# Patient Record
Sex: FEMALE | Race: WHITE | NOT HISPANIC OR LATINO | Employment: OTHER | URBAN - NONMETROPOLITAN AREA
[De-identification: names, ages, dates, MRNs, and addresses within clinical notes are randomized per-mention and may not be internally consistent; named-entity substitution may affect disease eponyms.]

---

## 2017-04-18 ENCOUNTER — TRANSCRIBE ORDERS (OUTPATIENT)
Dept: INFUSION THERAPY | Facility: HOSPITAL | Age: 72
End: 2017-04-18

## 2017-04-18 DIAGNOSIS — M81.0 OSTEOPOROSIS, UNSPECIFIED: Primary | ICD-10-CM

## 2017-06-06 ENCOUNTER — HOSPITAL ENCOUNTER (INPATIENT)
Facility: HOSPITAL | Age: 72
LOS: 10 days | Discharge: HOME HEALTH | End: 2017-06-16
Attending: FAMILY MEDICINE | Admitting: FAMILY MEDICINE

## 2017-06-06 DIAGNOSIS — S72.001D CLOSED RIGHT HIP FRACTURE, WITH ROUTINE HEALING, SUBSEQUENT ENCOUNTER: Primary | ICD-10-CM

## 2017-06-06 PROBLEM — S72.001A CLOSED RIGHT HIP FRACTURE: Status: ACTIVE | Noted: 2017-06-06

## 2017-06-06 LAB
BILIRUB UR QL STRIP: NEGATIVE
CLARITY UR: CLEAR
COLOR UR: YELLOW
GLUCOSE UR STRIP-MCNC: NEGATIVE MG/DL
HGB UR QL STRIP.AUTO: NEGATIVE
KETONES UR QL STRIP: NEGATIVE
LEUKOCYTE ESTERASE UR QL STRIP.AUTO: NEGATIVE
NITRITE UR QL STRIP: NEGATIVE
PH UR STRIP.AUTO: 6.5 [PH] (ref 5–8)
PROT UR QL STRIP: NEGATIVE
SP GR UR STRIP: <=1.005 (ref 1–1.03)
UROBILINOGEN UR QL STRIP: NORMAL

## 2017-06-06 PROCEDURE — 81003 URINALYSIS AUTO W/O SCOPE: CPT | Performed by: FAMILY MEDICINE

## 2017-06-06 PROCEDURE — 93010 ELECTROCARDIOGRAM REPORT: CPT | Performed by: INTERNAL MEDICINE

## 2017-06-06 PROCEDURE — 93005 ELECTROCARDIOGRAM TRACING: CPT | Performed by: FAMILY MEDICINE

## 2017-06-06 PROCEDURE — 94799 UNLISTED PULMONARY SVC/PX: CPT

## 2017-06-06 RX ORDER — SIMVASTATIN 40 MG
40 TABLET ORAL NIGHTLY
COMMUNITY
End: 2017-06-16 | Stop reason: HOSPADM

## 2017-06-06 RX ORDER — LEVETIRACETAM 500 MG/1
500 TABLET ORAL DAILY
Status: DISCONTINUED | OUTPATIENT
Start: 2017-06-07 | End: 2017-06-16 | Stop reason: HOSPADM

## 2017-06-06 RX ORDER — VENLAFAXINE HYDROCHLORIDE 150 MG/1
150 CAPSULE, EXTENDED RELEASE ORAL DAILY
Status: ON HOLD | COMMUNITY
End: 2019-02-20

## 2017-06-06 RX ORDER — PHENYTOIN SODIUM 100 MG/1
100 CAPSULE, EXTENDED RELEASE ORAL EVERY MORNING
COMMUNITY
End: 2017-06-16 | Stop reason: HOSPADM

## 2017-06-06 RX ORDER — ALUMINA, MAGNESIA, AND SIMETHICONE 2400; 2400; 240 MG/30ML; MG/30ML; MG/30ML
15 SUSPENSION ORAL EVERY 6 HOURS PRN
Status: DISCONTINUED | OUTPATIENT
Start: 2017-06-06 | End: 2017-06-16 | Stop reason: HOSPADM

## 2017-06-06 RX ORDER — MONTELUKAST SODIUM 10 MG/1
10 TABLET ORAL NIGHTLY
COMMUNITY
End: 2018-08-17

## 2017-06-06 RX ORDER — BACLOFEN 10 MG/1
10 TABLET ORAL 2 TIMES DAILY
Status: ON HOLD | COMMUNITY
End: 2018-06-04

## 2017-06-06 RX ORDER — CLONAZEPAM 0.5 MG/1
0.5 TABLET ORAL 2 TIMES DAILY PRN
Status: ON HOLD | COMMUNITY
End: 2018-06-04

## 2017-06-06 RX ORDER — SENNA PLUS 8.6 MG/1
1 TABLET ORAL 2 TIMES DAILY
Status: DISCONTINUED | OUTPATIENT
Start: 2017-06-06 | End: 2017-06-16 | Stop reason: HOSPADM

## 2017-06-06 RX ORDER — PHENOBARBITAL 97.2 MG/1
97.2 TABLET ORAL 2 TIMES DAILY
Status: ON HOLD | COMMUNITY
End: 2018-06-12

## 2017-06-06 RX ORDER — LEVETIRACETAM 500 MG/1
750 TABLET ORAL EVERY 12 HOURS SCHEDULED
Status: ON HOLD | COMMUNITY
End: 2019-02-20

## 2017-06-06 RX ORDER — LEVETIRACETAM 250 MG/1
250 TABLET ORAL
Status: ON HOLD | COMMUNITY
End: 2018-06-04

## 2017-06-06 RX ORDER — SENNA PLUS 8.6 MG/1
1 TABLET ORAL 2 TIMES DAILY
Status: ON HOLD | COMMUNITY
End: 2018-06-04

## 2017-06-06 RX ORDER — BISACODYL 10 MG
10 SUPPOSITORY, RECTAL RECTAL DAILY PRN
Status: DISCONTINUED | OUTPATIENT
Start: 2017-06-06 | End: 2017-06-16 | Stop reason: HOSPADM

## 2017-06-06 RX ORDER — ATORVASTATIN CALCIUM 40 MG/1
40 TABLET, FILM COATED ORAL NIGHTLY
Status: DISCONTINUED | OUTPATIENT
Start: 2017-06-06 | End: 2017-06-16 | Stop reason: HOSPADM

## 2017-06-06 RX ORDER — MONTELUKAST SODIUM 10 MG/1
10 TABLET ORAL NIGHTLY
Status: DISCONTINUED | OUTPATIENT
Start: 2017-06-06 | End: 2017-06-16 | Stop reason: HOSPADM

## 2017-06-06 RX ORDER — ONDANSETRON 4 MG/1
4 TABLET, ORALLY DISINTEGRATING ORAL EVERY 6 HOURS PRN
Status: DISCONTINUED | OUTPATIENT
Start: 2017-06-06 | End: 2017-06-16 | Stop reason: HOSPADM

## 2017-06-06 RX ORDER — PHENYTOIN SODIUM 100 MG/1
100 CAPSULE, EXTENDED RELEASE ORAL DAILY
Status: DISCONTINUED | OUTPATIENT
Start: 2017-06-07 | End: 2017-06-10

## 2017-06-06 RX ORDER — HYDROCODONE BITARTRATE AND ACETAMINOPHEN 10; 325 MG/1; MG/1
1 TABLET ORAL EVERY 6 HOURS PRN
Status: DISCONTINUED | OUTPATIENT
Start: 2017-06-06 | End: 2017-06-07

## 2017-06-06 RX ORDER — PANTOPRAZOLE SODIUM 40 MG/1
40 TABLET, DELAYED RELEASE ORAL 2 TIMES DAILY
COMMUNITY

## 2017-06-06 RX ORDER — LEVETIRACETAM 250 MG/1
250 TABLET ORAL NIGHTLY
Status: DISCONTINUED | OUTPATIENT
Start: 2017-06-06 | End: 2017-06-16 | Stop reason: HOSPADM

## 2017-06-06 RX ORDER — HYDROCODONE BITARTRATE AND ACETAMINOPHEN 10; 325 MG/1; MG/1
1 TABLET ORAL 2 TIMES DAILY PRN
COMMUNITY
End: 2017-06-16 | Stop reason: HOSPADM

## 2017-06-06 RX ORDER — BACLOFEN 10 MG/1
20 TABLET ORAL EVERY 12 HOURS SCHEDULED
Status: DISCONTINUED | OUTPATIENT
Start: 2017-06-06 | End: 2017-06-16 | Stop reason: HOSPADM

## 2017-06-06 RX ORDER — PHENYTOIN SODIUM 100 MG/1
200 CAPSULE, EXTENDED RELEASE ORAL NIGHTLY
Status: DISCONTINUED | OUTPATIENT
Start: 2017-06-06 | End: 2017-06-10

## 2017-06-06 RX ORDER — PANTOPRAZOLE SODIUM 40 MG/1
40 TABLET, DELAYED RELEASE ORAL
Status: DISCONTINUED | OUTPATIENT
Start: 2017-06-07 | End: 2017-06-16 | Stop reason: HOSPADM

## 2017-06-06 RX ORDER — ONDANSETRON 4 MG/1
4 TABLET, FILM COATED ORAL EVERY 6 HOURS PRN
Status: DISCONTINUED | OUTPATIENT
Start: 2017-06-06 | End: 2017-06-16 | Stop reason: HOSPADM

## 2017-06-06 RX ORDER — PHENOBARBITAL 32.4 MG/1
97.2 TABLET ORAL EVERY 12 HOURS SCHEDULED
Status: COMPLETED | OUTPATIENT
Start: 2017-06-06 | End: 2017-06-16

## 2017-06-06 RX ORDER — ONDANSETRON 2 MG/ML
4 INJECTION INTRAMUSCULAR; INTRAVENOUS EVERY 6 HOURS PRN
Status: DISCONTINUED | OUTPATIENT
Start: 2017-06-06 | End: 2017-06-16 | Stop reason: HOSPADM

## 2017-06-06 RX ORDER — PHENYTOIN SODIUM 100 MG/1
200 CAPSULE, EXTENDED RELEASE ORAL
COMMUNITY
End: 2017-06-16 | Stop reason: HOSPADM

## 2017-06-06 RX ORDER — ACETAMINOPHEN 325 MG/1
650 TABLET ORAL EVERY 4 HOURS PRN
Status: DISCONTINUED | OUTPATIENT
Start: 2017-06-06 | End: 2017-06-16 | Stop reason: HOSPADM

## 2017-06-06 RX ORDER — CLONAZEPAM 0.5 MG/1
0.5 TABLET ORAL 2 TIMES DAILY PRN
Status: DISCONTINUED | OUTPATIENT
Start: 2017-06-06 | End: 2017-06-16 | Stop reason: HOSPADM

## 2017-06-06 RX ORDER — VENLAFAXINE HYDROCHLORIDE 150 MG/1
150 CAPSULE, EXTENDED RELEASE ORAL
Status: DISCONTINUED | OUTPATIENT
Start: 2017-06-07 | End: 2017-06-16 | Stop reason: HOSPADM

## 2017-06-06 RX ADMIN — SENNOSIDES 1 TABLET: 8.6 TABLET ORAL at 21:16

## 2017-06-06 RX ADMIN — LEVETIRACETAM 250 MG: 250 TABLET, FILM COATED ORAL at 21:16

## 2017-06-06 RX ADMIN — PHENOBARBITAL 97.2 MG: 32.4 TABLET ORAL at 21:16

## 2017-06-06 RX ADMIN — CLONAZEPAM 0.5 MG: 0.5 TABLET ORAL at 21:17

## 2017-06-06 RX ADMIN — PHENYTOIN SODIUM 200 MG: 100 CAPSULE, EXTENDED RELEASE ORAL at 21:16

## 2017-06-06 RX ADMIN — ATORVASTATIN CALCIUM 40 MG: 40 TABLET, FILM COATED ORAL at 21:16

## 2017-06-06 RX ADMIN — MONTELUKAST SODIUM 10 MG: 10 TABLET ORAL at 21:16

## 2017-06-06 RX ADMIN — BACLOFEN 20 MG: 10 TABLET ORAL at 21:16

## 2017-06-07 LAB
ALBUMIN SERPL-MCNC: 3.3 G/DL (ref 3.4–4.8)
ALBUMIN/GLOB SERPL: 1 G/DL (ref 1.5–2.5)
ALP SERPL-CCNC: 141 U/L (ref 35–104)
ALT SERPL W P-5'-P-CCNC: 12 U/L (ref 10–36)
ANION GAP SERPL CALCULATED.3IONS-SCNC: 6.7 MMOL/L (ref 3.6–11.2)
AST SERPL-CCNC: 18 U/L (ref 10–30)
BASOPHILS # BLD AUTO: 0.06 10*3/MM3 (ref 0–0.3)
BASOPHILS NFR BLD AUTO: 1.5 % (ref 0–2)
BILIRUB SERPL-MCNC: 0.4 MG/DL (ref 0.2–1.8)
BUN BLD-MCNC: 6 MG/DL (ref 7–21)
BUN/CREAT SERPL: 15.4 (ref 7–25)
CALCIUM SPEC-SCNC: 8.8 MG/DL (ref 7.7–10)
CHLORIDE SERPL-SCNC: 106 MMOL/L (ref 99–112)
CO2 SERPL-SCNC: 25.3 MMOL/L (ref 24.3–31.9)
CREAT BLD-MCNC: 0.39 MG/DL (ref 0.43–1.29)
DEPRECATED RDW RBC AUTO: 48.8 FL (ref 37–54)
EOSINOPHIL # BLD AUTO: 0.44 10*3/MM3 (ref 0–0.7)
EOSINOPHIL NFR BLD AUTO: 10.8 % (ref 0–7)
ERYTHROCYTE [DISTWIDTH] IN BLOOD BY AUTOMATED COUNT: 14.5 % (ref 11.5–14.5)
GFR SERPL CREATININE-BSD FRML MDRD: >150 ML/MIN/1.73
GLOBULIN UR ELPH-MCNC: 3.3 GM/DL
GLUCOSE BLD-MCNC: 98 MG/DL (ref 70–110)
HCT VFR BLD AUTO: 34.2 % (ref 37–47)
HGB BLD-MCNC: 11.1 G/DL (ref 12–16)
IMM GRANULOCYTES # BLD: 0.02 10*3/MM3 (ref 0–0.03)
IMM GRANULOCYTES NFR BLD: 0.5 % (ref 0–0.5)
LYMPHOCYTES # BLD AUTO: 1.09 10*3/MM3 (ref 1–3)
LYMPHOCYTES NFR BLD AUTO: 26.7 % (ref 16–46)
MCH RBC QN AUTO: 32.1 PG (ref 27–33)
MCHC RBC AUTO-ENTMCNC: 32.5 G/DL (ref 33–37)
MCV RBC AUTO: 98.8 FL (ref 80–94)
MONOCYTES # BLD AUTO: 0.32 10*3/MM3 (ref 0.1–0.9)
MONOCYTES NFR BLD AUTO: 7.8 % (ref 0–12)
NEUTROPHILS # BLD AUTO: 2.16 10*3/MM3 (ref 1.4–6.5)
NEUTROPHILS NFR BLD AUTO: 52.7 % (ref 40–75)
OSMOLALITY SERPL CALC.SUM OF ELEC: 273.3 MOSM/KG (ref 273–305)
PLATELET # BLD AUTO: 383 10*3/MM3 (ref 130–400)
PMV BLD AUTO: 8.4 FL (ref 6–10)
POTASSIUM BLD-SCNC: 4.1 MMOL/L (ref 3.5–5.3)
PROT SERPL-MCNC: 6.6 G/DL (ref 6–8)
RBC # BLD AUTO: 3.46 10*6/MM3 (ref 4.2–5.4)
SODIUM BLD-SCNC: 138 MMOL/L (ref 135–153)
WBC NRBC COR # BLD: 4.09 10*3/MM3 (ref 4.5–12.5)

## 2017-06-07 PROCEDURE — 97530 THERAPEUTIC ACTIVITIES: CPT

## 2017-06-07 PROCEDURE — 97116 GAIT TRAINING THERAPY: CPT

## 2017-06-07 PROCEDURE — G8979 MOBILITY GOAL STATUS: HCPCS

## 2017-06-07 PROCEDURE — 25010000002 ENOXAPARIN PER 10 MG: Performed by: FAMILY MEDICINE

## 2017-06-07 PROCEDURE — 80053 COMPREHEN METABOLIC PANEL: CPT | Performed by: FAMILY MEDICINE

## 2017-06-07 PROCEDURE — G8985 CARRY GOAL STATUS: HCPCS

## 2017-06-07 PROCEDURE — 97163 PT EVAL HIGH COMPLEX 45 MIN: CPT

## 2017-06-07 PROCEDURE — 85025 COMPLETE CBC W/AUTO DIFF WBC: CPT | Performed by: FAMILY MEDICINE

## 2017-06-07 PROCEDURE — 97110 THERAPEUTIC EXERCISES: CPT

## 2017-06-07 PROCEDURE — G8978 MOBILITY CURRENT STATUS: HCPCS

## 2017-06-07 PROCEDURE — 97167 OT EVAL HIGH COMPLEX 60 MIN: CPT

## 2017-06-07 PROCEDURE — G8984 CARRY CURRENT STATUS: HCPCS

## 2017-06-07 RX ORDER — HYDROCODONE BITARTRATE AND ACETAMINOPHEN 10; 325 MG/1; MG/1
1 TABLET ORAL EVERY 4 HOURS PRN
Status: DISCONTINUED | OUTPATIENT
Start: 2017-06-07 | End: 2017-06-16 | Stop reason: HOSPADM

## 2017-06-07 RX ADMIN — PHENOBARBITAL 97.2 MG: 32.4 TABLET ORAL at 08:12

## 2017-06-07 RX ADMIN — VENLAFAXINE HYDROCHLORIDE 150 MG: 150 CAPSULE, EXTENDED RELEASE ORAL at 08:11

## 2017-06-07 RX ADMIN — SENNOSIDES 1 TABLET: 8.6 TABLET ORAL at 08:11

## 2017-06-07 RX ADMIN — ENOXAPARIN SODIUM 40 MG: 40 INJECTION SUBCUTANEOUS at 08:12

## 2017-06-07 RX ADMIN — HYDROCODONE BITARTRATE AND ACETAMINOPHEN 1 TABLET: 10; 325 TABLET ORAL at 20:54

## 2017-06-07 RX ADMIN — LEVETIRACETAM 250 MG: 250 TABLET, FILM COATED ORAL at 20:55

## 2017-06-07 RX ADMIN — LEVETIRACETAM 500 MG: 500 TABLET, FILM COATED ORAL at 08:11

## 2017-06-07 RX ADMIN — PHENYTOIN SODIUM 200 MG: 100 CAPSULE, EXTENDED RELEASE ORAL at 20:55

## 2017-06-07 RX ADMIN — SENNOSIDES 1 TABLET: 8.6 TABLET ORAL at 17:33

## 2017-06-07 RX ADMIN — PHENYTOIN SODIUM 100 MG: 100 CAPSULE, EXTENDED RELEASE ORAL at 08:11

## 2017-06-07 RX ADMIN — BACLOFEN 20 MG: 10 TABLET ORAL at 08:11

## 2017-06-07 RX ADMIN — BACLOFEN 20 MG: 10 TABLET ORAL at 20:54

## 2017-06-07 RX ADMIN — MONTELUKAST SODIUM 10 MG: 10 TABLET ORAL at 20:55

## 2017-06-07 RX ADMIN — PHENOBARBITAL 97.2 MG: 32.4 TABLET ORAL at 20:54

## 2017-06-07 RX ADMIN — CLONAZEPAM 0.5 MG: 0.5 TABLET ORAL at 20:54

## 2017-06-07 RX ADMIN — HYDROCODONE BITARTRATE AND ACETAMINOPHEN 1 TABLET: 10; 325 TABLET ORAL at 05:39

## 2017-06-07 RX ADMIN — PANTOPRAZOLE SODIUM 40 MG: 40 TABLET, DELAYED RELEASE ORAL at 05:39

## 2017-06-07 RX ADMIN — HYDROCODONE BITARTRATE AND ACETAMINOPHEN 1 TABLET: 10; 325 TABLET ORAL at 14:40

## 2017-06-07 RX ADMIN — ATORVASTATIN CALCIUM 40 MG: 40 TABLET, FILM COATED ORAL at 20:55

## 2017-06-07 NOTE — PROGRESS NOTES
Rehabilitation Nursing  Inpatient Rehabilitation Plan of Care Note    Plan of Care  Copy from POCBody Function Structure    Skin Integrity (Active)  Current Status (6/6/2017 9:00:00 PM): impaired skin integrity  Weekly Goal: prevent further skin integrity impairment  Discharge Goal: signs of regaining skin integrity    Safety    Potential for Injury (Active)  Current Status (6/6/2017 9:00:00 PM): risk for falls  Weekly Goal: remain free of falls  Discharge Goal: identify measures to prevent falls    Signed by: Yazmin Munoz Nurse

## 2017-06-07 NOTE — PROGRESS NOTES
Inpatient Rehabilitation Functional Measures Assessment    Functional Measures  RAS Eating:  RAS Grooming:  RAS Bathing:  RAS Upper Body Dressing:  RAS Lower Body Dressing:  RAS Toileting:    RAS Bladder Management  Level of Assistance:  Frequency/Number of Accidents this Shift:    RAS Bowel Management  Level of Assistance:  Frequency/Number of Accidents this Shift:    RAS Bed/Chair/Wheelchair Transfer:  RAS Toilet Transfer:  RAS Tub/Shower Transfer:    Previously Documented Mode of Locomotion at Discharge:  RAS Expected Mode of Locomotion at Discharge:  RAS Walk/Wheelchair:  RAS Stairs:    RAS Comprehension:  Both ( auditory and visual) modes of comprehension are used  equally. Comprehension Score = 7, Independent.  Patient comprehends  complex/abstract information in their primary language.  Patient is completely  independent for auditory and visual comprehension.  There are no activity  limitations.  RAS Expression:  Both ( vocal and non-vocal) modes of expression are used  equally. Expression Score = 7, Independent. Patient expresses complex/abstract  information in their primary language.  Patient is completely independent for  vocal and non-vocal expression.  There are no activity limitations.  RAS Social Interaction:  Social Interaction Score = 6, Modified Independent.  Patient is modified independent for social interaction, requiring: Requires  additional time.  RAS Problem Solving:  Patient does not make appropriate decisions in order to  solve complex problems without assistance from a helper. Problem Solving Score =  3, Moderate Direction. Patient makes appropriate decisions in order to solve  routine problems 50-74% of the time. Patient requires moderate/some direction  for the following behavior(s): Poor judgment. Difficulty with self-monitoring.  Difficulty weighing alternatives/making choices. Denies presence or extent of  disability. Impulsivity. patient is non weight bearing to right lower  extremity  and she neds constant reminding .  RAS Memory:  Memory Score = 3, Moderate Prompting. Patient recognizes and  remembers 50-74% of the time. Patient requires moderate/some prompting  for  memory for the following: Unaware of daily routine. Difficulty recognizing  family and/or friends. Inability to recognize people or remember  instructions/directions requiring short-term recall (minutes, hours, days).  Limited recall of daily routine.    Therapy Mode Minutes  Occupational Therapy:  Physical Therapy:  Speech Language Pathology:    Discharge Functional Goals:    Signed by: Nurse Mohinder

## 2017-06-07 NOTE — PROGRESS NOTES
Rehabilitation Nursing  Inpatient Rehabilitation Functional Measures Assessment and Plan of Care    Plan of Care/Interventions  Body Function Structure    Skin Integrity (Active)  Current Status (6/6/2017 9:00:00 PM): impaired skin integrity  Weekly Goal: prevent further skin integrity impairment  Discharge Goal: signs of regaining skin integrity    Safety    Potential for Injury (Active)  Current Status (6/6/2017 9:00:00 PM): risk for falls  Weekly Goal: remain free of falls  Discharge Goal: identify measures to prevent falls    Functional Measures  RAS Eating:  RAS Grooming:  RAS Bathing:  RAS Upper Body Dressing:  RAS Lower Body Dressing:  RAS Toileting:    RAS Bladder Management  Level of Assistance:  Frequency/Number of Accidents this Shift:    RAS Bowel Management  Level of Assistance:  Frequency/Number of Accidents this Shift:    RAS Bed/Chair/Wheelchair Transfer:  RAS Toilet Transfer:  RAS Tub/Shower Transfer:    Previously Documented Mode of Locomotion at Discharge:  RAS Expected Mode of Locomotion at Discharge:  RAS Walk/Wheelchair:  RAS Stairs:    RAS Comprehension:  Both ( auditory and visual) modes of comprehension are used  equally. Comprehension Score = 6, Modified [place].  Patient comprehends  complex/abstract information in their primary language, requiring: Additional  time.  RAS Expression:  Both ( vocal and non-vocal) modes of expression are used  equally. Expression Score = 6,  Modified Independent. Patient expresses  complex/abstract information in their primary language, requiring: Additional  time.  RAS Social Interaction:  Social Interaction Score = 6, Modified Independent.  Patient is modified independent for social interaction, requiring: Requires  additional time.  RAS Problem Solving:  Problem Solving Score = 6, Modified [place].  Patient  makes appropriate decisions in order to solve complex problems, but requires  extra time.  RAS Memory:  Memory Score = 6, Modified  [place].  Patient is modified  independent for memory, requiring: Requires additional time.    Signed by: Sasha Martin Nurse

## 2017-06-07 NOTE — THERAPY EVALUATION
Inpatient Rehabilitation - Occupational Therapy Initial Evaluation   Ed     Patient Name: Maribel Staton  : 1945  MRN: [id number]  Today's Date: 2017  Onset of Illness/Injury or Date of Surgery Date: 17  Date of Referral to OT: 17  Referring Physician: Dr. Velasquez    Admit Date: 2017     No diagnosis found.  Patient Active Problem List   Diagnosis   • Closed right hip fracture     Past Medical History:   Diagnosis Date   • Anxiety    • Arthritis    • Asthma    • Chronic low back pain    • COPD (chronic obstructive pulmonary disease)    • Depression    • GERD (gastroesophageal reflux disease)    • History of transfusion    • Hypertension    • Seizures    • Tobacco abuse      Past Surgical History:   Procedure Laterality Date   • APPENDECTOMY      about 15 years ago   • CARDIAC CATHETERIZATION     • CATARACT EXTRACTION Bilateral    • CHOLECYSTECTOMY  about 4 years ago   • CHOLECYSTECTOMY     • HIP ARTHROPLASTY Bilateral    • HYSTERECTOMY     • ORIF HIP FRACTURE Right 2017   • SKIN GRAFT                OT ASSESSMENT FLOWSHEET (last 72 hours)      OT Evaluation       17 1238 17 0751 17 2042 17 2018       Rehab Evaluation    Document Type evaluation;therapy note (daily note)  -AB        Subjective Information no complaints;agree to therapy  -AB        Patient Effort, Rehab Treatment good  -AB        General Information    Patient Profile Review yes  -AB        Onset of Illness/Injury or Date of Surgery Date 17  -AB        Referring Physician Dr. Velasquez  -AB        Precautions/Limitations fall precautions;non-weight bearing status;seizure precautions;other (see comments)   <skin integrity; NWB RLE; intermittent confusion  -AB        Prior Level of Function independent:;all household mobility;ADL's;home management   utilized RW at times for assistance w/ ambulation  -AB        Equipment Currently Used at Home shower  chair;commode;wheelchair;wheelchair, motorized;walker, rolling;cane, quad;cane, straight  -AB walker, rolling;wheelchair;shower chair;nebulizer;oxygen;cane, straight;wheelchair, motorized  -LUCIO  crutches;power chair, (recliner lift);walker, rolling;wheelchair  -     Plans/Goals Discussed With patient;agreed upon  -AB        Risks Reviewed patient:;LOB;increased discomfort;change in vital signs;increased drainage;lines disloged  -AB        Benefits Reviewed patient:;improve function;increase independence;increase strength;increase balance;decrease pain;decrease risk of DVT;improve skin integrity;increase knowledge  -AB        Barriers to Rehab cognitive status;physical barrier  -AB        Living Environment    Lives With spouse  -AB spouse  -LUCIO spouse  -      Living Arrangements house;mobile home  -AB house  -LUCIO house  -      Home Accessibility tub/shower is not walk in;ramps present at home  -AB ramps present at home;bed and bath on same level  -LUCIO bed and bath on same level  -      Stair Railings at Home   inside, present on left side  -      Type of Financial/Environmental Concern   eviction pending  -      Transportation Available  car  -LUCIO car  -      Clinical Impression    Date of Referral to OT 06/06/17  -AB        OT Diagnosis s/p R Hip ORIF  -AB        Impairments Found (describe specific impairments) aerobic capacity/endurance;arousal, attention, and cognition;gait, locomotion, and balance;muscle performance;other (see comments)   impaired ADL's  -AB        Patient/Family Goals Statement Return home  -AB        Criteria for Skilled Therapeutic Interventions Met yes;treatment indicated  -AB        Rehab Potential good, to achieve stated therapy goals  -AB        Therapy Frequency 3-5 times/wk  -AB        Predicted Duration of Therapy Intervention (days/wks) 10-14 days  -AB        Anticipated Equipment Needs At Discharge --   TBD  -AB        Anticipated Discharge Disposition --   TBD  -AB         Functional Level Prior    Ambulation    0-->independent  -FH     Transferring    0-->independent  -FH     Toileting    0-->independent  -FH     Bathing    0-->independent  -     Dressing    0-->independent  -     Eating    0-->independent  -FH     Communication    0-->understands/communicates without difficulty  -FH     Swallowing    0-->swallows foods/liquids without difficulty  -FH     Vision Assessment/Intervention    Visual Impairment WFL with corrective lenses  -AB        Cognitive Assessment/Intervention    Current Cognitive/Communication Assessment impaired  -AB        Orientation Status oriented to;person;place;situation;disoriented to;time  -AB        Follows Commands/Answers Questions able to follow single-step instructions;75% of the time;100% of the time;needs cueing;needs increased time;needs repetition  -AB        Personal Safety decreased awareness, need for assist;decreased awareness, need for safety  -AB        Personal Safety Interventions gait belt;nonskid shoes/slippers when out of bed;supervised activity  -AB        ROM (Range of Motion)    General ROM Detail BUE AROM WFL  -AB        MMT (Manual Muscle Testing)    General MMT Assessment Detail Bilateral Shoulders: 4/5; Bilateral elbows, hands: 4+/5  -AB        Mobility Assessment/Training    Extremity Weight-Bearing Status right lower extremity  -AB        Right Lower Extremity Weight-Bearing non weight-bearing  -AB        Transfer Assessment/Treatment    Transfer, Comment ModA/Ivory  -AB        ADL Assessment/Intervention    Additional Documentation Self-Feeding Assessment/Training (Group)  -AB        Lower Body Bathing Assessment/Training    LB Bathing Assess/Train, Comment TB Bathing: ModA  -AB        Upper Body Dressing Assessment/Training    UB Dressing Assess/Train, Comment Supervision/Set-Up  -AB        Lower Body Dressing Assessment/Training    LB Dressing Assess/Train, Comment MaxA  -AB        Toileting Assessment/Training     Toileting Assess/Train, Comment TotalA  -AB        Grooming Assessment/Training    Grooming Assess/Train, Comment Supervision/Set-Up  -AB        Self-Feeding Assessment/Training    Self-Feeding Assess/Train, Comment Set-Up  -AB        Motor Skills/Interventions    Additional Documentation Fine Motor Coordination Training (Group);Gross Motor Coordination Training (Group)  -AB        Therapy Exercises    Bilateral Upper Extremity AROM:;sitting   BUE CoordEx; G/FMC TherEx/Act; Strengthening  -AB        BUE Resistance other (comment)   BUE Wrist Rolls X2  -AB        Gross Motor Coordination Training    Gross Motor Skill, Impairments Detail BUE Rapid Alternating Intact  -AB        Fine Motor Coordination Training    Detail (Fine Motor Coordination Training) BUE FMC/In-Hand Manipulation slightly decreased  -AB        Opposition Right:;Left:;intact  -AB        Sensory Assessment/Intervention    Sensory Impairment --   BUE intact  -AB        General Therapy Interventions    Planned Therapy Interventions activity intolerance;adaptive equipment training;ADL retraining;energy conservation;fine motor coordination training;home exercise program;motor coordination training;ROM (Range of Motion);strengthening;transfer training;other (see comments)   Therapeutic groups as beneficial to patient  -AB        Positioning and Restraints    Post Treatment Position wheelchair  -AB        In Bed supine;call light within reach;encouraged to call for assist;exit alarm on;side rails up x2;RLE elevated   in AM  -AB        In Wheelchair sitting;with other staff;legs elevated   in PM w/   -AB          User Key  (r) = Recorded By, (t) = Taken By, (c) = Cosigned By    Initials Name Effective Dates    AB Missy Pelayo OT 02/04/16 -     LUCIO Joe RN 06/16/16 -      Yazmin Munoz RN 06/16/16 -            Occupational Therapy Education     Title: PT OT SLP Therapies (Active)     Topic: Occupational  Therapy (Active)     Point: ADL training (Active)    Description: Instruct learner(s) on proper safety adaptation and remediation techniques during self care or transfers.   Instruct in proper use of assistive devices.    Learning Progress Summary    Learner Readiness Method Response Comment Documented by Status   Patient Acceptance E,D NR  AB 06/07/17 1234 Active               Point: Precautions (Active)    Description: Instruct learner(s) on prescribed precautions during self-care and functional transfers.    Learning Progress Summary    Learner Readiness Method Response Comment Documented by Status   Patient Acceptance E,D NR  AB 06/07/17 1234 Active               Point: Body mechanics (Active)    Description: Instruct learner(s) on proper positioning and spine alignment during self-care, functional mobility activities and/or exercises.    Learning Progress Summary    Learner Readiness Method Response Comment Documented by Status   Patient Acceptance E,D NR  AB 06/07/17 1234 Active                      User Key     Initials Effective Dates Name Provider Type Discipline    AB 02/04/16 -  Missy Pelayo, OT Occupational Therapist OT                  OT Recommendation and Plan  Anticipated Equipment Needs At Discharge:  (TBD)  Anticipated Discharge Disposition:  (TBD)  Planned Therapy Interventions: activity intolerance, adaptive equipment training, ADL retraining, energy conservation, fine motor coordination training, home exercise program, motor coordination training, ROM (Range of Motion), strengthening, transfer training, other (see comments) (Therapeutic groups as beneficial to patient)  Therapy Frequency: 3-5 times/wk             OT Goals       06/07/17 1231          Transfer Training OT LTG    Transfer Training OT LTG, Date Established 06/07/17  -AB      Transfer Training OT LTG, Time to Achieve by discharge  -AB      Transfer Training OT LTG, Activity Type all transfers  -AB      Transfer Training OT  LTG, [place] Level contact guard assist  -AB      Patient Education OT LTG    Patient Education OT LTG, Date Established 06/07/17  -AB      Patient Education OT LTG, Time to Achieve by discharge  -AB      Patient Education OT LTG, Education Type HEP;adaptive equipment mgmt;home safety;work simplification  -AB      Bathing OT STG    Bathing Goal OT STG, Date Established 06/07/17  -AB      Bathing Goal OT STG, Time to Achieve 5 - 7 days  -AB      Bathing Goal OT STG, [place] Level minimum assist (75% patient effort)  -AB      Bathing OT LTG    Bathing Goal OT LTG, Date Established 06/07/17  -AB      Bathing Goal OT LTG, Time to Achieve by discharge  -AB      Bathing Goal OT LTG, [place] Level contact guard assist  -AB      Eating Self-Feeding OT LTG    Eat Self Feeding Goal OT LTG, Date Established 06/07/17  -AB      Eat Self Feeding Goal OT LTG, Time to Achieve by discharge  -AB      Eat Self Feed Goal OT LTG, [place] Level conditional independence  -AB      Toileting OT LTG    Toileting Goal OT LTG, Date Established 06/07/17  -AB      Toileting Goal OT LTG, Time to Achieve by discharge  -AB      Toileting Goal OT LTG, [place] Level contact guard assist  -AB      LB Dressing OT STG    LB Dressing Goal OT STG, Date Established 06/07/17  -AB      LB Dressing Goal OT STG, Time to Achieve 5 - 7 days  -AB      LB Dressing Goal OT STG, [place] Level moderate assist (50% patient effort)  -AB      LB Dressing OT LTG    LB Dressing Goal OT LTG, Date Established 06/07/17  -AB      LB Dressing Goal OT LTG, Time to Achieve by discharge  -AB      LB Dressing Goal OT LTG, [place] Level minimum assist (75% patient effort)  -AB        User Key  (r) = Recorded By, (t) = Taken By, (c) = Cosigned By    Initials Name Provider Type    AB Missy Pelayo, OT Occupational Therapist              Time Calculation:   OT Start Time: 1245  OT Stop Time: 1325  OT Time Calculation (min): 40 min  OT  Non-Billable Time (min): 60 min    Therapy Charges for Today     Code Description Service Date Service Provider Modifiers Qty    66513599862 HC OT EVAL HIGH COMPLEXITY 3 2017 Missy Pelayo, OT GO 1    27332476019 HC OT THER SUPP EA 15 MIN 2017 Missy Pelayo OT GO 2    14906281013 HC OT THERAPEUTIC ACT EA 15 MIN 2017 Missy Dariana Tanges OT GO 3               Missy Tanges OT  2017 and Inpatient Rehabilitation - Occupational Therapy Treatment Note  [facility]     Patient Name: Maribel Staton  : 1945  MRN: [id number]  Today's Date: 2017  Onset of Illness/Injury or Date of Surgery Date: 17  Date of Referral to OT: 17  Referring Physician: Dr. Velasquez      Admit Date: 2017    Visit Dx:   No diagnosis found.  Patient Active Problem List   Diagnosis   • Closed right hip fracture                 OT Goals       17 1231          Transfer Training OT LTG    Transfer Training OT LTG, Date Established 17  -AB      Transfer Training OT LTG, Time to Achieve by discharge  -AB      Transfer Training OT LTG, Activity Type all transfers  -AB      Transfer Training OT LTG, [place] Level contact guard assist  -AB      Patient Education OT LTG    Patient Education OT LTG, Date Established 17  -AB      Patient Education OT LTG, Time to Achieve by discharge  -AB      Patient Education OT LTG, Education Type HEP;adaptive equipment mgmt;home safety;work simplification  -AB      Bathing OT STG    Bathing Goal OT STG, Date Established 17  -AB      Bathing Goal OT STG, Time to Achieve 5 - 7 days  -AB      Bathing Goal OT STG, [place] Level minimum assist (75% patient effort)  -AB      Bathing OT LTG    Bathing Goal OT LTG, Date Established 17  -AB      Bathing Goal OT LTG, Time to Achieve by discharge  -AB      Bathing Goal OT LTG, [place] Level contact guard assist  -AB      Eating Self-Feeding OT LTG    Eat Self Feeding Goal  OT LTG, Date Established 06/07/17  -AB      Eat Self Feeding Goal OT LTG, Time to Achieve by discharge  -AB      Eat Self Feed Goal OT LTG, [place] Level conditional independence  -AB      Toileting OT LTG    Toileting Goal OT LTG, Date Established 06/07/17  -AB      Toileting Goal OT LTG, Time to Achieve by discharge  -AB      Toileting Goal OT LTG, [place] Level contact guard assist  -AB      LB Dressing OT STG    LB Dressing Goal OT STG, Date Established 06/07/17  -AB      LB Dressing Goal OT STG, Time to Achieve 5 - 7 days  -AB      LB Dressing Goal OT STG, [place] Level moderate assist (50% patient effort)  -AB      LB Dressing OT LTG    LB Dressing Goal OT LTG, Date Established 06/07/17  -AB      LB Dressing Goal OT LTG, Time to Achieve by discharge  -AB      LB Dressing Goal OT LTG, [place] Level minimum assist (75% patient effort)  -AB        User Key  (r) = Recorded By, (t) = Taken By, (c) = Cosigned By    Initials Name Provider Type    AB Missy Pelayo OT Occupational Therapist          Occupational Therapy Education     Title: PT OT SLP Therapies (Active)     Topic: Occupational Therapy (Active)     Point: ADL training (Active)    Description: Instruct learner(s) on proper safety adaptation and remediation techniques during self care or transfers.   Instruct in proper use of assistive devices.    Learning Progress Summary    Learner Readiness Method Response Comment Documented by Status   Patient Acceptance E,D NR  AB 06/07/17 1234 Active               Point: Precautions (Active)    Description: Instruct learner(s) on prescribed precautions during self-care and functional transfers.    Learning Progress Summary    Learner Readiness Method Response Comment Documented by Status   Patient Acceptance E,D NR  AB 06/07/17 1234 Active               Point: Body mechanics (Active)    Description: Instruct learner(s) on proper positioning and spine alignment during self-care,  functional mobility activities and/or exercises.    Learning Progress Summary    Learner Readiness Method Response Comment Documented by Status   Patient Acceptance E,D NR  AB 06/07/17 1234 Active                      User Key     Initials Effective Dates Name Provider Type [facility] 02/04/16 -  Missy Pelayo OT Occupational Therapist OT                  OT Recommendation and Plan  Anticipated Equipment Needs At Discharge:  (TBD)  Anticipated Discharge Disposition:  (TBD)  Planned Therapy Interventions: activity intolerance, adaptive equipment training, ADL retraining, energy conservation, fine motor coordination training, home exercise program, motor coordination training, ROM (Range of Motion), strengthening, transfer training, other (see comments) (Therapeutic groups as beneficial to patient)  Therapy Frequency: 3-5 times/wk          Time Calculation:         Time Calculation- OT       06/07/17 1552 06/07/17 0955       Time Calculation- OT    OT Start Time 1245  -AB 0955  -AB     OT Stop Time 1325  -AB 1045  -AB     OT Time Calculation (min) 40 min  -AB 50 min  -AB     Total Timed Code Minutes- OT 40 minute(s)  -AB 50 minute(s)  -AB     OT Non-Billable Time (min)  60 min  -AB       User Key  (r) = Recorded By, (t) = Taken By, (c) = Cosigned By    Initials Name Provider Type    AB Missy Pelayo OT Occupational Therapist           Therapy Charges for Today     Code Description Service Date Service Provider Modifiers Qty    05616839715 HC OT EVAL HIGH COMPLEXITY 3 6/7/2017 Missy Pelayo OT GO 1    43946048197 HC OT THER SUPP EA 15 MIN 6/7/2017 Missy Pelayo OT GO 2    [id number] HC OT THERAPEUTIC ACT EA 15 MIN 6/7/2017 iMssy Pelayo OT GO 3               Missy Pelayo OT  6/7/2017

## 2017-06-07 NOTE — PROGRESS NOTES
Rehabilitation Nursing  Inpatient Rehabilitation Admission Assessment of Functional Measures    Functional Measures  RAS Eating:  RAS Grooming:  RAS Bathing:  RAS Upper Body Dressing:  RAS Lower Body Dressing:  RAS Toileting:    RAS Bladder Management  Level of Assistance:  Bladder Score = 3. Patient performs 50-74% of tasks and  requires moderate assistance for bladder management. [place] provides some assist  to roll or bridge to place OR remove bedpan. bedpan  Frequency/Number of Accidents (4 days prior to admission):  Bladder accidents  prior to admission:  0 Patient has not had an accident, but used a  device/medication 4 days prior to admission requiring: bedpan .  Frequency/Number of Accidents this Shift: Bladder accidents this shift:  0 .  Patient has not had an accident, but used a device/medication this shift  requiring: bedpan .    RAS Bowel Management  Level of Assistance: Bowel Score = 3. Patient performs 50-74% of tasks and  requires moderate assistance for bowel management. [place] provides some assist  to roll or bridge to place OR remove bedpan. bedpan, medication  Frequency/Number of Accidents (4 days prior to admission): Bowel accidents prior  to admission:  0 Patient has not had an accident, but used a device/medication 4  days prior to admission requiring: bedpan, medication .  Frequency/Number of Accidents this Shift: Bowel accidents this shift: 0 .  Patient has not had an accident, but used a device/medication this shift  requiring: bedpan, medication .    RAS Bed/Chair/Wheelchair Transfer:  [facility] Toilet Transfer:  [facility] Tub/Shower Transfer:    Previously Documented Mode of Locomotion at Discharge:  [facility] Expected Mode of Locomotion at Discharge:  [facility] Walk/Wheelchair:  [facility] Stairs:    [facility] Comprehension:  Both ( auditory and visual) modes of comprehension are used  equally. Comprehension Score = 6, Modified [place].  Patient comprehends  complex/abstract information in their primary language,  requiring: Additional  time.  RAS Expression:  Both ( vocal and non-vocal) modes of expression are used  equally. Expression Score = 6,  Modified Independent. Patient expresses  complex/abstract information in their primary language, requiring: Additional  time.  RAS Social Interaction:  Social Interaction Score = 6, Modified Independent.  Patient is modified independent for social interaction, requiring: Requires  additional time. Medications.  RAS Problem Solving:  Problem Solving Score = 6, Modified [place].  Patient  makes appropriate decisions in order to solve complex problems, but requires  extra time.  RAS Memory:  Memory Score = 6, Modified [place].  Patient is modified  independent for memory, requiring: Requires additional time.    Discharge Functional Goals:  Bladder: 6  Bowel: 6  Mode of Comprehension: B  Comprehension: 6  Mode of Expression: B  Expression: 6  Problem Solvin  Social Interaction: 6  Memory: 6    Signed by: Arina Joe, Supervisor

## 2017-06-07 NOTE — PROGRESS NOTES
Patient Assessment Instrument  Quality Indicators - Admission    Section B. Hearing, Speech Vision  Expression of Ideas and Wants: Expresses complex messages without difficulty and  with speech that is clear and easy to understand.  Understanding Verbal Content: Understands: Clear comprehension without cues or  repetitions.    Section C. Cognitive Patterns      Section [id number]. Prior Functioning      Section GD8576. Prior Device Use      Section [id number]. Self Care Performance      Section [id number]. Self Care Discharge Goals      Section PW5006. Mobility Performance      Section [id number]. Mobility Discharge Goals      Section H. Bladder and Bowel  Bladder Continence: Incontinent less than daily (e.g., once or twice during the  3-day assessment period).  Bowel Continence: Occasionally incontinent (one episode of bowel incontinence).    Section I. Active Diagnosis      Section J. Health Conditions      Section K. Swallowing/Nutritional Status      Section M. Skin Conditions      Section O. Special Treatments, Procedures, and Programs      OPTIONAL BRANCH FOR TRACKING FALLS  Fall(s) During Shift:    Signed by: Sasha Martin Nurse

## 2017-06-07 NOTE — PROGRESS NOTES
Inpatient Rehabilitation Functional Measures Assessment    Functional Measures  RAS Eating:  Eating Score = 5. Patient is supervision/set-up for eating,  requiring: Opening containers. Cutting meat. Pouring liquids. No assistive  devices were required.  RAS Grooming: Patient requires minimal assistance for washing, rinsing and  drying the face. Patient requires minimal assistance for washing, rinsing and  drying the hands. Patient requires minimal assistance for brushing teeth.  Patient requires minimal assistance for brushing/combing hair. Shaving or  applying makeup was not observed for this patient. Patient performs 0 -  24% of  grooming tasks.  Grooming Score = 1, Total Assistance. No assistive devices were  required.  RAS Bathing:  Patient bathed in bed. Patient requires minimal assistance for  washing, rinsing, or drying the right arm. Patient requires minimal assistance  for washing, rinsing, or drying the left arm. Patient requires minimal  assistance for washing, rinsing, or drying the chest. Patient requires minimal  assistance for washing, rinsing, or drying the abdomen. Patient requires total  assistance for washing, rinsing, or drying the perineal area. Patient requires  total assistance for washing, rinsing, or drying the buttocks. Patient requires  total assistance for washing, rinsing, or drying the right upper leg. Patient  requires total assistance for washing, rinsing, or drying the left upper leg.  Patient requires total assistance for washing, rinsing, or drying the right  lower leg, including the foot. Patient requires total assistance for washing,  rinsing, or drying the left lower leg, including the foot. Patient performs 0 -  24% of bathing tasks.  Bathing Score = 1, Total Assistance. No assistive devices  were required.  RAS Upper Body Dressing:  Upper Body Dressing was not observed this shift  because patient dressed/undressed in pajamas/gown only. pt doesn't have any  clothes .  RAS Lower  Body Dressing:  Lower Body Dressing was not observed this shift  because patient dressed/undressed in pajamas/gown only.  RAS Toileting:  Patient requires moderate assistance for adjusting clothing  before using a toilet, commode, bedpan, or urinal. Patient requires moderate  assistance for hygiene. Patient requires moderate assistance for adjusting  clothing after using a toilet, commode, bedpan, or urinal. Patient performs 0 -  24% of toileting tasks.  Toileting Score = 1, Total Assistance. Patient requires  the following assistive device(s): Grab bar. Arm rest of a specialized seat.    RAS Bladder Management  Level of Assistance:  Bladder Score = 5.  Patient is supervision/set-up for  bladder management, requiring: Emptying equipment. Setting out equipment. Stand  by assistance. Patient requires the following assistive device(s):  Bedpan.  Frequency/Number of Accidents this Shift:  Bladder accidents this shift:  0 .  Patient has not had an accident this shift.    RAS Bowel Management  Level of Assistance: Bowel Score = 7.  Patient is completely independent for  bowel management.  Patient did not have bowel movement.  No  medication/intervention was provided.  Frequency/Number of Accidents this Shift: Bowel accidents this shift: 0 .  Patient has not had an accident this shift.    RAS Bed/Chair/Wheelchair Transfer:  Activity was not observed.  RAS Toilet Transfer:  Toilet Transfer was not observed this shift because  patient used bedpan/urinal only.  RAS Tub/Shower Transfer:    Previously Documented Mode of Locomotion at Discharge:  [facility] Expected Mode of Locomotion at Discharge:  [facility] Walk/Wheelchair:  [facility] Stairs:    [facility] Comprehension:  [facility] Expression:  [facility] Social Interaction:  [facility] Problem Solving:  [facility] Memory:    Therapy Mode Minutes  Occupational Therapy:  Physical Therapy:  Speech Language Pathology:    Discharge Functional Goals:    Signed by: RENNY Carr

## 2017-06-07 NOTE — PROGRESS NOTES
Inpatient Rehabilitation Functional Measures Assessment and Plan of Care    Plan of Care  Self Care    [OT] Bathing(Active)  Current Status(06/07/2017): ModA  Weekly Goal(06/13/2017): [place]  Discharge Goal: CGA    [OT] Dressing (Lower)(Active)  Current Status(06/07/2017): MaxA  Weekly Goal(06/13/2017): ModA  Discharge Goal: [place]    Performed Intervention(s)  ADL Retraining; Pt./Family Education; TherEx; Fxl Transfers; AE Training    Functional Measures  RAS Eating:  Eating Score = 5. Patient is supervision/set-up for eating,  requiring: Opening containers. No assistive devices were required.  RAS Grooming: Grooming Score = 5. Patient is supervision/set-up for grooming,  requiring: Setting out grooming equipment. Initial preparation. Stand by  assistance. Verbal cuing, prompting, or instructing. No assistive devices were  required.  RAS Bathing:  Bathing was not observed this shift.  Simulated bathing activities  only.  RAS Upper Body Dressing:  Upper Body Dressing Score = 5. Patient is supervision  for upper body dressing, requiring: Gathering/setting out clothes. Stand by  assistance. Verbal cuing, prompting, or instructing. No assistive devices were  required.  RAS Lower Body Dressing:  Gathering clothes was not observed for this patient.  Wearing underwear or an undergarment was not observed for this patient. Patient  requires total assistance for holding clothing and/or threading the right leg  through the pants/skirt. Patient requires no physical assistance for donning  and/or doffing pants/skirt threading left leg. Patient requires  minimal/incidental physical assistance for pulling pants/skirt over hips and  adjusting fasteners. Patient requires total assistance for holding clothing  and/or donning and/or doffing right sock. Patient requires no physical  assistance for donning and/or doffing left sock. Patient requires total  assistance for holding clothing and/or donning and/or doffing right shoe.  Patient  requires total assistance for holding clothing and/or donning and/or  doffing left shoe. Patient performs 39.29 % of lower body dressing tasks. Lower  Body Dressing Score = 2, Maximal Assistance. No assistive devices were required.    RAS Toileting:  Patient requires minimal assistance for adjusting clothing  before using a toilet, commode, bedpan, or urinal. Patient requires total  assistance for hygiene. Patient requires minimal assistance for adjusting  clothing after using a toilet, commode, bedpan, or urinal. Patient performs 0 -  24% of toileting tasks.  Toileting Score = 1, Total Assistance. Patient requires  the following assistive device(s): Grab bar.    RAS Bladder Management  Level of Assistance:  Frequency/Number of Accidents this Shift:    RAS Bowel Management  Level of Assistance:  Frequency/Number of Accidents this Shift:    RAS Bed/Chair/Wheelchair Transfer:  RAS Toilet Transfer:  Toilet Transfer Score = 3.  Patient performs 50-74% of  effort and requires moderate assistance (some lifting) for transferring to and  from the toilet/commode. Patient requires the following assistive device(s):  Safety frame/over the toilet. Grab bars.  RAS Tub/Shower Transfer:  Activity was not observed.    Previously Documented Mode of Locomotion at Discharge:  RAS Expected Mode of Locomotion at Discharge:  RAS Walk/Wheelchair:  RAS Stairs:    [facility] Comprehension:  RAS Expression:  RAS Social Interaction:  RAS Problem Solving:  RAS Memory:    Therapy Mode Minutes  Occupational Therapy: Individual: 90 minutes.  Physical Therapy:  Speech Language Pathology:    Discharge Functional Goals:  Eatin  Groomin  Bathing; 4  Lower Body Dressin  Upper Body Dressin  Toiletin  Tub/Shower: 4  Toilet Transfers: 4    Signed by: Missy Pelayo, Occupational Therapist

## 2017-06-07 NOTE — PROGRESS NOTES
Inpatient Rehabilitation Functional Measures Assessment    Functional Measures  RAS Eating:  Eating Score = 5. Patient is supervision/set-up for eating,  requiring: Opening containers. No assistive devices were required.  RAS Grooming:  RAS Bathing:  RAS Upper Body Dressing:  RAS Lower Body Dressing:  RAS Toileting:  Activity was not observed.    RAS Bladder Management  Level of Assistance:  Bladder Score = 2. Patient performs 25-49% of tasks and  requires maximal assistance for bladder management. [place] provides most of the  assist to roll or bridge to place and remove bedpan.  Frequency/Number of Accidents this Shift:  Bladder accidents this shift:  0 .  Patient has not had an accident, but used a device/medication this shift  requiring: bedpan .    RAS Bowel Management  Level of Assistance: Bowel Score = 7.  Patient is completely independent for  bowel management.  Patient did not have bowel movement.  No  medication/intervention was provided.  Frequency/Number of Accidents this Shift: Bowel accidents this shift: 0 .  Patient has not had an accident, but used a device/medication this shift  requiring: bedpan .    RAS Bed/Chair/Wheelchair Transfer:  Activity was not observed.  RAS Toilet Transfer:  Activity was not observed.  RAS Tub/Shower Transfer:  Activity was not observed.    Previously Documented Mode of Locomotion at Discharge:  RAS Expected Mode of Locomotion at Discharge:  RAS Walk/Wheelchair:  RAS Stairs:    RAS Comprehension:  RAS Expression:  RAS Social Interaction:  RAS Problem Solving:  RAS Memory:    Therapy Mode Minutes  Occupational Therapy:  Physical Therapy:  Speech Language Pathology:    Discharge Functional Goals:    Signed by: RENNY Ball

## 2017-06-07 NOTE — PLAN OF CARE
Problem: Inpatient Physical Therapy  Goal: Bed Mobility Goal STG- PT    06/07/17 1703   Bed Mobility PT STG   Bed Mobility PT STG, Date Established 06/07/17   Bed Mobility PT STG, Time to Achieve 5 - 7 days   Bed Mobility PT STG, Activity Type all bed mobility   Bed Mobility PT STG, [place] Level contact guard assist   Transfer Training Goal, Assist Device bed rails       Goal: Bed Mobility Goal LTG- PT    06/07/17 1703   Bed Mobility PT LTG   Bed Mobility PT LTG, Date Established 06/07/17   Bed Mobility PT LTG, Time to Achieve by discharge   Bed Mobility PT LTG, Activity Type all bed mobility   Bed Mobility PT LTG, [place] Level conditional independence;supervision required       Goal: Transfer Training Goal 1 STG- PT    06/07/17 1703   Transfer Training PT STG   Transfer Training PT STG, Date Established 06/07/17   Transfer Training PT STG, Time to Achieve 5 - 7 days   Transfer Training PT STG, Activity Type all transfers   Transfer Training PT STG, [place] Level contact guard assist   Transfer Training PT STG, Assist Device other (see comments)  (with appropriate AD)       Goal: Transfer Training Goal 1 LTG- PT    06/07/17 1703   Transfer Training PT LTG   Transfer Training PT LTG, Date Established 06/07/17   Transfer Training PT LTG, Time to Achieve by discharge   Transfer Training PT LTG, Activity Type all transfers   Transfer Training PT LTG, [place] Level conditional independence;supervision required   Transfer Training PT LTG, Assist Device other (see comments)  (with appropriate AD)       Goal: Gait Training Goal STG- PT    06/07/17 1703   Gait Training PT STG   Gait Training Goal PT STG, Date Established 06/07/17   Gait Training Goal PT STG, Time to Achieve 5 - 7 days   Gait Training Goal PT STG, [place] Level contact guard assist   Gait Training Goal PT STG, Assist Device walker, rolling   Gait Training Goal PT STG, Distance to Achieve 50       Goal: Gait Training Goal LTG- PT     06/07/17 1703   Gait Training PT LTG   Gait Training Goal PT LTG, Date Established 06/07/17   Gait Training Goal PT LTG, Time to Achieve by discharge   Gait Training Goal PT LTG, [place] Level conditional independence;supervision required   Gait Training Goal PT LTG, Assist Device walker, rolling   Gait Training Goal PT LTG, Distance to Achieve 75       Goal: Patient Education Goal LTG- PT    06/07/17 1703   Patient Education PT LTG   Patient Education PT LTG, Date Established 06/07/17   Patient Education PT LTG, Time to Achieve by discharge   Patient Education PT LTG, Education Type written program;HEP;precaution per surgeon;positioning;posture/body mechanics;gait;transfers;bed mobility;pain management;progression of POC;benefits of activity;home safety;equipment management;skin care/inspection;energy conservation   Patient Education PT LTG, Education Understanding demonstrate adequately;verbalize understanding

## 2017-06-07 NOTE — THERAPY EVALUATION
Inpatient Rehabilitation - Physical Therapy Initial Evaluation/Treatment Note  DEBORAH Ward     Patient Name: Maribel Staton  : 1945  MRN: [id number]  Today's Date: 2017   Onset of Illness/Injury or Date of Surgery Date: 17  Date of Referral to PT: 17  Referring Physician: Eva      Admit Date: 2017     Visit Dx:  No diagnosis found.  Patient Active Problem List   Diagnosis   • Closed right hip fracture     Past Medical History:   Diagnosis Date   • Anxiety    • Arthritis    • Asthma    • Chronic low back pain    • COPD (chronic obstructive pulmonary disease)    • Depression    • GERD (gastroesophageal reflux disease)    • History of transfusion    • Hypertension    • Seizures    • Tobacco abuse      Past Surgical History:   Procedure Laterality Date   • APPENDECTOMY      about 15 years ago   • CARDIAC CATHETERIZATION     • CATARACT EXTRACTION Bilateral    • CHOLECYSTECTOMY  about 4 years ago   • CHOLECYSTECTOMY     • HIP ARTHROPLASTY Bilateral    • HYSTERECTOMY     • ORIF HIP FRACTURE Right 2017   • SKIN GRAFT      's          PT ASSESSMENT (last 72 hours)      PT Evaluation       17 1623 17 1238    Rehab Evaluation    Document Type evaluation;therapy note (daily note)  -CT evaluation;therapy note (daily note)  -AB    Subjective Information agree to therapy;complains of;pain  -CT no complaints;agree to therapy  -AB    Patient Effort, Rehab Treatment good  -CT good  -AB    Symptoms Noted During/After Treatment increased pain  -CT     General Information    Patient Profile Review yes  -CT yes  -AB    Onset of Illness/Injury or Date of Surgery Date 17  -CT 17  -AB    Referring Physician Eva  -ALYCE Velasquez  -AB    Precautions/Limitations fall precautions;non-weight bearing status;seizure precautions   NWB RLE; intermittent confusion  -CT fall precautions;non-weight bearing status;seizure precautions;other (see comments)   <skin integrity; NWB RLE;  intermittent confusion  -AB    Prior Level of Function independent:;all household mobility;community mobility  -CT independent:;all household mobility;ADL's;home management   utilized RW at times for assistance w/ ambulation  -AB    Equipment Currently Used at Home shower chair;commode;wheelchair;wheelchair, motorized;walker, rolling;cane, quad;cane, straight  -CT shower chair;commode;wheelchair;wheelchair, motorized;walker, rolling;cane, quad;cane, straight  -AB    Plans/Goals Discussed With patient;agreed upon  -CT patient;agreed upon  -AB    Risks Reviewed patient:;LOB;nausea/vomiting;dizziness;increased discomfort;change in vital signs;increased drainage;lines disloged  -CT patient:;LOB;increased discomfort;change in vital signs;increased drainage;lines disloged  -AB    Benefits Reviewed patient:;improve function;increase independence;increase strength;increase balance;decrease pain;decrease risk of DVT;improve skin integrity;increase knowledge  -CT patient:;improve function;increase independence;increase strength;increase balance;decrease pain;decrease risk of DVT;improve skin integrity;increase knowledge  -AB    Barriers to Rehab cognitive status;physical barrier  -CT cognitive status;physical barrier  -AB    Living Environment    Lives With spouse  -CT spouse  -AB    Living Arrangements house;mobile home  -CT house;mobile home  -AB    Home Accessibility ramps present at home  -CT tub/shower is not walk in;ramps present at home  -AB    Clinical Impression    Date of Referral to PT 06/06/17  -CT     PT Diagnosis R hip ORIF  -CT     Functional Level At Time Of Evaluation Min A   -CT     Patient/Family Goals Statement pt goals are to return to PLOF  -CT     Criteria for Skilled Therapeutic Interventions Met yes;treatment indicated  -CT     Pathology/Pathophysiology Noted (Describe Specifically for Each System) musculoskeletal;neuromuscular  -CT     Impairments Found (describe specific impairments) aerobic  capacity/endurance;gait, locomotion, and balance;joint integrity and mobility  -CT     Functional Limitations in Following Categories (Describe Specific Limitations) self-care;home management  -CT     Rehab Potential good, to achieve stated therapy goals  -CT     Predicted Duration of Therapy Intervention (days/wks) 10-14 days  -CT     Pain Assessment    Pain Assessment 0-10  -CT     Pain Score 10  -CT     Pain Type Surgical pain  -CT     Pain Location Hip  -CT     Pain Orientation Right  -CT     Pain Intervention(s) Medication (See MAR);Repositioned;Rest   see RN notes for medication details  -CT     Vision Assessment/Intervention    Visual Impairment WFL with corrective lenses  -CT WFL with corrective lenses  -AB    Cognitive Assessment/Intervention    Current Cognitive/Communication Assessment impaired  -CT impaired  -AB    Orientation Status oriented to;person;place;situation;disoriented to;time  -CT oriented to;person;place;situation;disoriented to;time  -AB    Follows Commands/Answers Questions able to follow single-step instructions;75% of the time;100% of the time;needs cueing;needs increased time;needs repetition  -CT able to follow single-step instructions;75% of the time;100% of the time;needs cueing;needs increased time;needs repetition  -AB    Personal Safety decreased awareness, need for assist;decreased awareness, need for safety  -CT decreased awareness, need for assist;decreased awareness, need for safety  -AB    Personal Safety Interventions gait belt;muscle strengthening facilitated;nonskid shoes/slippers when out of bed  -CT gait belt;nonskid shoes/slippers when out of bed;supervised activity  -AB    ROM (Range of Motion)    General ROM Detail BLE functional within hip precaution   -CT BUE AROM WFL  -AB    MMT (Manual Muscle Testing)    General MMT Assessment Detail LLE 4/5; RLE deferred d/t reported pain  -CT Bilateral Shoulders: 4/5; Bilateral elbows, hands: 4+/5  -AB    Mobility  Assessment/Training    Extremity Weight-Bearing Status right lower extremity  -CT right lower extremity  -AB    Right Lower Extremity Weight-Bearing non weight-bearing  -CT non weight-bearing  -AB    Bed Mobility, Assessment/Treatment    Bed Mobility, Assistive Device bed rails  -CT     Bed Mobility, Roll Left, [place] contact guard assist  -CT     Bed Mobility, Roll Right, [place] contact guard assist  -CT     Bed Mobility, Scoot/Bridge, [place] minimum assist (75% patient effort)  -CT     Bed Mob, Supine to Sit, [place] minimum assist (75% patient effort);moderate assist (50% patient effort)  -CT     Bed Mob, Sit to Supine, [place] minimum assist (75% patient effort);moderate assist (50% patient effort)  -CT     Bed Mobility, Safety Issues decreased use of legs for bridging/pushing  -CT     Bed Mobility, Impairments ROM decreased;strength decreased;impaired balance;coordination impaired;pain  -CT     Transfer Assessment/Treatment    Transfers, Bed-Chair [place] minimum assist (75% patient effort);verbal cues required;nonverbal cues required (demo/gesture)  -CT     Transfers, Chair-Bed [place] minimum assist (75% patient effort);verbal cues required;nonverbal cues required (demo/gesture)  -CT     Transfers, Bed-Chair-Bed, Assist Device rolling walker  -CT     Transfers, Sit-Stand [place] minimum assist (75% patient effort);verbal cues required;nonverbal cues required (demo/gesture)  -CT     Transfers, Stand-Sit [place] minimum assist (75% patient effort);verbal cues required;nonverbal cues required (demo/gesture)  -CT     Transfers, Sit-Stand-Sit, Assist Device bed rails;rolling walker  -CT     Transfer, Maintain Weight Bearing Status able to maintain weight bearing status  -CT     Transfer, Safety Issues balance decreased during turns;step length decreased  -CT     Transfer, Impairments strength decreased;impaired balance;coordination impaired  -CT     Transfer, Comment   ModA/Ivory  -AB    Gait Assessment/Treatment    Gait, [place] Level minimum assist (75% patient effort);2 person assist required;verbal cues required;nonverbal cues required (demo/gesture)  -CT     Gait, Assistive Device rolling walker  -CT     Gait, Distance (Feet) 15  -CT     Gait, Gait Pattern Analysis swing-to gait  -CT     Gait, Gait Deviations rossy decreased;decreased heel strike;limb motion velocity decreased;step length decreased;weight-shifting ability decreased  -CT     Gait, Maintain Weight Bearing Status able to maintain weight bearing status  -CT     Gait, Safety Issues balance decreased during turns;step length decreased;weight-shifting ability decreased  -CT     Gait, Impairments strength decreased;impaired balance;coordination impaired  -CT     Stairs Assessment/Treatment    Stairs, [place] Level not tested  -CT     Motor Skills/Interventions    Additional Documentation  Fine Motor Coordination Training (Group);Gross Motor Coordination Training (Group)  -AB    Therapy Exercises    Bilateral Lower Extremities AROM:;15 reps;sitting  -CT     Bilateral Upper Extremity  AROM:;sitting   BUE CoordEx; G/FMC TherEx/Act; Strengthening  -AB    BUE Resistance  other (comment)   BUE Wrist Rolls X2  -AB    Fine Motor Coordination Training    Detail (Fine Motor Coordination Training)  BUE FMC/In-Hand Manipulation slightly decreased  -AB    Opposition  Right:;Left:;intact  -AB    Gross Motor Coordination Training    Gross Motor Skill, Impairments Detail  BUE Rapid Alternating Intact  -AB    Sensory Assessment/Intervention    Sensory Impairment  --   BUE intact  -AB    Edema Management    Edema Amount right:;minimal  -CT     Positioning and Restraints    Pre-Treatment Position in bed  -CT     Post Treatment Position wheelchair  -CT wheelchair  -AB    In Bed  supine;call light within reach;encouraged to call for assist;exit alarm on;side rails up x2;RLE elevated   in AM  -AB    In Wheelchair sitting;call  light within reach;encouraged to call for assist   in room  -CT sitting;with other staff;legs elevated   in PM w/   -AB      06/07/17 0751 06/06/17 2042    General Information    Equipment Currently Used at Home walker, rolling;wheelchair;shower chair;nebulizer;oxygen;cane, straight;wheelchair, motorized  -     Living Environment    Lives With spouse  - spouse  -    Living Arrangements house  - house  -    Home Accessibility ramps present at home;bed and bath on same level  - bed and bath on same level  -    Stair Railings at Home  inside, present on left side  -    Type of Financial/Environmental Concern  eviction pending  -    Transportation Available car  - car  -      06/06/17 2018       General Information    Equipment Currently Used at Home crutches;power chair, (recliner lift);walker, rolling;wheelchair  -       User Key  (r) = Recorded By, (t) = Taken By, (c) = Cosigned By    Initials Name Provider Type    AB Missy Pelayo, OT Occupational Therapist     Arina Joe, RN Registered Nurse     Yazmin Munoz, RN Registered Nurse    CT Yuli Moreno, PT Physical Therapist          Physical Therapy Education     Title: PT OT SLP Therapies (Active)     Topic: Physical Therapy (Active)     Point: Mobility training (Active)    Learning Progress Summary    Learner Readiness Method Response Comment Documented by Status   Patient Acceptance E NR  CT 06/07/17 1707 Active               Point: Body mechanics (Active)    Learning Progress Summary    Learner Readiness Method Response Comment Documented by Status   Patient Acceptance E NR  CT 06/07/17 1707 Active               Point: Precautions (Active)    Learning Progress Summary    Learner Readiness Method Response Comment Documented by Status   Patient Acceptance E NR  CT 06/07/17 1707 Active                      User Key     Initials Effective Dates Name Provider Type [facility] 03/14/16 -  Yuli  Josh, PT Physical Therapist PT                PT Recommendation and Plan  Anticipated Equipment Needs At Discharge:  (to be determined)  Anticipated Discharge Disposition: home with assist, home with home health  Planned Therapy Interventions: balance training, bed mobility training, gait training, home exercise program, manual therapy techniques, neuromuscular re-education, motor coordination training, patient/family education, postural re-education, ROM (Range of Motion), stair training, strengthening, transfer training  PT Frequency: 2 times/day, 5 times/wk, per priority policy             IP PT Goals       06/07/17 1703          Bed Mobility PT STG    Bed Mobility PT STG, Date Established 06/07/17  -CT      Bed Mobility PT STG, Time to Achieve 5 - 7 days  -CT      Bed Mobility PT STG, Activity Type all bed mobility  -CT      Bed Mobility PT STG, [place] Level contact guard assist  -CT      Transfer Training Goal, Assist Device bed rails  -CT      Bed Mobility PT LTG    Bed Mobility PT LTG, Date Established 06/07/17  -CT      Bed Mobility PT LTG, Time to Achieve by discharge  -CT      Bed Mobility PT LTG, Activity Type all bed mobility  -CT      Bed Mobility PT LTG, [place] Level conditional independence;supervision required  -CT      Transfer Training PT STG    Transfer Training PT STG, Date Established 06/07/17  -CT      Transfer Training PT STG, Time to Achieve 5 - 7 days  -CT      Transfer Training PT STG, Activity Type all transfers  -CT      Transfer Training PT STG, [place] Level contact guard assist  -CT      Transfer Training PT STG, Assist Device other (see comments)   with appropriate AD  -CT      Transfer Training PT LTG    Transfer Training PT LTG, Date Established 06/07/17  -CT      Transfer Training PT LTG, Time to Achieve by discharge  -CT      Transfer Training PT LTG, Activity Type all transfers  -CT      Transfer Training PT LTG, [place] Level conditional  independence;supervision required  -CT      Transfer Training PT LTG, Assist Device other (see comments)   with appropriate AD  -CT      Gait Training PT STG    Gait Training Goal PT STG, Date Established 06/07/17  -CT      Gait Training Goal PT STG, Time to Achieve 5 - 7 days  -CT      Gait Training Goal PT STG, [place] Level contact guard assist  -CT      Gait Training Goal PT STG, Assist Device walker, rolling  -CT      Gait Training Goal PT STG, Distance to Achieve 50  -CT      Gait Training PT LTG    Gait Training Goal PT LTG, Date Established 06/07/17  -CT      Gait Training Goal PT LTG, Time to Achieve by discharge  -CT      Gait Training Goal PT LTG, [place] Level conditional independence;supervision required  -CT      Gait Training Goal PT LTG, Assist Device walker, rolling  -CT      Gait Training Goal PT LTG, Distance to Achieve 75  -CT      Patient Education PT LTG    Patient Education PT LTG, Date Established 06/07/17  -CT      Patient Education PT LTG, Time to Achieve by discharge  -CT      Patient Education PT LTG, Education Type written program;HEP;precaution per surgeon;positioning;posture/body mechanics;gait;transfers;bed mobility;pain management;progression of POC;benefits of activity;home safety;equipment management;skin care/inspection;energy conservation  -CT      Patient Education PT LTG, Education Understanding demonstrate adequately;verbalize understanding  -CT        User Key  (r) = Recorded By, (t) = Taken By, (c) = Cosigned By    Initials Name Provider Type    CT Yuli Moreno PT Physical Therapist               Time Calculation:         PT Charges       06/07/17 1707          Time Calculation    Start Time 0745  -CT      Stop Time 0915  -CT      Time Calculation (min) 90 min  -CT      PT Non-Billable Time (min) 30 min  -CT      PT Received On 06/07/17  -CT      PT - Next Appointment 06/08/17  -CT      PT Goal Re-Cert Due Date 06/14/17  -CT        User Key  (r) = Recorded By,  (t) = Taken By, (c) = Cosigned By    Initials Name Provider Type    CT Yuli Moreno, PT Physical Therapist          Therapy Charges for Today     Code Description Service Date Service Provider Modifiers Qty    01028776487 HC PT EVAL HIGH COMPLEXITY 2 6/7/2017 Yuli Moreno, PT GP 1    65767023591 HC GAIT TRAINING EA 15 MIN 6/7/2017 Yuli Moreno, PT GP 1    25812576483 HC PT THER PROC EA 15 MIN 6/7/2017 Yuli Moreno, PT GP 1    47030260301 HC PT THERAPEUTIC ACT EA 15 MIN 6/7/2017 Yuli Moreno, PT GP 2    06591302001 HC PT THER SUPP EA 15 MIN 6/7/2017 Yuli Moreno, PT GP 2                 Yuli Moreno, PT  6/7/2017

## 2017-06-07 NOTE — PROGRESS NOTES
Patient Assessment Instrument  Quality Indicators - Admission    Section B. Hearing, Speech Vision      Section C. Cognitive Patterns      Section TV0032. Prior Functioning      Section AP0380. Prior Device Use  Manual wheelchair, Motorized wheelchair or scooter, Walker    Section GG2345. Self Care Performance      Section TR9693. Self Care Discharge Goals      Section MG9557. Mobility Performance      Section [id number]. Mobility Discharge Goals      Section H. Bladder and Bowel      Section I. Active Diagnosis      Section J. Health Conditions      Section K. Swallowing/Nutritional Status      Section M. Skin Conditions      Section O. Special Treatments, Procedures, and Programs      OPTIONAL BRANCH FOR TRACKING FALLS  Fall(s) During Shift:    Signed by: Arina Joe, Supervisor

## 2017-06-07 NOTE — SIGNIFICANT NOTE
06/07/17 0757   Case Management/Social Work Plan   Plan Spoke to pt who states being  to spouse Steven Staton.  She has 2 sons and 1 daughter.  Son Cyrus Gorman lives nearby and will help at home if needed.  Pt has had  services in the past but don't remember if it was [facility] or [facility].  Pt has a RW, w/c, shower chair, nebulizer, oxygen at night, cane, and electric wheelchair at home.  Pt lives in a house/trailer combination with ramp at front door.  Pt receives Social Security and $70 food stamps per month.  Pt has Medicare B only and [employer] insurance.  Pt has no advanced directive/living will/POA.  Spouse Steven will transport home at discharge.  Pt plans to return home with spouse at discharge with spouse and son providing assistance.

## 2017-06-07 NOTE — H&P
Chief complaint: Right Periprosthetic Femoral Neck Fracture S/P ORIF    This patient is seen this morning by me for post admission physician evaluation to the inpatient rehabilitation facility    Subjective     Patient is a 71 y.o. female presented to [facility] emergency room secondary to a fall that occurred at home.  Patient states that she has had a long history of dizziness upon standing as well as history of multiple falls in the past however states on date of admission of 5/23/2017 became profoundly dizzy with rising from a seated to standing position posture balance falling onto a hardwood floor.  Patient states she had immediate pain in the right hip was unable to move and unable to ambulate.  Patient states she laid on the floor for roughly 2 hours prior to anyone arriving home and able to contact EMS for transport.  Upon evaluation in emergency room was found to have a right periprosthetic femoral neck fracture.  Secondary to this, patient was taken to the operating room on May 25, for right hip open reduction internal fixation.  She encountered no interoperative complications.  Postoperatively patient did have acute blood loss anemia requiring 2 units pack blood cells.  The remainder of her hospital course has been lengthy yet uncomplicated and rehabilitation transfer has been delayed secondary to holiday weekend as well as insurance approval.  Patient continues to participate with physical and occupational therapy and has been recommended for inpatient rehabilitation.    Prior to hospitalization, patient was living at home with her .  She states that she was utilizing oxygen at night  however not continuously.  She does have history of COPD and continues to smoke.  Patient does facilitate the use of a cane, walker, and wheelchair assistance and has done so for the last 2 year secondary to instability on her feet as well as history of frequent falls and chronic pain in the lower back.   She states that she her  completes all of the household activities as well as assists her with transportation and community activities.  She states she is independent with transfers and ambulation within the home as well as personal care needs.    Mini FIM preadmission screening assessment based on PT and OT evaluations at acute care reveal the following preadmission FIM scores: Eating-6, Grooming-4, Bathing-2, Dressing upper body-2, Dressing lower body-2, Toileting-1, Transfers to bed/chair/wheelchair-1 with 2 person assist. Transfers to toilet-1 with 2 person assist.Locomotion-1 with w/c x 2 person assist, able to ambulate-10 ft, gati -poor. Bladder management-3, Bowel management-3, Comprehension-6,Expression-7, Social Interaction-7, Problem Solving-6, Memory-6.  I have reviewed the preadmission screening evaluation form and the document is current and up-to-date with no change required    Currently patient is resting comfortably.  She has no acute complaints this morning however continues to have some stiffness and soreness however the right lower extremity with decreased active range of motion.. She states the pain medication simply isn't working very long to help with her pain    Review of Systems   On review of systems the patient denies the following unless noted above:     Constitutional:  Fevers, chills, weight change, fatigue     Eyes: Vision changes, headache, double vision, loss of vision     ENT: Runny nose, nose bleeds, ringing in ears, pain with swallowing, sore throat     Cardiovascular: Chest pains, palpitations, PND, orthopnea     Respiratory: Cough, wheezing, SOA, hemoptysis     GI:  Abdominal pain, diarrhea, constipation, change in stool caliber,    Rectal bleeding, vomiting or nausea     : Difficulty voiding, dysuria, hematuria     Musculoskeletal: Changes of any chronic joint pain, swelling     Skin: Rash, itching, easy bruisability     Neurological: Unilateral weakness, new onset  numbness, speech difficulties     Psychiatric: Sadness, tearfulness, feelings of hopelessness, racing thoughts     Endocrine:  Heat or cold intolerance, mood swings, polydipsia, polyphagia,    recent hypoglycemia      History  Past Medical History:   Diagnosis Date   • Anxiety    • Arthritis    • Asthma    • Chronic low back pain    • COPD (chronic obstructive pulmonary disease)    • Depression    • GERD (gastroesophageal reflux disease)    • History of transfusion    • Hypertension    • Seizures    • Tobacco abuse      Past Surgical History:   Procedure Laterality Date   • APPENDECTOMY      about 15 years ago   • CARDIAC CATHETERIZATION  2012   • CATARACT EXTRACTION Bilateral    • CHOLECYSTECTOMY  about 4 years ago   • CHOLECYSTECTOMY     • HIP ARTHROPLASTY Bilateral    • HYSTERECTOMY     • ORIF HIP FRACTURE Right 05/2017   • SKIN GRAFT      1970's     Family History   Problem Relation Age of Onset   • Arthritis Mother    • Arthritis Father    • Cancer Father    • Alcohol abuse Sister    • Arthritis Sister    • Cancer Sister    • Alcohol abuse Brother    • Arthritis Brother    • Cancer Brother    • Early death Brother    • Breast cancer Neg Hx      Social History   Substance Use Topics   • Smoking status: Current Every Day Smoker     Packs/day: 1.50     Years: 53.00     Types: Cigarettes     Start date: 6/6/1965   • Smokeless tobacco: None   • Alcohol use No     Prescriptions Prior to Admission   Medication Sig Dispense Refill Last Dose   • baclofen (LIORESAL) 10 MG tablet Take 10 mg by mouth 2 (Two) Times a Day.   Unknown at Unknown time   • clonazePAM (KlonoPIN) 0.5 MG tablet Take 0.5 mg by mouth 2 (Two) Times a Day As Needed for Seizures.   Unknown at Unknown time   • HYDROcodone-acetaminophen (NORCO)  MG per tablet Take 1 tablet by mouth 2 (Two) Times a Day As Needed for Moderate Pain (4-6).   Unknown at Unknown time   • levETIRAcetam (KEPPRA) 250 MG tablet Take 250 mg by mouth every night at bedtime.    "Unknown at Unknown time   • levETIRAcetam (KEPPRA) 500 MG tablet Take 500 mg by mouth Every Morning.   Unknown at Unknown time   • montelukast (SINGULAIR) 10 MG tablet Take 10 mg by mouth Every Night.   Unknown at Unknown time   • pantoprazole (PROTONIX) 40 MG EC tablet Take 40 mg by mouth Daily.   Unknown at Unknown time   • PHENobarbital (LUMINAL) 97.2 MG tablet Take 97.2 mg by mouth 2 (Two) Times a Day.   Unknown at Unknown time   • phenytoin (DILANTIN) 100 MG ER capsule Take 100 mg by mouth Every Morning.   Unknown at Unknown time   • phenytoin (DILANTIN) 100 MG ER capsule Take 200 mg by mouth every night at bedtime.   Unknown at Unknown time   • senna (SENNA LAX) 8.6 MG tablet Take 1 tablet by mouth 2 (Two) Times a Day.   Unknown at Unknown time   • simvastatin (ZOCOR) 40 MG tablet Take 40 mg by mouth Every Night.   Unknown at Unknown time   • venlafaxine XR (EFFEXOR-XR) 150 MG 24 hr capsule Take 150 mg by mouth Daily.   Unknown at Unknown time     Allergies:  Morphine and related    Scheduled Meds:    atorvastatin 40 mg Oral Nightly   baclofen 20 mg Oral Q12H   enoxaparin 40 mg Subcutaneous Daily   levETIRAcetam 250 mg Oral Nightly   levETIRAcetam 500 mg Oral Daily   montelukast 10 mg Oral Nightly   pantoprazole 40 mg Oral Q AM   PHENobarbital 97.2 mg Oral Q12H   phenytoin 100 mg Oral Daily   phenytoin 200 mg Oral Nightly   senna 1 tablet Oral BID   venlafaxine  mg Oral Daily With Breakfast     Continuous Infusions:   PRN Meds:.•  acetaminophen  •  aluminum-magnesium hydroxide-simethicone  •  bisacodyl  •  clonazePAM  •  HYDROcodone-acetaminophen  •  magnesium hydroxide  •  ondansetron **OR** ondansetron ODT **OR** ondansetron          Objective     Vital Signs    /67 (BP Location: Right arm, Patient Position: Lying)  Pulse 68  Temp 97.9 °F (36.6 °C) (Oral)   Resp 18  Ht 59\" (149.9 cm)  Wt 119 lb (54 kg)  SpO2 97%  BMI 24.04 kg/m2        Physical Exam:   General Appearance: alert, pleasant, " appears older than stated age, interactive and   cooperative   Head: normocephalic, without obvious abnormality and atraumatic   Eyes: lids and lashes normal, conjunctivae and sclerae normal, no icterus, no   pallor, corneas clear and PERRLA   Ears: ears appear intact with no abnormalities noted   Nose: nares normal, septum midline, mucosa normal and no drainage   Throat: no oral lesions, no thrush, oral mucosa moist and oopharynx normal   Neck: no adenopathy, supple, trachea midline, no thyromegaly, no carotid bruit   and no JVD   Back: no kyphosis present, no scoliosis present, no skin lesions, erythema, or   scars, no tenderness to percussion or palpation and range of motion normal   Lungs: clear to auscultation, respirations regular, respirations even and    respirations unlabored. No accessory muscle use.    Heart:: regular rhythm & normal rate, normal S1, S2, no murmur, no gallop, no   rub and no click.  Chest wall with no abnormalities observed. PMI nondisplaced   Abdomen: normal bowel sounds in all quadrants, no masses, no hepatomegaly,   no splenomegaly, soft non-tender, no guarding and no rebound tenderness   Extremities: no edema, no cyanosis, no redness, no tenderness, no clubbing   Musculoskeletal: Right hip active flexion- ~10 degrees, passive flexion- ~30 degrees with pain, all other                      joints with full range of motion, no effusion.  Pedal pulses palpable and equal bilaterally   Skin: no bleeding, bruising or rash and no lesions noted   Lymph Nodes: no palpable adenopathy   Neurologic: Mental Status orientated to person, place, time and situation.    Speech is intelligible, Nonlabored.  Alertness alert and awake and mood/affect   normal, Cranial Nerves 2 - 12 grossly intact as examined   Sensory intact in BLE and BUE.   Motor strength  LUE is 4/5 proximally, 4/5 distally      RUE is 4/5 proximally, 4/5 distally      LLE is 5/5 @ hip flexors, quads as well as       dorsiflexion /  plantar flexion      RLE is 2/5 @ hip flexors, quads as well as        3/5 dosriflexion / plantar flexion   Reflexes: Right:  2+ biceps, 2+ brachioradialis      2+ patella, 2+ achilles     Left: 2+ biceps, 2+ brachioradialis      2+ patella, 2+ achilles    Results Review:   Lab Results (last 24 hours)     Procedure Component Value Units Date/Time    Urinalysis With / Culture If Indicated [[id number]]  (Normal) Collected:  06/06/17 2101    Specimen:  Urine from Urine, Clean Catch Updated:  06/06/17 2236     Color, UA Yellow     Appearance, UA Clear     pH, UA 6.5     Specific Gravity, UA <=1.005     Glucose, UA Negative     Ketones, UA Negative     Bilirubin, UA Negative     Blood, UA Negative     Protein, UA Negative     Leuk Esterase, UA Negative     Nitrite, UA Negative     Urobilinogen, UA 0.2 E.U./dL    Narrative:       Urine microscopic not indicated.    CBC Auto Differential [[id number]]  (Abnormal) Collected:  06/07/17 0417    Specimen:  Blood Updated:  06/07/17 0547     WBC 4.09 (L) 10*3/mm3      RBC 3.46 (L) 10*6/mm3      Hemoglobin 11.1 (L) g/dL      Hematocrit 34.2 (L) %      MCV 98.8 (H) fL      MCH 32.1 pg      MCHC 32.5 (L) g/dL      RDW 14.5 %      RDW-SD 48.8 fl      MPV 8.4 fL      Platelets 383 10*3/mm3      Neutrophil % 52.7 %      Lymphocyte % 26.7 %      Monocyte % 7.8 %      Eosinophil % 10.8 (H) %      Basophil % 1.5 %      Immature Grans % 0.5 %      Neutrophils, Absolute 2.16 10*3/mm3      Lymphocytes, Absolute 1.09 10*3/mm3      Monocytes, Absolute 0.32 10*3/mm3      Eosinophils, Absolute 0.44 10*3/mm3      Basophils, Absolute 0.06 10*3/mm3      Immature Grans, Absolute 0.02 10*3/mm3     Comprehensive Metabolic Panel [[id number]]  (Abnormal) Collected:  06/07/17 0417    Specimen:  Blood Updated:  06/07/17 0610     Glucose 98 mg/dL      BUN 6 (L) mg/dL      Creatinine 0.39 (L) mg/dL      Sodium 138 mmol/L      Potassium 4.1 mmol/L      Chloride 106 mmol/L      CO2 25.3 mmol/L      Calcium 8.8  mg/dL      Total Protein 6.6 g/dL      Albumin 3.30 (L) g/dL      ALT (SGPT) 12 U/L      AST (SGOT) 18 U/L      Alkaline Phosphatase 141 (H) U/L       Note New Reference Ranges        Total Bilirubin 0.4 mg/dL      eGFR Non African Amer >150 mL/min/1.73      Globulin 3.3 gm/dL      A/G Ratio 1.0 (L) g/dL      BUN/Creatinine Ratio 15.4     Anion Gap 6.7 mmol/L     Osmolality, Calculated [[id number]]  (Normal) Collected:  06/07/17 0417    Specimen:  Blood Updated:  06/07/17 0610     Osmolality Calc 273.3 mOsm/kg         Imaging Results (last 24 hours)     ** No results found for the last 24 hours. **          Assessment/Plan   Right Periprosthetic Hip Fracture S/P ORIF  Chronic Low Back Pain  OA  Seizure Disorder  COPD with Ongoing Tobacco Abuse  Debility    This patient will be admitted to acute inpatient rehabilitation.  The patient is going to require intensive treatment with physical therapy and occupational therapy.  The patient will require PT and OT treatments at least 90 minutes daily per modality 5 days per week.  Patient will require physical therapy for lower extremity strengthening, balance, endurance, progressing gait, functional mobility and improving safety.  The patient will require occupational therapy treatment for ADL retraining, functional transfers, functional mobility and self-care training.Continue with weightbearing restriction with nonweightbearing to the right lower extremity    Increase hydrocodone to every 4 hours when necessary for pain    Lovenox for DVT prophylaxis    Baclofen for spasm    She has history of seizures.  Continue with Keppra and phenytoin and phenobarbital      At this point his plans disposition is to home.  She has a  to assist her within approximately 10-14 days at a contact guard assist level of functioning          Samuel Duane Kreis, MD  06/07/17  8:59 AM     This patient is seen this morning by me.  I agree with the above note.  I have admitted the above  history and physical to reflect work performed by me.  TI have produced the above plan in its entirety    Samuel Duane Kreis, MD  06/07/17  8:59 AM

## 2017-06-07 NOTE — PROGRESS NOTES
Patient Assessment Instrument  Quality Indicators - Admission    Section B. Hearing, Speech Vision      Section C. Cognitive Patterns  Brief Interview for Mental Status (BIMS) was conducted.  Repetition of Three Words: Three words  Temporal Orientation Year: Missed by more than 5 years or no answer  Temporal Orientation Month: Missed by more than 1 month or no answer  Temporal Orientation Day of Week: Correct  Recall Socks: Yes, no cue required  Recall Blue: Yes, no cue required  Recall Bed: Yes, after cuing  BIMS SUMMARY SCORE: 9 Moderately impaired Patient was able to complete the Brief  Interview for Mental Status    Section [id number]. Prior Functioning    Self Care: Patient completed the activities by him/herself, with or without an  assistive device, with no assistance from a helper.  Indoor Mobility: Patient completed the activities by him/herself, with or  without an assistive device, with no assistance from a helper.  Stairs: A helper completed the activities for the patient.  Functional Cognition: Patient needed partial assistance from another person to  complete activities.    Section [id number]. Prior Device Use      Section IN8677. Self Care Performance      Section GE0769. Self Care Discharge Goals      Section OC7707. Mobility Performance      Section BJ9002. Mobility Discharge Goals      Section H. Bladder and Bowel      Section I. Active Diagnosis  Comorbidities and Co-existing Conditions:   Patient does not have PAD, PVD, or  Diabetes Mellitus    Section J. Health Conditions  Patient has had two or more falls, or a fall with injury, in the past year.  Patient has had major surgery during the 100 days prior to admission.    Section K. Swallowing/Nutritional Status  Regular food (solids and liquids swallowed safely without supervision or  modified food or liquid consistency).    Section M. Skin Conditions  Unhealed Pressure Ulcer(s) at Stage 1 or Higher on Admission:  Yes.  Number of Unhealed Stage 1:  1  Number of Unhealed Stage 2: 0  Number of Unhealed Stage 3: 0  Number of Unhealed Stage 4: 0  Number of Unhealed Unstageable Due to Non-removable Dressin  Number of Unhealed Unstageable Due to Slough/Eschar: 0  Number of Unhealed Unstageable Due to Suspected Deep Tissue Injury: 0    Section O. Special Treatments, Procedures, and Programs  Patient did not receive total parenteral nutrition treatment at the time of  admission.    OPTIONAL BRANCH FOR TRACKING FALLS  Fall(s) During Shift: No falls.    Signed by: Arina Joe, Supervisor

## 2017-06-07 NOTE — SIGNIFICANT NOTE
06/07/17 1406   Prior Routine   Prior Activity Arts/Crafts;Current events;Baking club;MT;1:1;P[employer]alism;Television;Discussion/reminisce;Beauty shop  (usha and quilting likesto cook and watch news , gospel  music , likes to go out to eat ans watching westerns )   Typical Evening Retire Time 2200   Preferred Interaction In Room   Physical/Cognitive Deficits   Vision Assessment Glasses used during interview   Hearing Assessment No difficulty in normal conversation, interaction, listening to TV   Adaptive Equipment/Other Walker;Wheelchair   Cognitive/Memory- Patient Recalls: Room Location;Staff names/faces;That he/she is in facility;Most Recent Meal;Birthplace   Communication barriers to participation Makes self understood;Understands   Speech assessment Clear speech   Other Treatments Receiving Therapy   Activity Plan   Activity Preferences During Stay One-to-one;Beauty shop;Television;Discussion/reminisce   Activity Plan Supportive   Information Obtained From Patient   Attendance   Activity 1:1  (nails done today)   Participation Active participation   Therapeutic Recreation   Community Reintegration Demonstrates ability to complete social goals   Stress Management/Relaxation Training Able to identify stress of pain levels   Social Skills Demonstrates ability to listen to others   Leisure Education Demonstrates knowledge of leisure barriers   Leisure Resources Identifies awareness of available leisure resources   Leisure Attitude/Participation Participates in 1:1 structured activity   Patient's goals: Improve Self-esteem   Time Spent With Patient   Minutes spent with patient 60

## 2017-06-07 NOTE — PROGRESS NOTES
Case Management  Inpatient Rehabilitation Plan of Care and Discharge Plan Note    Rehabilitation Diagnosis:  S/P Righ hip ORIF  Date of Onset:  05/23/17    Medical Summary:  Hx of present illness:  Right femur periprosthetic fracture  PMH:  GERD, anxiety, HLD, seizures, heart cath, jaylene, appy, hysterectomy,  cataracts, B hip replacements    Plan of Care      Expected Intensity:  Average of 3 hours of therapy 5 days/week.  Interdisciplinary Team:  Interdisciplinary Team: Medical Supervision and 24 Hour Rehabilitation Nursing.,  Physical Therapy:, Occupational Therapy:, Social Work, Therapeutic Recreation.  Physical Therapy Intensity/Duration: 1.5 hrs/day, 5-6 days per week  Occupational Therapy Intensity/Duration: 1.5 hrs/day, 5-6 days per week  Estimated Length of Stay/Anticipated Discharge Date: 7-10 days  Anticipated Discharge Destination:  Anticipated discharge destination from inpatient rehabilitation is community  discharge with assistance. Pt plans to return home with spouse at discharge.      Based on the patient's medical and functional status, their prognosis and  expected level of functional improvement is:  good    Signed by: Arina Joe, Supervisor

## 2017-06-07 NOTE — PROGRESS NOTES
Patient Assessment Instrument  Quality Indicators - Admission    Section B. Hearing, Speech Vision      Section C. Cognitive Patterns      Section IT4158. Prior Functioning      Section IT7305. Prior Device Use      Section [id number]. Self Care Performance     Eating: [place] sets up or cleans up; patient completes activity. [place] assists  only prior to or following the activity.   Oral Hygiene: [place] provides verbal cues or touching/steadying assistance as  patient completes activity.   Toileting Hygiene: [place] does all of the effort. Patient does none of the  effort to complete the activity. Or, the assistance of 2 or more helpers is  required for the patient to complete the activity.   Shower/Bathe Self: [place] does less than half the effort. [place] lifts, holds  or supports trunk or limbs but provides less than half the effort.   Upper Body Dressing: [place] provides verbal cues or touching/steadying  assistance as patient completes activity.   Lower Body Dressing: [place] does more than half the effort. [place] lifts or  holds trunk or limbs and provides more than half the effort.   Putting On/Taking Off Footwear: [place] does more than half the effort. [place]  lifts or holds trunk or limbs and provides more than half the effort.    Section [id number]. Self Care Discharge Goals     Eating: Patient completed the activities by him/herself with no assistance from  a helper.   Oral Hygiene: [place] sets up or cleans up; patient completes activity. [place]  assists only prior to or following the activity.   Toileting Hygiene: [place] provides verbal cues or touching/steadying assistance  as patient completes activity.   Shower/Bathe Self: [place] provides verbal cues or touching/steadying assistance  as patient completes activity.   Upper Body Dressing: [place] sets up or cleans up; patient completes activity.  [place] assists only prior to or following the activity.   Lower Body Dressing: [place] provides verbal cues or  touching/steadying  assistance as patient completes activity.   Putting On/Taking Off Footwear: [place] does less than half the effort. [place]  lifts, holds or supports trunk or limbs but provides less than half the effort.    Section [id number]. Mobility Performance      Section [id number]. Mobility Discharge Goals      Section H. Bladder and Bowel      Section I. Active Diagnosis      Section J. Health Conditions      Section K. Swallowing/Nutritional Status      Section M. Skin Conditions      Section O. Special Treatments, Procedures, and Programs      OPTIONAL BRANCH FOR TRACKING FALLS  Fall(s) During Shift:    Signed by: Missy Pelayo, Occupational Therapist

## 2017-06-07 NOTE — PLAN OF CARE
Problem: Inpatient Occupational Therapy  Goal: Transfer Training Goal 1 LTG- OT    06/07/17 1231   Transfer Training OT LTG   Transfer Training OT LTG, Date Established 06/07/17   Transfer Training OT LTG, Time to Achieve by discharge   Transfer Training OT LTG, Activity Type all transfers   Transfer Training OT LTG, [place] Level contact guard assist       Goal: Patient Education Goal LTG- OT    06/07/17 1231   Patient Education OT LTG   Patient Education OT LTG, Date Established 06/07/17   Patient Education OT LTG, Time to Achieve by discharge   Patient Education OT LTG, Education Type HEP;adaptive equipment mgmt;home safety;work simplification       Goal: Bathing Goal STG- OT    06/07/17 1231   Bathing OT STG   Bathing Goal OT STG, Date Established 06/07/17   Bathing Goal OT STG, Time to Achieve 5 - 7 days   Bathing Goal OT STG, [place] Level minimum assist (75% patient effort)       Goal: Bathing Goal LTG- OT    06/07/17 1231   Bathing OT LTG   Bathing Goal OT LTG, Date Established 06/07/17   Bathing Goal OT LTG, Time to Achieve by discharge   Bathing Goal OT LTG, [place] Level contact guard assist       Goal: Eating Self-Feeding Goal LTG- OT    06/07/17 1231   Eating Self-Feeding OT LTG   Eat Self Feeding Goal OT LTG, Date Established 06/07/17   Eat Self Feeding Goal OT LTG, Time to Achieve by discharge   Eat Self Feed Goal OT LTG, [place] Level conditional independence       Goal: Toileting Goal LTG- OT    06/07/17 1231   Toileting OT LTG   Toileting Goal OT LTG, Date Established 06/07/17   Toileting Goal OT LTG, Time to Achieve by discharge   Toileting Goal OT LTG, [place] Level contact guard assist       Goal: LB Dressing Goal STG- OT    06/07/17 1231   LB Dressing OT STG   LB Dressing Goal OT STG, Date Established 06/07/17   LB Dressing Goal OT STG, Time to Achieve 5 - 7 days   LB Dressing Goal OT STG, [place] Level moderate assist (50% patient effort)       Goal: LB Dressing  Goal LTG- OT    06/07/17 1231   LB Dressing OT LTG   LB Dressing Goal OT LTG, Date Established 06/07/17   LB Dressing Goal OT LTG, Time to Achieve by discharge   LB Dressing Goal OT LTG, [place] Level minimum assist (75% patient effort)

## 2017-06-07 NOTE — PROGRESS NOTES
Patient Assessment Instrument  Quality Indicators - Admission    Section B. Hearing, Speech Vision      Section C. Cognitive Patterns      Section [id number]. Prior Functioning      Section FG1625. Prior Device Use      Section UM0622. Self Care Performance      Section [id number]. Self Care Discharge Goals      Section [id number]. Mobility Performance     Roll Left and Right: [place] does less than half the effort. [place] lifts, holds  or supports trunk or limbs but provides less than half the effort.   Sit to Lying: [place] does less than half the effort. [place] lifts, holds or  supports trunk or limbs but provides less than half the effort.   Lying to Sitting on Side of Bed: [place] does less than half the effort. [place]  lifts, holds or supports trunk or limbs but provides less than half the effort.   Sit to Stand: [place] does less than half the effort. [place] lifts, holds or  supports trunk or limbs but provides less than half the effort.   Chair/Bed to Chair Transfer: [place] does less than half the effort. [place]  lifts, holds or supports trunk or limbs but provides less than half the effort.   Toilet Transfer [place] does less than half the effort. [place] lifts, holds or  supports trunk or limbs but provides less than half the effort.   Car Transfer: Not attempted due to medical or safety concerns.    Patient Walks: Yes   Walk 10 Feet: [place] does more than half the effort. [place] lifts or holds  trunk or limbs and provides more than half the effort.   Walk 50 Feet With 2 Turns: Not attempted due to medical or safety concerns.   Walk 150 Feet: Not attempted due to medical or safety concerns.   Walking 10 Feet on Uneven Surfaces: Not attempted due to medical or safety  concerns.   1 Step Over Curb or Up/Down Stair: Not attempted due to medical or safety  concerns.   4 Steps Up and Down, With/Without Rail: Not attempted due to medical or safety  concerns.   12 Steps Up and Down, With/Without Rail: Not attempted due to medical  or safety  concerns.   Picking up an Object: Not attempted due to medical or safety concerns.    Section [id number]. Mobility Discharge Goals     Roll Left and Right: Patient completed the activities by him/herself with no  assistance from a helper.   Sit to Lying: Patient completed the activities by him/herself with no  assistance from a helper.   Lying to Sitting on Side of Bed: Patient completed the activities by  him/herself with no assistance from a helper.   Sit to Stand: Patient completed the activities by him/herself with no  assistance from a helper.   Chair/Bed to Chair Transfer: Patient completed the activities by him/herself  with no assistance from a helper.   Toilet Transfer: Patient completed the activities by him/herself with no  assistance from a helper.   Car Transfer: Patient completed the activities by him/herself with no  assistance from a helper.   Walk Discharge Goals:   Walk 50 Feet With 2 Turns: [place] sets up or cleans up; patient completes  activity. [place] assists only prior to or following the activity.     Wheelchair Discharge Goals:  Wheel 50 Feet with Two Turns Discharge Goal: [place] does all of the effort.  Patient does none of the effort to complete the activity. Or, the assistance of  2 or more helpers is required for the patient to complete the activity.    Section H. Bladder and Bowel      Section I. Active Diagnosis      Section J. Health Conditions      Section K. Swallowing/Nutritional Status      Section M. Skin Conditions      Section O. Special Treatments, Procedures, and Programs      OPTIONAL BRANCH FOR TRACKING FALLS  Fall(s) During Shift:    Signed by: Yuli Moreno PT

## 2017-06-07 NOTE — PLAN OF CARE
Problem: Patient Care Overview (Adult)  Goal: Plan of Care Review  Outcome: Ongoing (interventions implemented as appropriate)  Goal: Adult Individualization and Mutuality  Outcome: Ongoing (interventions implemented as appropriate)  Goal: Discharge Needs Assessment  Outcome: Ongoing (interventions implemented as appropriate)    Problem: Skin Integrity Impairment, Risk/Actual (Adult)  Goal: Identify Related Risk Factors and Signs and Symptoms  Outcome: Ongoing (interventions implemented as appropriate)  Goal: Skin Integrity/Wound Healing  Outcome: Ongoing (interventions implemented as appropriate)    Problem: Pressure Ulcer (Adult)  Goal: Signs and Symptoms of Listed Potential Problems Will be Absent or Manageable (Pressure Ulcer)  Outcome: Ongoing (interventions implemented as appropriate)    Problem: Fall Risk (Adult)  Goal: Identify Related Risk Factors and Signs and Symptoms  Outcome: Ongoing (interventions implemented as appropriate)  Goal: Absence of Falls  Outcome: Ongoing (interventions implemented as appropriate)    Problem: Fractured Hip (Adult)  Goal: Signs and Symptoms of Listed Potential Problems Will be Absent or Manageable (Fractured Hip)  Outcome: Ongoing (interventions implemented as appropriate)    Problem: Infection, Risk/Actual (Adult)  Goal: Identify Related Risk Factors and Signs and Symptoms  Outcome: Ongoing (interventions implemented as appropriate)  Goal: Infection Prevention/Resolution  Outcome: Ongoing (interventions implemented as appropriate)

## 2017-06-07 NOTE — PROGRESS NOTES
Inpatient Rehabilitation Functional Measures Assessment and Plan of Care    Plan of Care  Mobility    [PT] Bed/Chair/Wheelchair(Active)  Current Status(01/01/1753):  Weekly Goal(01/01/1753):  Discharge Goal:    [PT] Walk(Active)  Current Status(06/07/2017): Min A w/ RW 15 ft  Weekly Goal(06/15/2017): CGA w/ RW 50 ft  Discharge Goal: MI/SUP w/ RW 75 ft    Functional Measures  RAS Eating:  RAS Grooming:  RAS Bathing:  RAS Upper Body Dressing:  RAS Lower Body Dressing:  RAS Toileting:    RAS Bladder Management  Level of Assistance:  Frequency/Number of Accidents this Shift:    RAS Bowel Management  Level of Assistance:  Frequency/Number of Accidents this Shift:    RAS Bed/Chair/Wheelchair Transfer:  Bed/chair/wheelchair Transfer Score = 4.  Patient performs 75% or more of effort and minimal assistance (little/incidental  help/lifting of one limb/steadying) for transferring to and from the  bed/chair/wheelchair, requiring: Contact guard. Steadying. Patient requires the  following assistive device(s): Walker.  RAS Toilet Transfer:  RAS Tub/Shower Transfer:    Previously Documented Mode of Locomotion at Discharge:  RAS Expected Mode of Locomotion at Discharge:  RAS Walk/Wheelchair:  WHEELCHAIR OBSERVATION   Activity was not observed.    WALK OBSERVATION   Walk Distance Scale = 1.  Distance walked is less than 50 feet. Walk Score = 1.   Incidental assistance with lifting, contact guard or steadying was provided.  Patient performs 75% or more of effort and requires minimal contact assistance  of two or more people for walking. . Patient walked a distance of 15 feet.  Patient requires the following assistive device(s): Rolling walker.  RAS Stairs:  Activity was not observed.    RAS Comprehension:  RAS Expression:  RAS Social Interaction:  RAS Problem Solving:  RAS Memory:    Therapy Mode Minutes  Occupational Therapy:  Physical Therapy: Individual: 90 minutes.  Speech Language Pathology:    Discharge Functional Goals:  Bed,  Chair, Wheelchair Transfers: 6  Mode of Locomotion: W  Walk: 2  Stairs: 1    Signed by: Yuli Moreno PT

## 2017-06-07 NOTE — PLAN OF CARE
Problem: Patient Care Overview (Adult)  Goal: Plan of Care Review  Outcome: Ongoing (interventions implemented as appropriate)    06/07/17 0747   Coping/Psychosocial Response Interventions   Plan Of Care Reviewed With patient   Patient Care Overview   Progress progress toward functional goals as expected         Problem: Skin Integrity Impairment, Risk/Actual (Adult)  Goal: Identify Related Risk Factors and Signs and Symptoms  Outcome: Ongoing (interventions implemented as appropriate)  Goal: Skin Integrity/Wound Healing  Outcome: Ongoing (interventions implemented as appropriate)    Problem: Pressure Ulcer (Adult)  Goal: Signs and Symptoms of Listed Potential Problems Will be Absent or Manageable (Pressure Ulcer)  Outcome: Ongoing (interventions implemented as appropriate)    Problem: Fall Risk (Adult)  Goal: Identify Related Risk Factors and Signs and Symptoms  Outcome: Outcome(s) achieved Date Met:  06/07/17  Goal: Absence of Falls  Outcome: Ongoing (interventions implemented as appropriate)    Problem: Fractured Hip (Adult)  Goal: Signs and Symptoms of Listed Potential Problems Will be Absent or Manageable (Fractured Hip)  Outcome: Ongoing (interventions implemented as appropriate)    Problem: Infection, Risk/Actual (Adult)  Goal: Identify Related Risk Factors and Signs and Symptoms  Outcome: Outcome(s) achieved Date Met:  06/07/17  Goal: Infection Prevention/Resolution  Outcome: Ongoing (interventions implemented as appropriate)

## 2017-06-08 PROCEDURE — 97116 GAIT TRAINING THERAPY: CPT

## 2017-06-08 PROCEDURE — 97535 SELF CARE MNGMENT TRAINING: CPT

## 2017-06-08 PROCEDURE — 97110 THERAPEUTIC EXERCISES: CPT

## 2017-06-08 PROCEDURE — 94799 UNLISTED PULMONARY SVC/PX: CPT

## 2017-06-08 PROCEDURE — 97530 THERAPEUTIC ACTIVITIES: CPT

## 2017-06-08 PROCEDURE — 25010000002 ENOXAPARIN PER 10 MG: Performed by: FAMILY MEDICINE

## 2017-06-08 PROCEDURE — 97150 GROUP THERAPEUTIC PROCEDURES: CPT

## 2017-06-08 RX ADMIN — LEVETIRACETAM 500 MG: 500 TABLET, FILM COATED ORAL at 08:48

## 2017-06-08 RX ADMIN — BACLOFEN 20 MG: 10 TABLET ORAL at 21:33

## 2017-06-08 RX ADMIN — SENNOSIDES 1 TABLET: 8.6 TABLET ORAL at 08:48

## 2017-06-08 RX ADMIN — PHENYTOIN SODIUM 200 MG: 100 CAPSULE, EXTENDED RELEASE ORAL at 21:34

## 2017-06-08 RX ADMIN — HYDROCODONE BITARTRATE AND ACETAMINOPHEN 1 TABLET: 10; 325 TABLET ORAL at 05:59

## 2017-06-08 RX ADMIN — ENOXAPARIN SODIUM 40 MG: 40 INJECTION SUBCUTANEOUS at 08:52

## 2017-06-08 RX ADMIN — HYDROCODONE BITARTRATE AND ACETAMINOPHEN 1 TABLET: 10; 325 TABLET ORAL at 15:03

## 2017-06-08 RX ADMIN — PHENOBARBITAL 97.2 MG: 32.4 TABLET ORAL at 08:50

## 2017-06-08 RX ADMIN — HYDROCODONE BITARTRATE AND ACETAMINOPHEN 1 TABLET: 10; 325 TABLET ORAL at 21:35

## 2017-06-08 RX ADMIN — VENLAFAXINE HYDROCHLORIDE 150 MG: 150 CAPSULE, EXTENDED RELEASE ORAL at 08:48

## 2017-06-08 RX ADMIN — PHENYTOIN SODIUM 100 MG: 100 CAPSULE, EXTENDED RELEASE ORAL at 08:48

## 2017-06-08 RX ADMIN — BACLOFEN 20 MG: 10 TABLET ORAL at 08:48

## 2017-06-08 RX ADMIN — SENNOSIDES 1 TABLET: 8.6 TABLET ORAL at 17:18

## 2017-06-08 RX ADMIN — ATORVASTATIN CALCIUM 40 MG: 40 TABLET, FILM COATED ORAL at 21:33

## 2017-06-08 RX ADMIN — CLONAZEPAM 0.5 MG: 0.5 TABLET ORAL at 21:34

## 2017-06-08 RX ADMIN — MONTELUKAST SODIUM 10 MG: 10 TABLET ORAL at 21:34

## 2017-06-08 RX ADMIN — PANTOPRAZOLE SODIUM 40 MG: 40 TABLET, DELAYED RELEASE ORAL at 05:59

## 2017-06-08 RX ADMIN — PHENOBARBITAL 64.8 MG: 32.4 TABLET ORAL at 21:34

## 2017-06-08 RX ADMIN — LEVETIRACETAM 250 MG: 250 TABLET, FILM COATED ORAL at 21:34

## 2017-06-08 NOTE — SIGNIFICANT NOTE
06/08/17 0800   Case Management/Social Work Plan   Plan Team conference was held this morning and discharge date is planned for 6/21/17.  Spoke to pt about discharge date of 6/21 and she is agreeable.  Attempted to contact spouse Steven [phone] without success.  Attempted to contact son Cyrus [phone] but message says that phone line has been disconnected

## 2017-06-08 NOTE — PROGRESS NOTES
"     LOS: 2 days     Chief Complaint:  Hip fracture    Subjective     Interval History:     She's frustrated this morning because her bed alarm went off all night.  She states she rested very little.  She did work with PT and OT yesterday.  She states that this morning she is tired because the bed alarm Going off on night.  Bed mobility requires minimal assist.  Transfers required min assist.  Ambulating up to 15 feet with min assist ×2.  No cough or fevers or chills.  She had a bowel movement this morning.  She has COPD and states her breathing is doing pretty good      Objective     Vital Signs  /68 (BP Location: Left arm, Patient Position: Lying)  Pulse 72  Temp 98.7 °F (37.1 °C) (Oral)   Resp 18  Ht 59\" (149.9 cm)  Wt 119 lb (54 kg)  SpO2 95%  BMI 24.04 kg/m2  Intake & Output (last day)       06/07 0701 - 06/08 0700 06/08 0701 - 06/09 0700    P.O. 1680     Total Intake(mL/kg) 1680 (31.1)     Net +1680            Unmeasured Urine Occurrence 9 x     Unmeasured Stool Occurrence 1 x             Physical Exam:     General Appearance:    Alert, cooperative, in no acute distress   Head:    Normocephalic, without obvious abnormality, atraumatic   Eyes:            Lids and lashes normal, conjunctivae and sclerae normal, no   icterus, no pallor, corneas clear, PERRLA   Ears:    Ears appear intact with no abnormalities noted       Neck:   No adenopathy, supple, trachea midline, no thyromegaly, no   carotid bruit, no JVD   Lungs:     Clear to auscultation,respirations regular, even and                  unlabored    Heart:    Regular rhythm and normal rate, normal S1 and S2, no            murmur, no gallop, no rub, no click   Chest Wall:    No abnormalities observed   Abdomen:     Normal bowel sounds, no masses, no organomegaly, soft        non-tender, non-distended, no guarding, no rebound                tenderness   Extremities:   no edema, no cyanosis, no redness   Pulses:   Pulses palpable and equal " bilaterally   Skin:   No bleeding, bruising or rash   Lymph nodes:   No palpable adenopathy   Neurologic:   Cranial nerves 2 - 12 grossly intact, sensation intact, DTR       present and equal bilaterally        Results Review:    Lab Results   Component Value Date    WBC 4.09 (L) 06/07/2017    HGB 11.1 (L) 06/07/2017    HCT 34.2 (L) 06/07/2017    MCV 98.8 (H) 06/07/2017     06/07/2017       Lab Results   Component Value Date    GLUCOSE 98 06/07/2017    BUN 6 (L) 06/07/2017    CREATININE 0.39 (L) 06/07/2017    EGFRIFNONA >150 06/07/2017    BCR 15.4 06/07/2017     06/07/2017    K 4.1 06/07/2017     06/07/2017    CO2 25.3 06/07/2017    CALCIUM 8.8 06/07/2017    ALBUMIN 3.30 (L) 06/07/2017    LABIL2 1.0 (L) 06/07/2017    AST 18 06/07/2017    ALT 12 06/07/2017     No results found for: INR    No results found for: POCGLU       Medication Review:     Current Facility-Administered Medications:   •  acetaminophen (TYLENOL) tablet 650 mg, 650 mg, Oral, Q4H PRN, Samuel Duane Kreis, MD  •  aluminum-magnesium hydroxide-simethicone (MAALOX MAX) 400-400-40 MG/5ML suspension 15 mL, 15 mL, Oral, Q6H PRN, Samuel Duane Kreis, MD  •  atorvastatin (LIPITOR) tablet 40 mg, 40 mg, Oral, Nightly, Samuel Duane Kreis, MD, 40 mg at 06/07/17 2055  •  baclofen (LIORESAL) tablet 20 mg, 20 mg, Oral, Q12H, Samuel Duane Kreis, MD, 20 mg at 06/08/17 0848  •  bisacodyl (DULCOLAX) suppository 10 mg, 10 mg, Rectal, Daily PRN, Samuel Duane Kreis, MD  •  clonazePAM (KlonoPIN) tablet 0.5 mg, 0.5 mg, Oral, BID PRN, Samuel Duane Kreis, MD, 0.5 mg at 06/07/17 2054  •  enoxaparin (LOVENOX) syringe 40 mg, 40 mg, Subcutaneous, Daily, Samuel Duane Kreis, MD, 40 mg at 06/08/17 0852  •  HYDROcodone-acetaminophen (NORCO)  MG per tablet 1 tablet, 1 tablet, Oral, Q4H PRN, Samuel Duane Kreis, MD, 1 tablet at 06/08/17 0519  •  levETIRAcetam (KEPPRA) tablet 250 mg, 250 mg, Oral, Nightly, Samuel Duane Kreis, MD, 250 mg at 06/07/17 2050  •   levETIRAcetam (KEPPRA) tablet 500 mg, 500 mg, Oral, Daily, Samuel Duane Kreis, MD, 500 mg at 06/08/17 0848  •  magnesium hydroxide (MILK OF MAGNESIA) suspension 2400 mg/10mL 10 mL, 10 mL, Oral, Daily PRN, Samuel Duane Kreis, MD  •  montelukast (SINGULAIR) tablet 10 mg, 10 mg, Oral, Nightly, Samuel Duane Kreis, MD, 10 mg at 06/07/17 2055  •  ondansetron (ZOFRAN) tablet 4 mg, 4 mg, Oral, Q6H PRN **OR** ondansetron ODT (ZOFRAN-ODT) disintegrating tablet 4 mg, 4 mg, Oral, Q6H PRN **OR** ondansetron (ZOFRAN) injection 4 mg, 4 mg, Intravenous, Q6H PRN, Samuel Duane Kreis, MD  •  pantoprazole (PROTONIX) EC tablet 40 mg, 40 mg, Oral, Q AM, Samuel Duane Kreis, MD, 40 mg at 06/08/17 0559  •  PHENobarbital (LUMINAL) tablet 97.2 mg, 97.2 mg, Oral, Q12H, Samuel Duane Kreis, MD, 97.2 mg at 06/08/17 0850  •  phenytoin (DILANTIN) ER capsule 100 mg, 100 mg, Oral, Daily, Samuel Duane Kreis, MD, 100 mg at 06/08/17 0848  •  phenytoin (DILANTIN) ER capsule 200 mg, 200 mg, Oral, Nightly, Samuel Duane Kreis, MD, 200 mg at 06/07/17 2055  •  senna (SENOKOT) tablet 1 tablet, 1 tablet, Oral, BID, Samuel Duane Kreis, MD, 1 tablet at 06/08/17 0848  •  venlafaxine XR (EFFEXOR-XR) 24 hr capsule 150 mg, 150 mg, Oral, Daily With Breakfast, Samuel Duane Kreis, MD, 150 mg at 06/08/17 0848      Assessment/Plan     Periprosthetic hip fracture status post ORIF  Osteoarthritis  Chronic obstructive pulmonary disease  Seizure disorder      Continue hydrocodone when necessary for pain    PT and OT ongoing.  Refer to team conference note.    Continue baclofen    Continue Keppra, Dilantin and phenobarbital        Samuel Duane Kreis, MD  06/08/17  8:56 AM

## 2017-06-08 NOTE — PROGRESS NOTES
Inpatient Rehabilitation Functional Measures Assessment    Functional Measures  RAS Eating:  RAS Grooming: Grooming Score = 5. Patient is supervision/set-up for grooming,  requiring: Setting out grooming equipment. Initial preparation. Stand by  assistance. Verbal cuing, prompting, or instructing. No assistive devices were  required.  RAS Bathing:  RAS Upper Body Dressing:  RAS Lower Body Dressing:  RAS Toileting:    RAS Bladder Management  Level of Assistance:  Frequency/Number of Accidents this Shift:    RAS Bowel Management  Level of Assistance:  Frequency/Number of Accidents this Shift:    RAS Bed/Chair/Wheelchair Transfer:  [facility] Toilet Transfer:  RAS Tub/Shower Transfer:    Previously Documented Mode of Locomotion at Discharge:  [facility] Expected Mode of Locomotion at Discharge:  RAS Walk/Wheelchair:  [facility] Stairs:    [facility] Comprehension:  RAS Expression:  [facility] Social Interaction:  [facility] Problem Solving:  [facility] Memory:    Therapy Mode Minutes  Occupational Therapy: Individual: 45 minutes.   Group: 45 minutes.  Physical Therapy:  Speech Language Pathology:    Discharge Functional Goals:    Signed by: Missy Pelayo, Occupational Therapist

## 2017-06-08 NOTE — PROGRESS NOTES
PPS CMG Coordinator  Inpatient Rehabilitation Admission    Ethnic Group: White.  Marital Status:  Marital Status: .    IRF Admission Date:  06/06/17  Admission Class: Initial Rehab.  Admit From:  [facility]    Pre-Hospital Living: Home. Pre-Hospital Living  With: (2) Family/Relatives.    Payment Sources: Primary: Medicare Fee for Service  Secondary: Not Listed.  Impairment Group: 08.11 Status Post Unilateral Hip Fracture  Date of Onset of Impairment: 05/23/17    Etiologic Diagnosis Code(s):  Rank Code      Description  1    S72.001A  Fracture of unspecified part of neck of right                 femur, initial encounter for closed fracture    Comorbidities:      Are there any arthritis conditions recorded for Impairment Group, Etiologic  Diagnosis, or Comorbid Conditions that meet all of the regulatory requirements  for IRF classification (in 42 .29(b)(2)(x), (xi), and xii))?    RAS Bladder Accidents:  0 - Accidents.  Patient used medications/device 4 days  prior to admission.  Patient used medications/device this shift.  6/6/2017  9:00:00 PM  Bladder Score = 6. Patient has not had an accident, but uses a  device/medication. bedpan  RAS Bowel Accident: 0 -Accidents.  Patient used medications/device 4 days prior  to admission. Patient used medications/device this shift.  6/6/2017 9:00:00 PM  Bowel Score = 6. Patient has no accidents, but uses a device/medications.  bedpan, medication    Signed by: Arina Joe, Supervisor

## 2017-06-08 NOTE — PROGRESS NOTES
Inpatient Rehabilitation Functional Measures Assessment and Plan of Care    Plan of Care      Functional Measures  RAS Eating:  RAS Grooming:  RAS Bathing:  RAS Upper Body Dressing:  RAS Lower Body Dressing:  RAS Toileting:    RAS Bladder Management  Level of Assistance:  Frequency/Number of Accidents this Shift:    RAS Bowel Management  Level of Assistance:  Frequency/Number of Accidents this Shift:    RAS Bed/Chair/Wheelchair Transfer:  RAS Toilet Transfer:  RAS Tub/Shower Transfer:    Previously Documented Mode of Locomotion at Discharge:  RAS Expected Mode of Locomotion at Discharge:  RAS Walk/Wheelchair:  RAS Stairs:    RAS Comprehension:  Both ( auditory and visual) modes of comprehension are used  equally. Comprehension Score = 6, Modified [place].  Patient comprehends  complex/abstract information in their primary language, requiring: Additional  time.  RAS Expression:  Both ( vocal and non-vocal) modes of expression are used  equally. Expression Score = 6,  Modified Independent. Patient expresses  complex/abstract information in their primary language, requiring: Additional  time.  RAS Social Interaction:  Social Interaction Score = 6, Modified [place].  Patient is modified independent for social interaction, requiring: Requires  additional time.  RAS Problem Solving:  Problem Solving Score = 6, Modified [place].  Patient  makes appropriate decisions in order to solve complex problems, but requires  extra time.  RAS Memory:  Memory Score = 6, Modified [place].  Patient is modified  independent for memory, requiring: Requires additional time.    Therapy Mode Minutes  Occupational Therapy:  Physical Therapy:  Speech Language Pathology:    Discharge Functional Goals:    Signed by: Dewey Sanchez RN

## 2017-06-08 NOTE — PROGRESS NOTES
Inpatient Rehabilitation Functional Measures Assessment    Functional Measures  RAS Eating:  RAS Grooming: Grooming Score = 5. Patient is supervision/set-up for grooming,  requiring: Setting out grooming equipment. Initial preparation. Opening  containers. No assistive devices were required.  RAS Bathing:  Patient bathed in bed. Patient requires moderate assistance for  washing, rinsing, or drying the right arm. Patient requires moderate assistance  for washing, rinsing, or drying the left arm. Patient requires moderate  assistance for washing, rinsing, or drying the chest. Patient requires moderate  assistance for washing, rinsing, or drying the abdomen. Patient requires  moderate assistance for washing, rinsing, or drying the perineal area. Patient  requires total assistance for washing, rinsing, or drying the buttocks. Patient  requires total assistance for washing, rinsing, or drying the right upper leg.  Patient requires total assistance for washing, rinsing, or drying the left upper  leg. Patient requires total assistance for washing, rinsing, or drying the right  lower leg, including the foot. Patient requires total assistance for washing,  rinsing, or drying the left lower leg, including the foot. Patient performs 0 -  24% of bathing tasks.  Bathing Score = 1, Total Assistance. No assistive devices  were required.  RAS Upper Body Dressing:  Patient requires total assistance for gathering  clothes. Wearing a bra or undershirt was not applicable for this patient.  Patient requires moderate/considerable physical assistance for threading the  right arm through the garment (shirt/sweater). Patient requires  moderate/considerable physical assistance for threading the left arm through the  garment (shirt/sweater). Patient requires moderate/considerable physical  assistance for pulling an over-head-garment over head or pulling  front-fastening-garment around back. Patient requires moderate/considerable  physical  assistance for pulling an over-head-garment down the trunk or  adjusting/fastening together a front-fastening-garment. Patient performs 60 % of  upper body dressing tasks. Upper Body Dressing Score = 3, Moderate Assistance.  No assistive devices were required.  RAS Lower Body Dressing:  Patient requires total assistance for gathering  clothes. Patient requires total assistance for holding clothing and/or threading  the right leg through the undergarment. Patient requires total assistance for  holding clothing and/or threading the left leg through the undergarment. Patient  requires total assistance for holding clothing and/or pulling undergarment over  hips and adjusting fasteners. Patient requires total assistance for holding  clothing and/or threading the right leg through the pants/skirt. Patient  requires total assistance for holding clothing and/or threading the left leg  through the pants/skirt. Patient requires total assistance for holding clothing  and/or pulling pants/skirt over hips and adjusting fasteners. Patient requires  total assistance for holding clothing and/or donning and/or doffing right sock.  Patient requires total assistance for holding clothing and/or donning and/or  doffing left sock. Donning and/or doffing right shoe was not observed for this  patient. Donning and/or doffing left shoe was not observed for this patient.  Patient performs 0 -  24% of lower body dressing tasks. Lower Body Dressing  Score = 1, Total Assistance. No assistive devices were required.  RAS Toileting:    RAS Bladder Management  Level of Assistance:  Bladder Score = 1. Patient performs less than 25% of tasks  and requires total assist or assist of two for use of bedpan in bladder  management.  Frequency/Number of Accidents this Shift:  Bladder accidents this shift:  0 .  Patient has not had an accident, but used a device/medication this shift  requiring: bedpan, brief .    RAS Bowel Management  Level of Assistance:  Bowel Score = 1. Patient performs less than 25% of tasks  and requires total assist or assist of two for use of bedpan in bowel  management.  Frequency/Number of Accidents this Shift: Bowel accidents this shift: 0 .  Patient has not had an accident, but used a device/medication this shift  requiring: bedpan .    RAS Bed/Chair/Wheelchair Transfer:  Psychiatric Toilet Transfer:  Toilet Transfer was not observed this shift because  patient used bedpan/urinal only.  RAS Tub/Shower Transfer:    Previously Documented Mode of Locomotion at Discharge:  Psychiatric Expected Mode of Locomotion at Discharge:  Psychiatric Walk/Wheelchair:  Psychiatric Stairs:    Psychiatric Comprehension:  RAS Expression:  Psychiatric Social Interaction:  Psychiatric Problem Solving:  Psychiatric Memory:    Therapy Mode Minutes  Occupational Therapy:  Physical Therapy:  Speech Language Pathology:    Discharge Functional Goals:    Signed by: RENNY Gonzalez

## 2017-06-08 NOTE — PROGRESS NOTES
Inpatient Rehabilitation Functional Measures Assessment    Functional Measures  RAS Eating:  RAS Grooming:  RAS Bathing:  RAS Upper Body Dressing:  RAS Lower Body Dressing:  RAS Toileting:    RAS Bladder Management  Level of Assistance:  Frequency/Number of Accidents this Shift:    RAS Bowel Management  Level of Assistance:  Frequency/Number of Accidents this Shift:    RAS Bed/Chair/Wheelchair Transfer:  RAS Toilet Transfer:  RAS Tub/Shower Transfer:    Previously Documented Mode of Locomotion at Discharge:  RAS Expected Mode of Locomotion at Discharge:  RAS Walk/Wheelchair:  RAS Stairs:    RAS Comprehension:  Both ( auditory and visual) modes of comprehension are used  equally. Comprehension Score = 6, Modified [place].  Patient comprehends  complex/abstract information in their primary language, requiring: Glasses.  RAS Expression:  Both ( vocal and non-vocal) modes of expression are used  equally. Expression Score = 6,  Modified Independent. Patient expresses  complex/abstract information in their primary language, requiring: Additional  time.  RAS Social Interaction:  Social Interaction Score = 6, Modified Independent.  Patient is modified independent for social interaction, requiring: Requires  additional time.  RAS Problem Solving:  Problem Solving Score = 6, Modified [place].  Patient  makes appropriate decisions in order to solve complex problems, but requires  extra time.  RAS Memory:  Memory Score = 6, Modified [place].  Patient is modified  independent for memory, requiring: Requires additional time.    Therapy Mode Minutes  Occupational Therapy:  Physical Therapy:  Speech Language Pathology:    Discharge Functional Goals:    Signed by: Nurse Nathen

## 2017-06-08 NOTE — PROGRESS NOTES
Inpatient Rehabilitation Functional Measures Assessment    Functional Measures  RAS Eating:  RAS Grooming:  RAS Bathing:  RAS Upper Body Dressing:  RAS Lower Body Dressing:  RAS Toileting:    RAS Bladder Management  Level of Assistance:  Frequency/Number of Accidents this Shift:    RAS Bowel Management  Level of Assistance:  Frequency/Number of Accidents this Shift:    RAS Bed/Chair/Wheelchair Transfer:  Bed/chair/wheelchair Transfer Score = 4.  Patient performs 75% or more of effort and minimal assistance (little/incidental  help/lifting of one limb/steadying) for transferring to and from the  bed/chair/wheelchair, requiring: Hand placement. Contact guard. Steadying.  Lifting one limb only. Patient requires the following assistive device(s): Arm  rest. Bed rails. Walker.  RAS Toilet Transfer:  Toilet Transfer Score = 4.  Patient performs 75% or more  of effort and minimal assistance (little/incidental help/steadying) for  transferring to and from the toilet/commode, requiring: Hand placement. Contact  guard. Steadying. Patient requires the following assistive device(s): Safety  frame/over the toilet. Grab bars. Walker.  RAS Tub/Shower Transfer:    Previously Documented Mode of Locomotion at Discharge:  RAS Expected Mode of Locomotion at Discharge:  RAS Walk/Wheelchair:  WHEELCHAIR OBSERVATION   Activity was not observed.    WALK OBSERVATION   Walk Distance Scale = 1.  Distance walked is less than 50 feet. Walk Score = 1.   Incidental assistance with lifting, contact guard or steadying was provided.  Patient performs 75% or more of effort and requires minimal contact assistance  for walking. Patient walked a distance of 20 feet. Patient requires the  following assistive device(s): Rolling walker.  RAS Stairs:  Activity was not observed.    RAS Comprehension:  RAS Expression:  RAS Social Interaction:  RAS Problem Solving:  RAS Memory:    Therapy Mode Minutes  Occupational Therapy:  Physical Therapy: Individual: 90  minutes.  Speech Language Pathology:    Discharge Functional Goals:    Signed by: Roberta Friedman PTA

## 2017-06-08 NOTE — PLAN OF CARE
Problem: Patient Care Overview (Adult)  Goal: Plan of Care Review  Outcome: Ongoing (interventions implemented as appropriate)    06/07/17 0747 06/08/17 0726   Coping/Psychosocial Response Interventions   Plan Of Care Reviewed With --  patient   Patient Care Overview   Progress progress toward functional goals as expected --          Problem: Skin Integrity Impairment, Risk/Actual (Adult)  Goal: Skin Integrity/Wound Healing  Outcome: Ongoing (interventions implemented as appropriate)    Problem: Pressure Ulcer (Adult)  Goal: Signs and Symptoms of Listed Potential Problems Will be Absent or Manageable (Pressure Ulcer)  Outcome: Ongoing (interventions implemented as appropriate)    Problem: Fall Risk (Adult)  Goal: Absence of Falls  Outcome: Ongoing (interventions implemented as appropriate)    Problem: Fractured Hip (Adult)  Goal: Signs and Symptoms of Listed Potential Problems Will be Absent or Manageable (Fractured Hip)  Outcome: Ongoing (interventions implemented as appropriate)    Problem: Infection, Risk/Actual (Adult)  Goal: Infection Prevention/Resolution  Outcome: Ongoing (interventions implemented as appropriate)

## 2017-06-08 NOTE — THERAPY TREATMENT NOTE
Inpatient Rehabilitation - Occupational Therapy Treatment Note  [facility]     Patient Name: Maribel Staton  : 1945  MRN: [id number]  Today's Date: 2017  Onset of Illness/Injury or Date of Surgery Date: 17  Date of Referral to OT: 17  Referring Physician: Eva      Admit Date: 2017    Visit Dx:   No diagnosis found.  Patient Active Problem List   Diagnosis   • Closed right hip fracture             Adult Rehabilitation Note       17 1629          Rehab Assessment/Intervention    Discipline occupational therapist  -AB      Document Type therapy note (daily note)  -AB      Subjective Information no complaints;agree to therapy  -AB      Patient Effort, Rehab Treatment good  -AB      Precautions/Limitations fall precautions;non-weight bearing status;seizure precautions;other (see comments)   <skin integrity; NWB RLE; intermittent confusion  -AB      Patient Response to Treatment Pt. w/ good tolerance and rest breaks provided.   -AB      Recorded by [AB] Missy Pelayo OT      Cognitive Assessment/Intervention    Follows Commands/Answers Questions able to follow single-step instructions;100% of the time;needs cueing  -AB      Personal Safety decreased awareness, need for assist;decreased awareness, need for safety  -AB      Personal Safety Interventions gait belt;nonskid shoes/slippers when out of bed;supervised activity  -AB      Recorded by [AB] Missy Pelayo OT      Grooming Assessment/Training    Grooming Assess/Train, Position sitting  -AB      Grooming Assess/Train, Indepen Level supervision required;verbal cues required  -AB      Recorded by [AB] Missy Pelayo OT      Therapy Exercises    Bilateral Upper Extremity AROM:;sitting   BUE CoordEx; G/FMC TherEx/Act; Strengthening  -AB      Recorded by [AB] Missy Pelayo OT      Positioning and Restraints    In Wheelchair sitting;call light within reach;encouraged to call for assist;legs  elevated;with PT   in room in AM; w/ PT in PM  -AB      Recorded by [AB] Missy Pelayo, OT        User Key  (r) = Recorded By, (t) = Taken By, (c) = Cosigned By    Initials Name Effective Dates    AB Missy Pelayo, OT 02/04/16 -                 OT Goals       06/07/17 1231          Transfer Training OT LTG    Transfer Training OT LTG, Date Established 06/07/17  -AB      Transfer Training OT LTG, Time to Achieve by discharge  -AB      Transfer Training OT LTG, Activity Type all transfers  -AB      Transfer Training OT LTG, [place] Level contact guard assist  -AB      Patient Education OT LTG    Patient Education OT LTG, Date Established 06/07/17  -AB      Patient Education OT LTG, Time to Achieve by discharge  -AB      Patient Education OT LTG, Education Type HEP;adaptive equipment mgmt;home safety;work simplification  -AB      Bathing OT STG    Bathing Goal OT STG, Date Established 06/07/17  -AB      Bathing Goal OT STG, Time to Achieve 5 - 7 days  -AB      Bathing Goal OT STG, [place] Level minimum assist (75% patient effort)  -AB      Bathing OT LTG    Bathing Goal OT LTG, Date Established 06/07/17  -AB      Bathing Goal OT LTG, Time to Achieve by discharge  -AB      Bathing Goal OT LTG, [place] Level contact guard assist  -AB      Eating Self-Feeding OT LTG    Eat Self Feeding Goal OT LTG, Date Established 06/07/17  -AB      Eat Self Feeding Goal OT LTG, Time to Achieve by discharge  -AB      Eat Self Feed Goal OT LTG, [place] Level conditional independence  -AB      Toileting OT LTG    Toileting Goal OT LTG, Date Established 06/07/17  -AB      Toileting Goal OT LTG, Time to Achieve by discharge  -AB      Toileting Goal OT LTG, [place] Level contact guard assist  -AB      LB Dressing OT STG    LB Dressing Goal OT STG, Date Established 06/07/17  -AB      LB Dressing Goal OT STG, Time to Achieve 5 - 7 days  -AB      LB Dressing Goal OT STG, [place] Level moderate  assist (50% patient effort)  -AB      LB Dressing OT LTG    LB Dressing Goal OT LTG, Date Established 06/07/17  -AB      LB Dressing Goal OT LTG, Time to Achieve by discharge  -AB      LB Dressing Goal OT LTG, [place] Level minimum assist (75% patient effort)  -AB        User Key  (r) = Recorded By, (t) = Taken By, (c) = Cosigned By    Initials Name Provider Type    AB Missy Pelayo OT Occupational Therapist          Occupational Therapy Education     Title: PT OT SLP Therapies (Active)     Topic: Occupational Therapy (Active)     Point: ADL training (Active)    Description: Instruct learner(s) on proper safety adaptation and remediation techniques during self care or transfers.   Instruct in proper use of assistive devices.    Learning Progress Summary    Learner Readiness Method Response Comment Documented by Status   Patient Acceptance E,D NR  AB 06/08/17 1632 Active    Acceptance E,D NR  AB 06/07/17 1234 Active               Point: Precautions (Active)    Description: Instruct learner(s) on prescribed precautions during self-care and functional transfers.    Learning Progress Summary    Learner Readiness Method Response Comment Documented by Status   Patient Acceptance E,D NR  AB 06/08/17 1632 Active    Acceptance E,D NR  AB 06/07/17 1234 Active               Point: Body mechanics (Active)    Description: Instruct learner(s) on proper positioning and spine alignment during self-care, functional mobility activities and/or exercises.    Learning Progress Summary    Learner Readiness Method Response Comment Documented by Status   Patient Acceptance E,D NR  AB 06/08/17 1632 Active    Acceptance E,D NR  AB 06/07/17 1234 Active                      User Key     Initials Effective Dates Name Provider Type [facility] 02/04/16 -  Missy Pelayo OT Occupational Therapist OT                  OT Recommendation and Plan  Anticipated Equipment Needs At Discharge:  (TBD)  Anticipated Discharge  Disposition:  (TBD)  Planned Therapy Interventions: activity intolerance, adaptive equipment training, ADL retraining, energy conservation, fine motor coordination training, home exercise program, motor coordination training, ROM (Range of Motion), strengthening, transfer training, other (see comments) (Therapeutic groups as beneficial to patient)  Therapy Frequency: 3-5 times/wk          Time Calculation:         Time Calculation- OT       06/08/17 1634 06/08/17 1000       Time Calculation- OT    OT Start Time 1245  -AB 1000  -AB     OT Stop Time 1330  -AB 1045  -AB     OT Time Calculation (min) 45 min  -AB 45 min  -AB     Total Timed Code Minutes- OT 45 minute(s)  -AB      OT Non-Billable Time (min)  15 min  -AB       User Key  (r) = Recorded By, (t) = Taken By, (c) = Cosigned By    Initials Name Provider Type    AB Missy Pelayo OT Occupational Therapist           Therapy Charges for Today     Code Description Service Date Service Provider Modifiers Qty    17830193289 HC OT EVAL HIGH COMPLEXITY 3 6/7/2017 Missy Pelayo OT GO 1    79672026614 HC OT THER SUPP EA 15 MIN 6/7/2017 Missy Pelayo OT GO 2    76543167628 HC OT THERAPEUTIC ACT EA 15 MIN 6/7/2017 Missy Pelayo OT GO 3    86757554334 HC OT THER PROC GROUP 6/8/2017 Missy Pelayo OT GO 1    50337088018 HC OT SELF CARE/MGMT/TRAIN EA 15 MIN 6/8/2017 Missy Pelayo OT GO 1    67663892330 HC OT THERAPEUTIC ACT EA 15 MIN 6/8/2017 Missy Pelayo OT GO 2    56550018344 HC OT THER SUPP EA 15 MIN 6/8/2017 Missy Pelayo OT GO 1               Missy Pelayo OT  6/8/2017

## 2017-06-08 NOTE — PROGRESS NOTES
Inpatient Rehabilitation Functional Measures Assessment    Functional Measures  RAS Eating:  Eating Score = 5. Patient is supervision/set-up for eating,  requiring: Opening containers. [place] bread. Cutting meat. Pouring liquids.  No assistive devices were required.  RAS Grooming: Activity was not observed.  RAS Bathing:  Activity was not observed.  RAS Upper Body Dressing:  Activity was not observed.  RAS Lower Body Dressing:  Activity was not observed.  RAS Toileting:  Patient requires moderate assistance for adjusting clothing  before using a toilet, commode, bedpan, or urinal. Patient requires minimal  assistance for hygiene. Patient requires moderate assistance for adjusting  clothing after using a toilet, commode, bedpan, or urinal. Patient performs 0 -  24% of toileting tasks.  Toileting Score = 1, Total Assistance. Patient requires  the following assistive device(s): Grab bar. Arm rest of a specialized seat.    RAS Bladder Management  Level of Assistance:  Bladder Score = 5.  Patient is supervision/set-up for  bladder management, requiring: Stand by assistance. No assistive devices were  required.  Frequency/Number of Accidents this Shift:  Bladder accidents this shift:  0 .  Patient has not had an accident this shift.    RAS Bowel Management  Level of Assistance: Bowel Score = 5.  Patient is supervision/set-up for bowel  management, requiring: Stand by assistance. No assistive devices were required.    Frequency/Number of Accidents this Shift: Bowel accidents this shift: 0 .  Patient has not had an accident this shift.    RAS Bed/Chair/Wheelchair Transfer:  Bed/chair/wheelchair Transfer Score = 1.  Patient performs 50-74% of effort and requires moderate assistance (some  lifting) of two or more people for transferring to and from the  bed/chair/wheelchair. safety . Patient requires the following assistive  device(s): Grab bars. Seating system of wheelchair.  RSA Toilet Transfer:  Toilet Transfer Score =  1.  Patient performs 50-74% of  effort and requires moderate assistance (some lifting) of two or more people for  transferring to and from the toilet/commode. safety . Patient requires the  following assistive device(s): Grab bars. Specialized seat.  RAS Tub/Shower Transfer:  Activity was not observed.    Previously Documented Mode of Locomotion at Discharge:  [facility] Expected Mode of Locomotion at Discharge:  RAS Walk/Wheelchair:  [facility] Stairs:    RAS Comprehension:  [facility] Expression:  [facility] Social Interaction:  [facility] Problem Solving:  [facility] Memory:    Therapy Mode Minutes  Occupational Therapy:  Physical Therapy:  Speech Language Pathology:    Discharge Functional Goals:    Signed by: RENNY Coppola

## 2017-06-08 NOTE — PROGRESS NOTES
Inpatient Rehabilitation Plan of Care Note    Plan of Care   Body Function Structure    Skin Integrity (Active)  Current Status (6/6/2017 9:00:00 PM): impaired skin integrity  Weekly Goal: prevent further skin integrity impairment  Discharge Goal: signs of regaining skin integrity    Safety    Potential for Injury (Active)  Current Status (6/6/2017 9:00:00 PM): risk for falls  Weekly Goal: remain free of falls  Discharge Goal: identify measures to prevent falls    Signed by: Dewey Sanchez RN

## 2017-06-08 NOTE — THERAPY TREATMENT NOTE
Inpatient Rehabilitation - Physical Therapy Treatment Note  [facility]     Patient Name: Maribel Staton  : 1945  MRN: [id number]  Today's Date: 2017  Onset of Illness/Injury or Date of Surgery Date: 17  Date of Referral to PT: 17  Referring Physician: Eva    Admit Date: 2017    Visit Dx:  No diagnosis found.  Patient Active Problem List   Diagnosis   • Closed right hip fracture               Adult Rehabilitation Note       17 1814 17 1629       Rehab Assessment/Intervention    Discipline physical therapy assistant  -LL occupational therapist  -AB     Document Type therapy note (daily note)   BID treatment session  -LL therapy note (daily note)  -AB     Subjective Information no complaints;agree to therapy  -LL no complaints;agree to therapy  -AB     Patient Effort, Rehab Treatment good  -LL good  -AB     Precautions/Limitations fall precautions;non-weight bearing status;seizure precautions;other (see comments)   skin integrity, NWB RLE, intermittent confusion  -LL fall precautions;non-weight bearing status;seizure precautions;other (see comments)   <skin integrity; NWB RLE; intermittent confusion  -AB     Patient Response to Treatment Patient did well with BID treatment session with adequate rest breaks but was c/o increased pain in pm.  RN notified & medication administered.  -LL Pt. w/ good tolerance and rest breaks provided.   -AB     Recorded by [LL] Roberta Friedman PTA [AB] Missy Pelayo OT     Pain Assessment    Pain Assessment Levin-Orozco FACES  -LL      Levin-Baker FACES Pain Rating 8  -LL      Pain Type Surgical pain  -LL      Pain Location Hip  -LL      Pain Orientation Right  -LL      Pain Intervention(s) Medication (See MAR);Repositioned;Rest   See RN notes for medication details  -LL      Recorded by [LL] Roberta Friedman PTA      Vision Assessment/Intervention    Visual Impairment WFL with corrective lenses  -LL      Recorded by [LL] Roberta Friedman PTA       Cognitive Assessment/Intervention    Current Cognitive/Communication Assessment impaired  -LL      Orientation Status oriented to;person;place;situation;disoriented to;time  -LL      Follows Commands/Answers Questions able to follow single-step instructions;100% of the time;needs cueing  -LL able to follow single-step instructions;100% of the time;needs cueing  -AB     Personal Safety decreased awareness, need for assist;decreased awareness, need for safety  -LL decreased awareness, need for assist;decreased awareness, need for safety  -AB     Personal Safety Interventions gait belt;nonskid shoes/slippers when out of bed  -LL gait belt;nonskid shoes/slippers when out of bed;supervised activity  -AB     Recorded by [LL] Roberta Friedman PTA [AB] Missy Pelayo OT     Mobility Assessment/Training    Extremity Weight-Bearing Status right lower extremity  -LL      Right Lower Extremity Weight-Bearing non weight-bearing  -LL      Recorded by [LL] Roberta Friedman PTA      Bed Mobility, Assessment/Treatment    Bed Mobility, Assistive Device bed rails  -LL      Bed Mobility, Roll Left, [place] contact guard assist  -LL      Bed Mobility, Roll Right, [place] contact guard assist  -LL      Bed Mobility, Scoot/Bridge, [place] minimum assist (75% patient effort)  -LL      Bed Mob, Supine to Sit, [place] minimum assist (75% patient effort);moderate assist (50% patient effort)  -LL      Bed Mob, Sit to Supine, [place] minimum assist (75% patient effort);moderate assist (50% patient effort)  -LL      Bed Mobility, Safety Issues decreased use of legs for bridging/pushing  -LL      Bed Mobility, Impairments ROM decreased;strength decreased;impaired balance;coordination impaired;pain  -LL      Recorded by [LL] Roberta Friedman PTA      Transfer Assessment/Treatment    Transfers, Bed-Chair [place] minimum assist (75% patient effort);verbal cues required;nonverbal cues required (demo/gesture)   -LL      Transfers, Chair-Bed [place] minimum assist (75% patient effort);verbal cues required;nonverbal cues required (demo/gesture)  -LL      Transfers, Bed-Chair-Bed, Assist Device rolling walker  -LL      Transfers, Sit-Stand [place] minimum assist (75% patient effort);verbal cues required;nonverbal cues required (demo/gesture)  -LL      Transfers, Stand-Sit [place] minimum assist (75% patient effort);verbal cues required;nonverbal cues required (demo/gesture)  -LL      Transfers, Sit-Stand-Sit, Assist Device bed rails;rolling walker  -LL      Toilet Transfer, [place] minimum assist (75% patient effort);nonverbal cues required (demo/gesture);verbal cues required  -LL      Toilet Transfer, Assistive Device rolling walker;elevated toilet seat   Grab bars  -LL      Transfer, Safety Issues balance decreased during turns;step length decreased  -LL      Transfer, Impairments strength decreased;impaired balance;coordination impaired  -LL      Recorded by [LL] Roberta Friedman PTA      Gait Assessment/Treatment    Gait, [place] Level minimum assist (75% patient effort);2 person assist required;verbal cues required;nonverbal cues required (demo/gesture)  -LL      Gait, Assistive Device rolling walker  -LL      Gait, Distance (Feet) 10   in am; 20 x 2 in pm  -LL      Gait, Gait Pattern Analysis swing-to gait  -LL      Gait, Gait Deviations rossy decreased;decreased heel strike;limb motion velocity decreased;step length decreased;weight-shifting ability decreased  -LL      Gait, Maintain Weight Bearing Status able to maintain weight bearing status  -LL      Gait, Safety Issues balance decreased during turns;step length decreased;weight-shifting ability decreased;steps too close front assistive device  -LL      Gait, Impairments strength decreased;impaired balance;coordination impaired  -LL      Recorded by [LL] Roberta Friedman PTA      Stairs Assessment/Treatment    Stairs, [place] Level not  tested  -LL      Recorded by [LL] Roberta Friedman PTA      Grooming Assessment/Training    Grooming Assess/Train, Position  sitting  -AB     Grooming Assess/Train, Indepen Level  supervision required;verbal cues required  -AB     Recorded by  [AB] Missy Pelayo OT     Therapy Exercises    Bilateral Lower Extremities AROM:;15 reps;sitting;supine  -LL      Bilateral Upper Extremity  AROM:;sitting   BUE CoordEx; G/FMC TherEx/Act; Strengthening  -AB     Recorded by [LL] Roberta Friedman PTA [AB] Missy Pelayo OT     Positioning and Restraints    Pre-Treatment Position in bed   in am; WC in common area in pm  -LL      Post Treatment Position wheelchair   in am; bed in pm  -LL      In Bed supine;call light within reach;encouraged to call for assist;exit alarm on;RLE elevated   in pm  -LL      In Wheelchair sitting;legs elevated   In common area  -LL sitting;call light within reach;encouraged to call for assist;legs elevated;with PT   in room in AM; w/ PT in PM  -AB     Recorded by [LL] Roberta rFiedman PTA [AB] Missy Pelayo OT       User Key  (r) = Recorded By, (t) = Taken By, (c) = Cosigned By    Initials Name Effective Dates    AB Missy Pelayo OT 02/04/16 -     LL Roberta Friedman PTA 05/02/16 -                 IP PT Goals       06/07/17 1703          Bed Mobility PT STG    Bed Mobility PT STG, Date Established 06/07/17  -CT      Bed Mobility PT STG, Time to Achieve 5 - 7 days  -CT      Bed Mobility PT STG, Activity Type all bed mobility  -CT      Bed Mobility PT STG, [place] Level contact guard assist  -CT      Transfer Training Goal, Assist Device bed rails  -CT      Bed Mobility PT LTG    Bed Mobility PT LTG, Date Established 06/07/17  -CT      Bed Mobility PT LTG, Time to Achieve by discharge  -CT      Bed Mobility PT LTG, Activity Type all bed mobility  -CT      Bed Mobility PT LTG, [place] Level conditional independence;supervision required  -CT      Transfer  Training PT STG    Transfer Training PT STG, Date Established 06/07/17  -CT      Transfer Training PT STG, Time to Achieve 5 - 7 days  -CT      Transfer Training PT STG, Activity Type all transfers  -CT      Transfer Training PT STG, [place] Level contact guard assist  -CT      Transfer Training PT STG, Assist Device other (see comments)   with appropriate AD  -CT      Transfer Training PT LTG    Transfer Training PT LTG, Date Established 06/07/17  -CT      Transfer Training PT LTG, Time to Achieve by discharge  -CT      Transfer Training PT LTG, Activity Type all transfers  -CT      Transfer Training PT LTG, [place] Level conditional independence;supervision required  -CT      Transfer Training PT LTG, Assist Device other (see comments)   with appropriate AD  -CT      Gait Training PT STG    Gait Training Goal PT STG, Date Established 06/07/17  -CT      Gait Training Goal PT STG, Time to Achieve 5 - 7 days  -CT      Gait Training Goal PT STG, [place] Level contact guard assist  -CT      Gait Training Goal PT STG, Assist Device walker, rolling  -CT      Gait Training Goal PT STG, Distance to Achieve 50  -CT      Gait Training PT LTG    Gait Training Goal PT LTG, Date Established 06/07/17  -CT      Gait Training Goal PT LTG, Time to Achieve by discharge  -CT      Gait Training Goal PT LTG, [place] Level conditional independence;supervision required  -CT      Gait Training Goal PT LTG, Assist Device walker, rolling  -CT      Gait Training Goal PT LTG, Distance to Achieve 75  -CT      Patient Education PT LTG    Patient Education PT LTG, Date Established 06/07/17  -CT      Patient Education PT LTG, Time to Achieve by discharge  -CT      Patient Education PT LTG, Education Type written program;HEP;precaution per surgeon;positioning;posture/body mechanics;gait;transfers;bed mobility;pain management;progression of POC;benefits of activity;home safety;equipment management;skin care/inspection;energy  conservation  -CT      Patient Education PT LTG, Education Understanding demonstrate adequately;verbalize understanding  -CT        User Key  (r) = Recorded By, (t) = Taken By, (c) = Cosigned By    Initials Name Provider Type    CT Yuli Moreno, LEODAN Physical Therapist          Physical Therapy Education     Title: PT OT SLP Therapies (Active)     Topic: Physical Therapy (Active)     Point: Mobility training (Active)    Learning Progress Summary    Learner Readiness Method Response Comment Documented by Status   Patient Acceptance E,D NR  LL 06/08/17 1820 Active    Acceptance E NR  CT 06/07/17 1707 Active               Point: Body mechanics (Active)    Learning Progress Summary    Learner Readiness Method Response Comment Documented by Status   Patient Acceptance E,D NR  LL 06/08/17 1820 Active    Acceptance E NR  CT 06/07/17 1707 Active               Point: Precautions (Active)    Learning Progress Summary    Learner Readiness Method Response Comment Documented by Status   Patient Acceptance E,D NR  LL 06/08/17 1820 Active    Acceptance E NR  CT 06/07/17 1707 Active                      User Key     Initials Effective Dates Name Provider Type Discipline    CT 03/14/16 -  Yuli Moreno PT Physical Therapist PT     05/02/16 -  Roberta Friedman PTA Physical Therapy Assistant PT                    PT Recommendation and Plan  Anticipated Equipment Needs At Discharge:  (to be determined)  Anticipated Discharge Disposition: home with assist, home with home health  Planned Therapy Interventions: balance training, bed mobility training, gait training, home exercise program, manual therapy techniques, neuromuscular re-education, motor coordination training, patient/family education, postural re-education, ROM (Range of Motion), stair training, strengthening, transfer training  PT Frequency: 2 times/day, 5 times/wk, per priority policy            Time Calculation:         PT Charges       06/08/17 1821 06/08/17 1820        Time Calculation    Start Time 1500  -LL 0745  -LL     Stop Time 1545  -LL 0830  -LL     Time Calculation (min) 45 min  -LL 45 min  -LL     PT Non-Billable Time (min)  15 min  -LL     PT Received On  06/08/17  -LL     PT Goal Re-Cert Due Date  06/14/17  -LL     Time Calculation- PT    Total Timed Code Minutes- PT 45 minute(s)  -LL 45 minute(s)  -LL       User Key  (r) = Recorded By, (t) = Taken By, (c) = Cosigned By    Initials Name Provider Type     Roberta Friedman PTA Physical Therapy Assistant          Therapy Charges for Today     Code Description Service Date Service Provider Modifiers Qty    70245079974 HC GAIT TRAINING EA 15 MIN 6/8/2017 Roberta Friedman PTA GP 2    18735161213 HC PT THER PROC EA 15 MIN 6/8/2017 Roberta Friedman PTA GP 3    64818262769 HC PT THERAPEUTIC ACT EA 15 MIN 6/8/2017 Roberta Friedman PTA GP 1    40129734003 HC PT CARE PLAN EACH 15 MIN 6/8/2017 Roberta Friedman PTA GP 1    84775525121 HC PT THER SUPP EA 15 MIN 6/8/2017 Roberta Friedman PTA GP 2               Di Friedman PTA  6/8/2017

## 2017-06-09 PROCEDURE — 97530 THERAPEUTIC ACTIVITIES: CPT

## 2017-06-09 PROCEDURE — 97535 SELF CARE MNGMENT TRAINING: CPT

## 2017-06-09 PROCEDURE — 97110 THERAPEUTIC EXERCISES: CPT

## 2017-06-09 PROCEDURE — 94799 UNLISTED PULMONARY SVC/PX: CPT

## 2017-06-09 PROCEDURE — 97116 GAIT TRAINING THERAPY: CPT

## 2017-06-09 PROCEDURE — 25010000002 ENOXAPARIN PER 10 MG: Performed by: FAMILY MEDICINE

## 2017-06-09 RX ORDER — LORAZEPAM 2 MG/ML
1 INJECTION INTRAMUSCULAR ONCE AS NEEDED
Status: DISCONTINUED | OUTPATIENT
Start: 2017-06-09 | End: 2017-06-16 | Stop reason: HOSPADM

## 2017-06-09 RX ADMIN — VENLAFAXINE HYDROCHLORIDE 150 MG: 150 CAPSULE, EXTENDED RELEASE ORAL at 08:37

## 2017-06-09 RX ADMIN — BACLOFEN 20 MG: 10 TABLET ORAL at 20:59

## 2017-06-09 RX ADMIN — SENNOSIDES 1 TABLET: 8.6 TABLET ORAL at 08:37

## 2017-06-09 RX ADMIN — ATORVASTATIN CALCIUM 40 MG: 40 TABLET, FILM COATED ORAL at 20:59

## 2017-06-09 RX ADMIN — MONTELUKAST SODIUM 10 MG: 10 TABLET ORAL at 21:00

## 2017-06-09 RX ADMIN — CLONAZEPAM 0.5 MG: 0.5 TABLET ORAL at 20:58

## 2017-06-09 RX ADMIN — PHENYTOIN SODIUM 200 MG: 100 CAPSULE, EXTENDED RELEASE ORAL at 21:00

## 2017-06-09 RX ADMIN — HYDROCODONE BITARTRATE AND ACETAMINOPHEN 1 TABLET: 10; 325 TABLET ORAL at 06:20

## 2017-06-09 RX ADMIN — PHENOBARBITAL 97.2 MG: 32.4 TABLET ORAL at 08:37

## 2017-06-09 RX ADMIN — SENNOSIDES 1 TABLET: 8.6 TABLET ORAL at 17:38

## 2017-06-09 RX ADMIN — LEVETIRACETAM 250 MG: 250 TABLET, FILM COATED ORAL at 20:59

## 2017-06-09 RX ADMIN — PANTOPRAZOLE SODIUM 40 MG: 40 TABLET, DELAYED RELEASE ORAL at 06:20

## 2017-06-09 RX ADMIN — LEVETIRACETAM 500 MG: 500 TABLET, FILM COATED ORAL at 08:37

## 2017-06-09 RX ADMIN — HYDROCODONE BITARTRATE AND ACETAMINOPHEN 1 TABLET: 10; 325 TABLET ORAL at 14:16

## 2017-06-09 RX ADMIN — HYDROCODONE BITARTRATE AND ACETAMINOPHEN 1 TABLET: 10; 325 TABLET ORAL at 21:34

## 2017-06-09 RX ADMIN — PHENYTOIN SODIUM 100 MG: 100 CAPSULE, EXTENDED RELEASE ORAL at 08:37

## 2017-06-09 RX ADMIN — PHENOBARBITAL 97.2 MG: 32.4 TABLET ORAL at 21:34

## 2017-06-09 RX ADMIN — ENOXAPARIN SODIUM 40 MG: 40 INJECTION SUBCUTANEOUS at 08:37

## 2017-06-09 RX ADMIN — BACLOFEN 20 MG: 10 TABLET ORAL at 08:36

## 2017-06-09 NOTE — PROGRESS NOTES
Inpatient Rehabilitation Functional Measures Assessment    Functional Measures  RAS Eating:  RAS Grooming:  RAS Bathing:  ARS Upper Body Dressing:  RAS Lower Body Dressing:  RAS Toileting:    RAS Bladder Management  Level of Assistance:  Bladder Score = 5.  Patient is supervision/set-up for  bladder management, requiring: No assistive devices were required.  Frequency/Number of Accidents this Shift:  Bladder accidents this shift:  0 .  Patient has not had an accident this shift.    RAS Bowel Management  Level of Assistance: Bowel Score = 7.  Patient is completely independent for  bowel management.  Patient did not have bowel movement.  No  medication/intervention was provided.  Frequency/Number of Accidents this Shift: Bowel accidents this shift: 0 .  Patient has not had an accident this shift.    RAS Bed/Chair/Wheelchair Transfer:  Bed/chair/wheelchair Transfer Score = 4.  Patient performs 75% or more of effort and minimal assistance (little/incidental  help/lifting of one limb/steadying) for transferring to and from the  bed/chair/wheelchair, requiring: Steadying. Patient requires the following  assistive device(s): Seating system of wheelchair. Grab bars.  RAS Toilet Transfer:  Toilet Transfer Score = 4.  Patient performs 75% or more  of effort and minimal assistance (little/incidental help/steadying) for  transferring to and from the toilet/commode, requiring: Steadying. Patient  requires the following assistive device(s): Safety frame/over the toilet. Grab  bars.  RAS Tub/Shower Transfer:    Previously Documented Mode of Locomotion at Discharge:  [facility] Expected Mode of Locomotion at Discharge:  RAS Walk/Wheelchair:  RAS Stairs:    RAS Comprehension:  RAS Expression:  [facility] Social Interaction:  [facility] Problem Solving:  [facility] Memory:    Therapy Mode Minutes  Occupational Therapy:  Physical Therapy:  Speech Language Pathology:    Discharge Functional Goals:    Signed by: RENNY Gonzalez

## 2017-06-09 NOTE — SIGNIFICANT NOTE
06/09/17 1442   Activity Plan   Activity Preferences During [employer];One-to-one   Activity Plan Supportive   Information Obtained From Patient   Attendance   Activity [employer] shop;1:1   Participation Active participation   Time Spent With Patient   Minutes spent with patient 30

## 2017-06-09 NOTE — PROGRESS NOTES
Inpatient Rehabilitation Functional Measures Assessment    Functional Measures  RAS Eating:  Eating Score = 5. Patient is supervision/set-up for eating,  requiring: Opening containers. [place] bread. Cutting meat. Pouring liquids.  No assistive devices were required.  RAS Grooming: Activity was not observed.  RAS Bathing:  Activity was not observed.  RAS Upper Body Dressing:  Activity was not observed.  RAS Lower Body Dressing:  Activity was not observed.  RAS Toileting:  Patient requires moderate assistance for adjusting clothing  before using a toilet, commode, bedpan, or urinal. Patient requires moderate  assistance for hygiene. Patient requires moderate assistance for adjusting  clothing after using a toilet, commode, bedpan, or urinal. Patient performs 0 -  24% of toileting tasks.  Toileting Score = 1, Total Assistance. Patient requires  the following assistive device(s): Grab bar. Arm rest of a specialized seat.    RAS Bladder Management  Level of Assistance:  Bladder Score = 5.  Patient is supervision/set-up for  bladder management, requiring: Stand by assistance. No assistive devices were  required.  Frequency/Number of Accidents this Shift:  Bladder accidents this shift:  0 .  Patient has not had an accident this shift.    RAS Bowel Management  Level of Assistance: Bowel Score = 5.  Patient is supervision/set-up for bowel  management, requiring: Stand by assistance. No assistive devices were required.    Frequency/Number of Accidents this Shift: Bowel accidents this shift: 0 .  Patient has not had an accident this shift.    RAS Bed/Chair/Wheelchair Transfer:  Bed/chair/wheelchair Transfer Score = 3.  Patient performs 50-74% of effort and requires moderate assistance (some  lifting) for transferring to and from the bed/chair/wheelchair. Patient requires  the following assistive device(s): Grab bars. Seating system of wheelchair.  RAS Toilet Transfer:  Toilet Transfer Score = 3.  Patient performs 50-74%  of  effort and requires moderate assistance (some lifting) for transferring to and  from the toilet/commode. Patient requires the following assistive device(s):  Grab bars. Specialized seat.  RAS Tub/Shower Transfer:  Activity was not observed.    Previously Documented Mode of Locomotion at Discharge:  RAS Expected Mode of Locomotion at Discharge:  RAS Walk/Wheelchair:  RAS Stairs:    RAS Comprehension:  RAS Expression:  RAS Social Interaction:  [facility] Problem Solving:  [facility] Memory:    Therapy Mode Minutes  Occupational Therapy:  Physical Therapy:  Speech Language Pathology:    Discharge Functional Goals:    Signed by: RENNY Coppola

## 2017-06-09 NOTE — PROGRESS NOTES
Inpatient Rehabilitation Functional Measures Assessment    Functional Measures  RAS Eating:  RAS Grooming: Grooming Score = 5. Patient is supervision/set-up for grooming,  requiring: Setting out grooming equipment. Initial preparation. Stand by  assistance. Verbal cuing, prompting, or instructing. No assistive devices were  required.  RAS Bathing:  Patient bathed in bed. Patient requires no physical assistance for  washing, rinsing, and drying the right arm. Patient requires no physical  assistance for washing, rinsing, and drying the left arm. Patient requires no  physical assistance for washing, rinsing, and drying the chest. Patient requires  no physical assistance for washing, rinsing, and drying the abdomen. Patient  requires no physical assistance for washing, rinsing, and drying the perineal  area. Patient requires no physical assistance for washing, rinsing, and drying  the buttocks. Patient requires no physical assistance for washing, rinsing, and  drying the right upper leg. Patient requires no physical assistance for washing,  rinsing, and drying the left upper leg. Patient requires minimal assistance for  washing, rinsing, or drying the right lower leg, including the foot. Patient  requires minimal assistance for washing, rinsing, or drying the left lower leg,  including the foot. Patient performs 80 % of bathing tasks. Bathing Score = 4,  Minimal Assistance. Patient requires the following assistive device(s): Grab  bar/arm rest to maintain balance. Long handled sponge.  RAS Upper Body Dressing:  Upper Body Dressing Score = 5. Patient is supervision  for upper body dressing, requiring: Gathering/setting out clothes. Stand by  assistance. Verbal cuing, prompting, or instructing. No assistive devices were  required.  RAS Lower Body Dressing:  Gathering clothes was not observed for this patient.  Wearing underwear or an undergarment was not observed for this patient. Patient  requires no physical assistance  for donning and/or doffing pants/skirt threading  right leg. Patient requires no physical assistance for donning and/or doffing  pants/skirt threading left leg. Patient requires no physical assistance for  donning and/or doffing pants/skirt over hips and adjusting fastener. Patient  requires minimal/incidental physical assistance for donning and/or doffing right  sock. Patient requires no physical assistance for donning and/or doffing left  sock. Patient requires total assistance for holding clothing and/or donning  and/or doffing right shoe. Patient requires total assistance for holding  clothing and/or donning and/or doffing left shoe. Patient performs 75 % of lower  body dressing tasks. Lower Body Dressing Score = 4, Minimal Assistance. No  assistive devices were required.  RAS Toileting:  Patient requires no physical assistance for adjusting clothing  before using a toilet, commode, bedpan, or urinal. Patient requires minimal  assistance for hygiene. Patient requires no physical assistance for adjusting  clothing after using a toilet, commode, bedpan, or urinal. Patient performs 75 %  of toileting tasks. Toileting Score = 4, Minimal Assistance. Patient requires  the following assistive device(s): Grab bar.    RAS Bladder Management  Level of Assistance:  Frequency/Number of Accidents this Shift:    RAS Bowel Management  Level of Assistance:  Frequency/Number of Accidents this Shift:    RAS Bed/Chair/Wheelchair Transfer:  RAS Toilet Transfer:  Toilet Transfer Score = 4.  Patient performs 75% or more  of effort and minimal assistance (little/incidental help/steadying) for  transferring to and from the toilet/commode, requiring: Steadying. Hand  placement. Patient requires the following assistive device(s): Safety frame/over  the toilet. Grab bars.  RAS Tub/Shower Transfer:    Previously Documented Mode of Locomotion at Discharge:  [facility] Expected Mode of Locomotion at Discharge:  [facility] Walk/Wheelchair:  [facility]  Stairs:    RAS Comprehension:  RAS Expression:  RAS Social Interaction:  RAS Problem Solving:  RAS Memory:    Therapy Mode Minutes  Occupational Therapy: Individual: 90 minutes.  Physical Therapy:  Speech Language Pathology:    Discharge Functional Goals:    Signed by: Missy Pelayo Occupational Therapist

## 2017-06-09 NOTE — PLAN OF CARE
Problem: Patient Care Overview (Adult)  Goal: Plan of Care Review  Outcome: Ongoing (interventions implemented as appropriate)    06/07/17 0747 06/09/17 1117   Coping/Psychosocial Response Interventions   Plan Of Care Reviewed With --  patient   Patient Care Overview   Progress progress toward functional goals as expected --          Problem: Skin Integrity Impairment, Risk/Actual (Adult)  Goal: Skin Integrity/Wound Healing  Outcome: Ongoing (interventions implemented as appropriate)    Problem: Pressure Ulcer (Adult)  Goal: Signs and Symptoms of Listed Potential Problems Will be Absent or Manageable (Pressure Ulcer)  Outcome: Ongoing (interventions implemented as appropriate)    Problem: Fall Risk (Adult)  Goal: Absence of Falls  Outcome: Ongoing (interventions implemented as appropriate)    Problem: Fractured Hip (Adult)  Goal: Signs and Symptoms of Listed Potential Problems Will be Absent or Manageable (Fractured Hip)  Outcome: Ongoing (interventions implemented as appropriate)    Problem: Infection, Risk/Actual (Adult)  Goal: Infection Prevention/Resolution  Outcome: Ongoing (interventions implemented as appropriate)

## 2017-06-09 NOTE — PROGRESS NOTES
Case Management  Inpatient Rehabilitation Team Conference    Conference Date/Time: 6/8/2017 5:01:51 AM    Team Conference Attendees:  MD Becka Vásquez, PT  Missy Pealyo, OT   Arina Joe RN/    Demographics            Age: 71Y            Gender: Female    Admission Date: 6/6/2017 7:41:15 PM  Rehabilitation Diagnosis:  S/P Righ hip ORIF  Comorbidities:      Plan of Care  Anticipated Discharge Date/Estimated Length of Stay: 7-10 days  Anticipated Discharge Destination: Community discharge with assistance  Discharge Plan : Pt plans to return home with spouse at discharge.  Medical Necessity Expected Level Rationale: good  Intensity and Duration: an average of 3 hours/5 days per week  Medical Supervision and 24 Hour Rehab Nursing: x  Physical Therapy: x  PT Intensity/Duration: 1.5 hrs/day, 5-6 days per week  Occupational Therapy: x  OT Intensity/Duration: 1.5 hrs/day, 5-6 days per week  Social Work: x  Therapeutic Recreation: x  Updated (if changes indicated)    Anticipated Discharge Date/Estimated Length of Stay:   6-21-17      Discharge Plan of Care:    Based on the patient's medical and functional status, their prognosis and  expected level of functional improvement is: good      Interdisciplinary Problem/Goals/Status  Body Function Structure    [RN] Skin Integrity(Active)  Current Status(06/06/2017): impaired skin integrity  Weekly Goal(06/17/2017): prevent further skin integrity impairment  Discharge Goal: signs of regaining skin integrity        Mobility    [PT] Bed/Chair/Wheelchair(Active)  Current Status(01/01/1753):  Weekly Goal(01/01/1753):  Discharge Goal:    [PT] Walk(Active)  Current Status(06/07/2017): Min A w/ RW 15 ft  Weekly Goal(06/15/2017): CGA w/ RW 50 ft  Discharge Goal: MI/SUP w/ RW 75 ft        Safety    [RN] Potential for Injury(Active)  Current Status(06/06/2017): risk for falls  Weekly Goal(06/17/2017): remain free of falls  Discharge Goal: identify measures to  prevent falls        Self Care    [OT] Bathing(Active)  Current Status(06/07/2017): ModA  Weekly Goal(06/13/2017): [place]  Discharge Goal: CGA    [OT] Dressing (Lower)(Active)  Current Status(06/07/2017): MaxA  Weekly Goal(06/13/2017): ModA  Discharge Goal: [place]    Comments: Pt will return home with spouse at discharge.    Signed by: Arina Joe, Supervisor    Physician CoSigned By: Abel Velasquez 06/09/2017 09:18:14

## 2017-06-09 NOTE — THERAPY TREATMENT NOTE
Inpatient Rehabilitation - Occupational Therapy Treatment Note   Ed     Patient Name: Maribel Staton  : 1945  MRN: [id number]  Today's Date: 2017  Onset of Illness/Injury or Date of Surgery Date: 17  Date of Referral to OT: 17  Referring Physician: Eva      Admit Date: 2017    Visit Dx:   No diagnosis found.  Patient Active Problem List   Diagnosis   • Closed right hip fracture             Adult Rehabilitation Note       17 1453 17 1814 17 1629    Rehab Assessment/Intervention    Discipline occupational therapist  -AB physical therapy assistant  -LL occupational therapist  -AB    Document Type therapy note (daily note)  -AB therapy note (daily note)   BID treatment session  -LL therapy note (daily note)  -AB    Subjective Information no complaints;agree to therapy  -AB no complaints;agree to therapy  -LL no complaints;agree to therapy  -AB    Patient Effort, Rehab Treatment good  -AB good  -LL good  -AB    Precautions/Limitations fall precautions;non-weight bearing status;seizure precautions;other (see comments)   NWB RLE; <skin integrity; intermittent confusion  -AB fall precautions;non-weight bearing status;seizure precautions;other (see comments)   skin integrity, NWB RLE, intermittent confusion  -LL fall precautions;non-weight bearing status;seizure precautions;other (see comments)   <skin integrity; NWB RLE; intermittent confusion  -AB    Patient Response to Treatment Pt. w/ good tolerance and rest breaks provided. Improvements noted w/ self care tasks. See ADL Assess for updated levels.   -AB Patient did well with BID treatment session with adequate rest breaks but was c/o increased pain in pm.  RN notified & medication administered.  -LL Pt. w/ good tolerance and rest breaks provided.   -AB    Recorded by [AB] Missy Pelayo OT [LL] Roberta Friedman PTA [AB] Missy Pelayo OT    Pain Assessment    Pain Assessment  Levin-Orozco FACES  -      Levin-Baker FACES Pain Rating  8  -LL     Pain Type  Surgical pain  -LL     Pain Location  Hip  -LL     Pain Orientation  Right  -LL     Pain Intervention(s)  Medication (See MAR);Repositioned;Rest   See RN notes for medication details  -LL     Recorded by  [LL] Roberta Friedman PTA     Vision Assessment/Intervention    Visual Impairment  WFL with corrective lenses  -LL     Recorded by  [LL] Roberta Friedman PTA     Cognitive Assessment/Intervention    Current Cognitive/Communication Assessment  impaired  -LL     Orientation Status  oriented to;person;place;situation;disoriented to;time  -LL     Follows Commands/Answers Questions able to follow single-step instructions;100% of the time;needs cueing  -AB able to follow single-step instructions;100% of the time;needs cueing  -LL able to follow single-step instructions;100% of the time;needs cueing  -AB    Personal Safety decreased awareness, need for assist;decreased awareness, need for safety  -AB decreased awareness, need for assist;decreased awareness, need for safety  -LL decreased awareness, need for assist;decreased awareness, need for safety  -AB    Personal Safety Interventions gait belt;nonskid shoes/slippers when out of bed;supervised activity  -AB gait belt;nonskid shoes/slippers when out of bed  -LL gait belt;nonskid shoes/slippers when out of bed;supervised activity  -AB    Recorded by [AB] Missy Pelayo OT [LL] Roberta Friedman PTA [AB] Missy Pelayo OT    Mobility Assessment/Training    Extremity Weight-Bearing Status right lower extremity  -AB right lower extremity  -LL     Right Lower Extremity Weight-Bearing non weight-bearing  -AB non weight-bearing  -LL     Recorded by [AB] Missy Pelayo OT [LL] Roberta Friedman PTA     Bed Mobility, Assessment/Treatment    Bed Mobility, Assistive Device  bed rails  -LL     Bed Mobility, Roll Left, [place]  contact guard assist  -LL     Bed Mobility, Roll Right, [place]   contact guard assist  -LL     Bed Mobility, Scoot/Bridge, [place]  minimum assist (75% patient effort)  -LL     Bed Mob, Supine to Sit, [place]  minimum assist (75% patient effort);moderate assist (50% patient effort)  -LL     Bed Mob, Sit to Supine, [place]  minimum assist (75% patient effort);moderate assist (50% patient effort)  -LL     Bed Mobility, Safety Issues  decreased use of legs for bridging/pushing  -LL     Bed Mobility, Impairments  ROM decreased;strength decreased;impaired balance;coordination impaired;pain  -LL     Recorded by  [LL] Roberta Friedman PTA     Transfer Assessment/Treatment    Transfers, Bed-Chair [place]  minimum assist (75% patient effort);verbal cues required;nonverbal cues required (demo/gesture)  -LL     Transfers, Chair-Bed [place]  minimum assist (75% patient effort);verbal cues required;nonverbal cues required (demo/gesture)  -LL     Transfers, Bed-Chair-Bed, Assist Device  rolling walker  -LL     Transfers, Sit-Stand [place]  minimum assist (75% patient effort);verbal cues required;nonverbal cues required (demo/gesture)  -LL     Transfers, Stand-Sit [place]  minimum assist (75% patient effort);verbal cues required;nonverbal cues required (demo/gesture)  -LL     Transfers, Sit-Stand-Sit, Assist Device  bed rails;rolling walker  -LL     Toilet Transfer, [place] minimum assist (75% patient effort);verbal cues required;nonverbal cues required (demo/gesture)  -AB minimum assist (75% patient effort);nonverbal cues required (demo/gesture);verbal cues required  -LL     Toilet Transfer, Assistive Device elevated toilet seat;wheelchair;other (see comments)   grab bar  -AB rolling walker;elevated toilet seat   Grab bars  -LL     Transfer, Safety Issues  balance decreased during turns;step length decreased  -LL     Transfer, Impairments  strength decreased;impaired balance;coordination impaired  -LL     Recorded by [AB] Missy Pelayo OT [LL]  Roberta Friedman PTA     Gait Assessment/Treatment    Gait, [place] Level  minimum assist (75% patient effort);2 person assist required;verbal cues required;nonverbal cues required (demo/gesture)  -LL     Gait, Assistive Device  rolling walker  -LL     Gait, Distance (Feet)  10   in am; 20 x 2 in pm  -LL     Gait, Gait Pattern Analysis  swing-to gait  -LL     Gait, Gait Deviations  rossy decreased;decreased heel strike;limb motion velocity decreased;step length decreased;weight-shifting ability decreased  -LL     Gait, Maintain Weight Bearing Status  able to maintain weight bearing status  -LL     Gait, Safety Issues  balance decreased during turns;step length decreased;weight-shifting ability decreased;steps too close front assistive device  -LL     Gait, Impairments  strength decreased;impaired balance;coordination impaired  -LL     Recorded by  [LL] Roberta Friedman PTA     Stairs Assessment/Treatment    Stairs, [place] Level  not tested  -LL     Recorded by  [LL] Roberta Friedman PTA     Upper Body Bathing Assessment/Training    UB Bathing Assess/Train, Position sitting  -AB      UB Bathing Assess/Train, [place] Level supervision required;set up required  -AB      UB Bathing Assess/Train, Comment TB Bathing: [place]  -AB      Recorded by [AB] Missy Pelayo, [place]      Lower Body Bathing Assessment/Training    LB Bathing Assess/Train Assistive Device long-handled sponge;grab bars  -AB      LB Bathing Assess/Train, Position sitting;standing  -AB      LB Bathing Assess/Train, [place] Level minimum assist (75% patient effort);verbal cues required;nonverbal cues required (demo/gesture)  -AB      LB Bathing Assess/Train, Comment TB Bathing: [place]  -AB      Recorded by [AB] Missy Pelayo OT      Upper Body Dressing Assessment/Training    UB Dressing Assess/Train, Position sitting  -AB      UB Dressing Assess/Train, [place] supervision required;set up required  -AB      Recorded by  [AB] Missy Pelayo, OT      Lower Body Dressing Assessment/Training    LB Dressing Assess/Train, Clothing Type donning:;doffing:;slipper socks;pants  -AB      LB Dressing Assess/Train, Assist Device reacher;sock-aid  -AB      LB Dressing Assess/Train, Position sitting  -AB      LB Dressing Assess/Train, [place] minimum assist (75% patient effort);verbal cues required;nonverbal cues required (demo/gesture)  -AB      Recorded by [AB] Missy Pelayo OT      Toileting Assessment/Training    Toileting Assess/Train, Assistive Device grab bars;raised toilet seat  -AB      Toileting Assess/Train, Position sitting  -AB      Toileting Assess/Train, [place] minimum assist (75% patient effort);nonverbal cues required (demo/gesture);verbal cues required  -AB      Recorded by [AB] Missy Pelayo OT      Grooming Assessment/Training    Grooming Assess/Train, Position sitting  -AB  sitting  -AB    Grooming Assess/Train, [place] Level supervision required;set up required  -AB  supervision required;verbal cues required  -AB    Recorded by [AB] Missy Pelayo OT  [AB] Missy Pelayo, [place]    Therapy Exercises    Bilateral Lower Extremities  AROM:;15 reps;sitting;supine  -LL     Bilateral Upper Extremity AROM:;sitting   BUE CoordEx; G/FMC TherEx/Act; Strengthening  -AB  AROM:;sitting   BUE CoordEx; G/[facility]MC TherEx/Act; Strengthening  -AB    Recorded by [AB] Missy Pelayo OT [LL] Roberta Friedman PTA [AB] Missy Pelayo, [place]    Positioning and Restraints    Pre-Treatment Position  in bed   in am; WC in common area in pm  -LL     Post Treatment Position  wheelchair   in am; bed in pm  -LL     In Bed  supine;call light within reach;encouraged to call for assist;exit alarm on;RLE elevated   in pm  -LL     In Wheelchair sitting;with PT;legs elevated  -AB sitting;legs elevated   In common area  -LL sitting;call light within reach;encouraged to call for assist;legs  elevated;with PT   in room in AM; w/ PT in PM  -AB    Recorded by [AB] Missy Pelayo OT [LL] Roberta Friedman, PTA [AB] Missy Pelayo, OT      User Key  (r) = Recorded By, (t) = Taken By, (c) = Cosigned By    Initials Name Effective Dates    AB Missy Pelayo, OT 02/04/16 -     LL Roberta Friedman, PTA 05/02/16 -                 OT Goals       06/07/17 1231          Transfer Training OT LTG    Transfer Training OT LTG, Date Established 06/07/17  -AB      Transfer Training OT LTG, Time to Achieve by discharge  -AB      Transfer Training OT LTG, Activity Type all transfers  -AB      Transfer Training OT LTG, [place] Level contact guard assist  -AB      Patient Education OT LTG    Patient Education OT LTG, Date Established 06/07/17  -AB      Patient Education OT LTG, Time to Achieve by discharge  -AB      Patient Education OT LTG, Education Type HEP;adaptive equipment mgmt;home safety;work simplification  -AB      Bathing OT STG    Bathing Goal OT STG, Date Established 06/07/17  -AB      Bathing Goal OT STG, Time to Achieve 5 - 7 days  -AB      Bathing Goal OT STG, [place] Level minimum assist (75% patient effort)  -AB      Bathing OT LTG    Bathing Goal OT LTG, Date Established 06/07/17  -AB      Bathing Goal OT LTG, Time to Achieve by discharge  -AB      Bathing Goal OT LTG, [place] Level contact guard assist  -AB      Eating Self-Feeding OT LTG    Eat Self Feeding Goal OT LTG, Date Established 06/07/17  -AB      Eat Self Feeding Goal OT LTG, Time to Achieve by discharge  -AB      Eat Self Feed Goal OT LTG, [place] Level conditional independence  -AB      Toileting OT LTG    Toileting Goal OT LTG, Date Established 06/07/17  -AB      Toileting Goal OT LTG, Time to Achieve by discharge  -AB      Toileting Goal OT LTG, [place] Level contact guard assist  -AB      LB Dressing OT STG    LB Dressing Goal OT STG, Date Established 06/07/17  -AB      LB Dressing Goal OT STG,  Time to Achieve 5 - 7 days  -AB      LB Dressing Goal OT STG, [place] Level moderate assist (50% patient effort)  -AB      LB Dressing OT LTG    LB Dressing Goal OT LTG, Date Established 06/07/17  -AB      LB Dressing Goal OT LTG, Time to Achieve by discharge  -AB      LB Dressing Goal OT LTG, [place] Level minimum assist (75% patient effort)  -AB        User Key  (r) = Recorded By, (t) = Taken By, (c) = Cosigned By    Initials Name Provider Type    AB Missy Pelayo OT Occupational Therapist          Occupational Therapy Education     Title: PT OT SLP Therapies (Active)     Topic: Occupational Therapy (Active)     Point: ADL training (Active)    Description: Instruct learner(s) on proper safety adaptation and remediation techniques during self care or transfers.   Instruct in proper use of assistive devices.    Learning Progress Summary    Learner Readiness Method Response Comment Documented by Status   Patient Acceptance E,D NR  AB 06/09/17 1457 Active    Acceptance E,D NR  AB 06/08/17 1632 Active    Acceptance E,D NR  AB 06/07/17 1234 Active               Point: Precautions (Active)    Description: Instruct learner(s) on prescribed precautions during self-care and functional transfers.    Learning Progress Summary    Learner Readiness Method Response Comment Documented by Status   Patient Acceptance E,D NR  AB 06/09/17 1457 Active    Acceptance E,D NR  AB 06/08/17 1632 Active    Acceptance E,D NR  AB 06/07/17 1234 Active               Point: Body mechanics (Active)    Description: Instruct learner(s) on proper positioning and spine alignment during self-care, functional mobility activities and/or exercises.    Learning Progress Summary    Learner Readiness Method Response Comment Documented by Status   Patient Acceptance E,D NR  AB 06/09/17 1457 Active    Acceptance E,D NR  AB 06/08/17 1632 Active    Acceptance E,D NR  AB 06/07/17 1234 Active                      User Key     Initials Effective  Dates Name Provider Type Discipline    AB 02/04/16 -  Missy Pelayo OT Occupational Therapist OT                  OT Recommendation and Plan  Anticipated Equipment Needs At Discharge:  (TBD)  Anticipated Discharge Disposition:  (TBD)  Planned Therapy Interventions: activity intolerance, adaptive equipment training, ADL retraining, energy conservation, fine motor coordination training, home exercise program, motor coordination training, ROM (Range of Motion), strengthening, transfer training, other (see comments) (Therapeutic groups as beneficial to patient)  Therapy Frequency: 3-5 times/wk          Time Calculation:         Time Calculation- OT       06/09/17 1458          Time Calculation- OT    OT Start Time 0745  -AB      OT Stop Time 0915  -AB      OT Time Calculation (min) 90 min  -AB      Total Timed Code Minutes- OT 90 minute(s)  -AB      OT Non-Billable Time (min) 15 min  -AB        User Key  (r) = Recorded By, (t) = Taken By, (c) = Cosigned By    Initials Name Provider Type    AB Missy Pelayo OT Occupational Therapist           Therapy Charges for Today     Code Description Service Date Service Provider Modifiers Qty    91051448116 HC OT THER PROC GROUP 6/8/2017 Missy Pelayo OT GO 1    11176432333 HC OT SELF CARE/MGMT/TRAIN EA 15 MIN 6/8/2017 Missy Pelayo OT GO 1    52367622576 HC OT THERAPEUTIC ACT EA 15 MIN 6/8/2017 Missy Pelayo OT GO 2    52984183171 HC OT THER SUPP EA 15 MIN 6/8/2017 Missy Pelayo OT GO 1    38176338105 HC OT SELF CARE/MGMT/TRAIN EA 15 MIN 6/9/2017 Missy Pelayo OT GO 3    96774144323 HC OT THER SUPP EA 15 MIN 6/9/2017 Missy Pelayo OT GO 3    21427583670 HC OT THERAPEUTIC ACT EA 15 MIN 6/9/2017 Missy Pelayo OT GO 3               Missy Pelayo OT  6/9/2017

## 2017-06-09 NOTE — PROGRESS NOTES
Inpatient Rehabilitation Functional Measures Assessment    Functional Measures  RAS Eating:  RAS Grooming:  RAS Bathing:  RAS Upper Body Dressing:  RAS Lower Body Dressing:  RAS Toileting:    RAS Bladder Management  Level of Assistance:  Frequency/Number of Accidents this Shift:    RAS Bowel Management  Level of Assistance:  Frequency/Number of Accidents this Shift:    RAS Bed/Chair/Wheelchair Transfer:  RAS Toilet Transfer:  RAS Tub/Shower Transfer:    Previously Documented Mode of Locomotion at Discharge:  RAS Expected Mode of Locomotion at Discharge:  RAS Walk/Wheelchair:  RAS Stairs:    RAS Comprehension:  Both ( auditory and visual) modes of comprehension are used  equally. Comprehension Score = 6, Modified [place].  Patient comprehends  complex/abstract information in their primary language, requiring:  RAS Expression:  Both ( vocal and non-vocal) modes of expression are used  equally. Expression Score = 6,  Modified Independent. Patient expresses  complex/abstract information in their primary language, requiring:  RAS Social Interaction:  Social Interaction Score = 6, Modified Independent.  Patient is modified independent for social interaction, requiring:  RAS Problem Solving:  Problem Solving Score = 6, Modified [place].  Patient  makes appropriate decisions in order to solve complex problems, but requires  extra time.  RAS Memory:  Memory Score = 6, Modified [place].  Patient is modified  independent for memory, requiring:    Therapy Mode Minutes  Occupational Therapy:  Physical Therapy:  Speech Language Pathology:    Discharge Functional Goals:    Signed by: Nurse Nathen

## 2017-06-09 NOTE — PROGRESS NOTES
"     LOS: 3 days     Chief Complaint:  Hip fracture    Subjective     Interval History:     She states she's feeling fairly well today.  She has no cough or fevers or chills.  No black or bloody bowel movements.  Bed mobility requires min to mod assist.  Transfers required min assist.  Ambulating 10 feet with rolling walker with min assist ×2.  Pain control seems pretty good for the most part.  Systolic blood pressure less than 140.  Rested better last night.      Objective     Vital Signs  /67 (BP Location: Right arm, Patient Position: Lying)  Pulse 71  Temp 98.1 °F (36.7 °C) (Oral)   Resp 18  Ht 59\" (149.9 cm)  Wt 119 lb (54 kg)  SpO2 95%  BMI 24.04 kg/m2  Intake & Output (last day)       06/08 0701 - 06/09 0700 06/09 0701 - 06/10 0700    P.O. 840 120    Total Intake(mL/kg) 840 (15.6) 120 (2.2)    Net +840 +120          Unmeasured Urine Occurrence 5 x             Physical Exam:     General Appearance:    Alert, cooperative, in no acute distress   Head:    Normocephalic, without obvious abnormality, atraumatic   Eyes:            Lids and lashes normal, conjunctivae and sclerae normal, no   icterus, no pallor, corneas clear, PERRLA   Ears:    Ears appear intact with no abnormalities noted       Neck:   No adenopathy, supple, trachea midline, no thyromegaly, no   carotid bruit, no JVD   Lungs:     Clear to auscultation,respirations regular, even and                  unlabored    Heart:    Regular rhythm and normal rate, normal S1 and S2, no            murmur, no gallop, no rub, no click   Chest Wall:    No abnormalities observed   Abdomen:     Normal bowel sounds, no masses, no organomegaly, soft        non-tender, non-distended, no guarding, no rebound                tenderness   Extremities:   no edema, no cyanosis, no redness   Pulses:   Pulses palpable and equal bilaterally   Skin:   No bleeding, bruising or rash                Results Review:    Lab Results   Component Value Date    WBC 4.09 (L) " 06/07/2017    HGB 11.1 (L) 06/07/2017    HCT 34.2 (L) 06/07/2017    MCV 98.8 (H) 06/07/2017     06/07/2017       Lab Results   Component Value Date    GLUCOSE 98 06/07/2017    BUN 6 (L) 06/07/2017    CREATININE 0.39 (L) 06/07/2017    EGFRIFNONA >150 06/07/2017    BCR 15.4 06/07/2017     06/07/2017    K 4.1 06/07/2017     06/07/2017    CO2 25.3 06/07/2017    CALCIUM 8.8 06/07/2017    ALBUMIN 3.30 (L) 06/07/2017    LABIL2 1.0 (L) 06/07/2017    AST 18 06/07/2017    ALT 12 06/07/2017     No results found for: INR    No results found for: POCGLU       Medication Review:     Current Facility-Administered Medications:   •  acetaminophen (TYLENOL) tablet 650 mg, 650 mg, Oral, Q4H PRN, Samuel Duane Kreis, MD  •  aluminum-magnesium hydroxide-simethicone (MAALOX MAX) 400-400-40 MG/5ML suspension 15 mL, 15 mL, Oral, Q6H PRN, Samuel Duane Kreis, MD  •  atorvastatin (LIPITOR) tablet 40 mg, 40 mg, Oral, Nightly, Samuel Duane Kreis, MD, 40 mg at 06/08/17 2133  •  baclofen (LIORESAL) tablet 20 mg, 20 mg, Oral, Q12H, Samuel Duane Kreis, MD, 20 mg at 06/09/17 0836  •  bisacodyl (DULCOLAX) suppository 10 mg, 10 mg, Rectal, Daily PRN, Samuel Duane Kreis, MD  •  clonazePAM (KlonoPIN) tablet 0.5 mg, 0.5 mg, Oral, BID PRN, Samuel Duane Kreis, MD, 0.5 mg at 06/08/17 2134  •  enoxaparin (LOVENOX) syringe 40 mg, 40 mg, Subcutaneous, Daily, Samuel Duane Kreis, MD, 40 mg at 06/09/17 0837  •  HYDROcodone-acetaminophen (NORCO)  MG per tablet 1 tablet, 1 tablet, Oral, Q4H PRN, Samuel Duane Kreis, MD, 1 tablet at 06/09/17 0620  •  levETIRAcetam (KEPPRA) tablet 250 mg, 250 mg, Oral, Nightly, Samuel Duane Kreis, MD, 250 mg at 06/08/17 2134  •  levETIRAcetam (KEPPRA) tablet 500 mg, 500 mg, Oral, Daily, Samuel Duane Kreis, MD, 500 mg at 06/09/17 0837  •  magnesium hydroxide (MILK OF MAGNESIA) suspension 2400 mg/10mL 10 mL, 10 mL, Oral, Daily PRN, Samuel Duane Kreis, MD  •  montelukast (SINGULAIR) tablet 10 mg, 10 mg, Oral,  Nightly, Samuel Duane Kreis, MD, 10 mg at 06/08/17 2134  •  ondansetron (ZOFRAN) tablet 4 mg, 4 mg, Oral, Q6H PRN **OR** ondansetron ODT (ZOFRAN-ODT) disintegrating tablet 4 mg, 4 mg, Oral, Q6H PRN **OR** ondansetron (ZOFRAN) injection 4 mg, 4 mg, Intravenous, Q6H PRN, Samuel Duane Kreis, MD  •  pantoprazole (PROTONIX) EC tablet 40 mg, 40 mg, Oral, Q AM, Samuel Duane Kreis, MD, 40 mg at 06/09/17 0620  •  PHENobarbital (LUMINAL) tablet 97.2 mg, 97.2 mg, Oral, Q12H, Samuel Duane Kreis, MD, 97.2 mg at 06/09/17 0837  •  phenytoin (DILANTIN) ER capsule 100 mg, 100 mg, Oral, Daily, Samuel Duane Kreis, MD, 100 mg at 06/09/17 0837  •  phenytoin (DILANTIN) ER capsule 200 mg, 200 mg, Oral, Nightly, Samuel Duane Kreis, MD, 200 mg at 06/08/17 2134  •  senna (SENOKOT) tablet 1 tablet, 1 tablet, Oral, BID, Samuel Duane Kreis, MD, 1 tablet at 06/09/17 0837  •  venlafaxine XR (EFFEXOR-XR) 24 hr capsule 150 mg, 150 mg, Oral, Daily With Breakfast, Samuel Duane Kreis, MD, 150 mg at 06/09/17 0837      Assessment/Plan     Periprosthetic hip fracture status post ORIF  Osteoarthritis  Chronic obstructive pulmonary disease  Seizure disorder      Continue hydrocodone when necessary for pain    PT and OT ongoing.     Pulmonary status is stable.    Continue baclofen    Continue Keppra, Dilantin and phenobarbital        Samuel Duane Kreis, MD  06/09/17  9:04 AM

## 2017-06-09 NOTE — PLAN OF CARE
Problem: Patient Care Overview (Adult)  Goal: Adult Individualization and Mutuality  Outcome: Ongoing (interventions implemented as appropriate)  Goal: Discharge Needs Assessment  Outcome: Ongoing (interventions implemented as appropriate)    Problem: Skin Integrity Impairment, Risk/Actual (Adult)  Goal: Skin Integrity/Wound Healing  Outcome: Ongoing (interventions implemented as appropriate)    Problem: Pressure Ulcer (Adult)  Goal: Signs and Symptoms of Listed Potential Problems Will be Absent or Manageable (Pressure Ulcer)  Outcome: Ongoing (interventions implemented as appropriate)    Problem: Fall Risk (Adult)  Goal: Absence of Falls  Outcome: Ongoing (interventions implemented as appropriate)    Problem: Fractured Hip (Adult)  Goal: Signs and Symptoms of Listed Potential Problems Will be Absent or Manageable (Fractured Hip)  Outcome: Ongoing (interventions implemented as appropriate)

## 2017-06-09 NOTE — PROGRESS NOTES
Inpatient Rehabilitation Functional Measures Assessment    Functional Measures  RAS Eating:  RAS Grooming:  RAS Bathing:  RAS Upper Body Dressing:  RAS Lower Body Dressing:  RAS Toileting:    RAS Bladder Management  Level of Assistance:  Frequency/Number of Accidents this Shift:    RAS Bowel Management  Level of Assistance:  Frequency/Number of Accidents this Shift:    RAS Bed/Chair/Wheelchair Transfer:  RAS Toilet Transfer:  RAS Tub/Shower Transfer:    Previously Documented Mode of Locomotion at Discharge:  RAS Expected Mode of Locomotion at Discharge:  RAS Walk/Wheelchair:  RAS Stairs:    RAS Comprehension:  Both ( auditory and visual) modes of comprehension are used  equally. Comprehension Score = 6, Modified Scott.  Patient comprehends  complex/abstract information in their primary language, requiring: Additional  time.  RAS Expression:  Both ( vocal and non-vocal) modes of expression are used  equally. Expression Score = 6,  Modified Independent. Patient expresses  complex/abstract information in their primary language, requiring: Additional  time.  RAS Social Interaction:  Social Interaction Score = 6, Modified Independent.  Patient is modified independent for social interaction, requiring: Requires  additional time.  RAS Problem Solving:  Problem Solving Score = 6, Modified Scott.  Patient  makes appropriate decisions in order to solve complex problems, but requires  extra time.  RAS Memory:  Memory Score = 6, Modified Scott.  Patient is modified  independent for memory, requiring: Requires additional time.    Therapy Mode Minutes  Occupational Therapy:  Physical Therapy:  Speech Language Pathology:    Discharge Functional Goals:    Signed by: Dewey Sanchez RN

## 2017-06-09 NOTE — PROGRESS NOTES
Inpatient Rehabilitation Functional Measures Assessment    Functional Measures  RAS Eating:  RAS Grooming:  RAS Bathing:  RAS Upper Body Dressing:  RAS Lower Body Dressing:  RAS Toileting:    RAS Bladder Management  Level of Assistance:  Frequency/Number of Accidents this Shift:    RAS Bowel Management  Level of Assistance:  Frequency/Number of Accidents this Shift:    RAS Bed/Chair/Wheelchair Transfer:  Bed/chair/wheelchair Transfer Score = 4.  Patient performs 75% or more of effort and minimal assistance (little/incidental  help/lifting of one limb/steadying) for transferring to and from the  bed/chair/wheelchair, requiring: Contact guard. Steadying. Hand placement.  Patient requires the following assistive device(s): Arm rest. Bed rails. Walker.    RAS Toilet Transfer:  Toilet Transfer Score = 4.  Patient performs 75% or more  of effort and minimal assistance (little/incidental help/steadying) for  transferring to and from the toilet/commode, requiring: Contact guard.  Steadying. Hand placement. Patient requires the following assistive device(s):  Safety frame/over the toilet. Grab bars. Walker.  RAS Tub/Shower Transfer:    Previously Documented Mode of Locomotion at Discharge:  RAS Expected Mode of Locomotion at Discharge:  RAS Walk/Wheelchair:  WHEELCHAIR OBSERVATION   Activity was not observed.    WALK OBSERVATION   Walk Distance Scale = 1.  Distance walked is less than 50 feet. Walk Score = 1.   Incidental assistance with lifting, contact guard or steadying was provided.  Patient performs 75% or more of effort and requires minimal contact assistance  for walking. Patient walked a distance of 20 feet. Patient requires the  following assistive device(s): Rolling walker.  RAS Stairs:  Activity was not observed.    RAS Comprehension:  RAS Expression:  RAS Social Interaction:  RAS Problem Solving:  RAS Memory:    Therapy Mode Minutes  Occupational Therapy:  Physical Therapy: Individual: 90 minutes.  Speech  Language Pathology:    Discharge Functional Goals:    Signed by: Roberta Friedman PTA

## 2017-06-09 NOTE — THERAPY TREATMENT NOTE
Inpatient Rehabilitation - Physical Therapy Treatment Note   [place]     Patient Name: Maribel Staton  : 1945  MRN: [id number]  Today's Date: 2017  Onset of Illness/Injury or Date of Surgery Date: 17  Date of Referral to PT: 17  Referring Physician: Eva    Admit Date: 2017    Visit Dx:  No diagnosis found.  Patient Active Problem List   Diagnosis   • Closed right hip fracture               Adult Rehabilitation Note       17 1646 17 1453 17 1814    Rehab Assessment/Intervention    Discipline physical therapy assistant  -LL occupational therapist  -AB physical therapy assistant  -LL    Document Type therapy note (daily note)   BID treatment session  -LL therapy note (daily note)  -AB therapy note (daily note)   BID treatment session  -LL    Subjective Information no complaints;agree to therapy  -LL no complaints;agree to therapy  -AB no complaints;agree to therapy  -LL    Patient Effort, Rehab Treatment good  -LL good  -AB good  -LL    Precautions/Limitations fall precautions;non-weight bearing status;seizure precautions   skin integrity, intermittent confusion  -LL fall precautions;non-weight bearing status;seizure precautions;other (see comments)   NWB RLE; <skin integrity; intermittent confusion  -AB fall precautions;non-weight bearing status;seizure precautions;other (see comments)   skin integrity, NWB RLE, intermittent confusion  -LL    Patient Response to Treatment Patient did well with BID treatment sessino with adequate rest breaks.  -LL Pt. w/ good tolerance and rest breaks provided. Improvements noted w/ self care tasks. See ADL Assess for updated levels.   -AB Patient did well with BID treatment session with adequate rest breaks but was c/o increased pain in pm.  RN notified & medication administered.  -LL    Recorded by [LL] Roberta Friedman PTA [AB] Missy Pelayo OT [LL] Roberta Friedman PTA    Pain Assessment    Pain Assessment Levin-Orozco FACES   -LL  Levin-Orozco FACES  -LL    Levin-Baker FACES Pain Rating 6  -LL  8  -LL    Pain Type Surgical pain  -LL  Surgical pain  -LL    Pain Location Hip  -LL  Hip  -LL    Pain Orientation Right  -LL  Right  -LL    Pain Intervention(s) Repositioned;Rest  -LL  Medication (See MAR);Repositioned;Rest   See RN notes for medication details  -LL    Recorded by [LL] Roberta Friedman PTA  [LL] Roberta Friedman PTA    Vision Assessment/Intervention    Visual Impairment WFL with corrective lenses  -LL  WFL with corrective lenses  -LL    Recorded by [LL] Roberta Friedman PTA  [LL] Roberta Friedman PTA    Cognitive Assessment/Intervention    Current Cognitive/Communication Assessment impaired  -LL  impaired  -LL    Orientation Status oriented to;person;place;situation;disoriented to;time  -LL  oriented to;person;place;situation;disoriented to;time  -LL    Follows Commands/Answers Questions able to follow single-step instructions;100% of the time;needs cueing  -LL able to follow single-step instructions;100% of the time;needs cueing  -AB able to follow single-step instructions;100% of the time;needs cueing  -LL    Personal Safety decreased awareness, need for assist;decreased awareness, need for safety  -LL decreased awareness, need for assist;decreased awareness, need for safety  -AB decreased awareness, need for assist;decreased awareness, need for safety  -LL    Personal Safety Interventions gait belt;nonskid shoes/slippers when out of bed  -LL gait belt;nonskid shoes/slippers when out of bed;supervised activity  -AB gait belt;nonskid shoes/slippers when out of bed  -LL    Recorded by [LL] Roberta Friedman PTA [AB] Missy Pelayo, [place] [LL] Roberta Friedman PTA    Mobility Assessment/Training    Extremity Weight-Bearing Status right lower extremity  -LL right lower extremity  -AB right lower extremity  -LL    Right Lower Extremity Weight-Bearing non weight-bearing  -LL non weight-bearing  -AB non weight-bearing  -LL    Recorded by  [LL] Roberta Friedman PTA [AB] Missy Pelayo, OT [LL] Roberta Friedman PTA    Bed Mobility, Assessment/Treatment    Bed Mobility, Assistive Device bed rails  -LL  bed rails  -LL    Bed Mobility, Roll Left, [place] contact guard assist  -LL  contact guard assist  -LL    Bed Mobility, Roll Right, [place] contact guard assist  -LL  contact guard assist  -LL    Bed Mobility, Scoot/Bridge, [place] minimum assist (75% patient effort)  -LL  minimum assist (75% patient effort)  -LL    Bed Mob, Supine to Sit, [place] minimum assist (75% patient effort)  -LL  minimum assist (75% patient effort);moderate assist (50% patient effort)  -LL    Bed Mob, Sit to Supine, [place] minimum assist (75% patient effort)  -LL  minimum assist (75% patient effort);moderate assist (50% patient effort)  -LL    Bed Mobility, Safety Issues decreased use of legs for bridging/pushing  -LL  decreased use of legs for bridging/pushing  -LL    Bed Mobility, Impairments ROM decreased;strength decreased;impaired balance;coordination impaired;pain  -LL  ROM decreased;strength decreased;impaired balance;coordination impaired;pain  -LL    Recorded by [LL] Roberta Friedman PTA  [LL] Roberta Friedman PTA    Transfer Assessment/Treatment    Transfers, Bed-Chair [place] minimum assist (75% patient effort);verbal cues required;nonverbal cues required (demo/gesture)  -LL  minimum assist (75% patient effort);verbal cues required;nonverbal cues required (demo/gesture)  -LL    Transfers, Chair-Bed [place] minimum assist (75% patient effort);verbal cues required;nonverbal cues required (demo/gesture)  -LL  minimum assist (75% patient effort);verbal cues required;nonverbal cues required (demo/gesture)  -LL    Transfers, Bed-Chair-Bed, Assist Device rolling walker  -LL  rolling walker  -LL    Transfers, Sit-Stand [place] minimum assist (75% patient effort);verbal cues required;nonverbal cues required (demo/gesture)  -LL   minimum assist (75% patient effort);verbal cues required;nonverbal cues required (demo/gesture)  -LL    Transfers, Stand-Sit [place] minimum assist (75% patient effort);verbal cues required;nonverbal cues required (demo/gesture)  -LL  minimum assist (75% patient effort);verbal cues required;nonverbal cues required (demo/gesture)  -LL    Transfers, Sit-Stand-Sit, Assist Device bed rails;rolling walker  -LL  bed rails;rolling walker  -LL    Toilet Transfer, [place] minimum assist (75% patient effort);nonverbal cues required (demo/gesture);verbal cues required  -LL minimum assist (75% patient effort);verbal cues required;nonverbal cues required (demo/gesture)  -AB minimum assist (75% patient effort);nonverbal cues required (demo/gesture);verbal cues required  -LL    Toilet Transfer, Assistive Device rolling walker;elevated toilet seat   Grab bars  -LL elevated toilet seat;wheelchair;other (see comments)   grab bar  -AB rolling walker;elevated toilet seat   Grab bars  -LL    Transfer, Safety Issues balance decreased during turns;step length decreased  -LL  balance decreased during turns;step length decreased  -LL    Transfer, Impairments strength decreased;impaired balance;coordination impaired  -LL  strength decreased;impaired balance;coordination impaired  -LL    Recorded by [LL] Roberta Friedman PTA [AB] Missy Pelayo OT [LL] Roberta Friedman PTA    Gait Assessment/Treatment    Gait, [place] Level minimum assist (75% patient effort);2 person assist required;verbal cues required;nonverbal cues required (demo/gesture)  -LL  minimum assist (75% patient effort);2 person assist required;verbal cues required;nonverbal cues required (demo/gesture)  -LL    Gait, Assistive Device rolling walker  -LL  rolling walker  -LL    Gait, Distance (Feet) 20   then 40 in am; 40 x 2 in pm  -LL  10   in am; 20 x 2 in pm  -LL    Gait, Gait Pattern Analysis swing-to gait  -LL  swing-to gait  -LL    Gait, Gait  Deviations rossy decreased;decreased heel strike;limb motion velocity decreased;step length decreased;weight-shifting ability decreased  -LL  rossy decreased;decreased heel strike;limb motion velocity decreased;step length decreased;weight-shifting ability decreased  -LL    Gait, Maintain Weight Bearing Status cues to maintain weight bearing status  -LL  able to maintain weight bearing status  -LL    Gait, Safety Issues balance decreased during turns;step length decreased;weight-shifting ability decreased  -LL  balance decreased during turns;step length decreased;weight-shifting ability decreased;steps too close front assistive device  -LL    Gait, Impairments strength decreased;impaired balance;coordination impaired  -LL  strength decreased;impaired balance;coordination impaired  -LL    Recorded by [LL] Roberta Friedman PTA  [LL] Roberta Friedman PTA    Stairs Assessment/Treatment    Stairs, [place] Level not tested  -LL  not tested  -LL    Recorded by [LL] Roberta Friedman PTA  [LL] Roberta Friedman PTA    Upper Body Bathing Assessment/Training    UB Bathing Assess/Train, Position  sitting  -AB     UB Bathing Assess/Train, [place] Level  supervision required;set up required  -AB     UB Bathing Assess/Train, Comment  TB Bathing: [place]  -AB     Recorded by  [AB] Missy Pelayo, [place]     Lower Body Bathing Assessment/Training    LB Bathing Assess/Train Assistive Device  long-handled sponge;grab bars  -AB     LB Bathing Assess/Train, Position  sitting;standing  -AB     LB Bathing Assess/Train, [place] Level  minimum assist (75% patient effort);verbal cues required;nonverbal cues required (demo/gesture)  -AB     LB Bathing Assess/Train, Comment  TB Bathing: [place]  -AB     Recorded by  [AB] Missy Pelayo, [place]     Upper Body Dressing Assessment/Training    UB Dressing Assess/Train, Position  sitting  -AB     UB Dressing Assess/Train, [place]  supervision required;set up required  -AB      Recorded by  [AB] Missy Pelayo OT     Lower Body Dressing Assessment/Training    LB Dressing Assess/Train, Clothing Type  donning:;doffing:;slipper socks;pants  -AB     LB Dressing Assess/Train, Assist Device  reacher;sock-aid  -AB     LB Dressing Assess/Train, Position  sitting  -AB     LB Dressing Assess/Train, [place]  minimum assist (75% patient effort);verbal cues required;nonverbal cues required (demo/gesture)  -AB     Recorded by  [AB] Missy Pelayo OT     Toileting Assessment/Training    Toileting Assess/Train, Assistive Device  grab bars;raised toilet seat  -AB     Toileting Assess/Train, Position  sitting  -AB     Toileting Assess/Train, [place] Level  minimum assist (75% patient effort);nonverbal cues required (demo/gesture);verbal cues required  -AB     Recorded by  [AB] Missy Pelayo OT     Grooming Assessment/Training    Grooming Assess/Train, Position  sitting  -AB     Grooming Assess/Train, [place] Level  supervision required;set up required  -AB     Recorded by  [AB] Missy Pelayo OT     Therapy Exercises    Bilateral Lower Extremities AROM:;15 reps;sitting;supine  -LL  AROM:;15 reps;sitting;supine  -LL    BLE Resistance theraband  -LL      Bilateral Upper Extremity  AROM:;sitting   BUE CoordEx; G/FMC TherEx/Act; Strengthening  -AB     Recorded by [LL] Roberta Friedman PTA [AB] Missy Pelayo OT [LL] Roberta Friedman PTA    Positioning and Restraints    Pre-Treatment Position --   WC with OT in am; WC with activities in pm  -LL  in bed   in am; WC in common area in pm  -LL    Post Treatment Position bed   in am & pm  -LL  wheelchair   in am; bed in pm  -LL    In Bed supine;call light within reach;encouraged to call for assist;exit alarm on;side rails up x3;RLE elevated  -LL  supine;call light within reach;encouraged to call for assist;exit alarm on;RLE elevated   in pm  -LL    In Wheelchair  sitting;with PT;legs elevated  -AB sitting;legs elevated    In common area  -LL    Recorded by [LL] Roberta Friedman PTA [AB] Missy Pelayo OT [LL] Roberta Friedman PTA      06/08/17 1629          Rehab Assessment/Intervention    Discipline occupational therapist  -AB      Document Type therapy note (daily note)  -AB      Subjective Information no complaints;agree to therapy  -AB      Patient Effort, Rehab Treatment good  -AB      Precautions/Limitations fall precautions;non-weight bearing status;seizure precautions;other (see comments)   <skin integrity; NWB RLE; intermittent confusion  -AB      Patient Response to Treatment Pt. w/ good tolerance and rest breaks provided.   -AB      Recorded by [AB] Missy Pelayo OT      Cognitive Assessment/Intervention    Follows Commands/Answers Questions able to follow single-step instructions;100% of the time;needs cueing  -AB      Personal Safety decreased awareness, need for assist;decreased awareness, need for safety  -AB      Personal Safety Interventions gait belt;nonskid shoes/slippers when out of bed;supervised activity  -AB      Recorded by [AB] Missy Pelayo OT      Grooming Assessment/Training    Grooming Assess/Train, Position sitting  -AB      Grooming Assess/Train, [place] Level supervision required;verbal cues required  -AB      Recorded by [AB] Missy Pelayo OT      Therapy Exercises    Bilateral Upper Extremity AROM:;sitting   BUE CoordEx; G/FMC TherEx/Act; Strengthening  -AB      Recorded by [AB] Missy Pelayo OT      Positioning and Restraints    In Wheelchair sitting;call light within reach;encouraged to call for assist;legs elevated;with PT   in room in AM; w/ PT in PM  -AB      Recorded by [AB] Missy Pelayo OT        User Key  (r) = Recorded By, (t) = Taken By, (c) = Cosigned By    Initials Name Effective Dates    AB Missy Pelayo OT 02/04/16 -     LL Roberta Friedman PTA 05/02/16 -                 IP PT Goals       06/07/17 1703          Bed  Mobility PT STG    Bed Mobility PT STG, Date Established 06/07/17  -CT      Bed Mobility PT STG, Time to Achieve 5 - 7 days  -CT      Bed Mobility PT STG, Activity Type all bed mobility  -CT      Bed Mobility PT STG, [place] Level contact guard assist  -CT      Transfer Training Goal, Assist Device bed rails  -CT      Bed Mobility PT LTG    Bed Mobility PT LTG, Date Established 06/07/17  -CT      Bed Mobility PT LTG, Time to Achieve by discharge  -CT      Bed Mobility PT LTG, Activity Type all bed mobility  -CT      Bed Mobility PT LTG, [place] Level conditional independence;supervision required  -CT      Transfer Training PT STG    Transfer Training PT STG, Date Established 06/07/17  -CT      Transfer Training PT STG, Time to Achieve 5 - 7 days  -CT      Transfer Training PT STG, Activity Type all transfers  -CT      Transfer Training PT STG, [place] Level contact guard assist  -CT      Transfer Training PT STG, Assist Device other (see comments)   with appropriate AD  -CT      Transfer Training PT LTG    Transfer Training PT LTG, Date Established 06/07/17  -CT      Transfer Training PT LTG, Time to Achieve by discharge  -CT      Transfer Training PT LTG, Activity Type all transfers  -CT      Transfer Training PT LTG, [place] Level conditional independence;supervision required  -CT      Transfer Training PT LTG, Assist Device other (see comments)   with appropriate AD  -CT      Gait Training PT STG    Gait Training Goal PT STG, Date Established 06/07/17  -CT      Gait Training Goal PT STG, Time to Achieve 5 - 7 days  -CT      Gait Training Goal PT STG, [place] Level contact guard assist  -CT      Gait Training Goal PT STG, Assist Device walker, rolling  -CT      Gait Training Goal PT STG, Distance to Achieve 50  -CT      Gait Training PT LTG    Gait Training Goal PT LTG, Date Established 06/07/17  -CT      Gait Training Goal PT LTG, Time to Achieve by discharge  -CT      Gait Training Goal  PT LTG, [place] Level conditional independence;supervision required  -CT      Gait Training Goal PT LTG, Assist Device walker, rolling  -CT      Gait Training Goal PT LTG, Distance to Achieve 75  -CT      Patient Education PT LTG    Patient Education PT LTG, Date Established 06/07/17  -CT      Patient Education PT LTG, Time to Achieve by discharge  -CT      Patient Education PT LTG, Education Type written program;HEP;precaution per surgeon;positioning;posture/body mechanics;gait;transfers;bed mobility;pain management;progression of POC;benefits of activity;home safety;equipment management;skin care/inspection;energy conservation  -CT      Patient Education PT LTG, Education Understanding demonstrate adequately;verbalize understanding  -CT        User Key  (r) = Recorded By, (t) = Taken By, (c) = Cosigned By    Initials Name Provider Type    CT Yuli Moreno PT Physical Therapist          Physical Therapy Education     Title: PT OT SLP Therapies (Active)     Topic: Physical Therapy (Active)     Point: Mobility training (Done)    Learning Progress Summary    Learner Readiness Method Response Comment Documented by Status   Patient Acceptance EELINR  LL 06/09/17 1700 Done    Acceptance ED NR  LL 06/08/17 1820 Active    Acceptance E NR  CT 06/07/17 1707 Active               Point: Body mechanics (Done)    Learning Progress Summary    Learner Readiness Method Response Comment Documented by Status   Patient Acceptance ELI BEGUMNR  LL 06/09/17 1700 Done    Acceptance EELI NR  LL 06/08/17 1820 Active    Acceptance E NR  CT 06/07/17 1707 Active               Point: Precautions (Done)    Learning Progress Summary    Learner Readiness Method Response Comment Documented by Status   Patient Acceptance ELI BEGUMNR  LL 06/09/17 1700 Done    Acceptance EELI NR  LL 06/08/17 1820 Active    Acceptance E NR  CT 06/07/17 1707 Active                      User Key     Initials Effective Dates Name Provider Type Discipline    CT  03/14/16 -  Yuli Moreno, PT Physical Therapist PT     05/02/16 -  Roberta Friedman PTA Physical Therapy Assistant PT                    PT Recommendation and Plan  Anticipated Equipment Needs At Discharge:  (to be determined)  Anticipated Discharge Disposition: home with assist, home with home health  Planned Therapy Interventions: balance training, bed mobility training, gait training, home exercise program, manual therapy techniques, neuromuscular re-education, motor coordination training, patient/family education, postural re-education, ROM (Range of Motion), stair training, strengthening, transfer training  PT Frequency: 2 times/day, 5 times/wk, per priority policy            Time Calculation:         PT Charges       06/09/17 1702 06/09/17 1700       Time Calculation    Start Time 1245  -LL 0920  -LL     Stop Time 1330  -LL 1005  -LL     Time Calculation (min) 45 min  -LL 45 min  -LL     PT Non-Billable Time (min)  15 min  -LL     PT Received On  06/09/17  -LL     PT Goal Re-Cert Due Date  06/14/17  -LL     Time Calculation- PT    Total Timed Code Minutes- PT 45 minute(s)  -LL 45 minute(s)  -LL       User Key  (r) = Recorded By, (t) = Taken By, (c) = Cosigned By    Initials Name Provider Type    LL Roberta Friedman PTA Physical Therapy Assistant          Therapy Charges for Today     Code Description Service Date Service Provider Modifiers Qty    02113205252 HC GAIT TRAINING EA 15 MIN 6/8/2017 Roberta Friedman PTA GP 2    54700680087 HC PT THER PROC EA 15 MIN 6/8/2017 Roberta Friedman PTA GP 3    52686042206 HC PT THERAPEUTIC ACT EA 15 MIN 6/8/2017 Roberta Friedman PTA GP 1    65319443403 HC PT CARE PLAN EACH 15 MIN 6/8/2017 Roberta Friedman PTA GP 1    75031492011 HC PT THER SUPP EA 15 MIN 6/8/2017 Roberta Friedman PTA GP 2    82245497583 HC GAIT TRAINING EA 15 MIN 6/9/2017 Roberta Friedman PTA GP 2    97745894508 HC PT THERAPEUTIC ACT EA 15 MIN 6/9/2017 Roberta Friedman PTA GP 1    26799412874 HC PT THER PROC EA  15 MIN 6/9/2017 Roberta Friedman PTA GP 3    43119586244 HC PT THER SUPP EA 15 MIN 6/9/2017 Roberta Friedman PTA GP 2               Di Friedman PTA  6/9/2017

## 2017-06-10 LAB — PHENYTOIN SERPL-MCNC: 3 MCG/ML (ref 10–20)

## 2017-06-10 PROCEDURE — 97110 THERAPEUTIC EXERCISES: CPT

## 2017-06-10 PROCEDURE — 80185 ASSAY OF PHENYTOIN TOTAL: CPT | Performed by: FAMILY MEDICINE

## 2017-06-10 PROCEDURE — 25010000002 ENOXAPARIN PER 10 MG: Performed by: FAMILY MEDICINE

## 2017-06-10 PROCEDURE — 97530 THERAPEUTIC ACTIVITIES: CPT

## 2017-06-10 PROCEDURE — 97116 GAIT TRAINING THERAPY: CPT

## 2017-06-10 PROCEDURE — 94799 UNLISTED PULMONARY SVC/PX: CPT

## 2017-06-10 PROCEDURE — 97535 SELF CARE MNGMENT TRAINING: CPT

## 2017-06-10 RX ORDER — PHENYTOIN SODIUM 100 MG/1
200 CAPSULE, EXTENDED RELEASE ORAL EVERY 12 HOURS SCHEDULED
Status: DISCONTINUED | OUTPATIENT
Start: 2017-06-10 | End: 2017-06-16 | Stop reason: HOSPADM

## 2017-06-10 RX ADMIN — BACLOFEN 20 MG: 10 TABLET ORAL at 20:53

## 2017-06-10 RX ADMIN — SENNOSIDES 1 TABLET: 8.6 TABLET ORAL at 08:44

## 2017-06-10 RX ADMIN — MONTELUKAST SODIUM 10 MG: 10 TABLET ORAL at 20:53

## 2017-06-10 RX ADMIN — ALUMINUM HYDROXIDE, MAGNESIUM HYDROXIDE, AND DIMETHICONE 15 ML: 400; 400; 40 SUSPENSION ORAL at 20:52

## 2017-06-10 RX ADMIN — PHENYTOIN SODIUM 200 MG: 100 CAPSULE, EXTENDED RELEASE ORAL at 20:53

## 2017-06-10 RX ADMIN — BACLOFEN 20 MG: 10 TABLET ORAL at 08:44

## 2017-06-10 RX ADMIN — PHENYTOIN SODIUM 100 MG: 100 CAPSULE, EXTENDED RELEASE ORAL at 08:44

## 2017-06-10 RX ADMIN — NYSTATIN 500000 UNITS: 100000 SUSPENSION ORAL at 20:56

## 2017-06-10 RX ADMIN — VENLAFAXINE HYDROCHLORIDE 150 MG: 150 CAPSULE, EXTENDED RELEASE ORAL at 08:44

## 2017-06-10 RX ADMIN — CLONAZEPAM 0.5 MG: 0.5 TABLET ORAL at 20:51

## 2017-06-10 RX ADMIN — LEVETIRACETAM 500 MG: 500 TABLET, FILM COATED ORAL at 08:44

## 2017-06-10 RX ADMIN — ATORVASTATIN CALCIUM 40 MG: 40 TABLET, FILM COATED ORAL at 20:53

## 2017-06-10 RX ADMIN — ACETAMINOPHEN 650 MG: 325 TABLET ORAL at 20:51

## 2017-06-10 RX ADMIN — PHENOBARBITAL 97.2 MG: 32.4 TABLET ORAL at 08:47

## 2017-06-10 RX ADMIN — PANTOPRAZOLE SODIUM 40 MG: 40 TABLET, DELAYED RELEASE ORAL at 05:20

## 2017-06-10 RX ADMIN — SENNOSIDES 1 TABLET: 8.6 TABLET ORAL at 17:53

## 2017-06-10 RX ADMIN — LEVETIRACETAM 250 MG: 250 TABLET, FILM COATED ORAL at 20:53

## 2017-06-10 RX ADMIN — PHENOBARBITAL 97.2 MG: 32.4 TABLET ORAL at 20:55

## 2017-06-10 RX ADMIN — ENOXAPARIN SODIUM 40 MG: 40 INJECTION SUBCUTANEOUS at 08:44

## 2017-06-10 RX ADMIN — NYSTATIN 500000 UNITS: 100000 SUSPENSION ORAL at 17:53

## 2017-06-10 RX ADMIN — ACETAMINOPHEN 650 MG: 325 TABLET ORAL at 06:29

## 2017-06-10 NOTE — PLAN OF CARE
Problem: Patient Care Overview (Adult)  Goal: Plan of Care Review  Outcome: Ongoing (interventions implemented as appropriate)    06/10/17 0733   Coping/Psychosocial Response Interventions   Plan Of Care Reviewed With patient   Patient Care Overview   Progress progress toward functional goals as expected         Problem: Skin Integrity Impairment, Risk/Actual (Adult)  Goal: Skin Integrity/Wound Healing  Outcome: Ongoing (interventions implemented as appropriate)    Problem: Pressure Ulcer (Adult)  Goal: Signs and Symptoms of Listed Potential Problems Will be Absent or Manageable (Pressure Ulcer)  Outcome: Ongoing (interventions implemented as appropriate)    Problem: Fall Risk (Adult)  Goal: Absence of Falls  Outcome: Ongoing (interventions implemented as appropriate)    Problem: Fractured Hip (Adult)  Goal: Signs and Symptoms of Listed Potential Problems Will be Absent or Manageable (Fractured Hip)  Outcome: Ongoing (interventions implemented as appropriate)    Problem: Infection, Risk/Actual (Adult)  Goal: Infection Prevention/Resolution  Outcome: Ongoing (interventions implemented as appropriate)    Problem: Mobility, Physical Impaired (Adult)  Goal: Enhanced Mobility Skills  Outcome: Ongoing (interventions implemented as appropriate)

## 2017-06-10 NOTE — PROGRESS NOTES
"     LOS: 4 days     Chief Complaint:  Hip fracture    Subjective     Interval History:     She had a grand mal seizure last night.  Dilantin level this morning is 3.  She had had a seizure in a long time prior to that.  No cough or fevers or chills or shortness of breath.  No recurrent seizures since last night.      Objective     Vital Signs  /78  Pulse 82  Temp 97.9 °F (36.6 °C)  Resp 16  Ht 59\" (149.9 cm)  Wt 119 lb (54 kg)  SpO2 98%  BMI 24.04 kg/m2  Intake & Output (last day)       06/09 0701 - 06/10 0700 06/10 0701 - 06/11 0700    P.O. 600 120    Total Intake(mL/kg) 600 (11.1) 120 (2.2)    Net +600 +120          Unmeasured Urine Occurrence 5 x             Physical Exam:     General Appearance:    Alert, cooperative, in no acute distress   Head:    Normocephalic, without obvious abnormality, atraumatic   Eyes:            Lids and lashes normal, conjunctivae and sclerae normal, no   icterus, no pallor, corneas clear, PERRLA   Ears:    Ears appear intact with no abnormalities noted       Neck:   No adenopathy, supple, trachea midline, no thyromegaly, no   carotid bruit, no JVD   Lungs:     Clear to auscultation,respirations regular, even and                  unlabored    Heart:    Regular rhythm and normal rate, normal S1 and S2, no            murmur, no gallop, no rub, no click   Chest Wall:    No abnormalities observed   Abdomen:     Normal bowel sounds, no masses, no organomegaly, soft        non-tender, non-distended, no guarding, no rebound                tenderness   Extremities:   no edema, no cyanosis, no redness   Pulses:   Pulses palpable and equal bilaterally   Skin:   No bleeding, bruising or rash                Results Review:    Lab Results   Component Value Date    WBC 4.09 (L) 06/07/2017    HGB 11.1 (L) 06/07/2017    HCT 34.2 (L) 06/07/2017    MCV 98.8 (H) 06/07/2017     06/07/2017       Lab Results   Component Value Date    GLUCOSE 98 06/07/2017    BUN 6 (L) 06/07/2017    " CREATININE 0.39 (L) 06/07/2017    EGFRIFNONA >150 06/07/2017    BCR 15.4 06/07/2017     06/07/2017    K 4.1 06/07/2017     06/07/2017    CO2 25.3 06/07/2017    CALCIUM 8.8 06/07/2017    ALBUMIN 3.30 (L) 06/07/2017    LABIL2 1.0 (L) 06/07/2017    AST 18 06/07/2017    ALT 12 06/07/2017     No results found for: INR    No results found for: POCGLU       Medication Review:     Current Facility-Administered Medications:   •  acetaminophen (TYLENOL) tablet 650 mg, 650 mg, Oral, Q4H PRN, Samuel Duane Kreis, MD, 650 mg at 06/10/17 0629  •  aluminum-magnesium hydroxide-simethicone (MAALOX MAX) 400-400-40 MG/5ML suspension 15 mL, 15 mL, Oral, Q6H PRN, Samuel Duane Kreis, MD  •  atorvastatin (LIPITOR) tablet 40 mg, 40 mg, Oral, Nightly, Samuel Duane Kreis, MD, 40 mg at 06/09/17 2059  •  baclofen (LIORESAL) tablet 20 mg, 20 mg, Oral, Q12H, Samuel Duane Kreis, MD, 20 mg at 06/10/17 0844  •  bisacodyl (DULCOLAX) suppository 10 mg, 10 mg, Rectal, Daily PRN, Samuel Duane Kreis, MD  •  clonazePAM (KlonoPIN) tablet 0.5 mg, 0.5 mg, Oral, BID PRN, Samuel Duane Kreis, MD, 0.5 mg at 06/09/17 2058  •  enoxaparin (LOVENOX) syringe 40 mg, 40 mg, Subcutaneous, Daily, Samuel Duane Kreis, MD, 40 mg at 06/10/17 0844  •  HYDROcodone-acetaminophen (NORCO)  MG per tablet 1 tablet, 1 tablet, Oral, Q4H PRN, Samuel Duane Kreis, MD, 1 tablet at 06/09/17 2134  •  levETIRAcetam (KEPPRA) tablet 250 mg, 250 mg, Oral, Nightly, Samuel Duane Kreis, MD, 250 mg at 06/09/17 2059  •  levETIRAcetam (KEPPRA) tablet 500 mg, 500 mg, Oral, Daily, Samuel Duane Kreis, MD, 500 mg at 06/10/17 0844  •  LORazepam (ATIVAN) injection 1 mg, 1 mg, Intramuscular, Once PRN, Samuel Duane Kreis, MD  •  magnesium hydroxide (MILK OF MAGNESIA) suspension 2400 mg/10mL 10 mL, 10 mL, Oral, Daily PRN, Samuel Duane Kreis, MD  •  montelukast (SINGULAIR) tablet 10 mg, 10 mg, Oral, Nightly, Samuel Duane Kreis, MD, 10 mg at 06/09/17 2100  •  ondansetron (ZOFRAN) tablet 4 mg,  4 mg, Oral, Q6H PRN **OR** ondansetron ODT (ZOFRAN-ODT) disintegrating tablet 4 mg, 4 mg, Oral, Q6H PRN **OR** ondansetron (ZOFRAN) injection 4 mg, 4 mg, Intravenous, Q6H PRN, Samuel Duane Kreis, MD  •  pantoprazole (PROTONIX) EC tablet 40 mg, 40 mg, Oral, Q AM, Samuel Duane Kreis, MD, 40 mg at 06/10/17 0520  •  PHENobarbital (LUMINAL) tablet 97.2 mg, 97.2 mg, Oral, Q12H, Samuel Duane Kreis, MD, 97.2 mg at 06/10/17 0847  •  phenytoin (DILANTIN) ER capsule 100 mg, 100 mg, Oral, Daily, Samuel Duane Kreis, MD, 100 mg at 06/10/17 0844  •  phenytoin (DILANTIN) ER capsule 200 mg, 200 mg, Oral, Nightly, Samuel Duane Kreis, MD, 200 mg at 06/09/17 2100  •  senna (SENOKOT) tablet 1 tablet, 1 tablet, Oral, BID, Samuel Duane Kreis, MD, 1 tablet at 06/10/17 0844  •  venlafaxine XR (EFFEXOR-XR) 24 hr capsule 150 mg, 150 mg, Oral, Daily With Breakfast, Samuel Duane Kreis, MD, 150 mg at 06/10/17 0844      Assessment/Plan     Periprosthetic hip fracture status post ORIF  Osteoarthritis  Chronic obstructive pulmonary disease  Seizure disorder      Continue hydrocodone when necessary for pain    PT and OT ongoing.     Pulmonary status is stable.    Continue baclofen    Continue Keppra, Dilantin and phenobarbital.  She is subtherapeutic on Dilantin and had active seizure last night.  Increase Dilantin to 200 mg twice a day        Samuel Duane Kreis, MD  06/10/17  2:12 PM

## 2017-06-10 NOTE — THERAPY TREATMENT NOTE
Inpatient Rehabilitation - Occupational Therapy Treatment Note  [facility]     Patient Name: Maribel Staton  : 1945  MRN: [id number]  Today's Date: 6/10/2017  Onset of Illness/Injury or Date of Surgery Date: 17  Date of Referral to OT: 17  Referring Physician: Eva      Admit Date: 2017    Visit Dx:   No diagnosis found.  Patient Active Problem List   Diagnosis   • Closed right hip fracture             Adult Rehabilitation Note       06/10/17 1500 06/10/17 1339 17 1646    Rehab Assessment/Intervention    Discipline occupational therapist  -BC physical therapist  - physical therapy assistant  -    Document Type therapy note (daily note)  -BC therapy note (daily note)  -AG therapy note (daily note)   BID treatment session  -LL    Subjective Information agree to therapy;no complaints  -BC agree to therapy;complains of;pain  -AG no complaints;agree to therapy  -LL    Patient Effort, Rehab Treatment good  -BC good  -AG good  -LL    Precautions/Limitations fall precautions;non-weight bearing status;seizure precautions  -BC  fall precautions;non-weight bearing status;seizure precautions   skin integrity, intermittent confusion  -LL    Patient Response to Treatment Pt. tolerated tx. well with frequent rest breaks at table top tasks  -BC tolerated well with rest breaks; incr c/o pain in PM.  -AG Patient did well with BID treatment sessino with adequate rest breaks.  -LL    Recorded by [BC] Ursula Concepcion, OT [AG] Becka No, PT [LL] Roberta Friedman PTA    Pain Assessment    Pain Assessment  Levin-Orozco FACES  -AG Levin-Orozco FACES  -LL    Levin-Baker FACES Pain Rating  6  -AG 6  -LL    Pain Type  Acute pain;Surgical pain  -AG Surgical pain  -LL    Pain Location  Hip  -AG Hip  -LL    Pain Orientation  Right  -AG Right  -LL    Pain Intervention(s)  Repositioned;Rest  -AG Repositioned;Rest  -LL    Recorded by  [AG] Becka No, PT [LL] Roberta Friedman PTA    Vision Assessment/Intervention     Visual Impairment   WFL with corrective lenses  -LL    Recorded by   [LL] Roberta Friedman PTA    Cognitive Assessment/Intervention    Current Cognitive/Communication Assessment  functional  -AG impaired  -LL    Orientation Status   oriented to;person;place;situation;disoriented to;time  -LL    Follows Commands/Answers Questions  100% of the time;able to follow single-step instructions;needs cueing  -AG able to follow single-step instructions;100% of the time;needs cueing  -LL    Personal Safety  decreased awareness, need for assist;decreased awareness, need for safety  -AG decreased awareness, need for assist;decreased awareness, need for safety  -LL    Personal Safety Interventions  gait belt;nonskid shoes/slippers when out of bed;supervised activity  -AG gait belt;nonskid shoes/slippers when out of bed  -LL    Recorded by  [AG] Becka No, PT [LL] Roberta Friedman PTA    Mobility Assessment/Training    Extremity Weight-Bearing Status  right lower extremity  -AG right lower extremity  -LL    Right Lower Extremity Weight-Bearing  non weight-bearing  -AG non weight-bearing  -LL    Recorded by  [AG] Becka No, PT [LL] Roberta Friedman PTA    Bed Mobility, Assessment/Treatment    Bed Mobility, Assistive Device   bed rails  -LL    Bed Mobility, Roll Left, [place]   contact guard assist  -LL    Bed Mobility, Roll Right, [place]   contact guard assist  -LL    Bed Mobility, Scoot/Bridge, [place]  minimum assist (75% patient effort)  -AG minimum assist (75% patient effort)  -LL    Bed Mob, Supine to Sit, [place]  contact guard assist;minimum assist (75% patient effort)  -AG minimum assist (75% patient effort)  -LL    Bed Mob, Sit to Supine, [place]  minimum assist (75% patient effort)  -AG minimum assist (75% patient effort)  -LL    Bed Mobility, Safety Issues  decreased use of legs for bridging/pushing  -AG decreased use of legs for bridging/pushing  -LL    Bed Mobility, Impairments  ROM  decreased;strength decreased;impaired balance;coordination impaired;pain  -AG ROM decreased;strength decreased;impaired balance;coordination impaired;pain  -LL    Recorded by  [AG] Becka No PT [LL] Roberta Friedman PTA    Transfer Assessment/Treatment    Transfers, Bed-Chair [place]  verbal cues required;nonverbal cues required (demo/gesture);contact guard assist;minimum assist (75% patient effort)  -AG minimum assist (75% patient effort);verbal cues required;nonverbal cues required (demo/gesture)  -LL    Transfers, Chair-Bed [place]  contact guard assist;minimum assist (75% patient effort)  -AG minimum assist (75% patient effort);verbal cues required;nonverbal cues required (demo/gesture)  -LL    Transfers, Bed-Chair-Bed, Assist Device  rolling walker  -AG rolling walker  -LL    Transfers, Sit-Stand [place]  verbal cues required;nonverbal cues required (demo/gesture);contact guard assist;minimum assist (75% patient effort)  -AG minimum assist (75% patient effort);verbal cues required;nonverbal cues required (demo/gesture)  -LL    Transfers, Stand-Sit [place]  verbal cues required;nonverbal cues required (demo/gesture);contact guard assist;minimum assist (75% patient effort)  -AG minimum assist (75% patient effort);verbal cues required;nonverbal cues required (demo/gesture)  -LL    Transfers, Sit-Stand-Sit, Assist Device  rolling walker  -AG bed rails;rolling walker  -LL    Toilet Transfer, [place]   minimum assist (75% patient effort);nonverbal cues required (demo/gesture);verbal cues required  -LL    Toilet Transfer, Assistive Device   rolling walker;elevated toilet seat   Grab bars  -LL    Transfer, Maintain Weight Bearing Status  cues to maintain weight bearing status  -AG     Transfer, Safety Issues  balance decreased during turns  -AG balance decreased during turns;step length decreased  -LL    Transfer, Impairments  ROM decreased;strength decreased;impaired balance;coordination  impaired;pain  -AG strength decreased;impaired balance;coordination impaired  -LL    Recorded by  [AG] Becka No, PT [LL] Roberta Friedman PTA    Gait Assessment/Treatment    Gait, [place] Level  contact guard assist;minimum assist (75% patient effort)  -AG minimum assist (75% patient effort);2 person assist required;verbal cues required;nonverbal cues required (demo/gesture)  -LL    Gait, Assistive Device  rolling walker  -AG rolling walker  -LL    Gait, Distance (Feet)  60   plus 40 additional ft following rest break.  -AG 20   then 40 in am; 40 x 2 in pm  -LL    Gait, Gait Pattern Analysis  swing-to gait  -AG swing-to gait  -LL    Gait, Gait Deviations  antalgic;rossy decreased;forward flexed posture;step length decreased  -AG rossy decreased;decreased heel strike;limb motion velocity decreased;step length decreased;weight-shifting ability decreased  -LL    Gait, Maintain Weight Bearing Status  cues to maintain weight bearing status  -AG cues to maintain weight bearing status  -LL    Gait, Safety Issues  balance decreased during turns  -AG balance decreased during turns;step length decreased;weight-shifting ability decreased  -LL    Gait, Impairments  ROM decreased;strength decreased;impaired balance;coordination impaired;pain  -AG strength decreased;impaired balance;coordination impaired  -LL    Recorded by  [AG] Becka No, PT [LL] Roberta Friedman PTA    Stairs Assessment/Treatment    Stairs, [place] Level   not tested  -LL    Recorded by   [LL] Roberta Friedman PTA    Toileting Assessment/Training    Toileting Assess/Train, Assistive Device grab bars;raised toilet seat  -BC      Toileting Assess/Train, Position sitting  -BC      Toileting Assess/Train, [place]ep[place] Level minimum assist (75% patient effort)  -BC      Recorded by [BC] Ursula Concepcion OT      Grooming Assessment/Training    Grooming Assess/Train, Position sitting;sink side  -BC      Grooming Assess/Train, Indepen Level supervision  required;nonverbal cues required (demo/gesture)  -BC      Recorded by [BC] Ursula Concepcion OT      Motor Skills/Interventions    Additional Documentation  Balance Skills Training (Group)  -AG     Recorded by  [AG] Becka No PT     Balance Skills Training    Sitting-Level of Assistance  Distant supervision  -AG     Sitting-Balance Support  Feet supported  -AG     Standing-Level of Assistance  Contact guard  -AG     Static Standing Balance Support  assistive device  -AG     Gait Balance-Level of Assistance  Contact guard;Minimum assistance  -AG     Gait Balance Support  assistive device  -AG     Recorded by  [AG] Becka No PT     Therapy Exercises    Bilateral Lower Extremities  AROM:;25 reps;supine;sitting  -AG AROM:;15 reps;sitting;supine  -LL    BLE Resistance  theraband  -AG theraband  -LL    Bilateral Upper Extremity AROM:   BUE, G/FM coordination ther ex. act., strengtherning   -BC      BUE Resistance --   wrist rolls x 1  -BC      Recorded by [BC] Ursula Concepcion, OT [AG] Bekca No, PT [LL] Roberta Friedman PTA    Fine Motor Coordination Training    Detail (Fine Motor Coordination Training) Peg board, hand , bean bag toss,   -BC      Recorded by [BC] Ursula Concepcion OT      Positioning and Restraints    Pre-Treatment Position sitting in chair/recliner  -BC sitting in chair/recliner  -AG --   WC with OT in am; WC with activities in pm  -LL    Post Treatment Position bed  -BC wheelchair  -AG bed   in am & pm  -LL    In Bed supine;call light within reach  -BC  supine;call light within reach;encouraged to call for assist;exit alarm on;side rails up x3;RLE elevated  -LL    In Wheelchair  sitting;with OT;RLE elevated  -AG     Recorded by [BC] Ursula Concepcion, OT [AG] Becka No, PT [LL] Roberta Friedman, [place]      06/09/17 1453 06/08/17 1814 06/08/17 1629    Rehab Assessment/Intervention    Discipline occupational therapist  -AB physical therapy assistant  -LL occupational therapist  -AB    Document  Type therapy note (daily note)  -AB therapy note (daily note)   BID treatment session  -LL therapy note (daily note)  -AB    Subjective Information no complaints;agree to therapy  -AB no complaints;agree to therapy  -LL no complaints;agree to therapy  -AB    Patient Effort, Rehab Treatment good  -AB good  -LL good  -AB    Precautions/Limitations fall precautions;non-weight bearing status;seizure precautions;other (see comments)   NWB RLE; <skin integrity; intermittent confusion  -AB fall precautions;non-weight bearing status;seizure precautions;other (see comments)   skin integrity, NWB RLE, intermittent confusion  -LL fall precautions;non-weight bearing status;seizure precautions;other (see comments)   <skin integrity; NWB RLE; intermittent confusion  -AB    Patient Response to Treatment Pt. w/ good tolerance and rest breaks provided. Improvements noted w/ self care tasks. See ADL Assess for updated levels.   -AB Patient did well with BID treatment session with adequate rest breaks but was c/o increased pain in pm.  RN notified & medication administered.  -LL Pt. w/ good tolerance and rest breaks provided.   -AB    Recorded by [AB] Missy Pelayo OT [LL] Roberta Friedman PTA [AB] Missy Pelayo OT    Pain Assessment    Pain Assessment  Levin-Baker FACES  -LL     Levin-Baker FACES Pain Rating  8  -LL     Pain Type  Surgical pain  -LL     Pain Location  Hip  -LL     Pain Orientation  Right  -LL     Pain Intervention(s)  Medication (See MAR);Repositioned;Rest   See RN notes for medication details  -LL     Recorded by  [LL] Roberta Friedman PTA     Vision Assessment/Intervention    Visual Impairment  WFL with corrective lenses  -LL     Recorded by  [LL] Roberta Friedman PTA     Cognitive Assessment/Intervention    Current Cognitive/Communication Assessment  impaired  -LL     Orientation Status  oriented to;person;place;situation;disoriented to;time  -LL     Follows Commands/Answers Questions able to follow  single-step instructions;100% of the time;needs cueing  -AB able to follow single-step instructions;100% of the time;needs cueing  -LL able to follow single-step instructions;100% of the time;needs cueing  -AB    Personal Safety decreased awareness, need for assist;decreased awareness, need for safety  -AB decreased awareness, need for assist;decreased awareness, need for safety  -LL decreased awareness, need for assist;decreased awareness, need for safety  -AB    Personal Safety Interventions gait belt;nonskid shoes/slippers when out of bed;supervised activity  -AB gait belt;nonskid shoes/slippers when out of bed  -LL gait belt;nonskid shoes/slippers when out of bed;supervised activity  -AB    Recorded by [AB] Missy Pelayo OT [LL] Roberta Friedman PTA [AB] Missy Pelayo OT    Mobility Assessment/Training    Extremity Weight-Bearing Status right lower extremity  -AB right lower extremity  -LL     Right Lower Extremity Weight-Bearing non weight-bearing  -AB non weight-bearing  -LL     Recorded by [AB] Missy Pelayo OT [LL] Roberta Friedman PTA     Bed Mobility, Assessment/Treatment    Bed Mobility, Assistive Device  bed rails  -LL     Bed Mobility, Roll Left, [place]  contact guard assist  -LL     Bed Mobility, Roll Right, [place]  contact guard assist  -LL     Bed Mobility, Scoot/Bridge, [place]  minimum assist (75% patient effort)  -LL     Bed Mob, Supine to Sit, [place]  minimum assist (75% patient effort);moderate assist (50% patient effort)  -LL     Bed Mob, Sit to Supine, [place]  minimum assist (75% patient effort);moderate assist (50% patient effort)  -LL     Bed Mobility, Safety Issues  decreased use of legs for bridging/pushing  -LL     Bed Mobility, Impairments  ROM decreased;strength decreased;impaired balance;coordination impaired;pain  -LL     Recorded by  [LL] Roberta Friedman PTA     Transfer Assessment/Treatment    Transfers, Bed-Chair [place]   minimum assist (75% patient effort);verbal cues required;nonverbal cues required (demo/gesture)  -LL     Transfers, Chair-Bed [place]  minimum assist (75% patient effort);verbal cues required;nonverbal cues required (demo/gesture)  -LL     Transfers, Bed-Chair-Bed, Assist Device  rolling walker  -LL     Transfers, Sit-Stand [place]  minimum assist (75% patient effort);verbal cues required;nonverbal cues required (demo/gesture)  -LL     Transfers, Stand-Sit [place]  minimum assist (75% patient effort);verbal cues required;nonverbal cues required (demo/gesture)  -LL     Transfers, Sit-Stand-Sit, Assist Device  bed rails;rolling walker  -LL     Toilet Transfer, [place] minimum assist (75% patient effort);verbal cues required;nonverbal cues required (demo/gesture)  -AB minimum assist (75% patient effort);nonverbal cues required (demo/gesture);verbal cues required  -LL     Toilet Transfer, Assistive Device elevated toilet seat;wheelchair;other (see comments)   grab bar  -AB rolling walker;elevated toilet seat   Grab bars  -LL     Transfer, Safety Issues  balance decreased during turns;step length decreased  -LL     Transfer, Impairments  strength decreased;impaired balance;coordination impaired  -LL     Recorded by [AB] Missy Pelayo OT [LL] Roberta Friedman PTA     Gait Assessment/Treatment    Gait, [place] Level  minimum assist (75% patient effort);2 person assist required;verbal cues required;nonverbal cues required (demo/gesture)  -LL     Gait, Assistive Device  rolling walker  -LL     Gait, Distance (Feet)  10   in am; 20 x 2 in pm  -LL     Gait, Gait Pattern Analysis  swing-to gait  -LL     Gait, Gait Deviations  rossy decreased;decreased heel strike;limb motion velocity decreased;step length decreased;weight-shifting ability decreased  -LL     Gait, Maintain Weight Bearing Status  able to maintain weight bearing status  -LL     Gait, Safety Issues  balance decreased during  turns;step length decreased;weight-shifting ability decreased;steps too close front assistive device  -LL     Gait, Impairments  strength decreased;impaired balance;coordination impaired  -LL     Recorded by  [LL] Roberta Friedman PTA     Stairs Assessment/Treatment    Stairs, [place] Level  not tested  -LL     Recorded by  [LL] Roberta Friedman PTA     Upper Body Bathing Assessment/Training    UB Bathing Assess/Train, Position sitting  -AB      UB Bathing Assess/Train, [place] Level supervision required;set up required  -AB      UB Bathing Assess/Train, Comment TB Bathing: [place]  -AB      Recorded by [AB] Missy Pelayo, OT      Lower Body Bathing Assessment/Training    LB Bathing Assess/Train Assistive Device long-handled sponge;grab bars  -AB      LB Bathing Assess/Train, Position sitting;standing  -AB      LB Bathing Assess/Train, [place] Level minimum assist (75% patient effort);verbal cues required;nonverbal cues required (demo/gesture)  -AB      LB Bathing Assess/Train, Comment TB Bathing: [place]  -AB      Recorded by [AB] Missy Pelayo OT      Upper Body Dressing Assessment/Training    UB Dressing Assess/Train, Position sitting  -AB      UB Dressing Assess/Train, [place] supervision required;set up required  -AB      Recorded by [AB] Missy Pelayo, [place]      Lower Body Dressing Assessment/Training    LB Dressing Assess/Train, Clothing Type donning:;doffing:;slipper socks;pants  -AB      LB Dressing Assess/Train, Assist Device reacher;sock-aid  -AB      LB Dressing Assess/Train, Position sitting  -AB      LB Dressing Assess/Train, [place] minimum assist (75% patient effort);verbal cues required;nonverbal cues required (demo/gesture)  -AB      Recorded by [AB] Missy Pelayo, [place]      Toileting Assessment/Training    Toileting Assess/Train, Assistive Device grab bars;raised toilet seat  -AB      Toileting Assess/Train, Position sitting  -AB      Toileting  Assess/Train, [place] Level minimum assist (75% patient effort);nonverbal cues required (demo/gesture);verbal cues required  -AB      Recorded by [AB] Missy Pelayo OT      Grooming Assessment/Training    Grooming Assess/Train, Position sitting  -AB  sitting  -AB    Grooming Assess/Train, [place]ep[place] Level supervision required;set up required  -AB  supervision required;verbal cues required  -AB    Recorded by [AB] Missy Pelayo OT  [AB] Missy Pelayo OT    Therapy Exercises    Bilateral Lower Extremities  AROM:;15 reps;sitting;supine  -LL     Bilateral Upper Extremity AROM:;sitting   BUE CoordEx; G/[facility] TherEx/Act; Strengthening  -AB  AROM:;sitting   BUE CoordEx; G/[facility] TherEx/Act; Strengthening  -AB    Recorded by [AB] Missy Pelayo OT [LL] Roberta Friedman PTA [AB] Missy Pelayo OT    Positioning and Restraints    Pre-Treatment Position  in bed   in am; WC in common area in pm  -LL     Post Treatment Position  wheelchair   in am; bed in pm  -LL     In Bed  supine;call light within reach;encouraged to call for assist;exit alarm on;RLE elevated   in pm  -LL     In Wheelchair sitting;with PT;legs elevated  -AB sitting;legs elevated   In common area  -LL sitting;call light within reach;encouraged to call for assist;legs elevated;with PT   in room in AM; w/ PT in PM  -AB    Recorded by [AB] Missy Pelayo OT [LL] Roberta Friedman PTA [AB] Missy Pelayo OT      User Key  (r) = Recorded By, (t) = Taken By, (c) = Cosigned By    Initials Name Effective Dates    AB Missy Pelayo, OT 02/04/16 -     AG Becka No, PT 03/14/16 -     LL Roberta Friedman PTA 05/02/16 -     [facility], OT 01/21/17 -                 OT Goals       06/07/17 1231          Transfer Training OT LTG    Transfer Training OT LTG, Date Established 06/07/17  -AB      Transfer Training OT LTG, Time to Achieve by discharge  -AB      Transfer Training OT LTG, Activity Type all  transfers  -AB      Transfer Training OT LTG, [place] Level contact guard assist  -AB      Patient Education OT LTG    Patient Education OT LTG, Date Established 06/07/17  -AB      Patient Education OT LTG, Time to Achieve by discharge  -AB      Patient Education OT LTG, Education Type HEP;adaptive equipment mgmt;home safety;work simplification  -AB      Bathing OT STG    Bathing Goal OT STG, Date Established 06/07/17  -AB      Bathing Goal OT STG, Time to Achieve 5 - 7 days  -AB      Bathing Goal OT STG, [place] Level minimum assist (75% patient effort)  -AB      Bathing OT LTG    Bathing Goal OT LTG, Date Established 06/07/17  -AB      Bathing Goal OT LTG, Time to Achieve by discharge  -AB      Bathing Goal OT LTG, [place] Level contact guard assist  -AB      Eating Self-Feeding OT LTG    Eat Self Feeding Goal OT LTG, Date Established 06/07/17  -AB      Eat Self Feeding Goal OT LTG, Time to Achieve by discharge  -AB      Eat Self Feed Goal OT LTG, [place] Level conditional independence  -AB      Toileting OT LTG    Toileting Goal OT LTG, Date Established 06/07/17  -AB      Toileting Goal OT LTG, Time to Achieve by discharge  -AB      Toileting Goal OT LTG, [place] Level contact guard assist  -AB      LB Dressing OT STG    LB Dressing Goal OT STG, Date Established 06/07/17  -AB      LB Dressing Goal OT STG, Time to Achieve 5 - 7 days  -AB      LB Dressing Goal OT STG, [place] Level moderate assist (50% patient effort)  -AB      LB Dressing OT LTG    LB Dressing Goal OT LTG, Date Established 06/07/17  -AB      LB Dressing Goal OT LTG, Time to Achieve by discharge  -AB      LB Dressing Goal OT LTG, [place] Level minimum assist (75% patient effort)  -AB        User Key  (r) = Recorded By, (t) = Taken By, (c) = Cosigned By    Initials Name Provider Type    AB Missy ePlayo OT Occupational Therapist          Occupational Therapy Education     Title: PT OT SLP Therapies  (Active)     Topic: Occupational Therapy (Active)     Point: ADL training (Active)    Description: Instruct learner(s) on proper safety adaptation and remediation techniques during self care or transfers.   Instruct in proper use of assistive devices.    Learning Progress Summary    Learner Readiness Method Response Comment Documented by Status   Patient Acceptance E NR  BC 06/10/17 1542 Active    Acceptance E,D NR  AB 06/09/17 1457 Active    Acceptance E,D NR  AB 06/08/17 1632 Active    Acceptance E,D NR  AB 06/07/17 1234 Active               Point: Precautions (Active)    Description: Instruct learner(s) on prescribed precautions during self-care and functional transfers.    Learning Progress Summary    Learner Readiness Method Response Comment Documented by Status   Patient Acceptance E,D NR  AB 06/09/17 1457 Active    Acceptance E,D NR  AB 06/08/17 1632 Active    Acceptance E,D NR  AB 06/07/17 1234 Active               Point: Body mechanics (Active)    Description: Instruct learner(s) on proper positioning and spine alignment during self-care, functional mobility activities and/or exercises.    Learning Progress Summary    Learner Readiness Method Response Comment Documented by Status   Patient Acceptance E,D NR  AB 06/09/17 1457 Active    Acceptance E,D NR  AB 06/08/17 1632 Active    Acceptance E,D NR  AB 06/07/17 1234 Active                      User Key     Initials Effective Dates Name Provider Type Discipline    AB 02/04/16 -  Missy Pelayo, OT Occupational Therapist OT    BC 01/21/17 -  Ursula Concepcion, OT Occupational Therapist OT                  OT Recommendation and Plan  Anticipated Equipment Needs At Discharge:  (TBD)  Anticipated Discharge Disposition:  (TBD)  Planned Therapy Interventions: activity intolerance, adaptive equipment training, ADL retraining, energy conservation, fine motor coordination training, home exercise program, motor coordination training, ROM (Range of Motion),  strengthening, transfer training, other (see comments) (Therapeutic groups as beneficial to patient)  Therapy Frequency: 3-5 times/wk          Time Calculation:         Time Calculation- OT       06/10/17 1542          Time Calculation-     OT Start Time 1330  -BC      OT Stop Time 1500  -BC      OT Time Calculation (min) 90 min  -BC      OT Non-Billable Time (min) 20 min  -BC        User Key  (r) = Recorded By, (t) = Taken By, (c) = Cosigned By    Initials Name Provider Type    [facility], OT Occupational Therapist           Therapy Charges for Today     Code Description Service Date Service Provider Modifiers Qty    [id number]  OT SELF CARE/MGMT/TRAIN EA 15 MIN 6/10/2017 [facility], OT GO 2    [id number]  OT THERAPEUTIC ACT EA 15 MIN 6/10/2017 [facility], OT GO 4               [facility], OT  6/10/2017

## 2017-06-10 NOTE — PROGRESS NOTES
Inpatient Rehabilitation Functional Measures Assessment    Functional Measures  RAS Eating:  RAS Grooming:  RAS Bathing:  RAS Upper Body Dressing:  RAS Lower Body Dressing:  RAS Toileting:    RAS Bladder Management  Level of Assistance:  Frequency/Number of Accidents this Shift:    RAS Bowel Management  Level of Assistance:  Frequency/Number of Accidents this Shift:    RAS Bed/Chair/Wheelchair Transfer:  RAS Toilet Transfer:  RAS Tub/Shower Transfer:    Previously Documented Mode of Locomotion at Discharge:  RAS Expected Mode of Locomotion at Discharge:  RAS Walk/Wheelchair:  RAS Stairs:    RAS Comprehension:  Both ( auditory and visual) modes of comprehension are used  equally. Comprehension Score = 6, Modified [place].  Patient comprehends  complex/abstract information in their primary language, requiring: Glasses.  RAS Expression:  Vocal expression is the usual mode. Expression Score = 7,  Independent.  Patient expresses complex/abstract information in their primary  language.  Patient is completely independent for vocal expression.  There are no  activity limitations.  RAS Social Interaction:  Social Interaction Score = 7, Independent. Patient is  completely independent for social interaction.  There are no activity  limitations.  RAS Problem Solving:  Problem Solving Score = 7, Independent.  Patient makes  appropriate decisions in order to solve complex problems.  Patient is completely  independent for problem solving.  There are no activity limitations.  RAS Memory:  Memory Score = 7, Independent.  Patient is completely independent  for memory.  There are no activity limitations.    Therapy Mode Minutes  Occupational Therapy:  Physical Therapy:  Speech Language Pathology:    Discharge Functional Goals:    Signed by: Nurse Radha

## 2017-06-10 NOTE — PLAN OF CARE
Problem: Patient Care Overview (Adult)  Goal: Plan of Care Review  Outcome: Ongoing (interventions implemented as appropriate)    Problem: Fall Risk (Adult)  Goal: Absence of Falls  Outcome: Ongoing (interventions implemented as appropriate)    Problem: Mobility, Physical Impaired (Adult)  Goal: Identify Related Risk Factors and Signs and Symptoms  Outcome: Outcome(s) achieved Date Met:  06/09/17  Goal: Enhanced Mobility Skills  Outcome: Ongoing (interventions implemented as appropriate)  Goal: Enhanced Functionality Ability  Outcome: Ongoing (interventions implemented as appropriate)    Problem: Seizure Disorder/Epilepsy (Adult)  Goal: Signs and Symptoms of Listed Potential Problems Will be Absent or Manageable (Seizure Disorder/Epilepsy)  Outcome: Ongoing (interventions implemented as appropriate)

## 2017-06-10 NOTE — PROGRESS NOTES
Inpatient Rehabilitation Functional Measures Assessment and Plan of Care    Plan of Care      Functional Measures  RAS Eating:  RAS Grooming:  RAS Bathing:  RAS Upper Body Dressing:  RAS Lower Body Dressing:  RAS Toileting:    RAS Bladder Management  Level of Assistance:  Frequency/Number of Accidents this Shift:    ARS Bowel Management  Level of Assistance:  Frequency/Number of Accidents this Shift:    RAS Bed/Chair/Wheelchair Transfer:  RAS Toilet Transfer:  RSA Tub/Shower Transfer:    Previously Documented Mode of Locomotion at Discharge:  RAS Expected Mode of Locomotion at Discharge:  RAS Walk/Wheelchair:  RAS Stairs:    RAS Comprehension:  Both ( auditory and visual) modes of comprehension are used  equally. Comprehension Score = 6, Modified [place].  Patient comprehends  complex/abstract information in their primary language, requiring: Additional  time.  RAS Expression:  Both ( vocal and non-vocal) modes of expression are used  equally. Expression Score = 6,  Modified Independent. Patient expresses  complex/abstract information in their primary language, requiring: Additional  time.  RAS Social Interaction:  Social Interaction Score = 6, Modified Independent.  Patient is modified independent for social interaction, requiring: Requires  additional time.  RAS Problem Solving:  Problem Solving Score = 6, Modified [place].  Patient  makes appropriate decisions in order to solve complex problems, but requires  extra time.  RAS Memory:  Memory Score = 6, Modified [place].  Patient is modified  independent for memory, requiring: Requires additional time.    Therapy Mode Minutes  Occupational Therapy:  Physical Therapy:  Speech Language Pathology:    Discharge Functional Goals:    Signed by: Dewey Sanchez RN

## 2017-06-10 NOTE — PROGRESS NOTES
Inpatient Rehabilitation Functional Measures Assessment    Functional Measures  RAS Eating:  RAS Grooming:  RAS Bathing:  RAS Upper Body Dressing:  RAS Lower Body Dressing:  RAS Toileting:    RAS Bladder Management  Level of Assistance:  Frequency/Number of Accidents this Shift:    RAS Bowel Management  Level of Assistance:  Frequency/Number of Accidents this Shift:    RAS Bed/Chair/Wheelchair Transfer:  Bed/chair/wheelchair Transfer Score = 4.  Patient performs 75% or more of effort and minimal assistance (little/incidental  help/lifting of one limb/steadying) for transferring to and from the  bed/chair/wheelchair, requiring: Patient requires the following assistive  device(s): Walker.  RAS Toilet Transfer:  Activity was not observed.  RAS Tub/Shower Transfer:  Activity was not observed.    Previously Documented Mode of Locomotion at Discharge:  RAS Expected Mode of Locomotion at Discharge: The expected mode of most  frequently used locomotion, at discharge, is expected to be both walking and  wheelchair.  RAS Walk/Wheelchair:  WHEELCHAIR OBSERVATION   Activity was not observed.    WALK OBSERVATION   Walk Distance Scale = 2.  Distance walked is 50 -149 feet. Walk Score = 2.  Patient performs 75% or more of effort and requires minimal assistance.  Incidental assistance, contact guard or steadying was provided. Patient walked a  distance of 60 feet. Patient requires the following assistive device(s): Rolling  walker.  RAS Stairs:  Activity was not observed.    RAS Comprehension:  RAS Expression:  RAS Social Interaction:  RAS Problem Solving:  RAS Memory:    Therapy Mode Minutes  Occupational Therapy:  Physical Therapy: Individual: 90 minutes.  Speech Language Pathology:    Discharge Functional Goals:    Signed by: Becka No, Physical Therapist

## 2017-06-10 NOTE — PROGRESS NOTES
Physical Medicine and Rehabilitation  Inpatient Rehabilitation Interdisciplinary Plan of Care    Demographics            Age: 71Y            Gender: Female    Admission Date: 6/6/2017 7:41:15 PM  Rehabilitation Diagnosis:  S/P Righ hip ORIF    Plan of Care  Anticipated Discharge Date/Estimated Length of Stay: 6-21-17  Anticipated Discharge Destination: Community discharge with assistance  Discharge Plan : Pt plans to return home with spouse at discharge.  Medical Necessity Expected Level Rationale: good  Intensity and Duration: an average of 3 hours/5 days per week  Medical Supervision and 24 Hour Rehab Nursing: x  Physical Therapy: x  PT Intensity/Duration: 1.5 hrs/day, 5-6 days per week  Occupational Therapy: x  OT Intensity/Duration: 1.5 hrs/day, 5-6 days per week  Social Work: x  Therapeutic Recreation: x  Updated (if changes indicated)  No changes to plan.    Based on the patient's medical and functional status, their prognosis and  expected level of functional improvement is: good    Interdisciplinary Problem/Goals/Status  Mobility    [PT] Bed/Chair/Wheelchair (Active)  Current Status:  Weekly Goal:  Discharge Goal:    [PT] Walk (Active)  Current Status (6/7/2017 12:00:00 AM): Min A w/ RW 15 ft  Weekly Goal: CGA w/ RW 50 ft  Discharge Goal: MI/SUP w/ RW 75 ft    Self Care    [OT] Bathing (Active)  Current Status (6/7/2017 10:00:00 AM): ModA  Weekly Goal: [place]  Discharge Goal: CGA    [OT] Dressing (Lower) (Active)  Current Status (6/7/2017 10:00:00 AM): MaxA  Weekly Goal: ModA  Discharge Goal: [place]    Body Function Structure    [RN] Skin Integrity (Active)  Current Status (6/6/2017 9:00:00 PM): impaired skin integrity  Weekly Goal: prevent further skin integrity impairment  Discharge Goal: signs of regaining skin integrity    Safety    [RN] Potential for Injury (Active)  Current Status (6/6/2017 9:00:00 PM): risk for falls  Weekly Goal: remain free of falls  Discharge Goal: identify measures to prevent  falls    Medical Problems  she has history of seizures.  Continue with anti-epileptic medications and  adjust as required    She has hip fracture.  Continue wound care.  Monitor for any worsening to her  overall status    Plan will be seizure-free and home on routine medications    Comments: Pt will return home with spouse at discharge.    Signed by: Abel Velasquez, Physician

## 2017-06-10 NOTE — PROGRESS NOTES
Inpatient Rehabilitation Functional Measures Assessment    Functional Measures  RAS Eating:  RAS Grooming: Grooming Score = 5. Patient is supervision/set-up for grooming,  requiring: No assistive devices were required.  RAS Bathing:  RAS Upper Body Dressing:  RAS Lower Body Dressing:  RAS Toileting:  Patient requires minimal assistance for adjusting clothing  before using a toilet, commode, bedpan, or urinal. Patient requires minimal  assistance for hygiene. Patient requires moderate assistance for adjusting  clothing after using a toilet, commode, bedpan, or urinal. Patient performs 75 %  of toileting tasks. Toileting Score = 4, Minimal Assistance. Patient requires  the following assistive device(s): Grab bar.    RAS Bladder Management  Level of Assistance:  Frequency/Number of Accidents this Shift:    RAS Bowel Management  Level of Assistance:  Frequency/Number of Accidents this Shift:    RAS Bed/Chair/Wheelchair Transfer:  RAS Toilet Transfer:  RAS Tub/Shower Transfer:    Previously Documented Mode of Locomotion at Discharge:  [facility] Expected Mode of Locomotion at Discharge:  [facility] Walk/Wheelchair:  [facility] Stairs:    [facility] Comprehension:  [facility] Expression:  [facility] Social Interaction:  [facility] Problem Solving:  ARS Memory:    Therapy Mode Minutes  Occupational Therapy: Individual: 90 minutes.  Physical Therapy:  Speech Language Pathology:    Discharge Functional Goals:    Signed by: Ursula Concepcion OT

## 2017-06-10 NOTE — PLAN OF CARE
Problem: Patient Care Overview (Adult)  Goal: Plan of Care Review  Outcome: Ongoing (interventions implemented as appropriate)    Problem: Fall Risk (Adult)  Goal: Absence of Falls  Outcome: Ongoing (interventions implemented as appropriate)    Problem: Mobility, Physical Impaired (Adult)  Goal: Enhanced Mobility Skills  Outcome: Ongoing (interventions implemented as appropriate)  Goal: Enhanced Functionality Ability  Outcome: Ongoing (interventions implemented as appropriate)    Problem: Seizure Disorder/Epilepsy (Adult)  Goal: Signs and Symptoms of Listed Potential Problems Will be Absent or Manageable (Seizure Disorder/Epilepsy)  Outcome: Ongoing (interventions implemented as appropriate)

## 2017-06-10 NOTE — PROGRESS NOTES
Inpatient Rehabilitation Functional Measures Assessment    Functional Measures  RAS Eating:  Eating Score = 5. Patient is supervision/set-up for eating,  requiring: Opening containers. [place] bread. Cutting meat. Pouring liquids.  No assistive devices were required.  RAS Grooming: Activity was not observed.  RAS Bathing:  Activity was not observed.  RAS Upper Body Dressing:  Activity was not observed.  RAS Lower Body Dressing:  Activity was not observed.  RAS Toileting:  Patient requires moderate assistance for adjusting clothing  before using a toilet, commode, bedpan, or urinal. Patient requires moderate  assistance for hygiene. Patient requires moderate assistance for adjusting  clothing after using a toilet, commode, bedpan, or urinal. Patient performs 0 -  24% of toileting tasks.  Toileting Score = 1, Total Assistance. Patient requires  the following assistive device(s): Grab bar. Arm rest of a specialized seat.    RAS Bladder Management  Level of Assistance:  Bladder Score = 5.  Patient is supervision/set-up for  bladder management, requiring: Stand by assistance. Patient requires the  following assistive device(s):  Adult brief. Patient is incontinent of bladder  at times.  Frequency/Number of Accidents this Shift:  Bladder accidents this shift:  1 .    RAS Bowel Management  Level of Assistance: Bowel Score = 5.  Patient is supervision/set-up for bowel  management, requiring: Stand by assistance. No assistive devices were required.    Frequency/Number of Accidents this Shift: Bowel accidents this shift: 0 .  Patient has not had an accident this shift.    RAS Bed/Chair/Wheelchair Transfer:  Bed/chair/wheelchair Transfer Score = 1.  Patient performs 50-74% of effort and requires moderate assistance (some  lifting) of two or more people for transferring to and from the  bed/chair/wheelchair. safety . Patient requires the following assistive  device(s): Grab bars. Seating system of wheelchair.  RAS Toilet  Transfer:  Toilet Transfer Score = 1.  Patient performs 50-74% of  effort and requires moderate assistance (some lifting) of two or more people for  transferring to and from the toilet/commode. safety . Patient requires the  following assistive device(s): Grab bars. Specialized seat.  RAS Tub/Shower Transfer:  Activity was not observed.    Previously Documented Mode of Locomotion at Discharge:  [facility] Expected Mode of Locomotion at Discharge:  [facility] Walk/Wheelchair:  [facility] Stairs:    [facility] Comprehension:  [facility] Expression:  [facility] Social Interaction:  [facility] Problem Solving:  [facility] Memory:    Therapy Mode Minutes  Occupational Therapy:  Physical Therapy:  Speech Language Pathology:    Discharge Functional Goals:    Signed by: RENNY Coppola

## 2017-06-10 NOTE — THERAPY TREATMENT NOTE
Inpatient Rehabilitation - Physical Therapy Treatment Note   [facility]     Patient Name: Maribel Staton  : 1945  MRN: [id number]  Today's Date: 6/10/2017  Onset of Illness/Injury or Date of Surgery Date: 17  Date of Referral to PT: 17  Referring Physician: Eva    Admit Date: 2017    Visit Dx:  No diagnosis found.  Patient Active Problem List   Diagnosis   • Closed right hip fracture               Adult Rehabilitation Note       06/10/17 1339 17 1646 17 1453    Rehab Assessment/Intervention    Discipline physical therapist  -AG physical therapy assistant  -LL occupational therapist  -AB    Document Type therapy note (daily note)  -AG therapy note (daily note)   BID treatment session  -LL therapy note (daily note)  -AB    Subjective Information agree to therapy;complains of;pain  -AG no complaints;agree to therapy  -LL no complaints;agree to therapy  -AB    Patient Effort, Rehab Treatment good  -AG good  -LL good  -AB    Precautions/Limitations  fall precautions;non-weight bearing status;seizure precautions   skin integrity, intermittent confusion  -LL fall precautions;non-weight bearing status;seizure precautions;other (see comments)   NWB RLE; <skin integrity; intermittent confusion  -AB    Patient Response to Treatment tolerated well with rest breaks; incr c/o pain in PM.  -AG Patient did well with BID treatment sessino with adequate rest breaks.  -LL Pt. w/ good tolerance and rest breaks provided. Improvements noted w/ self care tasks. See ADL Assess for updated levels.   -AB    Recorded by [AG] Becka No, PT [LL] Roberta Friedman PTA [AB] Missy Pelayo OT    Pain Assessment    Pain Assessment Levin-Orozco FACES  -AG Levin-Orozco FACES  -LL     Levin-Baker FACES Pain Rating 6  -AG 6  -LL     Pain Type Acute pain;Surgical pain  -AG Surgical pain  -LL     Pain Location Hip  -AG Hip  -LL     Pain Orientation Right  -AG Right  -LL     Pain Intervention(s) Repositioned;Rest   -AG Repositioned;Rest  -LL     Recorded by [AG] Becka No, PT [LL] Roberta Friedman PTA     Vision Assessment/Intervention    Visual Impairment  WFL with corrective lenses  -LL     Recorded by  [LL] Roberta Friedman PTA     Cognitive Assessment/Intervention    Current Cognitive/Communication Assessment functional  -AG impaired  -LL     Orientation Status  oriented to;person;place;situation;disoriented to;time  -LL     Follows Commands/Answers Questions 100% of the time;able to follow single-step instructions;needs cueing  -AG able to follow single-step instructions;100% of the time;needs cueing  -LL able to follow single-step instructions;100% of the time;needs cueing  -AB    Personal Safety decreased awareness, need for assist;decreased awareness, need for safety  -AG decreased awareness, need for assist;decreased awareness, need for safety  -LL decreased awareness, need for assist;decreased awareness, need for safety  -AB    Personal Safety Interventions gait belt;nonskid shoes/slippers when out of bed;supervised activity  -AG gait belt;nonskid shoes/slippers when out of bed  -LL gait belt;nonskid shoes/slippers when out of bed;supervised activity  -AB    Recorded by [AG] Becka No, PT [LL] Roberta Friedman, PTA [AB] Missy Pelayo, [place]    Mobility Assessment/Training    Extremity Weight-Bearing Status right lower extremity  -AG right lower extremity  -LL right lower extremity  -AB    Right Lower Extremity Weight-Bearing non weight-bearing  -AG non weight-bearing  -LL non weight-bearing  -AB    Recorded by [AG] Becka No, PT [LL] Roberta Friedman, [place] [AB] Missy Pelayo, OT    Bed Mobility, Assessment/Treatment    Bed Mobility, Assistive Device  bed rails  -LL     Bed Mobility, Roll Left, [place]  contact guard assist  -LL     Bed Mobility, Roll Right, [place]  contact guard assist  -LL     Bed Mobility, Scoot/Bridge, [place] minimum assist (75% patient effort)  -AG minimum  assist (75% patient effort)  -LL     Bed Mob, Supine to Sit, [place] contact guard assist;minimum assist (75% patient effort)  -AG minimum assist (75% patient effort)  -LL     Bed Mob, Sit to Supine, [place] minimum assist (75% patient effort)  -AG minimum assist (75% patient effort)  -LL     Bed Mobility, Safety Issues decreased use of legs for bridging/pushing  -AG decreased use of legs for bridging/pushing  -LL     Bed Mobility, Impairments ROM decreased;strength decreased;impaired balance;coordination impaired;pain  -AG ROM decreased;strength decreased;impaired balance;coordination impaired;pain  -LL     Recorded by [AG] Becka No, PT [LL] Roberta Friedman PTA     Transfer Assessment/Treatment    Transfers, Bed-Chair [place] verbal cues required;nonverbal cues required (demo/gesture);contact guard assist;minimum assist (75% patient effort)  -AG minimum assist (75% patient effort);verbal cues required;nonverbal cues required (demo/gesture)  -LL     Transfers, Chair-Bed [place] contact guard assist;minimum assist (75% patient effort)  -AG minimum assist (75% patient effort);verbal cues required;nonverbal cues required (demo/gesture)  -LL     Transfers, Bed-Chair-Bed, Assist Device rolling walker  -AG rolling walker  -LL     Transfers, Sit-Stand [place] verbal cues required;nonverbal cues required (demo/gesture);contact guard assist;minimum assist (75% patient effort)  -AG minimum assist (75% patient effort);verbal cues required;nonverbal cues required (demo/gesture)  -LL     Transfers, Stand-Sit [place] verbal cues required;nonverbal cues required (demo/gesture);contact guard assist;minimum assist (75% patient effort)  -AG minimum assist (75% patient effort);verbal cues required;nonverbal cues required (demo/gesture)  -LL     Transfers, Sit-Stand-Sit, Assist Device rolling walker  -AG bed rails;rolling walker  -LL     Toilet Transfer, [place]  minimum assist (75% patient  effort);nonverbal cues required (demo/gesture);verbal cues required  -LL minimum assist (75% patient effort);verbal cues required;nonverbal cues required (demo/gesture)  -AB    Toilet Transfer, Assistive Device  rolling walker;elevated toilet seat   Grab bars  -LL elevated toilet seat;wheelchair;other (see comments)   grab bar  -AB    Transfer, Maintain Weight Bearing Status cues to maintain weight bearing status  -AG      Transfer, Safety Issues balance decreased during turns  -AG balance decreased during turns;step length decreased  -LL     Transfer, Impairments ROM decreased;strength decreased;impaired balance;coordination impaired;pain  -AG strength decreased;impaired balance;coordination impaired  -LL     Recorded by [AG] Becka No, PT [LL] Roberta Friedman PTA [AB] Missy Pelayo OT    Gait Assessment/Treatment    Gait, [place] Level contact guard assist;minimum assist (75% patient effort)  -AG minimum assist (75% patient effort);2 person assist required;verbal cues required;nonverbal cues required (demo/gesture)  -LL     Gait, Assistive Device rolling walker  -AG rolling walker  -LL     Gait, Distance (Feet) 60   plus 40 additional ft following rest break.  -AG 20   then 40 in am; 40 x 2 in pm  -LL     Gait, Gait Pattern Analysis swing-to gait  -AG swing-to gait  -LL     Gait, Gait Deviations antalgic;rossy decreased;forward flexed posture;step length decreased  -AG rossy decreased;decreased heel strike;limb motion velocity decreased;step length decreased;weight-shifting ability decreased  -LL     Gait, Maintain Weight Bearing Status cues to maintain weight bearing status  -AG cues to maintain weight bearing status  -LL     Gait, Safety Issues balance decreased during turns  -AG balance decreased during turns;step length decreased;weight-shifting ability decreased  -LL     Gait, Impairments ROM decreased;strength decreased;impaired balance;coordination impaired;pain  -AG strength  decreased;impaired balance;coordination impaired  -LL     Recorded by [AG] Becka No, PT [LL] Roberta Friedman PTA     Stairs Assessment/Treatment    Stairs, [place] Level  not tested  -LL     Recorded by  [LL] Roberta Friedman PTA     Upper Body Bathing Assessment/Training    UB Bathing Assess/Train, Position   sitting  -AB    UB Bathing Assess/Train, [place] Level   supervision required;set up required  -AB    UB Bathing Assess/Train, Comment   TB Bathing: [place]  -AB    Recorded by   [AB] Misys Pelayo, OT    Lower Body Bathing Assessment/Training    LB Bathing Assess/Train Assistive Device   long-handled sponge;grab bars  -AB    LB Bathing Assess/Train, Position   sitting;standing  -AB    LB Bathing Assess/Train, [place] Level   minimum assist (75% patient effort);verbal cues required;nonverbal cues required (demo/gesture)  -AB    LB Bathing Assess/Train, Comment   TB Bathing: [place]  -AB    Recorded by   [AB] Missy Pelayo, OT    Upper Body Dressing Assessment/Training    UB Dressing Assess/Train, Position   sitting  -AB    UB Dressing Assess/Train, [place]   supervision required;set up required  -AB    Recorded by   [AB] Missy Pelayo, OT    Lower Body Dressing Assessment/Training    LB Dressing Assess/Train, Clothing Type   donning:;doffing:;slipper socks;pants  -AB    LB Dressing Assess/Train, Assist Device   reacher;sock-aid  -AB    LB Dressing Assess/Train, Position   sitting  -AB    LB Dressing Assess/Train, [place]   minimum assist (75% patient effort);verbal cues required;nonverbal cues required (demo/gesture)  -AB    Recorded by   [AB] Missy Pelayo, OT    Toileting Assessment/Training    Toileting Assess/Train, Assistive Device   grab bars;raised toilet seat  -AB    Toileting Assess/Train, Position   sitting  -AB    Toileting Assess/Train, [place] Level   minimum assist (75% patient effort);nonverbal cues required (demo/gesture);verbal cues  required  -AB    Recorded by   [AB] Missy Pelayo OT    Grooming Assessment/Training    Grooming Assess/Train, Position   sitting  -AB    Grooming Assess/Train, [place] Level   supervision required;set up required  -AB    Recorded by   [AB] Missy Pelayo OT    Motor Skills/Interventions    Additional Documentation Balance Skills Training (Group)  -AG      Recorded by [AG] Becka No PT      Balance Skills Training    Sitting-Level of Assistance Distant supervision  -AG      Sitting-Balance Support Feet supported  -AG      Standing-Level of Assistance Contact guard  -AG      Static Standing Balance Support assistive device  -AG      Gait Balance-Level of Assistance Contact guard;Minimum assistance  -AG      Gait Balance Support assistive device  -AG      Recorded by [AG] Becka No PT      Therapy Exercises    Bilateral Lower Extremities AROM:;25 reps;supine;sitting  -AG AROM:;15 reps;sitting;supine  -LL     BLE Resistance theraband  -AG theraband  -LL     Bilateral Upper Extremity   AROM:;sitting   BUE CoordEx; G/FMC TherEx/Act; Strengthening  -AB    Recorded by [AG] Becka No, PT [LL] Roberta Friedman PTA [AB] Missy Pelayo OT    Positioning and Restraints    Pre-Treatment Position sitting in chair/recliner  -AG --   WC with OT in am; WC with activities in pm  -LL     Post Treatment Position wheelchair  -AG bed   in am & pm  -LL     In Bed  supine;call light within reach;encouraged to call for assist;exit alarm on;side rails up x3;RLE elevated  -LL     In Wheelchair sitting;with OT;RLE elevated  -AG  sitting;with PT;legs elevated  -AB    Recorded by [AG] Becka No, PT [LL] Roberta Friedman PTA [AB] Missy Pelayo, OT      06/08/17 1814 06/08/17 1629       Rehab Assessment/Intervention    Discipline physical therapy assistant  -LL occupational therapist  -AB     Document Type therapy note (daily note)   BID treatment session  -LL therapy note (daily note)  -AB      Subjective Information no complaints;agree to therapy  -LL no complaints;agree to therapy  -AB     Patient Effort, Rehab Treatment good  -LL good  -AB     Precautions/Limitations fall precautions;non-weight bearing status;seizure precautions;other (see comments)   skin integrity, NWB RLE, intermittent confusion  -LL fall precautions;non-weight bearing status;seizure precautions;other (see comments)   <skin integrity; NWB RLE; intermittent confusion  -AB     Patient Response to Treatment Patient did well with BID treatment session with adequate rest breaks but was c/o increased pain in pm.  RN notified & medication administered.  -LL Pt. w/ good tolerance and rest breaks provided.   -AB     Recorded by [LL] Roberta Friedman PTA [AB] Missy Pelayo OT     Pain Assessment    Pain Assessment Levin-Orozco FACES  -LL      Levin-Baker FACES Pain Rating 8  -LL      Pain Type Surgical pain  -LL      Pain Location Hip  -LL      Pain Orientation Right  -LL      Pain Intervention(s) Medication (See MAR);Repositioned;Rest   See RN notes for medication details  -LL      Recorded by [LL] Roberta Friedman PTA      Vision Assessment/Intervention    Visual Impairment WFL with corrective lenses  -LL      Recorded by [LL] Roberta Friedman PTA      Cognitive Assessment/Intervention    Current Cognitive/Communication Assessment impaired  -LL      Orientation Status oriented to;person;place;situation;disoriented to;time  -LL      Follows Commands/Answers Questions able to follow single-step instructions;100% of the time;needs cueing  -LL able to follow single-step instructions;100% of the time;needs cueing  -AB     Personal Safety decreased awareness, need for assist;decreased awareness, need for safety  -LL decreased awareness, need for assist;decreased awareness, need for safety  -AB     Personal Safety Interventions gait belt;nonskid shoes/slippers when out of bed  -LL gait belt;nonskid shoes/slippers when out of bed;supervised  activity  -AB     Recorded by [LL] Roberta Friedman PTA [AB] Missy Pelayo, OT     Mobility Assessment/Training    Extremity Weight-Bearing Status right lower extremity  -LL      Right Lower Extremity Weight-Bearing non weight-bearing  -LL      Recorded by [LL] Roberta Friedman PTA      Bed Mobility, Assessment/Treatment    Bed Mobility, Assistive Device bed rails  -LL      Bed Mobility, Roll Left, [place] contact guard assist  -LL      Bed Mobility, Roll Right, [place] contact guard assist  -LL      Bed Mobility, Scoot/Bridge, [place] minimum assist (75% patient effort)  -LL      Bed Mob, Supine to Sit, [place] minimum assist (75% patient effort);moderate assist (50% patient effort)  -LL      Bed Mob, Sit to Supine, [place] minimum assist (75% patient effort);moderate assist (50% patient effort)  -LL      Bed Mobility, Safety Issues decreased use of legs for bridging/pushing  -LL      Bed Mobility, Impairments ROM decreased;strength decreased;impaired balance;coordination impaired;pain  -LL      Recorded by [LL] Roberta Friedman PTA      Transfer Assessment/Treatment    Transfers, Bed-Chair [place] minimum assist (75% patient effort);verbal cues required;nonverbal cues required (demo/gesture)  -LL      Transfers, Chair-Bed [place] minimum assist (75% patient effort);verbal cues required;nonverbal cues required (demo/gesture)  -LL      Transfers, Bed-Chair-Bed, Assist Device rolling walker  -LL      Transfers, Sit-Stand [place] minimum assist (75% patient effort);verbal cues required;nonverbal cues required (demo/gesture)  -LL      Transfers, Stand-Sit [place] minimum assist (75% patient effort);verbal cues required;nonverbal cues required (demo/gesture)  -LL      Transfers, Sit-Stand-Sit, Assist Device bed rails;rolling walker  -LL      Toilet Transfer, [place] minimum assist (75% patient effort);nonverbal cues required (demo/gesture);verbal cues required  -LL       Toilet Transfer, Assistive Device rolling walker;elevated toilet seat   Grab bars  -LL      Transfer, Safety Issues balance decreased during turns;step length decreased  -LL      Transfer, Impairments strength decreased;impaired balance;coordination impaired  -LL      Recorded by [LL] Roberta Friedman PTA      Gait Assessment/Treatment    Gait, [place] Level minimum assist (75% patient effort);2 person assist required;verbal cues required;nonverbal cues required (demo/gesture)  -LL      Gait, Assistive Device rolling walker  -LL      Gait, Distance (Feet) 10   in am; 20 x 2 in pm  -LL      Gait, Gait Pattern Analysis swing-to gait  -LL      Gait, Gait Deviations rossy decreased;decreased heel strike;limb motion velocity decreased;step length decreased;weight-shifting ability decreased  -LL      Gait, Maintain Weight Bearing Status able to maintain weight bearing status  -LL      Gait, Safety Issues balance decreased during turns;step length decreased;weight-shifting ability decreased;steps too close front assistive device  -LL      Gait, Impairments strength decreased;impaired balance;coordination impaired  -LL      Recorded by [LL] Roberta Friedman PTA      Stairs Assessment/Treatment    Stairs, [place] Level not tested  -LL      Recorded by [LL] Roberta Friedman PTA      Grooming Assessment/Training    Grooming Assess/Train, Position  sitting  -AB     Grooming Assess/Train, [place] Level  supervision required;verbal cues required  -AB     Recorded by  [AB] Missy Pelayo OT     Therapy Exercises    Bilateral Lower Extremities AROM:;15 reps;sitting;supine  -LL      Bilateral Upper Extremity  AROM:;sitting   BUE CoordEx; G/FMC TherEx/Act; Strengthening  -AB     Recorded by [LL] Roberta Friedman PTA [AB] Missy Pelayo OT     Positioning and Restraints    Pre-Treatment Position in bed   in am; WC in common area in pm  -LL      Post Treatment Position wheelchair   in am; bed in pm  -LL       In Bed supine;call light within reach;encouraged to call for assist;exit alarm on;RLE elevated   in pm  -LL      In Wheelchair sitting;legs elevated   In common area  -LL sitting;call light within reach;encouraged to call for assist;legs elevated;with PT   in room in AM; w/ PT in PM  -AB     Recorded by [LL] Roberta Friedman, PTA [AB] Missy Pelayo, OT       User Key  (r) = Recorded By, (t) = Taken By, (c) = Cosigned By    Initials Name Effective Dates    AB Missy Pelayo, OT 02/04/16 -     AG Becka No, PT 03/14/16 -     LL Roberta Friedman, PTA 05/02/16 -                 IP PT Goals       06/07/17 1703          Bed Mobility PT STG    Bed Mobility PT STG, Date Established 06/07/17  -CT      Bed Mobility PT STG, Time to Achieve 5 - 7 days  -CT      Bed Mobility PT STG, Activity Type all bed mobility  -CT      Bed Mobility PT STG, [place] Level contact guard assist  -CT      Transfer Training Goal, Assist Device bed rails  -CT      Bed Mobility PT LTG    Bed Mobility PT LTG, Date Established 06/07/17  -CT      Bed Mobility PT LTG, Time to Achieve by discharge  -CT      Bed Mobility PT LTG, Activity Type all bed mobility  -CT      Bed Mobility PT LTG, [place] Level conditional independence;supervision required  -CT      Transfer Training PT STG    Transfer Training PT STG, Date Established 06/07/17  -CT      Transfer Training PT STG, Time to Achieve 5 - 7 days  -CT      Transfer Training PT STG, Activity Type all transfers  -CT      Transfer Training PT STG, [place] Level contact guard assist  -CT      Transfer Training PT STG, Assist Device other (see comments)   with appropriate AD  -CT      Transfer Training PT LTG    Transfer Training PT LTG, Date Established 06/07/17  -CT      Transfer Training PT LTG, Time to Achieve by discharge  -CT      Transfer Training PT LTG, Activity Type all transfers  -CT      Transfer Training PT LTG, [place] Level conditional  independence;supervision required  -CT      Transfer Training PT LTG, Assist Device other (see comments)   with appropriate AD  -CT      Gait Training PT STG    Gait Training Goal PT STG, Date Established 06/07/17  -CT      Gait Training Goal PT STG, Time to Achieve 5 - 7 days  -CT      Gait Training Goal PT STG, [place] Level contact guard assist  -CT      Gait Training Goal PT STG, Assist Device walker, rolling  -CT      Gait Training Goal PT STG, Distance to Achieve 50  -CT      Gait Training PT LTG    Gait Training Goal PT LTG, Date Established 06/07/17  -CT      Gait Training Goal PT LTG, Time to Achieve by discharge  -CT      Gait Training Goal PT LTG, [place] Level conditional independence;supervision required  -CT      Gait Training Goal PT LTG, Assist Device walker, rolling  -CT      Gait Training Goal PT LTG, Distance to Achieve 75  -CT      Patient Education PT LTG    Patient Education PT LTG, Date Established 06/07/17  -CT      Patient Education PT LTG, Time to Achieve by discharge  -CT      Patient Education PT LTG, Education Type written program;HEP;precaution per surgeon;positioning;posture/body mechanics;gait;transfers;bed mobility;pain management;progression of POC;benefits of activity;home safety;equipment management;skin care/inspection;energy conservation  -CT      Patient Education PT LTG, Education Understanding demonstrate adequately;verbalize understanding  -CT        User Key  (r) = Recorded By, (t) = Taken By, (c) = Cosigned By    Initials Name Provider Type    CT Yuli Moreno PT Physical Therapist          Physical Therapy Education     Title: PT OT SLP Therapies (Active)     Topic: Physical Therapy (Done)     Point: Mobility training (Done)    Learning Progress Summary    Learner Readiness Method Response Comment Documented by Status   Patient Acceptance E,D KERA DE ANDA   06/10/17 1345 Done    Acceptance EELI NR   06/09/17 1700 Done    Acceptance EELI   06/08/17 1820  Active    Acceptance E NR  CT 06/07/17 1707 Active               Point: Home exercise program (Done)    Learning Progress Summary    Learner Readiness Method Response Comment Documented by Status   Patient Acceptance E,D NR,VU  AG 06/10/17 1345 Done               Point: Body mechanics (Done)    Learning Progress Summary    Learner Readiness Method Response Comment Documented by Status   Patient Acceptance E,D NR,VU  AG 06/10/17 1345 Done    Acceptance E,D VU,NR  LL 06/09/17 1700 Done    Acceptance E,D NR  LL 06/08/17 1820 Active    Acceptance E NR  CT 06/07/17 1707 Active               Point: Precautions (Done)    Learning Progress Summary    Learner Readiness Method Response Comment Documented by Status   Patient Acceptance E,D NR,VU  AG 06/10/17 1345 Done    Acceptance E,D VU,NR  LL 06/09/17 1700 Done    Acceptance E,D NR  LL 06/08/17 1820 Active    Acceptance E NR  CT 06/07/17 1707 Active                      User Key     Initials Effective Dates Name Provider Type Discipline     03/14/16 -  Becka No, PT Physical Therapist PT    CT 03/14/16 -  Yuli Moreno, PT Physical Therapist PT     05/02/16 -  Roberta Friedman PTA Physical Therapy Assistant PT                    PT Recommendation and Plan  Anticipated Equipment Needs At Discharge:  (to be determined)  Anticipated Discharge Disposition: home with assist, home with home health  Planned Therapy Interventions: balance training, bed mobility training, gait training, home exercise program, manual therapy techniques, neuromuscular re-education, motor coordination training, patient/family education, postural re-education, ROM (Range of Motion), stair training, strengthening, transfer training  PT Frequency: 2 times/day, 5 times/wk, per priority policy            Time Calculation:         PT Charges       06/10/17 1346 06/10/17 1345       Time Calculation    Start Time 1245  -AG 0830  -AG     Stop Time 1330  -AG 0915  -AG     Time Calculation (min) 45 min   -AG 45 min  -AG     PT Received On  06/10/17  -OLVIN     PT - Next Appointment  06/12/17  -     PT Goal Re-Cert Due Date  06/14/17  -       User Key  (r) = Recorded By, (t) = Taken By, (c) = Cosigned By    Initials Name Provider Type    AG Becka No PT Physical Therapist          Therapy Charges for Today     Code Description Service Date Service Provider Modifiers Qty    79243696259 HC GAIT TRAINING EA 15 MIN 6/10/2017 Becka No, PT GP 2    80851366345 HC PT THER SUPP EA 15 MIN 6/10/2017 Becka No, PT GP 2    09780699978 HC PT THER PROC EA 15 MIN 6/10/2017 Becka No, PT GP 4               Becka No, PT  6/10/2017

## 2017-06-10 NOTE — PROGRESS NOTES
Rehabilitation Nursing  Inpatient Rehabilitation Functional Measures Assessment and Plan of Care    Plan of Care/Interventions  Copy from POC    Functional Measures  RAS Eating:  RAS Grooming:   Body Function Structure    Skin Integrity (Active)  Current Status (6/6/2017 9:00:00 PM): impaired skin integrity  Weekly Goal: prevent further skin integrity impairment  Discharge Goal: signs of regaining skin integrity    Safety    Potential for Injury (Active)  Current Status (6/6/2017 9:00:00 PM): risk for falls  Weekly Goal: remain free of falls  Discharge Goal: identify measures to prevent falls  RAS Bathing:  RAS Upper Body Dressing:  RAS Lower Body Dressing:  RAS Toileting:    RAS Bladder Management  Level of Assistance:  Frequency/Number of Accidents this Shift:    RAS Bowel Management  Level of Assistance:  Frequency/Number of Accidents this Shift:    RAS Bed/Chair/Wheelchair Transfer:  RAS Toilet Transfer:  RAS Tub/Shower Transfer:    Previously Documented Mode of Locomotion at Discharge:  RAS Expected Mode of Locomotion at Discharge:  RAS Walk/Wheelchair:  RAS Stairs:    RAS Comprehension:  Both ( auditory and visual) modes of comprehension are used  equally. Comprehension Score = 6, Modified [place].  Patient comprehends  complex/abstract information in their primary language, requiring: Glasses.  RAS Expression:  Vocal expression is the usual mode. Expression Score = 7,  Independent.  Patient expresses complex/abstract information in their primary  language.  Patient is completely independent for vocal expression.  There are no  activity limitations.  RAS Social Interaction:  Social Interaction Score = 7, Independent. Patient is  completely independent for social interaction.  There are no activity  limitations.  RAS Problem Solving:  Problem Solving Score = 7, Independent.  Patient makes  appropriate decisions in order to solve complex problems.  Patient is completely  independent for problem solving.   There are no activity limitations.  RAS Memory:  Memory Score = 7, Independent.  Patient is completely independent  for memory.  There are no activity limitations.    Signed by: Brisa Rucker, Nurse

## 2017-06-10 NOTE — PROGRESS NOTES
Inpatient Rehabilitation Functional Measures Assessment    Functional Measures  RAS Eating:  RAS Grooming: Patient requires minimal assistance for washing, rinsing and  drying the face. Patient requires minimal assistance for washing, rinsing and  drying the hands. Patient requires no physical assistance for brushing teeth.  Patient requires minimal assistance for brushing/combing hair. Shaving or  applying makeup was not observed for this patient. Patient performs 20 -  24% of  grooming tasks.  Grooming Score = 1, Total Assistance. No assistive devices were  required.  RAS Bathing:  Patient bathed in bed. Patient requires moderate assistance for  washing, rinsing, or drying the right arm. Patient requires moderate assistance  for washing, rinsing, or drying the left arm. Patient requires moderate  assistance for washing, rinsing, or drying the chest. Patient requires total  assistance for washing, rinsing, or drying the abdomen. Patient requires total  assistance for washing, rinsing, or drying the perineal area. Patient requires  total assistance for washing, rinsing, or drying the buttocks. Patient requires  total assistance for washing, rinsing, or drying the right upper leg. Patient  requires total assistance for washing, rinsing, or drying the left upper leg.  Patient requires total assistance for washing, rinsing, or drying the right  lower leg, including the foot. Patient requires total assistance for washing,  rinsing, or drying the left lower leg, including the foot. Patient performs 0 -  24% of bathing tasks.  Bathing Score = 1, Total Assistance. No assistive devices  were required.  RAS Upper Body Dressing:  Patient requires total assistance for gathering  clothes. Wearing a bra or undershirt was not applicable for this patient.  Patient requires moderate/considerable physical assistance for threading the  right arm through the garment (shirt/sweater). Patient requires  moderate/considerable physical  assistance for threading the left arm through the  garment (shirt/sweater). Patient requires total assistance for holding clothing  and/or pulling an over-head-garment over head or pulling front-fastening-garment  around back. Patient requires total assistance for holding clothing and/or  pulling an over-head-garment down the trunk or adjusting/fastening together a  front-fastening-garment. Patient performs 40 % of upper body dressing tasks.  Upper Body Dressing Score = 2, Maximal Assistance. No assistive devices were  required.  RAS Lower Body Dressing:  Patient requires total assistance for gathering  clothes. Patient requires total assistance for holding clothing and/or threading  the right leg through the undergarment. Patient requires total assistance for  holding clothing and/or threading the left leg through the undergarment. Patient  requires total assistance for holding clothing and/or pulling undergarment over  hips and adjusting fasteners. Patient requires total assistance for holding  clothing and/or threading the right leg through the pants/skirt. Patient  requires total assistance for holding clothing and/or threading the left leg  through the pants/skirt. Patient requires total assistance for holding clothing  and/or pulling pants/skirt over hips and adjusting fasteners. Patient requires  total assistance for holding clothing and/or donning and/or doffing right sock.  Patient requires total assistance for holding clothing and/or donning and/or  doffing left sock. Donning and/or doffing right shoe was not observed for this  patient. Donning and/or doffing left shoe was not observed for this patient.  Patient performs 0 -  24% of lower body dressing tasks. Lower Body Dressing  Score = 1, Total Assistance. No assistive devices were required.  RAS Toileting:    RAS Bladder Management  Level of Assistance:  Bladder Score = 1. Patient performs less than 25% of tasks  and requires total assistance for  bladder management. [place] provides total  assist to completely apply and remove brief.  Frequency/Number of Accidents this Shift:  Bladder accidents this shift:  0 .  Patient has not had an accident, but used a device/medication this shift  requiring: brief .    [facility] Bowel Management  Level of Assistance: Bowel Score = 7.  Patient is completely independent for  bowel management.  Patient did not have bowel movement.  No  medication/intervention was provided.  Frequency/Number of Accidents this Shift:    [facility] Bed/Chair/Wheelchair Transfer:  Bed/chair/wheelchair Transfer Score = 4.  Patient performs 75% or more of effort and minimal assistance (little/incidental  help/lifting of one limb/steadying) for transferring to and from the  bed/chair/wheelchair, requiring: Steadying. Patient requires the following  assistive device(s): Seating system of wheelchair. Grab bars.  [facility] Toilet Transfer:  [facility] Tub/Shower Transfer:    Previously Documented Mode of Locomotion at Discharge:  [facility] Expected Mode of Locomotion at Discharge:  [facility] Walk/Wheelchair:  [facility] Stairs:    [facility] Comprehension:  [facility] Expression:  [facility] Social Interaction:  [facility] Problem Solving:  [facility] Memory:    Therapy Mode Minutes  Occupational Therapy:  Physical Therapy:  Speech Language Pathology:    Discharge Functional Goals:    Signed by: RENNY Gonzalez

## 2017-06-11 PROCEDURE — 25010000002 ENOXAPARIN PER 10 MG: Performed by: FAMILY MEDICINE

## 2017-06-11 PROCEDURE — 94799 UNLISTED PULMONARY SVC/PX: CPT

## 2017-06-11 RX ADMIN — ENOXAPARIN SODIUM 40 MG: 40 INJECTION SUBCUTANEOUS at 09:58

## 2017-06-11 RX ADMIN — SENNOSIDES 1 TABLET: 8.6 TABLET ORAL at 17:34

## 2017-06-11 RX ADMIN — BACLOFEN 20 MG: 10 TABLET ORAL at 09:58

## 2017-06-11 RX ADMIN — PHENOBARBITAL 97.2 MG: 32.4 TABLET ORAL at 21:24

## 2017-06-11 RX ADMIN — ATORVASTATIN CALCIUM 40 MG: 40 TABLET, FILM COATED ORAL at 21:24

## 2017-06-11 RX ADMIN — MONTELUKAST SODIUM 10 MG: 10 TABLET ORAL at 21:24

## 2017-06-11 RX ADMIN — NYSTATIN 500000 UNITS: 100000 SUSPENSION ORAL at 17:34

## 2017-06-11 RX ADMIN — NYSTATIN 500000 UNITS: 100000 SUSPENSION ORAL at 12:12

## 2017-06-11 RX ADMIN — VENLAFAXINE HYDROCHLORIDE 150 MG: 150 CAPSULE, EXTENDED RELEASE ORAL at 09:57

## 2017-06-11 RX ADMIN — PANTOPRAZOLE SODIUM 40 MG: 40 TABLET, DELAYED RELEASE ORAL at 05:29

## 2017-06-11 RX ADMIN — LEVETIRACETAM 250 MG: 250 TABLET, FILM COATED ORAL at 21:24

## 2017-06-11 RX ADMIN — BACLOFEN 20 MG: 10 TABLET ORAL at 21:24

## 2017-06-11 RX ADMIN — PHENOBARBITAL 97.2 MG: 32.4 TABLET ORAL at 09:58

## 2017-06-11 RX ADMIN — ACETAMINOPHEN 650 MG: 325 TABLET ORAL at 21:23

## 2017-06-11 RX ADMIN — ACETAMINOPHEN 650 MG: 325 TABLET ORAL at 05:29

## 2017-06-11 RX ADMIN — NYSTATIN 500000 UNITS: 100000 SUSPENSION ORAL at 21:25

## 2017-06-11 RX ADMIN — LEVETIRACETAM 500 MG: 500 TABLET, FILM COATED ORAL at 09:58

## 2017-06-11 RX ADMIN — PHENYTOIN SODIUM 200 MG: 100 CAPSULE, EXTENDED RELEASE ORAL at 21:24

## 2017-06-11 RX ADMIN — CLONAZEPAM 0.5 MG: 0.5 TABLET ORAL at 21:23

## 2017-06-11 RX ADMIN — PHENYTOIN SODIUM 200 MG: 100 CAPSULE, EXTENDED RELEASE ORAL at 09:58

## 2017-06-11 RX ADMIN — SENNOSIDES 1 TABLET: 8.6 TABLET ORAL at 09:57

## 2017-06-11 NOTE — PROGRESS NOTES
Inpatient Rehabilitation Functional Measures Assessment    Functional Measures  RAS Eating:  Eating Score = 5. Patient is supervision/set-up for eating,  requiring: Opening containers. No assistive devices were required.  RAS Grooming: Grooming Score = 5. Patient is supervision/set-up for grooming,  requiring: No assistive devices were required.  RAS Bathing:  Patient bathed in bed. Patient requires moderate assistance for  washing, rinsing, or drying the right arm. Patient requires moderate assistance  for washing, rinsing, or drying the left arm. Patient requires moderate  assistance for washing, rinsing, or drying the chest. Patient requires moderate  assistance for washing, rinsing, or drying the abdomen. Patient requires maximal  assistance for washing, rinsing, or drying the perineal area. Patient requires  maximal assistance for washing, rinsing, or drying the buttocks. Patient  requires maximal assistance for washing, rinsing, or drying the right upper leg.  Patient requires maximal assistance for washing, rinsing, or drying the left  upper leg. Patient requires maximal assistance for washing, rinsing, or drying  the right lower leg, including the foot. Patient requires maximal assistance for  washing, rinsing, or drying the left lower leg, including the foot. Patient  performs 0 -  24% of bathing tasks.  Bathing Score = 1, Total Assistance. No  assistive devices were required.  RAS Upper Body Dressing:  Upper Body Dressing Score = 4. Patient requires  minimal assistance for upper body dressing, requiring incidental help only (such  as task initiation or assist with buttons/zips/snaps). No assistive devices were  required.  RAS Lower Body Dressing:  Patient requires total assistance for gathering  clothes. Wearing underwear or an undergarment is not applicable for this  patient. Patient requires moderate/considerable physical assistance for  threading the right leg through the pants/skirt. Patient  requires  moderate/considerable physical assistance for threading the left leg through the  pants/skirt. Patient requires moderate/considerable physical assistance for  pulling pants/skirt over hips and adjusting fasteners. Patient requires  maximal/significant physical assistance for holding clothing and/or donning  and/or doffing right sock. Patient requires no physical assistance for donning  and/or doffing left sock. Donning and/or doffing right shoe is not applicable  for this patient. Donning and/or doffing left shoe is not applicable for this  patient. Patient performs 45.83 % of lower body dressing tasks. Lower Body  Dressing Score = 2, Maximal Assistance. No assistive devices were required.  RAS Toileting:  Patient requires moderate assistance for adjusting clothing  before using a toilet, commode, bedpan, or urinal. Patient requires moderate  assistance for hygiene. Patient requires moderate assistance for adjusting  clothing after using a toilet, commode, bedpan, or urinal. Patient performs 0 -  24% of toileting tasks.  Toileting Score = 1, Total Assistance. Patient requires  the following assistive device(s): Grab bar.    RAS Bladder Management  Level of Assistance:  Bladder Score = 5.  Patient is supervision/set-up for  bladder management, requiring: No assistive devices were required.  Frequency/Number of Accidents this Shift:  Bladder accidents this shift:  0 .  Patient has not had an accident, but used a device/medication this shift  requiring: brief .    RAS Bowel Management  Level of Assistance: Bowel Score = 7.  Patient is completely independent for  bowel management.  Patient did not have bowel movement.  No  medication/intervention was provided.  Frequency/Number of Accidents this Shift: Bowel accidents this shift: 0 .  Patient has not had an accident, but used a device/medication this shift  requiring: brief .    RAS Bed/Chair/Wheelchair Transfer:  Bed/chair/wheelchair Transfer Score =  3.  Patient performs 50-74% of effort and requires moderate assistance (some  lifting) for transferring to and from the bed/chair/wheelchair. No assistive  devices were required.  RAS Toilet Transfer:  Toilet Transfer Score = 3.  Patient performs 50-74% of  effort and requires moderate assistance (some lifting) for transferring to and  from the toilet/commode. Patient requires the following assistive device(s):  Grab bars.  RAS Tub/Shower Transfer:  Activity was not observed.    Previously Documented Mode of Locomotion at Discharge:  RAS Expected Mode of Locomotion at Discharge:  RAS Walk/Wheelchair:  RAS Stairs:    RAS Comprehension:  RAS Expression:  RAS Social Interaction:  RAS Problem Solving:  RAS Memory:    Therapy Mode Minutes  Occupational Therapy:  Physical Therapy:  Speech Language Pathology:    Discharge Functional Goals:    Signed by: RENNY Ball

## 2017-06-11 NOTE — PROGRESS NOTES
Inpatient Rehabilitation Functional Measures Assessment    Functional Measures  RAS Eating:  RAS Grooming:  RAS Bathing:  RAS Upper Body Dressing:  RAS Lower Body Dressing:  RAS Toileting:  Patient requires moderate assistance for adjusting clothing  before using a toilet, commode, bedpan, or urinal. Patient requires no physical  assistance for hygiene. Patient requires moderate assistance for adjusting  clothing after using a toilet, commode, bedpan, or urinal. Patient performs  33.33 % of toileting tasks. Toileting Score = 2. Maximal Assistance. Patient  requires the following assistive device(s): Grab bar.    RAS Bladder Management  Level of Assistance:  Bladder Score = 5.  Patient is supervision/set-up for  bladder management, requiring: Stand by assistance. No assistive devices were  required.  Frequency/Number of Accidents this Shift:  Bladder accidents this shift:  0 .  Patient has not had an accident this shift.    RAS Bowel Management  Level of Assistance: Activity was not observed.  Frequency/Number of Accidents this Shift:    RAS Bed/Chair/Wheelchair Transfer:  Bed/chair/wheelchair Transfer Score = 3.  Patient performs 50-74% of effort and requires moderate assistance (some  lifting)  for transferring to and from the bed/chair/wheelchair, including  assist lifting both legs. Patient requires the following assistive device(s):  Bed rails. Grab bars. Arm rest.  RAS Toilet Transfer:  Toilet Transfer Score = 3.  Patient performs 50-74% of  effort and requires moderate assistance (some lifting) for transferring to and  from the toilet/commode. Patient requires the following assistive device(s):  Grab bars. Safety frame/over the toilet.  RAS Tub/Shower Transfer:  Activity was not observed.    Previously Documented Mode of Locomotion at Discharge:  RAS Expected Mode of Locomotion at Discharge:  RAS Walk/Wheelchair:  RAS Stairs:    RAS Comprehension:  RAS Expression:  RAS Social Interaction:  RAS Problem  Solving:  RAS Memory:    Therapy Mode Minutes  Occupational Therapy:  Physical Therapy:  Speech Language Pathology:    Discharge Functional Goals:    Signed by: RENNY Mcadams

## 2017-06-11 NOTE — PROGRESS NOTES
Rehabilitation Nursing  Inpatient Rehabilitation Plan of Care Note    Plan of Care  Copy from POCBody Function Structure    Skin Integrity (Active)  Current Status (6/6/2017 9:00:00 PM): impaired skin integrity  Weekly Goal: prevent further skin integrity impairment  Discharge Goal: signs of regaining skin integrity    Safety    Potential for Injury (Active)  Current Status (6/6/2017 9:00:00 PM): risk for falls  Weekly Goal: remain free of falls  Discharge Goal: identify measures to prevent falls    Signed by: Brisa Rucker Nurse

## 2017-06-11 NOTE — PLAN OF CARE
Problem: Patient Care Overview (Adult)  Goal: Plan of Care Review  Outcome: Ongoing (interventions implemented as appropriate)    06/11/17 1510   Coping/Psychosocial Response Interventions   Plan Of Care Reviewed With patient   Patient Care Overview   Progress progress toward functional goals as expected         Problem: Skin Integrity Impairment, Risk/Actual (Adult)  Goal: Identify Related Risk Factors and Signs and Symptoms  Outcome: Outcome(s) achieved Date Met:  06/11/17  Goal: Skin Integrity/Wound Healing  Outcome: Ongoing (interventions implemented as appropriate)    Problem: Pressure Ulcer (Adult)  Goal: Signs and Symptoms of Listed Potential Problems Will be Absent or Manageable (Pressure Ulcer)  Outcome: Ongoing (interventions implemented as appropriate)    Problem: Fall Risk (Adult)  Goal: Absence of Falls  Outcome: Ongoing (interventions implemented as appropriate)    Problem: Fractured Hip (Adult)  Goal: Signs and Symptoms of Listed Potential Problems Will be Absent or Manageable (Fractured Hip)  Outcome: Ongoing (interventions implemented as appropriate)    Problem: Infection, Risk/Actual (Adult)  Goal: Infection Prevention/Resolution  Outcome: Ongoing (interventions implemented as appropriate)    Problem: Mobility, Physical Impaired (Adult)  Goal: Enhanced Mobility Skills  Outcome: Ongoing (interventions implemented as appropriate)  Goal: Enhanced Functionality Ability  Outcome: Ongoing (interventions implemented as appropriate)    Problem: Seizure Disorder/Epilepsy (Adult)  Goal: Signs and Symptoms of Listed Potential Problems Will be Absent or Manageable (Seizure Disorder/Epilepsy)  Outcome: Ongoing (interventions implemented as appropriate)

## 2017-06-11 NOTE — PROGRESS NOTES
Rehabilitation Nursing  Inpatient Rehabilitation Functional Measures Assessment and Plan of Care    Plan of Care/Interventions  Copy from POC    Functional Measures  RAS Eating:  RAS Grooming:  RAS Bathing:  RAS Upper Body Dressing:  RAS Lower Body Dressing:  RAS Toileting:    RAS Bladder Management  Level of Assistance:  Frequency/Number of Accidents this Shift:    RAS Bowel Management  Level of Assistance:  Frequency/Number of Accidents this Shift:    RAS Bed/Chair/Wheelchair Transfer:  RAS Toilet Transfer:  RAS Tub/Shower Transfer:    Previously Documented Mode of Locomotion at Discharge:  RAS Expected Mode of Locomotion at Discharge:  RAS Walk/Wheelchair:  RAS Stairs:    RAS Comprehension:  Both ( auditory and visual) modes of comprehension are used  equally. Comprehension Score = 7, Independent.  Patient comprehends  complex/abstract information in their primary language.  Patient is completely  independent for auditory and visual comprehension.  There are no activity  limitations.  RAS Expression:  Both ( vocal and non-vocal) modes of expression are used  equally. Expression Score = 7, Independent. Patient expresses complex/abstract  information in their primary language.  Patient is completely independent for  vocal and non-vocal expression.  There are no activity limitations.  RAS Social Interaction:  Social Interaction Score = 7, Independent. Patient is  completely independent for social interaction.  There are no activity  limitations.  RAS Problem Solving:  Problem Solving Score = 7, Independent.  Patient makes  appropriate decisions in order to solve complex problems.  Patient is completely  independent for problem solving.  There are no activity limitations.  RAS Memory:  Memory Score = 7, Independent.  Patient is completely independent  for memory.  There are no activity limitations.    Signed by: Isi Javier Nurse

## 2017-06-11 NOTE — PROGRESS NOTES
Inpatient Rehabilitation Functional Measures Assessment    Functional Measures  RAS Eating:  Eating Score = 5. Patient is supervision/set-up for eating,  requiring: Opening containers. No assistive devices were required.  RAS Grooming: Grooming Score = 5. Patient is supervision/set-up for grooming,  requiring: No assistive devices were required.  RAS Bathing:  Patient bathed in bed. Patient requires no physical assistance for  washing, rinsing, and drying the right arm. Patient requires no physical  assistance for washing, rinsing, and drying the left arm. Patient requires no  physical assistance for washing, rinsing, and drying the chest. Patient requires  moderate assistance for washing, rinsing, or drying the abdomen. Patient  requires moderate assistance for washing, rinsing, or drying the perineal area.  Patient requires moderate assistance for washing, rinsing, or drying the  buttocks. Patient requires minimal assistance for washing, rinsing, or drying  the right upper leg. Patient requires minimal assistance for washing, rinsing,  or drying the left upper leg. Patient requires maximal assistance for washing,  rinsing, or drying the right lower leg, including the foot. Patient requires  maximal assistance for washing, rinsing, or drying the left lower leg, including  the foot. Patient performs 30 % of bathing tasks. Bathing Score = 2, Maximal  Assistance. No assistive devices were required.  RAS Upper Body Dressing:  Upper Body Dressing Score = 4. Patient requires  minimal assistance for upper body dressing, requiring incidental help only (such  as task initiation or assist with buttons/zips/snaps). No assistive devices were  required.  RAS Lower Body Dressing:  Patient requires total assistance for gathering  clothes. Wearing underwear or an undergarment is not applicable for this  patient. Patient requires moderate/considerable physical assistance for  threading the right leg through the pants/skirt. Patient  requires  moderate/considerable physical assistance for threading the left leg through the  pants/skirt. Patient requires moderate/considerable physical assistance for  pulling pants/skirt over hips and adjusting fasteners. Patient requires  maximal/significant physical assistance for holding clothing and/or donning  and/or doffing right sock. Patient requires maximal/significant physical  assistance for holding clothing and/or donning and/or doffing left sock. Donning  and/or doffing right shoe is not applicable for this patient. Donning and/or  doffing left shoe is not applicable for this patient. Patient performs 33.33 %  of lower body dressing tasks. Lower Body Dressing Score = 2, Maximal Assistance.  No assistive devices were required.  RAS Toileting:  Patient requires moderate assistance for adjusting clothing  before using a toilet, commode, bedpan, or urinal. Patient requires moderate  assistance for hygiene. Patient requires moderate assistance for adjusting  clothing after using a toilet, commode, bedpan, or urinal. Patient performs 0 -  24% of toileting tasks.  Toileting Score = 1, Total Assistance. Patient requires  the following assistive device(s): Grab bar.    RAS Bladder Management  Level of Assistance:  Bladder Score = 5.  Patient is supervision/set-up for  bladder management, requiring: Patient requires the following assistive  device(s):  Adult brief.  Frequency/Number of Accidents this Shift:  Bladder accidents this shift:  0 .  Patient has not had an accident, but used a device/medication this shift  requiring: brief .    RAS Bowel Management  Level of Assistance: Bowel Score = 7.  Patient is completely independent for  bowel management.  Patient did not have bowel movement.  No  medication/intervention was provided.  Frequency/Number of Accidents this Shift: Bowel accidents this shift: 0 .  Patient has not had an accident, but used a device/medication this shift  requiring: brief .    RAS  Bed/Chair/Wheelchair Transfer:  Bed/chair/wheelchair Transfer Score = 3.  Patient performs 50-74% of effort and requires moderate assistance (some  lifting) for transferring to and from the bed/chair/wheelchair. No assistive  devices were required.  RAS Toilet Transfer:  Toilet Transfer Score = 3.  Patient performs 50-74% of  effort and requires moderate assistance (some lifting) for transferring to and  from the toilet/commode. Patient requires the following assistive device(s):  Grab bars.  RAS Tub/Shower Transfer:  Activity was not observed.    Previously Documented Mode of Locomotion at Discharge:  RAS Expected Mode of Locomotion at Discharge:  RAS Walk/Wheelchair:  RAS Stairs:    RAS Comprehension:  RAS Expression:  RAS Social Interaction:  RAS Problem Solving:  RAS Memory:    Therapy Mode Minutes  Occupational Therapy:  Physical Therapy:  Speech Language Pathology:    Discharge Functional Goals:    Signed by: RENNY Ball

## 2017-06-12 PROCEDURE — 97110 THERAPEUTIC EXERCISES: CPT

## 2017-06-12 PROCEDURE — 97530 THERAPEUTIC ACTIVITIES: CPT

## 2017-06-12 PROCEDURE — 97535 SELF CARE MNGMENT TRAINING: CPT

## 2017-06-12 PROCEDURE — 25010000002 ENOXAPARIN PER 10 MG: Performed by: FAMILY MEDICINE

## 2017-06-12 PROCEDURE — 97116 GAIT TRAINING THERAPY: CPT

## 2017-06-12 RX ADMIN — NYSTATIN 500000 UNITS: 100000 SUSPENSION ORAL at 08:02

## 2017-06-12 RX ADMIN — HYDROCODONE BITARTRATE AND ACETAMINOPHEN 1 TABLET: 10; 325 TABLET ORAL at 21:07

## 2017-06-12 RX ADMIN — MONTELUKAST SODIUM 10 MG: 10 TABLET ORAL at 21:06

## 2017-06-12 RX ADMIN — SENNOSIDES 1 TABLET: 8.6 TABLET ORAL at 08:01

## 2017-06-12 RX ADMIN — PHENYTOIN SODIUM 200 MG: 100 CAPSULE, EXTENDED RELEASE ORAL at 21:06

## 2017-06-12 RX ADMIN — NYSTATIN 500000 UNITS: 100000 SUSPENSION ORAL at 12:34

## 2017-06-12 RX ADMIN — NYSTATIN 500000 UNITS: 100000 SUSPENSION ORAL at 17:28

## 2017-06-12 RX ADMIN — BACLOFEN 20 MG: 10 TABLET ORAL at 21:07

## 2017-06-12 RX ADMIN — ACETAMINOPHEN 650 MG: 325 TABLET ORAL at 05:10

## 2017-06-12 RX ADMIN — PHENOBARBITAL 97.2 MG: 32.4 TABLET ORAL at 21:06

## 2017-06-12 RX ADMIN — HYDROCODONE BITARTRATE AND ACETAMINOPHEN 1 TABLET: 10; 325 TABLET ORAL at 08:10

## 2017-06-12 RX ADMIN — SENNOSIDES 1 TABLET: 8.6 TABLET ORAL at 17:28

## 2017-06-12 RX ADMIN — LEVETIRACETAM 500 MG: 500 TABLET, FILM COATED ORAL at 08:01

## 2017-06-12 RX ADMIN — PHENOBARBITAL 97.2 MG: 32.4 TABLET ORAL at 08:07

## 2017-06-12 RX ADMIN — CLONAZEPAM 0.5 MG: 0.5 TABLET ORAL at 21:06

## 2017-06-12 RX ADMIN — PHENYTOIN SODIUM 200 MG: 100 CAPSULE, EXTENDED RELEASE ORAL at 08:01

## 2017-06-12 RX ADMIN — ATORVASTATIN CALCIUM 40 MG: 40 TABLET, FILM COATED ORAL at 21:06

## 2017-06-12 RX ADMIN — ENOXAPARIN SODIUM 40 MG: 40 INJECTION SUBCUTANEOUS at 08:01

## 2017-06-12 RX ADMIN — PANTOPRAZOLE SODIUM 40 MG: 40 TABLET, DELAYED RELEASE ORAL at 05:10

## 2017-06-12 RX ADMIN — NYSTATIN 500000 UNITS: 100000 SUSPENSION ORAL at 21:10

## 2017-06-12 RX ADMIN — VENLAFAXINE HYDROCHLORIDE 150 MG: 150 CAPSULE, EXTENDED RELEASE ORAL at 08:01

## 2017-06-12 RX ADMIN — BACLOFEN 20 MG: 10 TABLET ORAL at 08:00

## 2017-06-12 RX ADMIN — LEVETIRACETAM 250 MG: 250 TABLET, FILM COATED ORAL at 21:07

## 2017-06-12 NOTE — PROGRESS NOTES
"     LOS: 6 days     Chief Complaint:  Hip fracture    Subjective     Interval History:     She states she's doing better overall.  No recurrent seizures.  No fevers and no chills.  No abdominal discomfort.  Improving mobility.      Objective     Vital Signs  /58 (BP Location: Right arm, Patient Position: Sitting)  Pulse 79  Temp 97.8 °F (36.6 °C) (Oral)   Resp 16  Ht 59\" (149.9 cm)  Wt 119 lb (54 kg)  SpO2 97%  BMI 24.04 kg/m2  Intake & Output (last day)       06/11 0701 - 06/12 0700 06/12 0701 - 06/13 0700    P.O. 1080 480    Total Intake(mL/kg) 1080 (20) 480 (8.9)    Net +1080 +480          Unmeasured Urine Occurrence 8 x 2 x            Physical Exam:     General Appearance:    Alert, cooperative, in no acute distress   Head:    Normocephalic, without obvious abnormality, atraumatic   Eyes:            Lids and lashes normal, conjunctivae and sclerae normal, no   icterus, no pallor, corneas clear, PERRLA   Ears:    Ears appear intact with no abnormalities noted       Neck:   No adenopathy, supple, trachea midline, no thyromegaly, no   carotid bruit, no JVD   Lungs:     Clear to auscultation,respirations regular, even and                  unlabored    Heart:    Regular rhythm and normal rate, normal S1 and S2, no            murmur, no gallop, no rub, no click   Chest Wall:    No abnormalities observed   Abdomen:     Normal bowel sounds, no masses, no organomegaly, soft        non-tender, non-distended, no guarding, no rebound                tenderness   Extremities:   no edema, no cyanosis, no redness   Pulses:   Pulses palpable and equal bilaterally   Skin:   No bleeding, bruising or rash                Results Review:    Lab Results   Component Value Date    WBC 4.09 (L) 06/07/2017    HGB 11.1 (L) 06/07/2017    HCT 34.2 (L) 06/07/2017    MCV 98.8 (H) 06/07/2017     06/07/2017       Lab Results   Component Value Date    GLUCOSE 98 06/07/2017    BUN 6 (L) 06/07/2017    CREATININE 0.39 (L) " 06/07/2017    EGFRIFNONA >150 06/07/2017    BCR 15.4 06/07/2017     06/07/2017    K 4.1 06/07/2017     06/07/2017    CO2 25.3 06/07/2017    CALCIUM 8.8 06/07/2017    ALBUMIN 3.30 (L) 06/07/2017    LABIL2 1.0 (L) 06/07/2017    AST 18 06/07/2017    ALT 12 06/07/2017     No results found for: INR    No results found for: POCGLU       Medication Review:     Current Facility-Administered Medications:   •  acetaminophen (TYLENOL) tablet 650 mg, 650 mg, Oral, Q4H PRN, Samuel Duane Kreis, MD, 650 mg at 06/12/17 0510  •  aluminum-magnesium hydroxide-simethicone (MAALOX MAX) 400-400-40 MG/5ML suspension 15 mL, 15 mL, Oral, Q6H PRN, Samuel Duane Kreis, MD, 15 mL at 06/10/17 2052  •  atorvastatin (LIPITOR) tablet 40 mg, 40 mg, Oral, Nightly, Samuel Duane Kreis, MD, 40 mg at 06/11/17 2124  •  baclofen (LIORESAL) tablet 20 mg, 20 mg, Oral, Q12H, Samuel Duane Kreis, MD, 20 mg at 06/12/17 0800  •  bisacodyl (DULCOLAX) suppository 10 mg, 10 mg, Rectal, Daily PRN, Samuel Duane Kreis, MD  •  clonazePAM (KlonoPIN) tablet 0.5 mg, 0.5 mg, Oral, BID PRN, Samuel Duane Kreis, MD, 0.5 mg at 06/11/17 2123  •  enoxaparin (LOVENOX) syringe 40 mg, 40 mg, Subcutaneous, Daily, Samuel Duane Kreis, MD, 40 mg at 06/12/17 0801  •  HYDROcodone-acetaminophen (NORCO)  MG per tablet 1 tablet, 1 tablet, Oral, Q4H PRN, Samuel Duane Kreis, MD, 1 tablet at 06/12/17 0810  •  levETIRAcetam (KEPPRA) tablet 250 mg, 250 mg, Oral, Nightly, Samuel Duane Kreis, MD, 250 mg at 06/11/17 2124  •  levETIRAcetam (KEPPRA) tablet 500 mg, 500 mg, Oral, Daily, Samuel Duane Kreis, MD, 500 mg at 06/12/17 0801  •  LORazepam (ATIVAN) injection 1 mg, 1 mg, Intramuscular, Once PRN, Samuel Duane Kreis, MD  •  magnesium hydroxide (MILK OF MAGNESIA) suspension 2400 mg/10mL 10 mL, 10 mL, Oral, Daily PRN, Samuel Duane Kreis, MD  •  montelukast (SINGULAIR) tablet 10 mg, 10 mg, Oral, Nightly, Samuel Duane Kreis, MD, 10 mg at 06/11/17 2124  •  nystatin (MYCOSTATIN) 747504  UNIT/ML suspension 500,000 Units, 5 mL, Oral, 4x Daily, Samuel Duane Kreis, MD, 500,000 Units at 06/12/17 1234  •  ondansetron (ZOFRAN) tablet 4 mg, 4 mg, Oral, Q6H PRN **OR** ondansetron ODT (ZOFRAN-ODT) disintegrating tablet 4 mg, 4 mg, Oral, Q6H PRN **OR** ondansetron (ZOFRAN) injection 4 mg, 4 mg, Intravenous, Q6H PRN, Samuel Duane Kreis, MD  •  pantoprazole (PROTONIX) EC tablet 40 mg, 40 mg, Oral, Q AM, Samuel Duane Kreis, MD, 40 mg at 06/12/17 0510  •  PHENobarbital (LUMINAL) tablet 97.2 mg, 97.2 mg, Oral, Q12H, Samuel Duane Kreis, MD, 97.2 mg at 06/12/17 0807  •  phenytoin (DILANTIN) ER capsule 200 mg, 200 mg, Oral, Q12H, Samuel Duane Kreis, MD, 200 mg at 06/12/17 0801  •  senna (SENOKOT) tablet 1 tablet, 1 tablet, Oral, BID, Samuel Duane Kreis, MD, 1 tablet at 06/12/17 0801  •  venlafaxine XR (EFFEXOR-XR) 24 hr capsule 150 mg, 150 mg, Oral, Daily With Breakfast, Samuel Duane Kreis, MD, 150 mg at 06/12/17 0801      Assessment/Plan     Periprosthetic hip fracture status post ORIF  Osteoarthritis  Chronic obstructive pulmonary disease  Seizure disorder      Continue hydrocodone when necessary for pain    PT and OT ongoing.     Pulmonary status is stable.    Continue baclofen    Continue Keppra, Dilantin and phenobarbital.  She is subtherapeutic on Dilantin and had active seizure last night.  Increase.  Dilantin to 200 mg twice a day.  Dilantin level tomorrow morning at 8 AM        Samuel Duane Kreis, MD  06/12/17  4:51 PM

## 2017-06-12 NOTE — PROGRESS NOTES
Inpatient Rehabilitation Functional Measures Assessment    Functional Measures  RAS Eating:  RAS Grooming: Grooming Score = 5. Patient is supervision/set-up for grooming,  requiring: Stand by assistance. Setting out grooming equipment. Initial  preparation. No assistive devices were required.  RAS Bathing:  Patient took sponge bath. Patient requires no physical assistance  for washing, rinsing, and drying the right arm. Patient requires no physical  assistance for washing, rinsing, and drying the left arm. Patient requires no  physical assistance for washing, rinsing, and drying the chest. Patient requires  no physical assistance for washing, rinsing, and drying the abdomen. Patient  requires no physical assistance for washing, rinsing, and drying the perineal  area. Patient requires no physical assistance for washing, rinsing, and drying  the buttocks. Patient requires no physical assistance for washing, rinsing, and  drying the right upper leg. Patient requires no physical assistance for washing,  rinsing, and drying the left upper leg. Patient requires total assistance for  washing, rinsing, or drying the right lower leg, including the foot. Patient  requires total assistance for washing, rinsing, or drying the left lower leg,  including the foot. Patient performs 80 % of bathing tasks. Bathing Score = 4,  Minimal Assistance. No assistive devices were required.  RAS Upper Body Dressing:  Patient requires total assistance for gathering  clothes. Wearing a bra or undershirt was not observed for this patient. Patient  requires moderate/considerable physical assistance for threading the right arm  through the garment (shirt/sweater). Patient requires moderate/considerable  physical assistance for threading the left arm through the garment  (shirt/sweater). Patient requires moderate/considerable physical assistance for  pulling an over-head-garment over head or pulling front-fastening-garment around  back. Patient  requires moderate/considerable physical assistance for pulling an  over-head-garment down the trunk or adjusting/fastening together a  front-fastening-garment. Patient performs 60 % of upper body dressing tasks.  Upper Body Dressing Score = 3, Moderate Assistance. No assistive devices were  required.  RAS Lower Body Dressing:  Patient requires total assistance for gathering  clothes. Wearing underwear or an undergarment was not observed for this patient.  Patient requires total assistance for holding clothing and/or threading the  right leg through the pants/skirt. Patient requires total assistance for holding  clothing and/or threading the left leg through the pants/skirt. Patient requires  maximal/significant physical assistance for holding clothing and/or pulling  pants/skirt over hips and adjusting fasteners. Patient requires total assistance  for holding clothing and/or donning and/or doffing right sock. Patient requires  total assistance for holding clothing and/or donning and/or doffing left sock.  Donning and/or doffing right shoe was not observed for this patient. Donning  and/or doffing left shoe was not observed for this patient. Patient performs  4.17 -  24% of lower body dressing tasks. Lower Body Dressing  Score = 1, Total  Assistance. No assistive devices were required.  RAS Toileting:  Toileting Score = 4.  Patient requires minimal assistance for  toileting, such as steadying for balance while cleansing or adjusting clothes.  Patient requires the following assistive device(s): Grab bar.    RAS Bladder Management  Level of Assistance:  Bladder Score = 5.  Patient is supervision/set-up for  bladder management, requiring: Stand by assistance. No assistive devices were  required.  Frequency/Number of Accidents this Shift:  Bladder accidents this shift:  0 .  Patient has not had an accident this shift.    RAS Bowel Management  Level of Assistance: Activity was not observed.  Frequency/Number of Accidents  this Shift:    RAS Bed/Chair/Wheelchair Transfer:  Bed/chair/wheelchair Transfer Score = 3.  Patient performs 50-74% of effort and requires moderate assistance (some  lifting)  for transferring to and from the bed/chair/wheelchair, including  assist lifting both legs. Patient requires the following assistive device(s):  Bed rails. Grab bars. Arm rest.  RAS Toilet Transfer:  Toilet Transfer Score = 3.  Patient performs 50-74% of  effort and requires moderate assistance (some lifting) for transferring to and  from the toilet/commode. Patient requires the following assistive device(s):  Grab bars. Safety frame/over the toilet.  RAS Tub/Shower Transfer:  Activity was not observed.    Previously Documented Mode of Locomotion at Discharge:  [facility] Expected Mode of Locomotion at Discharge:  RAS Walk/Wheelchair:  RAS Stairs:    RAS Comprehension:  RAS Expression:  RAS Social Interaction:  RAS Problem Solving:  RAS Memory:    Therapy Mode Minutes  Occupational Therapy:  Physical Therapy:  Speech Language Pathology:    Discharge Functional Goals:    Signed by: RENNY Mcadams

## 2017-06-12 NOTE — PROGRESS NOTES
Inpatient Rehabilitation Plan of Care Note    Plan of Care  Mobility    [PT] Bed/Chair/Wheelchair(Active)  Current Status(06/12/2017): Min A with RW  Weekly Goal(06/19/2017): CGA  Discharge Goal: Supervision    [PT] Walk(Active)  Current Status(06/12/2017): 70 ft with Min A, RW  Weekly Goal(06/19/2017): CGA w/ RW 50 ft  Discharge Goal: MI/SUP w/ RW 75 ft    Signed by: Becka No, Physical Therapist

## 2017-06-12 NOTE — THERAPY TREATMENT NOTE
Inpatient Rehabilitation - Occupational Therapy Treatment Note   Ed     Patient Name: Maribel Staton  : 1945  MRN: [id number]  Today's Date: 2017  Onset of Illness/Injury or Date of Surgery Date: 17  Date of Referral to OT: 17  Referring Physician: Eva      Admit Date: 2017    Visit Dx:   No diagnosis found.  Patient Active Problem List   Diagnosis   • Closed right hip fracture             Adult Rehabilitation Note       17 0941 06/10/17 1500 06/10/17 1339    Rehab Assessment/Intervention    Discipline occupational therapist  -AB occupational therapist  -BC physical therapist  -AG    Document Type therapy note (daily note)  -AB therapy note (daily note)  -BC therapy note (daily note)  -AG    Subjective Information no complaints;agree to therapy  -AB agree to therapy;no complaints  -BC agree to therapy;complains of;pain  -AG    Patient Effort, Rehab Treatment good  -AB good  -BC good  -AG    Precautions/Limitations fall precautions;non-weight bearing status;seizure precautions;other (see comments)   NWB RLE; <skin integrity; intermittent confusion  -AB fall precautions;non-weight bearing status;seizure precautions  -BC     Patient Response to Treatment Pt. w/ good tolerance and rest breaks provided.   -AB Pt. tolerated tx. well with frequent rest breaks at table top tasks  -BC tolerated well with rest breaks; incr c/o pain in PM.  -AG    Recorded by [AB] Missy Pelayo, OT [BC] Ursula Concepcion, OT [AG] Becka No, PT    Pain Assessment    Pain Assessment   Levin-Orozco FACES  -AG    Levin-Orozco FACES Pain Rating   6  -AG    Pain Type   Acute pain;Surgical pain  -AG    Pain Location   Hip  -AG    Pain Orientation   Right  -AG    Pain Intervention(s)   Repositioned;Rest  -AG    Recorded by   [AG] Becka No, PT    Cognitive Assessment/Intervention    Current Cognitive/Communication Assessment   functional  -AG    Follows Commands/Answers Questions able to follow  single-step instructions;100% of the time;needs cueing  -AB  100% of the time;able to follow single-step instructions;needs cueing  -AG    Personal Safety decreased awareness, need for assist;decreased awareness, need for safety  -AB  decreased awareness, need for assist;decreased awareness, need for safety  -AG    Personal Safety Interventions gait belt;nonskid shoes/slippers when out of bed;supervised activity  -AB  gait belt;nonskid shoes/slippers when out of bed;supervised activity  -AG    Recorded by [AB] Missy Pelayo OT  [AG] Becka No, PT    Mobility Assessment/Training    Extremity Weight-Bearing Status   right lower extremity  -AG    Right Lower Extremity Weight-Bearing   non weight-bearing  -AG    Recorded by   [AG] Becka No, PT    Bed Mobility, Assessment/Treatment    Bed Mobility, Scoot/Bridge, [place]   minimum assist (75% patient effort)  -AG    Bed Mob, Supine to Sit, [place]   contact guard assist;minimum assist (75% patient effort)  -AG    Bed Mob, Sit to Supine, [place]   minimum assist (75% patient effort)  -AG    Bed Mobility, Safety Issues   decreased use of legs for bridging/pushing  -AG    Bed Mobility, Impairments   ROM decreased;strength decreased;impaired balance;coordination impaired;pain  -AG    Recorded by   [AG] Becka No, PT    Transfer Assessment/Treatment    Transfers, Bed-Chair [place] minimum assist (75% patient effort);contact guard assist;verbal cues required;nonverbal cues required (demo/gesture)  -AB  verbal cues required;nonverbal cues required (demo/gesture);contact guard assist;minimum assist (75% patient effort)  -AG    Transfers, Chair-Bed [place]   contact guard assist;minimum assist (75% patient effort)  -AG    Transfers, Bed-Chair-Bed, Assist Device rolling walker;other (see comments)   w/c  -AB  rolling walker  -AG    Transfers, Sit-Stand [place]   verbal cues required;nonverbal cues required (demo/gesture);contact  guard assist;minimum assist (75% patient effort)  -AG    Transfers, Stand-Sit [place]   verbal cues required;nonverbal cues required (demo/gesture);contact guard assist;minimum assist (75% patient effort)  -AG    Transfers, Sit-Stand-Sit, Assist Device   rolling walker  -AG    Transfer, Maintain Weight Bearing Status   cues to maintain weight bearing status  -AG    Transfer, Safety Issues   balance decreased during turns  -AG    Transfer, Impairments   ROM decreased;strength decreased;impaired balance;coordination impaired;pain  -AG    Recorded by [AB] Missy Pelayo OT  [AG] Becka No, PT    Gait Assessment/Treatment    Gait, [place] Level   contact guard assist;minimum assist (75% patient effort)  -AG    Gait, Assistive Device   rolling walker  -AG    Gait, Distance (Feet)   60   plus 40 additional ft following rest break.  -AG    Gait, Gait Pattern Analysis   swing-to gait  -AG    Gait, Gait Deviations   antalgic;rossy decreased;forward flexed posture;step length decreased  -AG    Gait, Maintain Weight Bearing Status   cues to maintain weight bearing status  -AG    Gait, Safety Issues   balance decreased during turns  -AG    Gait, Impairments   ROM decreased;strength decreased;impaired balance;coordination impaired;pain  -AG    Recorded by   [AG] Becka No, PT    Toileting Assessment/Training    Toileting Assess/Train, Assistive Device  grab bars;raised toilet seat  -BC     Toileting Assess/Train, Position  sitting  -BC     Toileting Assess/Train, [place]ep[place] Level  minimum assist (75% patient effort)  -BC     Recorded by  [BC] Ursula Concepcion OT     Grooming Assessment/Training    Grooming Assess/Train, Position sitting;sink side  -AB sitting;sink side  -BC     Grooming Assess/Train, Indepen Level supervision required;set up required  -AB supervision required;nonverbal cues required (demo/gesture)  -BC     Recorded by [AB] Missy Pelayo OT [BC] Ursula Concepcion OT     Motor  Skills/Interventions    Additional Documentation   Balance Skills Training (Group)  -AG    Recorded by   [AG] Becka No, PT    Balance Skills Training    Sitting-Level of Assistance   Distant supervision  -AG    Sitting-Balance Support   Feet supported  -AG    Standing-Level of Assistance   Contact guard  -AG    Static Standing Balance Support   assistive device  -AG    Gait Balance-Level of Assistance   Contact guard;Minimum assistance  -AG    Gait Balance Support   assistive device  -AG    Recorded by   [AG] Becka No, PT    Therapy Exercises    Bilateral Lower Extremities   AROM:;25 reps;supine;sitting  -AG    BLE Resistance   theraband  -AG    Bilateral Upper Extremity AROM:;sitting   BUE CoordEx; G/FMC TherEx/Act; Strengthening  -AB AROM:   BUE, G/FM coordination ther ex. act., strengtherning   -BC     BUE Resistance manual resistance- minimal;other (comment)   ArmBike: [id number], min resistance; BUE Wrist Roll X1  -AB --   wrist rolls x 1  -BC     Recorded by [AB] Missy Pelayo OT [BC] Ursula Concepcion, OT [AG] Becka No, PT    Fine Motor Coordination Training    Detail (Fine Motor Coordination Training)  Peg board, hand , bean bag toss,   -BC     Recorded by  [BC] Ursula Concepcion OT     Positioning and Restraints    Pre-Treatment Position  sitting in chair/recliner  -BC sitting in chair/recliner  -AG    Post Treatment Position  bed  -BC wheelchair  -AG    In Bed  supine;call light within reach  -BC     In Wheelchair sitting;call light within reach;encouraged to call for assist;legs elevated  -AB  sitting;with OT;RLE elevated  -AG    Recorded by [AB] Missy Pelayo OT [BC] Ursula Concepcion OT [AG] Becka No, PT      06/09/17 1646 06/09/17 1453       Rehab Assessment/Intervention    Discipline physical therapy assistant  -LL occupational therapist  -AB     Document Type therapy note (daily note)   BID treatment session  -LL therapy note (daily note)  -AB     Subjective  Information no complaints;agree to therapy  -LL no complaints;agree to therapy  -AB     Patient Effort, Rehab Treatment good  -LL good  -AB     Precautions/Limitations fall precautions;non-weight bearing status;seizure precautions   skin integrity, intermittent confusion  -LL fall precautions;non-weight bearing status;seizure precautions;other (see comments)   NWB RLE; <skin integrity; intermittent confusion  -AB     Patient Response to Treatment Patient did well with BID treatment sessino with adequate rest breaks.  -LL Pt. w/ good tolerance and rest breaks provided. Improvements noted w/ self care tasks. See ADL Assess for updated levels.   -AB     Recorded by [LL] Roberta Friedman PTA [AB] Missy Pelayo, [place]     Pain Assessment    Pain Assessment Levin-Orozco FACES  -LL      Levin-Baker FACES Pain Rating 6  -LL      Pain Type Surgical pain  -LL      Pain Location Hip  -LL      Pain Orientation Right  -LL      Pain Intervention(s) Repositioned;Rest  -LL      Recorded by [LL] Roberta Friedman PTA      Vision Assessment/Intervention    Visual Impairment WFL with corrective lenses  -LL      Recorded by [LL] Roberta Friedman PTA      Cognitive Assessment/Intervention    Current Cognitive/Communication Assessment impaired  -LL      Orientation Status oriented to;person;place;situation;disoriented to;time  -LL      Follows Commands/Answers Questions able to follow single-step instructions;100% of the time;needs cueing  -LL able to follow single-step instructions;100% of the time;needs cueing  -AB     Personal Safety decreased awareness, need for assist;decreased awareness, need for safety  -LL decreased awareness, need for assist;decreased awareness, need for safety  -AB     Personal Safety Interventions gait belt;nonskid shoes/slippers when out of bed  -LL gait belt;nonskid shoes/slippers when out of bed;supervised activity  -AB     Recorded by [LL] Roberta Friedman PTA [AB] Missy Pelayo, OT     Mobility  Assessment/Training    Extremity Weight-Bearing Status right lower extremity  -LL right lower extremity  -AB     Right Lower Extremity Weight-Bearing non weight-bearing  -LL non weight-bearing  -AB     Recorded by [LL] Roberta Friedman PTA [AB] Missy Pelayo OT     Bed Mobility, Assessment/Treatment    Bed Mobility, Assistive Device bed rails  -LL      Bed Mobility, Roll Left, [place] contact guard assist  -LL      Bed Mobility, Roll Right, [place] contact guard assist  -LL      Bed Mobility, Scoot/Bridge, [place] minimum assist (75% patient effort)  -LL      Bed Mob, Supine to Sit, [place] minimum assist (75% patient effort)  -LL      Bed Mob, Sit to Supine, [place] minimum assist (75% patient effort)  -LL      Bed Mobility, Safety Issues decreased use of legs for bridging/pushing  -LL      Bed Mobility, Impairments ROM decreased;strength decreased;impaired balance;coordination impaired;pain  -LL      Recorded by [LL] Roberta Friedman PTA      Transfer Assessment/Treatment    Transfers, Bed-Chair [place] minimum assist (75% patient effort);verbal cues required;nonverbal cues required (demo/gesture)  -LL      Transfers, Chair-Bed [place] minimum assist (75% patient effort);verbal cues required;nonverbal cues required (demo/gesture)  -LL      Transfers, Bed-Chair-Bed, Assist Device rolling walker  -LL      Transfers, Sit-Stand [place] minimum assist (75% patient effort);verbal cues required;nonverbal cues required (demo/gesture)  -LL      Transfers, Stand-Sit [place] minimum assist (75% patient effort);verbal cues required;nonverbal cues required (demo/gesture)  -LL      Transfers, Sit-Stand-Sit, Assist Device bed rails;rolling walker  -LL      Toilet Transfer, [place] minimum assist (75% patient effort);nonverbal cues required (demo/gesture);verbal cues required  -LL minimum assist (75% patient effort);verbal cues required;nonverbal cues required  (demo/gesture)  -AB     Toilet Transfer, Assistive Device rolling walker;elevated toilet seat   Grab bars  -LL elevated toilet seat;wheelchair;other (see comments)   grab bar  -AB     Transfer, Safety Issues balance decreased during turns;step length decreased  -LL      Transfer, Impairments strength decreased;impaired balance;coordination impaired  -LL      Recorded by [LL] Roberta Friedman PTA [AB] Missy Pelayo OT     Gait Assessment/Treatment    Gait, [place] Level minimum assist (75% patient effort);2 person assist required;verbal cues required;nonverbal cues required (demo/gesture)  -LL      Gait, Assistive Device rolling walker  -LL      Gait, Distance (Feet) 20   then 40 in am; 40 x 2 in pm  -LL      Gait, Gait Pattern Analysis swing-to gait  -LL      Gait, Gait Deviations rossy decreased;decreased heel strike;limb motion velocity decreased;step length decreased;weight-shifting ability decreased  -LL      Gait, Maintain Weight Bearing Status cues to maintain weight bearing status  -LL      Gait, Safety Issues balance decreased during turns;step length decreased;weight-shifting ability decreased  -LL      Gait, Impairments strength decreased;impaired balance;coordination impaired  -LL      Recorded by [LL] Roberta Friedman PTA      Stairs Assessment/Treatment    Stairs, [place] Level not tested  -LL      Recorded by [LL] Roberta Friedman PTA      Upper Body Bathing Assessment/Training    UB Bathing Assess/Train, Position  sitting  -AB     UB Bathing Assess/Train, [place] Level  supervision required;set up required  -AB     UB Bathing Assess/Train, Comment  TB Bathing: [place]  -AB     Recorded by  [AB] Missy Pelayo OT     Lower Body Bathing Assessment/Training    LB Bathing Assess/Train Assistive Device  long-handled sponge;grab bars  -AB     LB Bathing Assess/Train, Position  sitting;standing  -AB     LB Bathing Assess/Train, [place] Level  minimum assist (75% patient  effort);verbal cues required;nonverbal cues required (demo/gesture)  -AB     LB Bathing Assess/Train, Comment  TB Bathing: [place]  -AB     Recorded by  [AB] Missy Pelayo OT     Upper Body Dressing Assessment/Training    UB Dressing Assess/Train, Position  sitting  -AB     UB Dressing Assess/Train, [place]  supervision required;set up required  -AB     Recorded by  [AB] Missy Pelayo OT     Lower Body Dressing Assessment/Training    LB Dressing Assess/Train, Clothing Type  donning:;doffing:;slipper socks;pants  -AB     LB Dressing Assess/Train, Assist Device  reacher;sock-aid  -AB     LB Dressing Assess/Train, Position  sitting  -AB     LB Dressing Assess/Train, [place]  minimum assist (75% patient effort);verbal cues required;nonverbal cues required (demo/gesture)  -AB     Recorded by  [AB] Missy Pelayo OT     Toileting Assessment/Training    Toileting Assess/Train, Assistive Device  grab bars;raised toilet seat  -AB     Toileting Assess/Train, Position  sitting  -AB     Toileting Assess/Train, [place]ep[place] Level  minimum assist (75% patient effort);nonverbal cues required (demo/gesture);verbal cues required  -AB     Recorded by  [AB] Missy Pelayo OT     Grooming Assessment/Training    Grooming Assess/Train, Position  sitting  -AB     Grooming Assess/Train, [place]ep[place] Level  supervision required;set up required  -AB     Recorded by  [AB] Missy Pelayo OT     Therapy Exercises    Bilateral Lower Extremities AROM:;15 reps;sitting;supine  -LL      BLE Resistance theraband  -LL      Bilateral Upper Extremity  AROM:;sitting   BUE CoordEx; G/FMC TherEx/Act; Strengthening  -AB     Recorded by [LL] Roberta Friedman PTA [AB] Missy Pelayo, [place]     Positioning and Restraints    Pre-Treatment Position --   WC with OT in am; WC with activities in pm  -LL      Post Treatment Position bed   in am & pm  -LL      In Bed supine;call light within reach;encouraged to call for  assist;exit alarm on;side rails up x3;RLE elevated  -LL      In Wheelchair  sitting;with PT;legs elevated  -AB     Recorded by [LL] Roberta Friedman, PTA [AB] Missy Pelayo, OT       User Key  (r) = Recorded By, (t) = Taken By, (c) = Cosigned By    Initials Name Effective Dates    AB Missy Pelayo, OT 02/04/16 -     AG Becka No, PT 03/14/16 -     LL Roberta Friedman, PTA 05/02/16 -     BC Ursula Concepcion, OT 01/21/17 -                 OT Goals       06/07/17 1231          Transfer Training OT LTG    Transfer Training OT LTG, Date Established 06/07/17  -AB      Transfer Training OT LTG, Time to Achieve by discharge  -AB      Transfer Training OT LTG, Activity Type all transfers  -AB      Transfer Training OT LTG, [place] Level contact guard assist  -AB      Patient Education OT LTG    Patient Education OT LTG, Date Established 06/07/17  -AB      Patient Education OT LTG, Time to Achieve by discharge  -AB      Patient Education OT LTG, Education Type HEP;adaptive equipment mgmt;home safety;work simplification  -AB      Bathing OT STG    Bathing Goal OT STG, Date Established 06/07/17  -AB      Bathing Goal OT STG, Time to Achieve 5 - 7 days  -AB      Bathing Goal OT STG, [place] Level minimum assist (75% patient effort)  -AB      Bathing OT LTG    Bathing Goal OT LTG, Date Established 06/07/17  -AB      Bathing Goal OT LTG, Time to Achieve by discharge  -AB      Bathing Goal OT LTG, [place] Level contact guard assist  -AB      Eating Self-Feeding OT LTG    Eat Self Feeding Goal OT LTG, Date Established 06/07/17  -AB      Eat Self Feeding Goal OT LTG, Time to Achieve by discharge  -AB      Eat Self Feed Goal OT LTG, [place] Level conditional independence  -AB      Toileting OT LTG    Toileting Goal OT LTG, Date Established 06/07/17  -AB      Toileting Goal OT LTG, Time to Achieve by discharge  -AB      Toileting Goal OT LTG, [place] Level contact guard assist  -AB      LB  Dressing OT STG    LB Dressing Goal OT STG, Date Established 06/07/17  -AB      LB Dressing Goal OT STG, Time to Achieve 5 - 7 days  -AB      LB Dressing Goal OT STG, [place] Level moderate assist (50% patient effort)  -AB      LB Dressing OT LTG    LB Dressing Goal OT LTG, Date Established 06/07/17  -AB      LB Dressing Goal OT LTG, Time to Achieve by discharge  -AB      LB Dressing Goal OT LTG, [place] Level minimum assist (75% patient effort)  -AB        User Key  (r) = Recorded By, (t) = Taken By, (c) = Cosigned By    Initials Name Provider Type    AB Missy Pelayo OT Occupational Therapist          Occupational Therapy Education     Title: PT OT SLP Therapies (Active)     Topic: Occupational Therapy (Active)     Point: ADL training (Active)    Description: Instruct learner(s) on proper safety adaptation and remediation techniques during self care or transfers.   Instruct in proper use of assistive devices.    Learning Progress Summary    Learner Readiness Method Response Comment Documented by Status   Patient Acceptance E,D NR  AB 06/12/17 0946 Active    Acceptance E NR  BC 06/10/17 1542 Active    Acceptance E,D NR  AB 06/09/17 1457 Active    Acceptance E,D NR  AB 06/08/17 1632 Active    Acceptance E,D NR  AB 06/07/17 1234 Active               Point: Precautions (Active)    Description: Instruct learner(s) on prescribed precautions during self-care and functional transfers.    Learning Progress Summary    Learner Readiness Method Response Comment Documented by Status   Patient Acceptance E,D NR  AB 06/12/17 0946 Active    Acceptance E,D NR  AB 06/09/17 1457 Active    Acceptance E,D NR  AB 06/08/17 1632 Active    Acceptance E,D NR  AB 06/07/17 1234 Active               Point: Body mechanics (Active)    Description: Instruct learner(s) on proper positioning and spine alignment during self-care, functional mobility activities and/or exercises.    Learning Progress Summary    Learner Readiness  Method Response Comment Documented by Status   Patient Acceptance E,D NR  AB 06/12/17 0946 Active    Acceptance E,D NR  AB 06/09/17 1457 Active    Acceptance E,D NR  AB 06/08/17 1632 Active    Acceptance E,D NR  AB 06/07/17 1234 Active                      User Key     Initials Effective Dates Name Provider Type Discipline    AB 02/04/16 -  Missy Pelayo, OT Occupational Therapist OT    BC 01/21/17 -  Ursula Concepcion, OT Occupational Therapist OT                  OT Recommendation and Plan  Anticipated Equipment Needs At Discharge:  (TBD)  Anticipated Discharge Disposition:  (TBD)  Planned Therapy Interventions: activity intolerance, adaptive equipment training, ADL retraining, energy conservation, fine motor coordination training, home exercise program, motor coordination training, ROM (Range of Motion), strengthening, transfer training, other (see comments) (Therapeutic groups as beneficial to patient)  Therapy Frequency: 3-5 times/wk          Time Calculation:         Time Calculation- OT       06/12/17 0946          Time Calculation- OT    OT Start Time 0745  -AB      OT Stop Time 0915  -AB      OT Time Calculation (min) 90 min  -AB      Total Timed Code Minutes- OT 90 minute(s)  -AB      OT Non-Billable Time (min) 25 min  -AB        User Key  (r) = Recorded By, (t) = Taken By, (c) = Cosigned By    Initials Name Provider Type    AB Missy Pelayo OT Occupational Therapist           Therapy Charges for Today     Code Description Service Date Service Provider Modifiers Qty    19015033112 HC OT SELF CARE/MGMT/TRAIN EA 15 MIN 6/12/2017 Missy Pelayo OT GO 1    29372909316 HC OT THERAPEUTIC ACT EA 15 MIN 6/12/2017 Missy Pelayo OT GO 5    21335824067 HC OT THER SUPP EA 15 MIN 6/12/2017 Missy Pelayo OT GO 1               Missy Pelayo OT  6/12/2017

## 2017-06-12 NOTE — PROGRESS NOTES
Inpatient Rehabilitation Functional Measures Assessment and Plan of Care    Plan of Care      Functional Measures  RAS Eating:  RAS Grooming:  RAS Bathing:  RAS Upper Body Dressing:  RAS Lower Body Dressing:  RAS Toileting:    RAS Bladder Management  Level of Assistance:  Frequency/Number of Accidents this Shift:    RAS Bowel Management  Level of Assistance:  Frequency/Number of Accidents this Shift:    RAS Bed/Chair/Wheelchair Transfer:  RAS Toilet Transfer:  RAS Tub/Shower Transfer:    Previously Documented Mode of Locomotion at Discharge:  RAS Expected Mode of Locomotion at Discharge:  RAS Walk/Wheelchair:  RAS Stairs:    RAS Comprehension:  Auditory comprehension is the usual mode. Comprehension  Score = 6, Modified [place].  Patient comprehends complex/abstract  information in their primary language, requiring: Additional time.  RAS Expression:  Vocal expression is the usual mode. Expression Score = 6,  Modified Independent.  Patient expresses complex/abstract information in their  primary language, requiring: Additional time.  RAS Social Interaction:  Social Interaction Score = 6, Modified Independent.  Patient is modified independent for social interaction, requiring: Requires  additional time.  RAS Problem Solving:  Problem Solving Score = 6, Modified [place].  Patient  makes appropriate decisions in order to solve complex problems, but requires  extra time.  RAS Memory:  Memory Score = 6, Modified [place].  Patient is modified  independent for memory, requiring: Requires additional time.    Therapy Mode Minutes  Occupational Therapy:  Physical Therapy:  Speech Language Pathology:    Discharge Functional Goals:    Body Function Structure    Skin Integrity (Active)  Current Status (6/6/2017 9:00:00 PM): impaired skin integrity  Weekly Goal: prevent further skin integrity impairment  Discharge Goal: signs of regaining skin integrity    Safety    Potential for Injury (Active)  Current Status  (6/6/2017 9:00:00 PM): risk for falls  Weekly Goal: remain free of falls  Discharge Goal: identify measures to prevent falls    RN Interventions    Body Function Structure -  RN: assess skin every shift and prn [RN]  RN: apply moisture barrier cream as needed [RN]    Safety -  RN: encourage to call for help [RN]  RN: items within reach at all times [RN]  RN: BED ALARM [RN]    Signed by: Dorothea Crabtree Nurse

## 2017-06-12 NOTE — THERAPY TREATMENT NOTE
Inpatient Rehabilitation - Physical Therapy Treatment Note   [place]     Patient Name: Maribel Staton  : 1945  MRN: [id number]  Today's Date: 2017  Onset of Illness/Injury or Date of Surgery Date: 17  Date of Referral to PT: 17  Referring Physician: Eva    Admit Date: 2017    Visit Dx:  No diagnosis found.  Patient Active Problem List   Diagnosis   • Closed right hip fracture               Adult Rehabilitation Note       17 1503 17 0941 06/10/17 1500    Rehab Assessment/Intervention    Discipline physical therapy assistant  -LL occupational therapist  -AB occupational therapist  -BC    Document Type therapy note (daily note)   BID treatment session  -LL therapy note (daily note)  -AB therapy note (daily note)  -BC    Subjective Information no complaints;agree to therapy  -LL no complaints;agree to therapy  -AB agree to therapy;no complaints  -BC    Patient Effort, Rehab Treatment good  -LL good  -AB good  -BC    Precautions/Limitations fall precautions;non-weight bearing status;seizure precautions;other (see comments)   NWB RLE; skin integrity, intermittent confusion  -LL fall precautions;non-weight bearing status;seizure precautions;other (see comments)   NWB RLE; <skin integrity; intermittent confusion  -AB fall precautions;non-weight bearing status;seizure precautions  -BC    Patient Response to Treatment Patient did well with BID treatment session with adequate rest breaks.  -LL Pt. w/ good tolerance and rest breaks provided.   -AB Pt. tolerated tx. well with frequent rest breaks at table top tasks  -BC    Recorded by [LL] Roberta Friedman PTA [AB] Missy Pelayo, OT [BC] Ursula Concepcion, OT    Pain Assessment    Pain Assessment Levin-Orozco FACES  -LL      Levin-Baker FACES Pain Rating 6  -LL      Pain Type Acute pain;Surgical pain  -LL      Pain Location Hip  -LL      Pain Orientation Right  -LL      Pain Intervention(s) Repositioned;Rest  -LL      Recorded by  [LL] Roberta Friedman PTA      Cognitive Assessment/Intervention    Current Cognitive/Communication Assessment functional  -LL      Orientation Status oriented to;person;place;situation;disoriented to;time  -LL      Follows Commands/Answers Questions able to follow single-step instructions;100% of the time;needs cueing  -LL able to follow single-step instructions;100% of the time;needs cueing  -AB     Personal Safety decreased awareness, need for assist;decreased awareness, need for safety  -LL decreased awareness, need for assist;decreased awareness, need for safety  -AB     Personal Safety Interventions gait belt;nonskid shoes/slippers when out of bed;supervised activity  -LL gait belt;nonskid shoes/slippers when out of bed;supervised activity  -AB     Recorded by [LL] Roberta Friedman PTA [AB] Missy Pelayo OT     Mobility Assessment/Training    Extremity Weight-Bearing Status right lower extremity  -LL      Right Lower Extremity Weight-Bearing non weight-bearing  -LL      Recorded by [LL] Roberta Friedman PTA      Bed Mobility, Assessment/Treatment    Bed Mobility, Scoot/Bridge, [place] minimum assist (75% patient effort)  -LL      Bed Mob, Supine to Sit, [place] contact guard assist;minimum assist (75% patient effort)  -LL      Bed Mob, Sit to Supine, [place] minimum assist (75% patient effort)  -LL      Bed Mobility, Safety Issues decreased use of legs for bridging/pushing  -LL      Bed Mobility, Impairments ROM decreased;strength decreased;impaired balance;coordination impaired;pain  -LL      Recorded by [LL] Roberta Friedman PTA      Transfer Assessment/Treatment    Transfers, Bed-Chair [place] verbal cues required;nonverbal cues required (demo/gesture);contact guard assist;minimum assist (75% patient effort)  -LL minimum assist (75% patient effort);contact guard assist;verbal cues required;nonverbal cues required (demo/gesture)  -AB     Transfers, Chair-Bed [place] contact  guard assist;minimum assist (75% patient effort)  -LL      Transfers, Bed-Chair-Bed, Assist Device rolling walker  -LL rolling walker;other (see comments)   w/c  -AB     Transfers, Sit-Stand [place] verbal cues required;nonverbal cues required (demo/gesture);contact guard assist;minimum assist (75% patient effort)  -LL      Transfers, Stand-Sit [place] verbal cues required;nonverbal cues required (demo/gesture);contact guard assist;minimum assist (75% patient effort)  -LL      Transfers, Sit-Stand-Sit, Assist Device rolling walker  -LL      Transfer, Safety Issues balance decreased during turns  -LL      Transfer, Impairments ROM decreased;strength decreased;impaired balance;coordination impaired;pain  -LL      Recorded by [LL] Roberta Friedman PTA [AB] Missy Pelayo OT     Gait Assessment/Treatment    Gait, [place] Level contact guard assist;minimum assist (75% patient effort)  -LL      Gait, Assistive Device rolling walker  -LL      Gait, Distance (Feet) 70   then 60 in am; 40 x 2 in pm  -LL      Gait, Gait Pattern Analysis swing-to gait  -LL      Gait, Gait Deviations antalgic;decreased heel strike;limb motion velocity decreased;step length decreased;weight-shifting ability decreased  -LL      Gait, Maintain Weight Bearing Status able to maintain weight bearing status  -LL      Gait, Safety Issues balance decreased during turns;step length decreased;weight-shifting ability decreased  -LL      Gait, Impairments ROM decreased;strength decreased;impaired balance;coordination impaired;pain  -LL      Recorded by [LL] Roberta Friedman PTA      Toileting Assessment/Training    Toileting Assess/Train, Assistive Device   grab bars;raised toilet seat  -BC    Toileting Assess/Train, Position   sitting  -BC    Toileting Assess/Train, Indepen Level   minimum assist (75% patient effort)  -BC    Recorded by   [BC] Ursula Concepcion, [place]    Grooming Assessment/Training    Grooming Assess/Train, Position   sitting;sink side  -AB sitting;sink side  -BC    Grooming Assess/Train, Indepen Level  supervision required;set up required  -AB supervision required;nonverbal cues required (demo/gesture)  -BC    Recorded by  [AB] Missy Pelayo OT [BC] Ursula Concepcion, OT    Balance Skills Training    Sitting-Level of Assistance Distant supervision  -LL      Sitting-Balance Support Feet supported  -LL      Standing-Level of Assistance Contact guard  -LL      Static Standing Balance Support assistive device  -LL      Gait Balance-Level of Assistance Contact guard;Minimum assistance  -LL      Gait Balance Support assistive device  -LL      Recorded by [LL] Roberta Friedman PTA      Therapy Exercises    Bilateral Lower Extremities AROM:;25 reps;supine;sitting  -LL      BLE Resistance theraband  -LL      Bilateral Upper Extremity  AROM:;sitting   BUE CoordEx; G/FMC TherEx/Act; Strengthening  -AB AROM:   BUE, G/FM coordination ther ex. act., strengtherning   -BC    BUE Resistance  manual resistance- minimal;other (comment)   ArmBike: [id number], min resistance; BUE Wrist Roll X1  -AB --   wrist rolls x 1  -BC    Recorded by [LL] Roberta Friedman PTA [AB] Missy Pelayo OT [BC] Ursula Concepcion, OT    Fine Motor Coordination Training    Detail (Fine Motor Coordination Training)   Peg board, hand , bean bag toss,   -BC    Recorded by   [BC] Ursula Concepcion OT    Positioning and Restraints    Pre-Treatment Position --   WC in room in am & pm  -LL  sitting in chair/recliner  -BC    Post Treatment Position wheelchair  -LL  bed  -BC    In Bed   supine;call light within reach  -BC    In Wheelchair sitting;call light within reach;encouraged to call for assist;legs elevated   in room in am; with activities in pm  -LL sitting;call light within reach;encouraged to call for assist;legs elevated  -AB     Recorded by [LL] Roberta Friedman PTA [AB] Missy Pelayo OT [BC] Ursula Concepcion, [place]      06/10/17 1339 06/09/17 5251        Rehab Assessment/Intervention    Discipline physical therapist  - physical therapy assistant  -     Document Type therapy note (daily note)  - therapy note (daily note)   BID treatment session  -LL     Subjective Information agree to therapy;complains of;pain  -AG no complaints;agree to therapy  -LL     Patient Effort, Rehab Treatment good  -AG good  -LL     Precautions/Limitations  fall precautions;non-weight bearing status;seizure precautions   skin integrity, intermittent confusion  -LL     Patient Response to Treatment tolerated well with rest breaks; incr c/o pain in PM.  -AG Patient did well with BID treatment sessino with adequate rest breaks.  -LL     Recorded by [AG] Becka No, PT [LL] Roberta Friedman PTA     Pain Assessment    Pain Assessment Levin-Orozco FACES  -AG Levin-Orozco FACES  -LL     Levin-Baker FACES Pain Rating 6  -AG 6  -LL     Pain Type Acute pain;Surgical pain  -AG Surgical pain  -LL     Pain Location Hip  -AG Hip  -LL     Pain Orientation Right  -AG Right  -LL     Pain Intervention(s) Repositioned;Rest  -AG Repositioned;Rest  -LL     Recorded by [AG] Becka No, PT [LL] Roberta Friedman PTA     Vision Assessment/Intervention    Visual Impairment  WFL with corrective lenses  -LL     Recorded by  [LL] Roberta Friedman PTA     Cognitive Assessment/Intervention    Current Cognitive/Communication Assessment functional  -AG impaired  -LL     Orientation Status  oriented to;person;place;situation;disoriented to;time  -LL     Follows Commands/Answers Questions 100% of the time;able to follow single-step instructions;needs cueing  -AG able to follow single-step instructions;100% of the time;needs cueing  -LL     Personal Safety decreased awareness, need for assist;decreased awareness, need for safety  -AG decreased awareness, need for assist;decreased awareness, need for safety  -LL     Personal Safety Interventions gait belt;nonskid shoes/slippers when out of bed;supervised activity  -AG gait  belt;nonskid shoes/slippers when out of bed  -LL     Recorded by [AG] Becka No, PT [LL] Roberta Friedman PTA     Mobility Assessment/Training    Extremity Weight-Bearing Status right lower extremity  -AG right lower extremity  -LL     Right Lower Extremity Weight-Bearing non weight-bearing  -AG non weight-bearing  -LL     Recorded by [AG] Becka No, PT [LL] Roberta Friedman PTA     Bed Mobility, Assessment/Treatment    Bed Mobility, Assistive Device  bed rails  -LL     Bed Mobility, Roll Left, [place]  contact guard assist  -LL     Bed Mobility, Roll Right, [place]  contact guard assist  -LL     Bed Mobility, Scoot/Bridge, [place] minimum assist (75% patient effort)  -AG minimum assist (75% patient effort)  -LL     Bed Mob, Supine to Sit, [place] contact guard assist;minimum assist (75% patient effort)  -AG minimum assist (75% patient effort)  -LL     Bed Mob, Sit to Supine, [place] minimum assist (75% patient effort)  -AG minimum assist (75% patient effort)  -LL     Bed Mobility, Safety Issues decreased use of legs for bridging/pushing  -AG decreased use of legs for bridging/pushing  -LL     Bed Mobility, Impairments ROM decreased;strength decreased;impaired balance;coordination impaired;pain  -AG ROM decreased;strength decreased;impaired balance;coordination impaired;pain  -LL     Recorded by [AG] Becka No, PT [LL] Roberta Friedman PTA     Transfer Assessment/Treatment    Transfers, Bed-Chair [place] verbal cues required;nonverbal cues required (demo/gesture);contact guard assist;minimum assist (75% patient effort)  -AG minimum assist (75% patient effort);verbal cues required;nonverbal cues required (demo/gesture)  -LL     Transfers, Chair-Bed [place] contact guard assist;minimum assist (75% patient effort)  -AG minimum assist (75% patient effort);verbal cues required;nonverbal cues required (demo/gesture)  -LL     Transfers, Bed-Chair-Bed, Assist Device rolling walker   -AG rolling walker  -LL     Transfers, Sit-Stand [place] verbal cues required;nonverbal cues required (demo/gesture);contact guard assist;minimum assist (75% patient effort)  -AG minimum assist (75% patient effort);verbal cues required;nonverbal cues required (demo/gesture)  -LL     Transfers, Stand-Sit [place] verbal cues required;nonverbal cues required (demo/gesture);contact guard assist;minimum assist (75% patient effort)  -AG minimum assist (75% patient effort);verbal cues required;nonverbal cues required (demo/gesture)  -LL     Transfers, Sit-Stand-Sit, Assist Device rolling walker  -AG bed rails;rolling walker  -LL     Toilet Transfer, [place]  minimum assist (75% patient effort);nonverbal cues required (demo/gesture);verbal cues required  -LL     Toilet Transfer, Assistive Device  rolling walker;elevated toilet seat   Grab bars  -LL     Transfer, Maintain Weight Bearing Status cues to maintain weight bearing status  -AG      Transfer, Safety Issues balance decreased during turns  -AG balance decreased during turns;step length decreased  -LL     Transfer, Impairments ROM decreased;strength decreased;impaired balance;coordination impaired;pain  -AG strength decreased;impaired balance;coordination impaired  -LL     Recorded by [AG] Becka No, PT [LL] Roberta Friedman PTA     Gait Assessment/Treatment    Gait, [place] Level contact guard assist;minimum assist (75% patient effort)  -AG minimum assist (75% patient effort);2 person assist required;verbal cues required;nonverbal cues required (demo/gesture)  -LL     Gait, Assistive Device rolling walker  -AG rolling walker  -LL     Gait, Distance (Feet) 60   plus 40 additional ft following rest break.  -AG 20   then 40 in am; 40 x 2 in pm  -LL     Gait, Gait Pattern Analysis swing-to gait  -AG swing-to gait  -LL     Gait, Gait Deviations antalgic;[place] decreased;forward flexed posture;step length decreased  -AG rossy decreased;decreased  heel strike;limb motion velocity decreased;step length decreased;weight-shifting ability decreased  -LL     Gait, Maintain Weight Bearing Status cues to maintain weight bearing status  -AG cues to maintain weight bearing status  -LL     Gait, Safety Issues balance decreased during turns  -AG balance decreased during turns;step length decreased;weight-shifting ability decreased  -LL     Gait, Impairments ROM decreased;strength decreased;impaired balance;coordination impaired;pain  -AG strength decreased;impaired balance;coordination impaired  -LL     Recorded by [AG] Becka No, PT [LL] Roberta Friedman PTA     Stairs Assessment/Treatment    Stairs, [place] Level  not tested  -LL     Recorded by  [LL] Roberta Friedman PTA     Motor Skills/Interventions    Additional Documentation Balance Skills Training (Group)  -AG      Recorded by [AG] Becka No PT      Balance Skills Training    Sitting-Level of Assistance Distant supervision  -AG      Sitting-Balance Support Feet supported  -AG      Standing-Level of Assistance Contact guard  -AG      Static Standing Balance Support assistive device  -AG      Gait Balance-Level of Assistance Contact guard;Minimum assistance  -AG      Gait Balance Support assistive device  -AG      Recorded by [AG] Becka No PT      Therapy Exercises    Bilateral Lower Extremities AROM:;25 reps;supine;sitting  -AG AROM:;15 reps;sitting;supine  -LL     BLE Resistance theraband  -AG theraband  -LL     Recorded by [AG] Becka No, PT [LL] Roberta Friedman PTA     Positioning and Restraints    Pre-Treatment Position sitting in chair/recliner  -AG --   WC with OT in am; WC with activities in pm  -LL     Post Treatment Position wheelchair  -AG bed   in am & pm  -LL     In Bed  supine;call light within reach;encouraged to call for assist;exit alarm on;side rails up x3;RLE elevated  -LL     In Wheelchair sitting;with OT;RLE elevated  -AG      Recorded by [AG] Becka No, PT [LL]  Roberta Friedman, PTA       User Key  (r) = Recorded By, (t) = Taken By, (c) = Cosigned By    Initials Name Effective Dates    AB Missy Pelayo, OT 02/04/16 -     AG Becka No, PT 03/14/16 -     LL Roberta Friedman, PTA 05/02/16 -     BC Ursula Cocoa, OT 01/21/17 -                 IP PT Goals       06/07/17 1703          Bed Mobility PT STG    Bed Mobility PT STG, Date Established 06/07/17  -CT      Bed Mobility PT STG, Time to Achieve 5 - 7 days  -CT      Bed Mobility PT STG, Activity Type all bed mobility  -CT      Bed Mobility PT STG, [place] Level contact guard assist  -CT      Transfer Training Goal, Assist Device bed rails  -CT      Bed Mobility PT LTG    Bed Mobility PT LTG, Date Established 06/07/17  -CT      Bed Mobility PT LTG, Time to Achieve by discharge  -CT      Bed Mobility PT LTG, Activity Type all bed mobility  -CT      Bed Mobility PT LTG, [place] Level conditional independence;supervision required  -CT      Transfer Training PT STG    Transfer Training PT STG, Date Established 06/07/17  -CT      Transfer Training PT STG, Time to Achieve 5 - 7 days  -CT      Transfer Training PT STG, Activity Type all transfers  -CT      Transfer Training PT STG, [place] Level contact guard assist  -CT      Transfer Training PT STG, Assist Device other (see comments)   with appropriate AD  -CT      Transfer Training PT LTG    Transfer Training PT LTG, Date Established 06/07/17  -CT      Transfer Training PT LTG, Time to Achieve by discharge  -CT      Transfer Training PT LTG, Activity Type all transfers  -CT      Transfer Training PT LTG, [place] Level conditional independence;supervision required  -CT      Transfer Training PT LTG, Assist Device other (see comments)   with appropriate AD  -CT      Gait Training PT STG    Gait Training Goal PT STG, Date Established 06/07/17  -CT      Gait Training Goal PT STG, Time to Achieve 5 - 7 days  -CT      Gait Training Goal PT STG,  [place] Level contact guard assist  -CT      Gait Training Goal PT STG, Assist Device walker, rolling  -CT      Gait Training Goal PT STG, Distance to Achieve 50  -CT      Gait Training PT LTG    Gait Training Goal PT LTG, Date Established 06/07/17  -CT      Gait Training Goal PT LTG, Time to Achieve by discharge  -CT      Gait Training Goal PT LTG, [place] Level conditional independence;supervision required  -CT      Gait Training Goal PT LTG, Assist Device walker, rolling  -CT      Gait Training Goal PT LTG, Distance to Achieve 75  -CT      Patient Education PT LTG    Patient Education PT LTG, Date Established 06/07/17  -CT      Patient Education PT LTG, Time to Achieve by discharge  -CT      Patient Education PT LTG, Education Type written program;HEP;precaution per surgeon;positioning;posture/body mechanics;gait;transfers;bed mobility;pain management;progression of POC;benefits of activity;home safety;equipment management;skin care/inspection;energy conservation  -CT      Patient Education PT LTG, Education Understanding demonstrate adequately;verbalize understanding  -CT        User Key  (r) = Recorded By, (t) = Taken By, (c) = Cosigned By    Initials Name Provider Type    CT Yuli Moreno PT Physical Therapist          Physical Therapy Education     Title: PT OT SLP Therapies (Active)     Topic: Physical Therapy (Done)     Point: Mobility training (Done)    Learning Progress Summary    Learner Readiness Method Response Comment Documented by Status   Patient Acceptance ED KERANR  LL 06/12/17 1511 Done    Acceptance ED KERA DE ANDA   06/10/17 1345 Done    Acceptance EELINR  LL 06/09/17 1700 Done    Acceptance ED NR  LL 06/08/17 1820 Active    Acceptance E ADILSON  CT 06/07/17 1707 Active               Point: Home exercise program (Done)    Learning Progress Summary    Learner Readiness Method Response Comment Documented by Status   Patient Acceptance ED KERANR  LL 06/12/17 1511 Done    Acceptance ELI BEGUM  NR,VU  AG 06/10/17 1345 Done               Point: Body mechanics (Done)    Learning Progress Summary    Learner Readiness Method Response Comment Documented by Status   Patient Acceptance E,D VU,NR  LL 06/12/17 1511 Done    Acceptance E,D NR,VU  AG 06/10/17 1345 Done    Acceptance E,D VU,NR  LL 06/09/17 1700 Done    Acceptance E,D NR  LL 06/08/17 1820 Active    Acceptance E NR  CT 06/07/17 1707 Active               Point: Precautions (Done)    Learning Progress Summary    Learner Readiness Method Response Comment Documented by Status   Patient Acceptance E,D VU,NR  LL 06/12/17 1511 Done    Acceptance E,D NR,VU  AG 06/10/17 1345 Done    Acceptance E,D VU,NR  LL 06/09/17 1700 Done    Acceptance E,D NR  LL 06/08/17 1820 Active    Acceptance E NR  CT 06/07/17 1707 Active                      User Key     Initials Effective Dates Name Provider Type Discipline     03/14/16 -  Becka No, PT Physical Therapist PT    CT 03/14/16 -  Yuli Moreno, PT Physical Therapist PT     05/02/16 -  Roberta Friedman PTA Physical Therapy Assistant PT                    PT Recommendation and Plan  Anticipated Equipment Needs At Discharge:  (to be determined)  Anticipated Discharge Disposition: home with assist, home with home health  Planned Therapy Interventions: balance training, bed mobility training, gait training, home exercise program, manual therapy techniques, neuromuscular re-education, motor coordination training, patient/family education, postural re-education, ROM (Range of Motion), stair training, strengthening, transfer training  PT Frequency: 2 times/day, 5 times/wk, per priority policy            Time Calculation:         PT Charges       06/12/17 1512 06/12/17 1511       Time Calculation    Start Time 1245  -LL 1000  -LL     Stop Time 1330  -LL 1045  -LL     Time Calculation (min) 45 min  -LL 45 min  -LL     PT Non-Billable Time (min)  15 min  -LL     PT Received On  06/12/17  -LL     PT Goal Re-Cert Due Date   06/14/17  -LL     Time Calculation- PT    Total Timed Code Minutes- PT 45 minute(s)  -LL 45 minute(s)  -LL       User Key  (r) = Recorded By, (t) = Taken By, (c) = Cosigned By    Initials Name Provider Type    PRICILA Friedman PTA Physical Therapy Assistant          Therapy Charges for Today     Code Description Service Date Service Provider Modifiers Qty    17290797450 HC GAIT TRAINING EA 15 MIN 6/12/2017 Roberta Friedman PTA GP 2    28707543858 HC PT THER PROC EA 15 MIN 6/12/2017 Roberta Friedman PTA GP 4    12748130562 HC PT THER SUPP EA 15 MIN 6/12/2017 Roberta Friedman PTA GP 2               Di Friedman PTA  6/12/2017

## 2017-06-12 NOTE — PROGRESS NOTES
Inpatient Rehabilitation Functional Measures Assessment    Functional Measures  RAS Eating:  RAS Grooming:  RAS Bathing:  RAS Upper Body Dressing:  RAS Lower Body Dressing:  RAS Toileting:    RAS Bladder Management  Level of Assistance:  Frequency/Number of Accidents this Shift:    RAS Bowel Management  Level of Assistance:  Frequency/Number of Accidents this Shift:    RAS Bed/Chair/Wheelchair Transfer:  RAS Toilet Transfer:  RAS Tub/Shower Transfer:    Previously Documented Mode of Locomotion at Discharge:  RAS Expected Mode of Locomotion at Discharge:  RAS Walk/Wheelchair:  RAS Stairs:    RAS Comprehension:  Both ( auditory and visual) modes of comprehension are used  equally. Comprehension Score = 6, Modified [place].  Patient comprehends  complex/abstract information in their primary language, requiring: Glasses.  RAS Expression:  Vocal expression is the usual mode. Expression Score = 7,  Independent.  Patient expresses complex/abstract information in their primary  language.  Patient is completely independent for vocal expression.  There are no  activity limitations.  RAS Social Interaction:  Social Interaction Score = 7, Independent. Patient is  completely independent for social interaction.  There are no activity  limitations.  RAS Problem Solving:  Problem Solving Score = 7, Independent.  Patient makes  appropriate decisions in order to solve complex problems.  Patient is completely  independent for problem solving.  There are no activity limitations.  RAS Memory:  Memory Score = 7, Independent.  Patient is completely independent  for memory.  There are no activity limitations.    Therapy Mode Minutes  Occupational Therapy:  Physical Therapy:  Speech Language Pathology:    Discharge Functional Goals:    Signed by: Nurse Radha

## 2017-06-12 NOTE — PROGRESS NOTES
Inpatient Rehabilitation Functional Measures Assessment    Functional Measures  RAS Eating:  Eating Score = 5. Patient is supervision/set-up for eating,  requiring: Opening containers. No assistive devices were required.  RAS Grooming:  RAS Bathing:  RAS Upper Body Dressing:  RAS Lower Body Dressing:  RAS Toileting:  Patient requires moderate assistance for adjusting clothing  before using a toilet, commode, bedpan, or urinal. Patient requires moderate  assistance for hygiene. Patient requires moderate assistance for adjusting  clothing after using a toilet, commode, bedpan, or urinal. Patient performs 0 -  24% of toileting tasks.  Toileting Score = 1, Total Assistance. Patient requires  the following assistive device(s): Grab bar.    RAS Bladder Management  Level of Assistance:  Bladder Score = 5.  Patient is supervision/set-up for  bladder management, requiring: No assistive devices were required.  Frequency/Number of Accidents this Shift:  Bladder accidents this shift:  0 .  Patient has not had an accident, but used a device/medication this shift  requiring: brief .    RAS Bowel Management  Level of Assistance: Bowel Score = 7.  Patient is completely independent for  bowel management.  Patient did not have bowel movement.  No  medication/intervention was provided.  Frequency/Number of Accidents this Shift: Bowel accidents this shift: 0 .  Patient has not had an accident, but used a device/medication this shift  requiring: brief .    RAS Bed/Chair/Wheelchair Transfer:  Bed/chair/wheelchair Transfer Score = 3.  Patient performs 50-74% of effort and requires moderate assistance (some  lifting) for transferring to and from the bed/chair/wheelchair. No assistive  devices were required.  RAS Toilet Transfer:  Toilet Transfer Score = 3.  Patient performs 50-74% of  effort and requires moderate assistance (some lifting) for transferring to and  from the toilet/commode. Patient requires the following assistive  device(s):  Grab bars.  RAS Tub/Shower Transfer:  Activity was not observed.    Previously Documented Mode of Locomotion at Discharge:  RAS Expected Mode of Locomotion at Discharge:  [facility] Walk/Wheelchair:  RAS Stairs:    [facility] Comprehension:  [facility] Expression:  [facility] Social Interaction:  [facility] Problem Solving:  [facility] Memory:    Therapy Mode Minutes  Occupational Therapy:  Physical Therapy:  Speech Language Pathology:    Discharge Functional Goals:    Signed by: RENNY Ball

## 2017-06-12 NOTE — PROGRESS NOTES
Inpatient Rehabilitation Functional Measures Assessment    Functional Measures  RAS Eating:  RAS Grooming:  RAS Bathing:  RAS Upper Body Dressing:  RAS Lower Body Dressing:  RAS Toileting:    RAS Bladder Management  Level of Assistance:  Frequency/Number of Accidents this Shift:    RAS Bowel Management  Level of Assistance:  Frequency/Number of Accidents this Shift:    RAS Bed/Chair/Wheelchair Transfer:  Bed/chair/wheelchair Transfer Score = 4.  Patient performs 75% or more of effort and minimal assistance (little/incidental  help/lifting of one limb/steadying) for transferring to and from the  bed/chair/wheelchair, requiring: Contact guard. Steadying. Patient requires the  following assistive device(s): Arm rest. Bed rails. Walker.  RAS Toilet Transfer:  RAS Tub/Shower Transfer:    Previously Documented Mode of Locomotion at Discharge:  [facility] Expected Mode of Locomotion at Discharge:  RAS Walk/Wheelchair:  WHEELCHAIR OBSERVATION   Activity was not observed.    WALK OBSERVATION   Walk Distance Scale = 1.  Distance walked is less than 50 feet. Walk Score = 1.   Incidental assistance with lifting, contact guard or steadying was provided.  Patient performs 75% or more of effort and requires minimal contact assistance  for walking. Patient walked a distance of 40 feet. Patient requires the  following assistive device(s): Rolling walker.  RAS Stairs:  Activity was not observed.    RAS Comprehension:  RAS Expression:  RAS Social Interaction:  RAS Problem Solving:  RAS Memory:    Therapy Mode Minutes  Occupational Therapy:  Physical Therapy: Individual: 90 minutes.  Speech Language Pathology:    Discharge Functional Goals:    Signed by: Roberta Friedman PTA

## 2017-06-12 NOTE — PROGRESS NOTES
Inpatient Rehabilitation Functional Measures Assessment and Plan of Care    Plan of Care  Self Care    [OT] Bathing(Active)  Current Status(06/12/2017): [place]  Weekly Goal(06/20/2017): [place]/CGA  Discharge Goal: CGA    [OT] Dressing (Lower)(Active)  Current Status(06/12/2017): [place]  Weekly Goal(06/20/2017): [place]/CGA  Discharge Goal: CGA    Functional Measures  RAS Eating:  RAS Grooming: Grooming Score = 5. Patient is supervision/set-up for grooming,  requiring: Setting out grooming equipment. No assistive devices were required.  RAS Bathing:  RAS Upper Body Dressing:  RAS Lower Body Dressing:  RAS Toileting:    RAS Bladder Management  Level of Assistance:  Frequency/Number of Accidents this Shift:    RAS Bowel Management  Level of Assistance:  Frequency/Number of Accidents this Shift:    RAS Bed/Chair/Wheelchair Transfer:  Bed/chair/wheelchair Transfer Score = 4.  Patient performs 75% or more of effort and minimal assistance (little/incidental  help/lifting of one limb/steadying) for transferring to and from the  bed/chair/wheelchair, requiring: Steadying. Hand placement. No assistive devices  were required.  RAS Toilet Transfer:  RAS Tub/Shower Transfer:    Previously Documented Mode of Locomotion at Discharge:  RAS Expected Mode of Locomotion at Discharge:  RAS Walk/Wheelchair:  RAS Stairs:    RAS Comprehension:  RAS Expression:  RAS Social Interaction:  RAS Problem Solving:  RAS Memory:    Therapy Mode Minutes  Occupational Therapy: Individual: 90 minutes.  Physical Therapy:  Speech Language Pathology:    Discharge Functional Goals:    Signed by: Missy Pelayo, Occupational Therapist

## 2017-06-12 NOTE — PLAN OF CARE
Problem: Patient Care Overview (Adult)  Goal: Plan of Care Review  Outcome: Ongoing (interventions implemented as appropriate)    Problem: Skin Integrity Impairment, Risk/Actual (Adult)  Goal: Skin Integrity/Wound Healing  Outcome: Ongoing (interventions implemented as appropriate)    Problem: Pressure Ulcer (Adult)  Goal: Signs and Symptoms of Listed Potential Problems Will be Absent or Manageable (Pressure Ulcer)  Outcome: Ongoing (interventions implemented as appropriate)    Problem: Fall Risk (Adult)  Goal: Absence of Falls  Outcome: Ongoing (interventions implemented as appropriate)    Problem: Fractured Hip (Adult)  Goal: Signs and Symptoms of Listed Potential Problems Will be Absent or Manageable (Fractured Hip)  Outcome: Ongoing (interventions implemented as appropriate)    Problem: Infection, Risk/Actual (Adult)  Goal: Infection Prevention/Resolution  Outcome: Ongoing (interventions implemented as appropriate)    Problem: Mobility, Physical Impaired (Adult)  Goal: Enhanced Mobility Skills  Outcome: Ongoing (interventions implemented as appropriate)  Goal: Enhanced Functionality Ability  Outcome: Ongoing (interventions implemented as appropriate)    Problem: Seizure Disorder/Epilepsy (Adult)  Goal: Signs and Symptoms of Listed Potential Problems Will be Absent or Manageable (Seizure Disorder/Epilepsy)  Outcome: Ongoing (interventions implemented as appropriate)

## 2017-06-13 LAB — PHENYTOIN SERPL-MCNC: 4.9 MCG/ML (ref 10–20)

## 2017-06-13 PROCEDURE — 25010000002 ENOXAPARIN PER 10 MG: Performed by: FAMILY MEDICINE

## 2017-06-13 PROCEDURE — 97530 THERAPEUTIC ACTIVITIES: CPT

## 2017-06-13 PROCEDURE — 97110 THERAPEUTIC EXERCISES: CPT

## 2017-06-13 PROCEDURE — 97116 GAIT TRAINING THERAPY: CPT

## 2017-06-13 PROCEDURE — 80185 ASSAY OF PHENYTOIN TOTAL: CPT | Performed by: FAMILY MEDICINE

## 2017-06-13 PROCEDURE — 94799 UNLISTED PULMONARY SVC/PX: CPT

## 2017-06-13 PROCEDURE — 97535 SELF CARE MNGMENT TRAINING: CPT

## 2017-06-13 RX ORDER — PHENYTOIN 50 MG/1
50 TABLET, CHEWABLE ORAL DAILY
Status: DISCONTINUED | OUTPATIENT
Start: 2017-06-13 | End: 2017-06-16 | Stop reason: HOSPADM

## 2017-06-13 RX ADMIN — HYDROCODONE BITARTRATE AND ACETAMINOPHEN 1 TABLET: 10; 325 TABLET ORAL at 21:17

## 2017-06-13 RX ADMIN — SENNOSIDES 1 TABLET: 8.6 TABLET ORAL at 08:54

## 2017-06-13 RX ADMIN — ACETAMINOPHEN 650 MG: 325 TABLET ORAL at 23:34

## 2017-06-13 RX ADMIN — CLONAZEPAM 0.5 MG: 0.5 TABLET ORAL at 21:17

## 2017-06-13 RX ADMIN — ENOXAPARIN SODIUM 40 MG: 40 INJECTION SUBCUTANEOUS at 08:55

## 2017-06-13 RX ADMIN — LEVETIRACETAM 500 MG: 500 TABLET, FILM COATED ORAL at 08:55

## 2017-06-13 RX ADMIN — NYSTATIN 500000 UNITS: 100000 SUSPENSION ORAL at 12:24

## 2017-06-13 RX ADMIN — PHENOBARBITAL 97.2 MG: 32.4 TABLET ORAL at 21:17

## 2017-06-13 RX ADMIN — HYDROCODONE BITARTRATE AND ACETAMINOPHEN 1 TABLET: 10; 325 TABLET ORAL at 06:23

## 2017-06-13 RX ADMIN — MONTELUKAST SODIUM 10 MG: 10 TABLET ORAL at 21:18

## 2017-06-13 RX ADMIN — BACLOFEN 20 MG: 10 TABLET ORAL at 21:18

## 2017-06-13 RX ADMIN — PHENYTOIN SODIUM 200 MG: 100 CAPSULE, EXTENDED RELEASE ORAL at 21:18

## 2017-06-13 RX ADMIN — PHENYTOIN 50 MG: 50 TABLET, CHEWABLE ORAL at 12:23

## 2017-06-13 RX ADMIN — PANTOPRAZOLE SODIUM 40 MG: 40 TABLET, DELAYED RELEASE ORAL at 06:23

## 2017-06-13 RX ADMIN — LEVETIRACETAM 250 MG: 250 TABLET, FILM COATED ORAL at 21:18

## 2017-06-13 RX ADMIN — PHENOBARBITAL 97.2 MG: 32.4 TABLET ORAL at 08:53

## 2017-06-13 RX ADMIN — ATORVASTATIN CALCIUM 40 MG: 40 TABLET, FILM COATED ORAL at 21:18

## 2017-06-13 RX ADMIN — VENLAFAXINE HYDROCHLORIDE 150 MG: 150 CAPSULE, EXTENDED RELEASE ORAL at 08:54

## 2017-06-13 RX ADMIN — BACLOFEN 20 MG: 10 TABLET ORAL at 08:54

## 2017-06-13 RX ADMIN — HYDROCODONE BITARTRATE AND ACETAMINOPHEN 1 TABLET: 10; 325 TABLET ORAL at 12:26

## 2017-06-13 RX ADMIN — NYSTATIN 500000 UNITS: 100000 SUSPENSION ORAL at 08:55

## 2017-06-13 RX ADMIN — PHENYTOIN SODIUM 200 MG: 100 CAPSULE, EXTENDED RELEASE ORAL at 08:54

## 2017-06-13 RX ADMIN — NYSTATIN 500000 UNITS: 100000 SUSPENSION ORAL at 21:18

## 2017-06-13 NOTE — PROGRESS NOTES
Inpatient Rehabilitation Functional Measures Assessment    Functional Measures  RAS Eating:  RAS Grooming: Grooming Score = 5. Patient is supervision/set-up for grooming,  requiring: Setting out grooming equipment. Stand by assistance. No assistive  devices were required.  RAS Bathing:  RAS Upper Body Dressing:  RAS Lower Body Dressing:  RAS Toileting:  Toileting Score = 4.  Patient requires minimal assistance for  toileting, such as steadying for balance while cleansing or adjusting clothes.  Patient requires the following assistive device(s): Grab bar.    RAS Bladder Management  Level of Assistance:  Frequency/Number of Accidents this Shift:    RAS Bowel Management  Level of Assistance:  Frequency/Number of Accidents this Shift:    RAS Bed/Chair/Wheelchair Transfer:  RAS Toilet Transfer:  Toilet Transfer Score = 4.  Patient performs 75% or more  of effort and minimal assistance (little/incidental help/steadying) for  transferring to and from the toilet/commode, requiring: Contact guard. Patient  requires the following assistive device(s): Safety frame/over the toilet. Grab  bars.  RAS Tub/Shower Transfer:    Previously Documented Mode of Locomotion at Discharge:  [facility] Expected Mode of Locomotion at Discharge:  RAS Walk/Wheelchair:  RAS Stairs:    RAS Comprehension:  RAS Expression:  RAS Social Interaction:  RAS Problem Solving:  RAS Memory:    Therapy Mode Minutes  Occupational Therapy: Individual: 90 minutes.  Physical Therapy:  Speech Language Pathology:    Discharge Functional Goals:    Signed by: Missy Pelayo, Occupational Therapist

## 2017-06-13 NOTE — PROGRESS NOTES
Inpatient Rehabilitation Functional Measures Assessment    Functional Measures  RAS Eating:  RAS Grooming: Grooming Score = 5. Patient is supervision/set-up for grooming,  requiring: Stand by assistance. Setting out grooming equipment. Initial  preparation. No assistive devices were required.  RAS Bathing:  Patient took sponge bath. Patient requires no physical assistance  for washing, rinsing, and drying the right arm. Patient requires no physical  assistance for washing, rinsing, and drying the left arm. Patient requires no  physical assistance for washing, rinsing, and drying the chest. Patient requires  no physical assistance for washing, rinsing, and drying the abdomen. Patient  requires no physical assistance for washing, rinsing, and drying the perineal  area. Patient requires moderate assistance for washing, rinsing, or drying the  buttocks. Patient requires no physical assistance for washing, rinsing, and  drying the right upper leg. Patient requires no physical assistance for washing,  rinsing, and drying the left upper leg. Patient requires total assistance for  washing, rinsing, or drying the right lower leg, including the foot. Patient  requires total assistance for washing, rinsing, or drying the left lower leg,  including the foot. Patient performs 70 % of bathing tasks. Bathing Score = 3,  Moderate Assistance. Patient requires the following assistive device(s): Grab  bar/arm rest to maintain balance. Long handled sponge.  RAS Upper Body Dressing:  Upper Body Dressing Score = 5. Patient is supervision  for upper body dressing, requiring: Gathering/setting out clothes. Stand by  assistance. No assistive devices were required.  RAS Lower Body Dressing:  Patient requires total assistance for gathering  clothes. Wearing underwear or an undergarment was not observed for this patient.  Patient requires total assistance for holding clothing and/or threading the  right leg through the pants/skirt. Patient  requires total assistance for holding  clothing and/or threading the left leg through the pants/skirt. Patient requires  moderate/considerable physical assistance for pulling pants/skirt over hips and  adjusting fasteners. Patient requires total assistance for holding clothing  and/or donning and/or doffing right sock. Patient requires total assistance for  holding clothing and/or donning and/or doffing left sock. Donning and/or doffing  right shoe was not observed for this patient. Donning and/or doffing left shoe  was not observed for this patient. Patient performs 8.33 -  24% of lower body  dressing tasks. Lower Body Dressing  Score = 1, Total Assistance. No assistive  devices were required.  RAS Toileting:  Toileting Score = 4.  Patient requires minimal assistance for  toileting, such as steadying for balance while cleansing or adjusting clothes.  Patient requires the following assistive device(s): Grab bar.    RAS Bladder Management  Level of Assistance:  Bladder Score = 5.  Patient is supervision/set-up for  bladder management, requiring: Stand by assistance. No assistive devices were  required.  Frequency/Number of Accidents this Shift:  Bladder accidents this shift:  1 .    RAS Bowel Management  Level of Assistance: Activity was not observed.  Frequency/Number of Accidents this Shift:    RAS Bed/Chair/Wheelchair Transfer:  Bed/chair/wheelchair Transfer Score = 3.  Patient performs 50-74% of effort and requires moderate assistance (some  lifting)  for transferring to and from the bed/chair/wheelchair, including  assist lifting both legs. Patient requires the following assistive device(s):  Bed rails. Grab bars. Arm rest.  RAS Toilet Transfer:  Toilet Transfer Score = 3.  Patient performs 50-74% of  effort and requires moderate assistance (some lifting) for transferring to and  from the toilet/commode. Patient requires the following assistive device(s):  Grab bars. Safety frame/over the toilet.  RAS Tub/Shower  Transfer:  Activity was not observed.    Previously Documented Mode of Locomotion at Discharge:  RAS Expected Mode of Locomotion at Discharge:  RAS Walk/Wheelchair:  RAS Stairs:    RAS Comprehension:  RAS Expression:  RAS Social Interaction:  [facility] Problem Solving:  RAS Memory:    Therapy Mode Minutes  Occupational Therapy:  Physical Therapy:  Speech Language Pathology:    Discharge Functional Goals:    Signed by: RENNY Mcadams

## 2017-06-13 NOTE — PROGRESS NOTES
Inpatient Rehabilitation Functional Measures Assessment    Functional Measures  RAS Eating:  Eating Score = 5. Patient is supervision/set-up for eating,  requiring: Opening containers. No assistive devices were required.  RAS Grooming:  RAS Bathing:  RAS Upper Body Dressing:  RAS Lower Body Dressing:  RAS Toileting:  Patient requires moderate assistance for adjusting clothing  before using a toilet, commode, bedpan, or urinal. Patient requires moderate  assistance for hygiene. Patient requires moderate assistance for adjusting  clothing after using a toilet, commode, bedpan, or urinal. Patient performs 0 -  24% of toileting tasks.  Toileting Score = 1, Total Assistance. Patient requires  the following assistive device(s): Grab bar.    RAS Bladder Management  Level of Assistance:  Bladder Score = 5.  Patient is supervision/set-up for  bladder management, requiring: No assistive devices were required.  Frequency/Number of Accidents this Shift:  Bladder accidents this shift:  0 .  Patient has not had an accident this shift.    RAS Bowel Management  Level of Assistance: Bowel Score = 7.  Patient is completely independent for  bowel management.  Patient did not have bowel movement.  No  medication/intervention was provided.  Frequency/Number of Accidents this Shift: Bowel accidents this shift: 0 .  Patient has not had an accident this shift.    RAS Bed/Chair/Wheelchair Transfer:  Bed/chair/wheelchair Transfer Score = 3.  Patient performs 50-74% of effort and requires moderate assistance (some  lifting) for transferring to and from the bed/chair/wheelchair. No assistive  devices were required.  RAS Toilet Transfer:  Toilet Transfer Score = 3.  Patient performs 50-74% of  effort and requires moderate assistance (some lifting) for transferring to and  from the toilet/commode. Patient requires the following assistive device(s):  Grab bars.  RAS Tub/Shower Transfer:  Activity was not observed.    Previously Documented Mode of  Locomotion at Discharge:  RAS Expected Mode of Locomotion at Discharge:  RAS Walk/Wheelchair:  RAS Stairs:    RAS Comprehension:  RAS Expression:  RAS Social Interaction:  [facility] Problem Solving:  RAS Memory:    Therapy Mode Minutes  Occupational Therapy:  Physical Therapy:  Speech Language Pathology:    Discharge Functional Goals:    Signed by: RENNY Ball

## 2017-06-13 NOTE — PROGRESS NOTES
Inpatient Rehabilitation Functional Measures Assessment    Functional Measures  RAS Eating:  RAS Grooming:  RAS Bathing:  RAS Upper Body Dressing:  RAS Lower Body Dressing:  RAS Toileting:    RAS Bladder Management  Level of Assistance:  Frequency/Number of Accidents this Shift:    RAS Bowel Management  Level of Assistance:  Frequency/Number of Accidents this Shift:    RAS Bed/Chair/Wheelchair Transfer:  Bed/chair/wheelchair Transfer Score = 4.  Patient performs 75% or more of effort and minimal assistance (little/incidental  help/lifting of one limb/steadying) for transferring to and from the  bed/chair/wheelchair, requiring: Patient requires the following assistive  device(s): Walker.  RAS Toilet Transfer:  RAS Tub/Shower Transfer:    Previously Documented Mode of Locomotion at Discharge:  RAS Expected Mode of Locomotion at Discharge:  RAS Walk/Wheelchair:  WHEELCHAIR OBSERVATION   Activity was not observed.    WALK OBSERVATION   Walk Distance Scale = 1.  Distance walked is less than 50 feet. Walk Score = 1.   Incidental assistance with lifting, contact guard or steadying was provided.  Patient performs 75% or more of effort and requires minimal contact assistance  for walking. Patient walked a distance of 45 feet. Patient requires the  following assistive device(s): Rolling walker.  RAS Stairs:  Activity was not observed.    RAS Comprehension:  RAS Expression:  RAS Social Interaction:  RAS Problem Solving:  RAS Memory:    Therapy Mode Minutes  Occupational Therapy:  Physical Therapy: Individual: 90 minutes.  Speech Language Pathology:    Discharge Functional Goals:    Signed by: Roberta Eisenberg, Physical Therapy Assistant

## 2017-06-13 NOTE — PROGRESS NOTES
Case Management  Inpatient Rehabilitation Team Conference    Conference Date/Time: 6/13/2017 5:40:00 AM    Team Conference Attendees:  MD Arina Vásquez, RN  Becka No, PT  Missy Pelayo, OT  YAMILE Gunn    Demographics            Age: 71Y            Gender: Female    Admission Date: 6/6/2017 7:41:15 PM  Rehabilitation Diagnosis:  S/P Righ hip ORIF  Comorbidities:      Plan of Care  Anticipated Discharge Date/Estimated Length of Stay: 6-21-17  Anticipated Discharge Destination: Community discharge with assistance  Discharge Plan : Pt plans to return home with spouse at discharge.  Medical Necessity Expected Level Rationale: good  Intensity and Duration: an average of 3 hours/5 days per week  Medical Supervision and 24 Hour Rehab Nursing: x  Physical Therapy: x  PT Intensity/Duration: 1.5 hrs/day, 5-6 days per week  Occupational Therapy: x  OT Intensity/Duration: 1.5 hrs/day, 5-6 days per week  Social Work: x  Therapeutic Recreation: x  Updated (if changes indicated)    Anticipated Discharge Date/Estimated Length of Stay:   6-16-17      Discharge Plan of Care:Home Health Services Physical Therapy strengthening, gait  training, transfer training, balance, home safety 2x per week for 4 weeks  Occupational Therapy ADL re-training, home safety, functional mobility 2x per  week for 4 weeks Commodes: Bedside commode Wheelchairs: lightweight wheelchair  Size:  18 inch elevating legrest, anti-tippers, basic cushion    Based on the patient's medical and functional status, their prognosis and  expected level of functional improvement is: good      Interdisciplinary Problem/Goals/Status  Body Function Structure    [RN] Skin Integrity(Active)  Current Status(06/06/2017): impaired skin integrity  Weekly Goal(06/21/2017): prevent further skin integrity impairment  Discharge Goal: signs of regaining skin integrity        Mobility    [PT] Bed/Chair/Wheelchair(Active)  Current Status(06/12/2017): Min A  with RW  Weekly Goal(06/19/2017): CGA  Discharge Goal: Supervision    [PT] Walk(Active)  Current Status(06/12/2017): 70 ft with Min A, RW  Weekly Goal(06/19/2017): CGA w/ RW 50 ft  Discharge Goal: MI/SUP w/ RW 75 ft        Safety    [RN] Potential for Injury(Active)  Current Status(06/06/2017): risk for falls  Weekly Goal(06/21/2017): remain free of falls  Discharge Goal: identify measures to prevent falls        Self Care    [OT] Bathing(Active)  Current Status(06/12/2017): Ivory  Weekly Goal(06/20/2017): [place]/CGA  Discharge Goal: CGA    [OT] Dressing (Lower)(Active)  Current Status(06/12/2017): Iv[place]  Weekly Goal(06/20/2017): [place]/CGA  Discharge Goal: CGA    Comments: Pt will return home with spouse at discharge.    Signed by: YAMILE Gunn    Physician CoSigned By: Abel Velasquez 06/13/2017 08:49:23

## 2017-06-13 NOTE — PROGRESS NOTES
Inpatient Rehabilitation Functional Measures Assessment    Functional Measures  RAS Eating:  RAS Grooming:  RAS Bathing:  RAS Upper Body Dressing:  RAS Lower Body Dressing:  RAS Toileting:    RAS Bladder Management  Level of Assistance:  Frequency/Number of Accidents this Shift:    RAS Bowel Management  Level of Assistance:  Frequency/Number of Accidents this Shift:    RAS Bed/Chair/Wheelchair Transfer:  RAS Toilet Transfer:  RAS Tub/Shower Transfer:    Previously Documented Mode of Locomotion at Discharge:  RAS Expected Mode of Locomotion at Discharge:  RAS Walk/Wheelchair:  RAS Stairs:    RAS Comprehension:  Auditory comprehension is the usual mode. Comprehension  Score = 6, Modified [place].  Patient comprehends complex/abstract  information in their primary language, requiring: Additional time.  RAS Expression:  Vocal expression is the usual mode. Expression Score = 6,  Modified Independent.  Patient expresses complex/abstract information in their  primary language, requiring: Additional time.  RAS Social Interaction:  Social Interaction Score = 6, Modified Independent.  Patient is modified independent for social interaction, requiring: Requires  additional time.  RAS Problem Solving:  Problem Solving Score = 6, Modified [place].  Patient  makes appropriate decisions in order to solve complex problems, but requires  extra time.  RAS Memory:  Memory Score = 6, Modified [place].  Patient is modified  independent for memory, requiring: Requires additional time.    Therapy Mode Minutes  Occupational Therapy:  Physical Therapy:  Speech Language Pathology:    Discharge Functional Goals:    Signed by: Nurse Nahomi

## 2017-06-13 NOTE — THERAPY TREATMENT NOTE
Inpatient Rehabilitation - Physical Therapy Treatment Note   [place]     Patient Name: Maribel Staton  : 1945  MRN: [id number]  Today's Date: 2017  Onset of Illness/Injury or Date of Surgery Date: 17  Date of Referral to PT: 17  Referring Physician: Eva    Admit Date: 2017    Visit Dx:  No diagnosis found.  Patient Active Problem List   Diagnosis   • Closed right hip fracture               Adult Rehabilitation Note       17 1440 17 1400 17 1503    Rehab Assessment/Intervention    Discipline occupational therapist  -AB physical therapy assistant  -AMINA physical therapy assistant  -LL    Document Type therapy note (daily note)  -AB therapy note (daily note)   BID treatment session  -AMINA therapy note (daily note)   BID treatment session  -LL    Subjective Information no complaints;agree to therapy  -AB agree to therapy;complains of;pain  -AMINA no complaints;agree to therapy  -LL    Patient Effort, Rehab Treatment good  -AB good  -AMINA good  -LL    Precautions/Limitations fall precautions;non-weight bearing status;seizure precautions;other (see comments)   NWB RLE; <skin integrity; intermittent confusion  -AB fall precautions;non-weight bearing status;seizure precautions;other (see comments)   NWB R) LE, , skin integrity, intermittent confusion  -AMINA fall precautions;non-weight bearing status;seizure precautions;other (see comments)   NWB RLE; skin integrity, intermittent confusion  -LL    Patient Response to Treatment Pt. w/ good tolerance and rest breaks provided. She continues to display improvements w/ self care tasks and fxl mobility. See appropriate sections for updated levels.   -AB Patient tolerated tx well today, however reported increased fatigue in PM session.  Pt had episode of LOB during gait training in PM session, however corrected w/ assistance of therapist and PT tech.  No injuries or complaints noted; RN notified and aware.   -AMINA Patient did well with BID  treatment session with adequate rest breaks.  -LL    Recorded by [AB] Missy Pelayo, OT [AMINA] Roberta Eisenberg PTA [LL] Roberta Friedman PTA    Pain Assessment    Pain Assessment  0-10  -AMINA Levin-Orozco FACES  -LL    Levin-Baker FACES Pain Rating  8  -AMINA 6  -LL    Pain Type  Acute pain;Surgical pain  -AMINA Acute pain;Surgical pain  -LL    Pain Location  Hip  -AMINA Hip  -LL    Pain Orientation  Right  -AMINA Right  -LL    Pain Intervention(s)  Repositioned;Rest;Other (Comment)   pt received meds to assist w/ pain prior to initiation of se  -AMINA Repositioned;Rest  -LL    Recorded by  [AMINA] Roberta Eisenberg PTA [LL] Roberta Friedman PTA    Cognitive Assessment/Intervention    Current Cognitive/Communication Assessment  functional  -AMINA functional  -LL    Orientation Status  oriented to;person;place  -AMINA oriented to;person;place;situation;disoriented to;time  -LL    Follows Commands/Answers Questions able to follow single-step instructions;100% of the time  -AB able to follow single-step instructions;100% of the time;needs cueing  -AMINA able to follow single-step instructions;100% of the time;needs cueing  -LL    Personal Safety decreased awareness, need for assist;decreased awareness, need for safety  -AB decreased awareness, need for assist;decreased awareness, need for safety  -AMINA decreased awareness, need for assist;decreased awareness, need for safety  -LL    Personal Safety Interventions gait belt;nonskid shoes/slippers when out of bed;supervised activity  -AB gait belt;nonskid shoes/slippers when out of bed;supervised activity  -AMINA gait belt;nonskid shoes/slippers when out of bed;supervised activity  -LL    Recorded by [AB] Missy Pelayo, OT [AMINA] Roberta Eisenberg PTA [LL] Roberta Friedman PTA    Mobility Assessment/Training    Extremity Weight-Bearing Status right lower extremity  -AB  right lower extremity  -LL    Right Lower Extremity Weight-Bearing non weight-bearing  -AB  non weight-bearing   -LL    Recorded by [AB] Missy Pelayo, OT  [LL] Roberta Friedman PTA    Bed Mobility, Assessment/Treatment    Bed Mobility, Scoot/Bridge, [place]   minimum assist (75% patient effort)  -LL    Bed Mob, Supine to Sit, [place]  verbal cues required;minimum assist (75% patient effort)  -AMINA contact guard assist;minimum assist (75% patient effort)  -LL    Bed Mob, Sit to Supine, [place]  minimum assist (75% patient effort);verbal cues required  -AMINA minimum assist (75% patient effort)  -LL    Bed Mobility, Safety Issues  decreased use of legs for bridging/pushing  -AMINA decreased use of legs for bridging/pushing  -LL    Bed Mobility, Impairments  ROM decreased;strength decreased;impaired balance;coordination impaired;pain  -AMINA ROM decreased;strength decreased;impaired balance;coordination impaired;pain  -LL    Recorded by  [AMINA] Roberta Eisenberg PTA [LL] Roberta Friedman PTA    Transfer Assessment/Treatment    Transfers, Bed-Chair [place]  verbal cues required;nonverbal cues required (demo/gesture);minimum assist (75% patient effort)  -AMINA verbal cues required;nonverbal cues required (demo/gesture);contact guard assist;minimum assist (75% patient effort)  -LL    Transfers, Chair-Bed [place]  verbal cues required;nonverbal cues required (demo/gesture);minimum assist (75% patient effort)  -AMINA contact guard assist;minimum assist (75% patient effort)  -LL    Transfers, Bed-Chair-Bed, Assist Device  rolling walker  -AMINA rolling walker  -LL    Transfers, Sit-Stand [place]  verbal cues required;nonverbal cues required (demo/gesture);minimum assist (75% patient effort)  -AMINA verbal cues required;nonverbal cues required (demo/gesture);contact guard assist;minimum assist (75% patient effort)  -LL    Transfers, Stand-Sit [place]  verbal cues required;nonverbal cues required (demo/gesture);minimum assist (75% patient effort)  -AMINA verbal cues required;nonverbal cues required  (demo/gesture);contact guard assist;minimum assist (75% patient effort)  -LL    Transfers, Sit-Stand-Sit, Assist Device  rolling walker  -AMINA rolling walker  -LL    Toilet Transfer, [place] contact guard assist;verbal cues required;nonverbal cues required (demo/gesture)  -AB      Toilet Transfer, Assistive Device elevated toilet seat;wheelchair;other (see comments)   grab bar  -AB      Transfer, Safety Issues  balance decreased during turns  -AMINA balance decreased during turns  -LL    Transfer, Impairments  ROM decreased;strength decreased;impaired balance;coordination impaired;pain  -AMINA ROM decreased;strength decreased;impaired balance;coordination impaired;pain  -LL    Recorded by [AB] Missy Pelayo, OT [AMINA] Roberta Eisenberg PTA [LL] Roberta Friedman PTA    Gait Assessment/Treatment    Gait, [place] Level  verbal cues required;nonverbal cues required (demo/gesture);minimum assist (75% patient effort)  -AMINA contact guard assist;minimum assist (75% patient effort)  -LL    Gait, Assistive Device  rolling walker  -AMINA rolling walker  -LL    Gait, Distance (Feet)  45   45' x 2 AM session, 20' x 2 PM session  -AMINA 70   then 60 in am; 40 x 2 in pm  -LL    Gait, Gait Pattern Analysis  swing-to gait  -AMINA swing-to gait  -LL    Gait, Gait Deviations  antalgic;decreased heel strike;step length decreased;limb motion velocity decreased  -AMINA antalgic;decreased heel strike;limb motion velocity decreased;step length decreased;weight-shifting ability decreased  -LL    Gait, Maintain Weight Bearing Status  able to maintain weight bearing status  -AMINA able to maintain weight bearing status  -LL    Gait, Safety Issues  balance decreased during turns;step length decreased;weight-shifting ability decreased  -AMINA balance decreased during turns;step length decreased;weight-shifting ability decreased  -LL    Gait, Impairments  ROM decreased;strength decreased;impaired balance;coordination impaired;pain  -AMINA ROM  decreased;strength decreased;impaired balance;coordination impaired;pain  -LL    Recorded by  [AMINA] Roberta Eisenberg PTA [LL] Roberta Friedman PTA    Toileting Assessment/Training    Toileting Assess/Train, Assistive Device grab bars;raised toilet seat  -AB      Toileting Assess/Train, Position sitting  -AB      Toileting Assess/Train, Indepen Level contact guard assist;verbal cues required;nonverbal cues required (demo/gesture)  -AB      Recorded by [AB] Missy Pelayo OT      Grooming Assessment/Training    Grooming Assess/Train, Position sitting  -AB      Grooming Assess/Train, Indepen Level set up required  -AB      Recorded by [AB] Missy Pelayo OT      Balance Skills Training    Sitting-Level of Assistance   Distant supervision  -LL    Sitting-Balance Support   Feet supported  -LL    Standing-Level of Assistance  Contact guard;Minimum assistance  -AMINA Contact guard  -LL    Static Standing Balance Support  assistive device  -AMINA assistive device  -LL    Gait Balance-Level of Assistance  Minimum assistance  -AMINA Contact guard;Minimum assistance  -LL    Gait Balance Support  assistive device  -AMINA assistive device  -LL    Recorded by  [AMINA] Roberta Eisenberg PTA [LL] Roberta Friedman PTA    Therapy Exercises    Bilateral Lower Extremities  AAROM:;AROM:;20 reps;30 reps;sitting;supine  -AMINA AROM:;25 reps;supine;sitting  -LL    BLE Resistance  theraband  -AMINA theraband  -LL    Bilateral Upper Extremity AROM:;sitting   BUE CoordEx; G/FMC TherEx/Act; Strengthening  -AB      BUE Resistance manual resistance- minimal;other (comment)   ArmBike: [id number], min resistance; BUE Wrist Rolls X2  -AB      Recorded by [AB] Missy Pelayo OT [AMINA] Roberta Eisenberg PTA [LL] Roberta Firedman PTA    Positioning and Restraints    Pre-Treatment Position  sitting in chair/recliner  -AMINA --   WC in room in am & pm  -LL    Post Treatment Position  wheelchair  -AMINA wheelchair  -LL    In Wheelchair  sitting;call light within reach;encouraged to call for assist;legs elevated  -AB sitting;call light within reach;notified nsg;heels elevated   in front of nursing station following AM/PM session  -AMINA sitting;call light within reach;encouraged to call for assist;legs elevated   in room in am; with activities in pm  -LL    Recorded by [AB] Missy Pelayo OT [AMINA] Roberta Eisenberg, PTA [LL] Roberta Friedman, [place]      06/12/17 0941 06/10/17 1500       Rehab Assessment/Intervention    Discipline occupational therapist  -AB occupational therapist  -BC     Document Type therapy note (daily note)  -AB therapy note (daily note)  -BC     Subjective Information no complaints;agree to therapy  -AB agree to therapy;no complaints  -BC     Patient Effort, Rehab Treatment good  -AB good  -BC     Precautions/Limitations fall precautions;non-weight bearing status;seizure precautions;other (see comments)   NWB RLE; <skin integrity; intermittent confusion  -AB fall precautions;non-weight bearing status;seizure precautions  -BC     Patient Response to Treatment Pt. w/ good tolerance and rest breaks provided.   -AB Pt. tolerated tx. well with frequent rest breaks at table top tasks  -BC     Recorded by [AB] Missy Pelayo, OT [BC] Ursula Concepcion, OT     Cognitive Assessment/Intervention    Follows Commands/Answers Questions able to follow single-step instructions;100% of the time;needs cueing  -AB      Personal Safety decreased awareness, need for assist;decreased awareness, need for safety  -AB      Personal Safety Interventions gait belt;nonskid shoes/slippers when out of bed;supervised activity  -AB      Recorded by [AB] Missy Pelayo, OT      Transfer Assessment/Treatment    Transfers, Bed-Chair [place] minimum assist (75% patient effort);contact guard assist;verbal cues required;nonverbal cues required (demo/gesture)  -AB      Transfers, Bed-Chair-Bed, Assist Device rolling walker;other (see  comments)   w/c  -AB      Recorded by [AB] Missy Pelayo, [place]      Toileting Assessment/Training    Toileting Assess/Train, Assistive Device  grab bars;raised toilet seat  -BC     Toileting Assess/Train, Position  sitting  -BC     Toileting Assess/Train, Indepen Level  minimum assist (75% patient effort)  -BC     Recorded by  [BC] Ursula Concepcion OT     Grooming Assessment/Training    Grooming Assess/Train, Position sitting;sink side  -AB sitting;sink side  -BC     Grooming Assess/Train, Indepen Level supervision required;set up required  -AB supervision required;nonverbal cues required (demo/gesture)  -BC     Recorded by [AB] Missy Pelayo OT [BC] Ursula Concepcion, OT     Therapy Exercises    Bilateral Upper Extremity AROM:;sitting   BUE CoordEx; G/FMC TherEx/Act; Strengthening  -AB AROM:   BUE, G/FM coordination ther ex. act., strengtherning   -BC     BUE Resistance manual resistance- minimal;other (comment)   ArmBike: [id number], min resistance; BUE Wrist Roll X1  -AB --   wrist rolls x 1  -BC     Recorded by [AB] Missy Pelayo, OT [BC] Ursula Concepcion, OT     Fine Motor Coordination Training    Detail (Fine Motor Coordination Training)  Peg board, hand , bean bag toss,   -BC     Recorded by  [BC] Ursula Concepcion OT     Positioning and Restraints    Pre-Treatment Position  sitting in chair/recliner  -BC     Post Treatment Position  bed  -BC     In Bed  supine;call light within reach  -BC     In Wheelchair sitting;call light within reach;encouraged to call for assist;legs elevated  -AB      Recorded by [AB] Missy Pelayo, OT [BC] Ursula Concepcion, OT       User Key  (r) = Recorded By, (t) = Taken By, (c) = Cosigned By    Initials Name Effective Dates    AB Missy Pelayo, OT 02/04/16 -     LL Roberta Friedman, PTA 05/02/16 -     AMINA Roberta Eisenberg, PTA 05/02/16 -     BC Ursula Concepcion OT 01/21/17 -                 IP PT Goals       06/07/17 1703          Bed Mobility PT  STG    Bed Mobility PT STG, Date Established 06/07/17  -CT      Bed Mobility PT STG, Time to Achieve 5 - 7 days  -CT      Bed Mobility PT STG, Activity Type all bed mobility  -CT      Bed Mobility PT STG, [place] Level contact guard assist  -CT      Transfer Training Goal, Assist Device bed rails  -CT      Bed Mobility PT LTG    Bed Mobility PT LTG, Date Established 06/07/17  -CT      Bed Mobility PT LTG, Time to Achieve by discharge  -CT      Bed Mobility PT LTG, Activity Type all bed mobility  -CT      Bed Mobility PT LTG, [place] Level conditional independence;supervision required  -CT      Transfer Training PT STG    Transfer Training PT STG, Date Established 06/07/17  -CT      Transfer Training PT STG, Time to Achieve 5 - 7 days  -CT      Transfer Training PT STG, Activity Type all transfers  -CT      Transfer Training PT STG, [place] Level contact guard assist  -CT      Transfer Training PT STG, Assist Device other (see comments)   with appropriate AD  -CT      Transfer Training PT LTG    Transfer Training PT LTG, Date Established 06/07/17  -CT      Transfer Training PT LTG, Time to Achieve by discharge  -CT      Transfer Training PT LTG, Activity Type all transfers  -CT      Transfer Training PT LTG, [place] Level conditional independence;supervision required  -CT      Transfer Training PT LTG, Assist Device other (see comments)   with appropriate AD  -CT      Gait Training PT STG    Gait Training Goal PT STG, Date Established 06/07/17  -CT      Gait Training Goal PT STG, Time to Achieve 5 - 7 days  -CT      Gait Training Goal PT STG, [place] Level contact guard assist  -CT      Gait Training Goal PT STG, Assist Device walker, rolling  -CT      Gait Training Goal PT STG, Distance to Achieve 50  -CT      Gait Training PT LTG    Gait Training Goal PT LTG, Date Established 06/07/17  -CT      Gait Training Goal PT LTG, Time to Achieve by discharge  -CT      Gait Training Goal PT LTG,  [place] Level conditional independence;supervision required  -CT      Gait Training Goal PT LTG, Assist Device walker, rolling  -CT      Gait Training Goal PT LTG, Distance to Achieve 75  -CT      Patient Education PT LTG    Patient Education PT LTG, Date Established 06/07/17  -CT      Patient Education PT LTG, Time to Achieve by discharge  -CT      Patient Education PT LTG, Education Type written program;HEP;precaution per surgeon;positioning;posture/body mechanics;gait;transfers;bed mobility;pain management;progression of POC;benefits of activity;home safety;equipment management;skin care/inspection;energy conservation  -CT      Patient Education PT LTG, Education Understanding demonstrate adequately;verbalize understanding  -CT        User Key  (r) = Recorded By, (t) = Taken By, (c) = Cosigned By    Initials Name Provider Type    CT Yuli oMreno PT Physical Therapist          Physical Therapy Education     Title: PT OT SLP Therapies (Done)     Topic: Physical Therapy (Done)     Point: Mobility training (Done)    Learning Progress Summary    Learner Readiness Method Response Comment Documented by Status   Patient Acceptance E VU,NR  AMINA 06/13/17 1448 Done    Acceptance E VU   06/13/17 0322 Done    Acceptance E,D VU,NR  LL 06/12/17 1511 Done    Acceptance E,D NR,VU  AG 06/10/17 1345 Done    Acceptance E,D VU,NR  LL 06/09/17 1700 Done    Acceptance E,D NR  LL 06/08/17 1820 Active    Acceptance E NR  CT 06/07/17 1707 Active               Point: Home exercise program (Done)    Learning Progress Summary    Learner Readiness Method Response Comment Documented by Status   Patient Acceptance E VU,NR  AMINA 06/13/17 1448 Done    Acceptance E VU   06/13/17 0322 Done    Acceptance E,D VU,NR   06/12/17 1511 Done    Acceptance E,D NR,VU  AG 06/10/17 1345 Done               Point: Body mechanics (Done)    Learning Progress Summary    Learner Readiness Method Response Comment Documented by Status   Patient Acceptance E  VU,NR  AMINA 06/13/17 1448 Done    Acceptance E VU   06/13/17 0322 Done    Acceptance E,D VU,NR  LL 06/12/17 1511 Done    Acceptance E,D NR,VU   06/10/17 1345 Done    Acceptance E,D VU,NR  LL 06/09/17 1700 Done    Acceptance E,D NR  LL 06/08/17 1820 Active    Acceptance E NR  CT 06/07/17 1707 Active               Point: Precautions (Done)    Learning Progress Summary    Learner Readiness Method Response Comment Documented by Status   Patient Acceptance E VU,NR  AMINA 06/13/17 1448 Done    Acceptance E VU   06/13/17 0322 Done    Acceptance E,D VU,NR  LL 06/12/17 1511 Done    Acceptance E,D NR,VU   06/10/17 1345 Done    Acceptance E,D VU,NR  LL 06/09/17 1700 Done    Acceptance E,D NR  LL 06/08/17 1820 Active    Acceptance E NR  CT 06/07/17 1707 Active                      User Key     Initials Effective Dates Name Provider Type Discipline     06/16/16 -  Yazmin Munoz, RN Registered Nurse Nurse     03/14/16 -  Becka No, PT Physical Therapist PT    CT 03/14/16 -  Yuli Moreno, PT Physical Therapist PT     05/02/16 -  Roberta Friedman, PTA Physical Therapy Assistant PT     05/02/16 -  Roberta Eisenberg, PTA Physical Therapy Assistant PT                    PT Recommendation and Plan  Anticipated Equipment Needs At Discharge:  (to be determined)  Anticipated Discharge Disposition: home with assist, home with home health  Planned Therapy Interventions: balance training, bed mobility training, gait training, home exercise program, manual therapy techniques, neuromuscular re-education, motor coordination training, patient/family education, postural re-education, ROM (Range of Motion), stair training, strengthening, transfer training  PT Frequency: 2 times/day, 5 times/wk, per priority policy            Time Calculation:         PT Charges       06/13/17 1451 06/13/17 1448       Time Calculation    Start Time 1245  -AMINA 1100  -AMINA     Stop Time 1330  -AMINA 1145  -AMINA     Time Calculation (min) 45 min  -AMINA  45 min  -AMINA     PT Non-Billable Time (min)  15 min  -AMINA     PT Received On 06/13/17  -AMINA 06/13/17  -AMINA     PT - Next Appointment 06/14/17  -AMINA 06/14/17  -AMINA     PT Goal Re-Cert Due Date 06/14/17  -AMINA 06/14/17  -AMINA       User Key  (r) = Recorded By, (t) = Taken By, (c) = Cosigned By    Initials Name Provider Type    AMINA Eisenberg PTA Physical Therapy Assistant          Therapy Charges for Today     Code Description Service Date Service Provider Modifiers Qty    90461970642 HC GAIT TRAINING EA 15 MIN 6/13/2017 Roberta Eisenberg PTA GP 2    52673999433 HC PT THER PROC EA 15 MIN 6/13/2017 Roberta Eisenberg PTA GP 4    27618798615 HC PT THER SUPP EA 15 MIN 6/13/2017 Roberta Eisenberg PTA GP 2               Roberta Eisenberg PTA  6/13/2017

## 2017-06-13 NOTE — PLAN OF CARE
Problem: Patient Care Overview (Adult)  Goal: Adult Individualization and Mutuality  Outcome: Ongoing (interventions implemented as appropriate)  Goal: Discharge Needs Assessment  Outcome: Ongoing (interventions implemented as appropriate)    Problem: Pressure Ulcer (Adult)  Goal: Signs and Symptoms of Listed Potential Problems Will be Absent or Manageable (Pressure Ulcer)  Outcome: Ongoing (interventions implemented as appropriate)    Problem: Fractured Hip (Adult)  Goal: Signs and Symptoms of Listed Potential Problems Will be Absent or Manageable (Fractured Hip)  Outcome: Ongoing (interventions implemented as appropriate)    Problem: Infection, Risk/Actual (Adult)  Goal: Infection Prevention/Resolution  Outcome: Ongoing (interventions implemented as appropriate)    Problem: Mobility, Physical Impaired (Adult)  Goal: Enhanced Mobility Skills  Outcome: Ongoing (interventions implemented as appropriate)

## 2017-06-13 NOTE — SIGNIFICANT NOTE
06/13/17 1444   Case Management/Social Work Plan   Plan Pt discussed in team conference this am.  Spoke to pt about plans for discharge on 6-16-17, home health and DME.  Pt states having rollator that was a private purchase and standard walker belongs to spouse.  Pt prefers [facility] and [facility].  Attempted to contact spouse [phone] without success; unable to leave message.  Pt does not know spouse's cell phone number.  Pt says spouse visits every evening around 5:00 pm.  Provided pt with [email or web address] business card and requested spouse contact SS on 6-14-17.  Pt plans to return home with spouse at discharge.     Patient/Family In Agreement With Plan yes

## 2017-06-13 NOTE — PROGRESS NOTES
"     LOS: 7 days     Chief Complaint:  Hip fracture    Subjective     Interval History:     She is feeling better overall.  Dilantin Level is 4.9 this morning. She has not had a seizure since last week.  Improving mobility.  No rash. No SOA / cough      Objective     Vital Signs  /58  Pulse 73  Temp 97.7 °F (36.5 °C) (Oral)   Resp 20  Ht 59\" (149.9 cm)  Wt 119 lb (54 kg)  SpO2 97%  BMI 24.04 kg/m2  Intake & Output (last day)       06/12 0701 - 06/13 0700 06/13 0701 - 06/14 0700    P.O. 1200     Total Intake(mL/kg) 1200 (22.2)     Net +1200            Unmeasured Urine Occurrence 6 x             Physical Exam:     General Appearance:    Alert, cooperative, in no acute distress   Head:    Normocephalic, without obvious abnormality, atraumatic   Eyes:            Lids and lashes normal, conjunctivae and sclerae normal, no   icterus, no pallor, corneas clear, PERRLA   Ears:    Ears appear intact with no abnormalities noted       Neck:   No adenopathy, supple, trachea midline, no thyromegaly, no   carotid bruit, no JVD   Lungs:     Clear to auscultation,respirations regular, even and                  unlabored    Heart:    Regular rhythm and normal rate, normal S1 and S2, no            murmur, no gallop, no rub, no click   Chest Wall:    No abnormalities observed   Abdomen:     Normal bowel sounds, no masses, no organomegaly, soft        non-tender, non-distended, no guarding, no rebound                tenderness   Extremities:   no edema, no cyanosis, no redness   Pulses:   Pulses palpable and equal bilaterally   Skin:   No bleeding, bruising or rash                Results Review:    Lab Results   Component Value Date    WBC 4.09 (L) 06/07/2017    HGB 11.1 (L) 06/07/2017    HCT 34.2 (L) 06/07/2017    MCV 98.8 (H) 06/07/2017     06/07/2017       Lab Results   Component Value Date    GLUCOSE 98 06/07/2017    BUN 6 (L) 06/07/2017    CREATININE 0.39 (L) 06/07/2017    EGFRIFNONA >150 06/07/2017    BCR 15.4 " 06/07/2017     06/07/2017    K 4.1 06/07/2017     06/07/2017    CO2 25.3 06/07/2017    CALCIUM 8.8 06/07/2017    ALBUMIN 3.30 (L) 06/07/2017    LABIL2 1.0 (L) 06/07/2017    AST 18 06/07/2017    ALT 12 06/07/2017     No results found for: INR    No results found for: POCGLU       Medication Review:     Current Facility-Administered Medications:   •  acetaminophen (TYLENOL) tablet 650 mg, 650 mg, Oral, Q4H PRN, Samuel Duane Kreis, MD, 650 mg at 06/12/17 0510  •  aluminum-magnesium hydroxide-simethicone (MAALOX MAX) 400-400-40 MG/5ML suspension 15 mL, 15 mL, Oral, Q6H PRN, Samuel Duane Kreis, MD, 15 mL at 06/10/17 2052  •  atorvastatin (LIPITOR) tablet 40 mg, 40 mg, Oral, Nightly, Samuel Duane Kreis, MD, 40 mg at 06/12/17 2106  •  baclofen (LIORESAL) tablet 20 mg, 20 mg, Oral, Q12H, Samuel Duane Kreis, MD, 20 mg at 06/13/17 0854  •  bisacodyl (DULCOLAX) suppository 10 mg, 10 mg, Rectal, Daily PRN, Samuel Duane Kreis, MD  •  clonazePAM (KlonoPIN) tablet 0.5 mg, 0.5 mg, Oral, BID PRN, Samuel Duane Kreis, MD, 0.5 mg at 06/12/17 2106  •  enoxaparin (LOVENOX) syringe 40 mg, 40 mg, Subcutaneous, Daily, Samuel Duane Kreis, MD, 40 mg at 06/13/17 0855  •  HYDROcodone-acetaminophen (NORCO)  MG per tablet 1 tablet, 1 tablet, Oral, Q4H PRN, Samuel Duane Kreis, MD, 1 tablet at 06/13/17 0623  •  levETIRAcetam (KEPPRA) tablet 250 mg, 250 mg, Oral, Nightly, Samuel Duane Kreis, MD, 250 mg at 06/12/17 2107  •  levETIRAcetam (KEPPRA) tablet 500 mg, 500 mg, Oral, Daily, Samuel Duane Kreis, MD, 500 mg at 06/13/17 0855  •  LORazepam (ATIVAN) injection 1 mg, 1 mg, Intramuscular, Once PRN, Samuel Duane Kreis, MD  •  magnesium hydroxide (MILK OF MAGNESIA) suspension 2400 mg/10mL 10 mL, 10 mL, Oral, Daily PRN, Samuel Duane Kreis, MD  •  montelukast (SINGULAIR) tablet 10 mg, 10 mg, Oral, Nightly, Samuel Duane Kreis, MD, 10 mg at 06/12/17 2105  •  nystatin (MYCOSTATIN) 476561 UNIT/ML suspension 500,000 Units, 5 mL, Oral, 4x  Daily, Samuel Duane Kreis, MD, 500,000 Units at 06/13/17 0855  •  ondansetron (ZOFRAN) tablet 4 mg, 4 mg, Oral, Q6H PRN **OR** ondansetron ODT (ZOFRAN-ODT) disintegrating tablet 4 mg, 4 mg, Oral, Q6H PRN **OR** ondansetron (ZOFRAN) injection 4 mg, 4 mg, Intravenous, Q6H PRN, Samuel Duane Kreis, MD  •  pantoprazole (PROTONIX) EC tablet 40 mg, 40 mg, Oral, Q AM, Samuel Duane Kreis, MD, 40 mg at 06/13/17 0623  •  PHENobarbital (LUMINAL) tablet 97.2 mg, 97.2 mg, Oral, Q12H, Samuel Duane Kreis, MD, 97.2 mg at 06/13/17 0853  •  phenytoin (DILANTIN) ER capsule 200 mg, 200 mg, Oral, Q12H, Samuel Duane Kreis, MD, 200 mg at 06/13/17 0854  •  senna (SENOKOT) tablet 1 tablet, 1 tablet, Oral, BID, Samuel Duane Kreis, MD, 1 tablet at 06/13/17 0854  •  venlafaxine XR (EFFEXOR-XR) 24 hr capsule 150 mg, 150 mg, Oral, Daily With Breakfast, Samuel Duane Kreis, MD, 150 mg at 06/13/17 0854      Assessment/Plan     Periprosthetic hip fracture status post ORIF  Osteoarthritis  Chronic obstructive pulmonary disease  Seizure disorder      Continue hydrocodone when necessary for pain    PT and OT ongoing.     Pulmonary status is stable.    Continue baclofen    Continue Keppra, Dilantin and phenobarbital.  Continue Dilantin 200 mg twice a day and add Dilantin 50 mg @ noon    Refer to team conference note    Samuel Duane Kreis, MD  06/13/17  8:59 AM

## 2017-06-13 NOTE — PROGRESS NOTES
Inpatient Rehabilitation Functional Measures Assessment    Functional Measures  RAS Eating:  RAS Grooming:  RAS Bathing:  RAS Upper Body Dressing:  RAS Lower Body Dressing:  RAS Toileting:    RAS Bladder Management  Level of Assistance:  Frequency/Number of Accidents this Shift:    RAS Bowel Management  Level of Assistance:  Frequency/Number of Accidents this Shift:    RAS Bed/Chair/Wheelchair Transfer:  RAS Toilet Transfer:  RAS Tub/Shower Transfer:    Previously Documented Mode of Locomotion at Discharge:  RAS Expected Mode of Locomotion at Discharge:  RAS Walk/Wheelchair:  RAS Stairs:    RAS Comprehension:  Both ( auditory and visual) modes of comprehension are used  equally. Comprehension Score = 6, Modified [place].  Patient comprehends  complex/abstract information in their primary language, requiring: Additional  time.  RAS Expression:  Both ( vocal and non-vocal) modes of expression are used  equally. Expression Score = 6,  Modified Independent. Patient expresses  complex/abstract information in their primary language, requiring: Additional  time.  RAS Social Interaction:  Social Interaction Score = 6, Modified Independent.  Patient is modified independent for social interaction, requiring: Requires  additional time.  RAS Problem Solving:  Problem Solving Score = 6, Modified [place].  Patient  makes appropriate decisions in order to solve complex problems, but requires  extra time.  RAS Memory:  Memory Score = 6, Modified [place].  Patient is modified  independent for memory, requiring: Requires additional time.    Therapy Mode Minutes  Occupational Therapy:  Physical Therapy:  Speech Language Pathology:    Discharge Functional Goals:    Signed by: Nurse Nathen

## 2017-06-13 NOTE — THERAPY TREATMENT NOTE
Inpatient Rehabilitation - Occupational Therapy Treatment Note   Ed     Patient Name: Maribel Staton  : 1945  MRN: [id number]  Today's Date: 2017  Onset of Illness/Injury or Date of Surgery Date: 17  Date of Referral to OT: 17  Referring Physician: Eva      Admit Date: 2017    Visit Dx:   No diagnosis found.  Patient Active Problem List   Diagnosis   • Closed right hip fracture             Adult Rehabilitation Note       17 1440 17 1400 17 1503    Rehab Assessment/Intervention    Discipline occupational therapist  -AB physical therapy assistant  -AMINA physical therapy assistant  -LL    Document Type therapy note (daily note)  -AB therapy note (daily note)   BID treatment session  -AMINA therapy note (daily note)   BID treatment session  -LL    Subjective Information no complaints;agree to therapy  -AB agree to therapy;complains of;pain  -AMINA no complaints;agree to therapy  -LL    Patient Effort, Rehab Treatment good  -AB good  -AMINA good  -LL    Precautions/Limitations fall precautions;non-weight bearing status;seizure precautions;other (see comments)   NWB RLE; <skin integrity; intermittent confusion  -AB fall precautions;non-weight bearing status;seizure precautions;other (see comments)   NWB R) LE, , skin integrity, intermittent confusion  -AMINA fall precautions;non-weight bearing status;seizure precautions;other (see comments)   NWB RLE; skin integrity, intermittent confusion  -LL    Patient Response to Treatment Pt. w/ good tolerance and rest breaks provided. She continues to display improvements w/ self care tasks and fxl mobility. See appropriate sections for updated levels.   -AB Patient tolerated tx well today, however reported increased fatigue in PM session.  Pt had episode of LOB during gait training in PM session, however corrected w/ assistance of therapist and PT tech.  No injuries or complaints noted; RN notified and aware.   -AMINA Patient did well with BID  treatment session with adequate rest breaks.  -LL    Recorded by [AB] Missy Pelayo, OT [AMINA] Roberta Eisenberg PTA [LL] Roberta Friedamn PTA    Pain Assessment    Pain Assessment  0-10  -AMINA Levin-Orozco FACES  -LL    Levin-Baker FACES Pain Rating  8  -AMINA 6  -LL    Pain Type  Acute pain;Surgical pain  -AMINA Acute pain;Surgical pain  -LL    Pain Location  Hip  -AMINA Hip  -LL    Pain Orientation  Right  -AMINA Right  -LL    Pain Intervention(s)  Repositioned;Rest;Other (Comment)   pt received meds to assist w/ pain prior to initiation of se  -AMINA Repositioned;Rest  -LL    Recorded by  [AMINA] Roberta Eisenberg PTA [LL] Roberta Friedman PTA    Cognitive Assessment/Intervention    Current Cognitive/Communication Assessment  functional  -AMINA functional  -LL    Orientation Status  oriented to;person;place  -AMINA oriented to;person;place;situation;disoriented to;time  -LL    Follows Commands/Answers Questions able to follow single-step instructions;100% of the time  -AB able to follow single-step instructions;100% of the time;needs cueing  -AMINA able to follow single-step instructions;100% of the time;needs cueing  -LL    Personal Safety decreased awareness, need for assist;decreased awareness, need for safety  -AB decreased awareness, need for assist;decreased awareness, need for safety  -AMINA decreased awareness, need for assist;decreased awareness, need for safety  -LL    Personal Safety Interventions gait belt;nonskid shoes/slippers when out of bed;supervised activity  -AB gait belt;nonskid shoes/slippers when out of bed;supervised activity  -AMINA gait belt;nonskid shoes/slippers when out of bed;supervised activity  -LL    Recorded by [AB] Missy Pelayo, OT [AMINA] Roberta Eisenberg PTA [LL] Roberta Friedman PTA    Mobility Assessment/Training    Extremity Weight-Bearing Status right lower extremity  -AB  right lower extremity  -LL    Right Lower Extremity Weight-Bearing non weight-bearing  -AB  non weight-bearing   -LL    Recorded by [AB] Missy Pelayo, OT  [LL] Roberta Friedman PTA    Bed Mobility, Assessment/Treatment    Bed Mobility, Scoot/Bridge, [place]   minimum assist (75% patient effort)  -LL    Bed Mob, Supine to Sit, [place]  verbal cues required;minimum assist (75% patient effort)  -AMINA contact guard assist;minimum assist (75% patient effort)  -LL    Bed Mob, Sit to Supine, [place]  minimum assist (75% patient effort);verbal cues required  -AMINA minimum assist (75% patient effort)  -LL    Bed Mobility, Safety Issues  decreased use of legs for bridging/pushing  -AMINA decreased use of legs for bridging/pushing  -LL    Bed Mobility, Impairments  ROM decreased;strength decreased;impaired balance;coordination impaired;pain  -AMINA ROM decreased;strength decreased;impaired balance;coordination impaired;pain  -LL    Recorded by  [AMINA] Roberta Eisenberg PTA [LL] Roberta Friedman PTA    Transfer Assessment/Treatment    Transfers, Bed-Chair [place]  verbal cues required;nonverbal cues required (demo/gesture);minimum assist (75% patient effort)  -AMINA verbal cues required;nonverbal cues required (demo/gesture);contact guard assist;minimum assist (75% patient effort)  -LL    Transfers, Chair-Bed [place]  verbal cues required;nonverbal cues required (demo/gesture);minimum assist (75% patient effort)  -AMINA contact guard assist;minimum assist (75% patient effort)  -LL    Transfers, Bed-Chair-Bed, Assist Device  rolling walker  -AMINA rolling walker  -LL    Transfers, Sit-Stand [place]  verbal cues required;nonverbal cues required (demo/gesture);minimum assist (75% patient effort)  -AMINA verbal cues required;nonverbal cues required (demo/gesture);contact guard assist;minimum assist (75% patient effort)  -LL    Transfers, Stand-Sit [place]  verbal cues required;nonverbal cues required (demo/gesture);minimum assist (75% patient effort)  -AMINA verbal cues required;nonverbal cues required  (demo/gesture);contact guard assist;minimum assist (75% patient effort)  -LL    Transfers, Sit-Stand-Sit, Assist Device  rolling walker  -AMINA rolling walker  -LL    Toilet Transfer, [place] contact guard assist;verbal cues required;nonverbal cues required (demo/gesture)  -AB      Toilet Transfer, Assistive Device elevated toilet seat;wheelchair;other (see comments)   grab bar  -AB      Transfer, Safety Issues  balance decreased during turns  -AMINA balance decreased during turns  -LL    Transfer, Impairments  ROM decreased;strength decreased;impaired balance;coordination impaired;pain  -AMINA ROM decreased;strength decreased;impaired balance;coordination impaired;pain  -LL    Recorded by [AB] Missy Pelayo, OT [AMINA] Roberta Eisenberg PTA [LL] Roberta Friedman PTA    Gait Assessment/Treatment    Gait, [place] Level  verbal cues required;nonverbal cues required (demo/gesture);minimum assist (75% patient effort)  -AMINA contact guard assist;minimum assist (75% patient effort)  -LL    Gait, Assistive Device  rolling walker  -AMINA rolling walker  -LL    Gait, Distance (Feet)  45   45' x 2 AM session, 20' x 2 PM session  -AMINA 70   then 60 in am; 40 x 2 in pm  -LL    Gait, Gait Pattern Analysis  swing-to gait  -AMINA swing-to gait  -LL    Gait, Gait Deviations  antalgic;decreased heel strike;step length decreased;limb motion velocity decreased  -AMINA antalgic;decreased heel strike;limb motion velocity decreased;step length decreased;weight-shifting ability decreased  -LL    Gait, Maintain Weight Bearing Status  able to maintain weight bearing status  -AMINA able to maintain weight bearing status  -LL    Gait, Safety Issues  balance decreased during turns;step length decreased;weight-shifting ability decreased  -AMINA balance decreased during turns;step length decreased;weight-shifting ability decreased  -LL    Gait, Impairments  ROM decreased;strength decreased;impaired balance;coordination impaired;pain  -AMINA ROM  decreased;strength decreased;impaired balance;coordination impaired;pain  -LL    Recorded by  [AMINA] Roberta Eisenberg PTA [LL] Roberta Friedman PTA    Toileting Assessment/Training    Toileting Assess/Train, Assistive Device grab bars;raised toilet seat  -AB      Toileting Assess/Train, Position sitting  -AB      Toileting Assess/Train, Indepen Level contact guard assist;verbal cues required;nonverbal cues required (demo/gesture)  -AB      Recorded by [AB] Missy Pelayo OT      Grooming Assessment/Training    Grooming Assess/Train, Position sitting  -AB      Grooming Assess/Train, Indepen Level set up required  -AB      Recorded by [AB] Missy Pelayo OT      Balance Skills Training    Sitting-Level of Assistance   Distant supervision  -LL    Sitting-Balance Support   Feet supported  -LL    Standing-Level of Assistance  Contact guard;Minimum assistance  -AMINA Contact guard  -LL    Static Standing Balance Support  assistive device  -AMINA assistive device  -LL    Gait Balance-Level of Assistance  Minimum assistance  -AMINA Contact guard;Minimum assistance  -LL    Gait Balance Support  assistive device  -AMINA assistive device  -LL    Recorded by  [AMINA] Roberta Eisenberg PTA [LL] Roberta Friedman PTA    Therapy Exercises    Bilateral Lower Extremities   AROM:;25 reps;supine;sitting  -LL    BLE Resistance   theraband  -LL    Bilateral Upper Extremity AROM:;sitting   BUE CoordEx; G/FMC TherEx/Act; Strengthening  -AB      BUE Resistance manual resistance- minimal;other (comment)   ArmBike: [id number], min resistance; BUE Wrist Rolls X2  -AB      Recorded by [AB] Missy Pelayo OT  [LL] Roberta Friedman PTA    Positioning and Restraints    Pre-Treatment Position  sitting in chair/recliner  -AMINA --   WC in room in am & pm  -LL    Post Treatment Position  wheelchair  -AMINA wheelchair  -LL    In Wheelchair sitting;call light within reach;encouraged to call for assist;legs elevated  -AB sitting;call light  within reach;notified nsg;heels elevated   in front of nursing station following AM/PM session  -AMINA sitting;call light within reach;encouraged to call for assist;legs elevated   in room in am; with activities in pm  -LL    Recorded by [AB] Missy Pelayo OT [AMINA] Roberta Eisenberg, PTA [LL] Roberta Friedman, PTA      06/12/17 0941 06/10/17 1500       Rehab Assessment/Intervention    Discipline occupational therapist  -AB occupational therapist  -BC     Document Type therapy note (daily note)  -AB therapy note (daily note)  -BC     Subjective Information no complaints;agree to therapy  -AB agree to therapy;no complaints  -BC     Patient Effort, Rehab Treatment good  -AB good  -BC     Precautions/Limitations fall precautions;non-weight bearing status;seizure precautions;other (see comments)   NWB RLE; <skin integrity; intermittent confusion  -AB fall precautions;non-weight bearing status;seizure precautions  -BC     Patient Response to Treatment Pt. w/ good tolerance and rest breaks provided.   -AB Pt. tolerated tx. well with frequent rest breaks at table top tasks  -BC     Recorded by [AB] Missy Pelayo OT [BC] Ursula Concepcion OT     Cognitive Assessment/Intervention    Follows Commands/Answers Questions able to follow single-step instructions;100% of the time;needs cueing  -AB      Personal Safety decreased awareness, need for assist;decreased awareness, need for safety  -AB      Personal Safety Interventions gait belt;nonskid shoes/slippers when out of bed;supervised activity  -AB      Recorded by [AB] Missy Pelayo OT      Transfer Assessment/Treatment    Transfers, Bed-Chair [place] minimum assist (75% patient effort);contact guard assist;verbal cues required;nonverbal cues required (demo/gesture)  -AB      Transfers, Bed-Chair-Bed, Assist Device rolling walker;other (see comments)   w/c  -AB      Recorded by [AB] Missy Pelayo OT      Toileting Assessment/Training     Toileting Assess/Train, Assistive Device  grab bars;raised toilet seat  -BC     Toileting Assess/Train, Position  sitting  -BC     Toileting Assess/Train, Indepen Level  minimum assist (75% patient effort)  -BC     Recorded by  [BC] Ursula Concepcion OT     Grooming Assessment/Training    Grooming Assess/Train, Position sitting;sink side  -AB sitting;sink side  -BC     Grooming Assess/Train, Indepen Level supervision required;set up required  -AB supervision required;nonverbal cues required (demo/gesture)  -BC     Recorded by [AB] Missy Pelayo, OT [BC] Ursula Concepcion OT     Therapy Exercises    Bilateral Upper Extremity AROM:;sitting   BUE CoordEx; G/FMC TherEx/Act; Strengthening  -AB AROM:   BUE, G/FM coordination ther ex. act., strengtherning   -BC     BUE Resistance manual resistance- minimal;other (comment)   ArmBike: [id number], min resistance; BUE Wrist Roll X1  -AB --   wrist rolls x 1  -BC     Recorded by [AB] Missy Pelayo, OT [BC] Ursula Concepcion OT     Fine Motor Coordination Training    Detail (Fine Motor Coordination Training)  Peg board, hand , bean bag toss,   -BC     Recorded by  [BC] Ursula Concepcion OT     Positioning and Restraints    Pre-Treatment Position  sitting in chair/recliner  -BC     Post Treatment Position  bed  -BC     In Bed  supine;call light within reach  -BC     In Wheelchair sitting;call light within reach;encouraged to call for assist;legs elevated  -AB      Recorded by [AB] Missy Pelayo, OT [BC] Ursula Concepcion OT       User Key  (r) = Recorded By, (t) = Taken By, (c) = Cosigned By    Initials Name Effective Dates    AB Missy Pelayo, OT 02/04/16 -     LL Roberta Friedman, PTA 05/02/16 -     AMINA Roberta Eisenberg, PTA 05/02/16 -     BC Ursula Concepcion OT 01/21/17 -                 OT Goals       06/07/17 1231          Transfer Training OT LTG    Transfer Training OT LTG, Date Established 06/07/17  -AB      Transfer Training OT LTG, Time to  Achieve by discharge  -AB      Transfer Training OT LTG, Activity Type all transfers  -AB      Transfer Training OT LTG, [place] Level contact guard assist  -AB      Patient Education OT LTG    Patient Education OT LTG, Date Established 06/07/17  -AB      Patient Education OT LTG, Time to Achieve by discharge  -AB      Patient Education OT LTG, Education Type HEP;adaptive equipment mgmt;home safety;work simplification  -AB      Bathing OT STG    Bathing Goal OT STG, Date Established 06/07/17  -AB      Bathing Goal OT STG, Time to Achieve 5 - 7 days  -AB      Bathing Goal OT STG, [place] Level minimum assist (75% patient effort)  -AB      Bathing OT LTG    Bathing Goal OT LTG, Date Established 06/07/17  -AB      Bathing Goal OT LTG, Time to Achieve by discharge  -AB      Bathing Goal OT LTG, [place] Level contact guard assist  -AB      Eating Self-Feeding OT LTG    Eat Self Feeding Goal OT LTG, Date Established 06/07/17  -AB      Eat Self Feeding Goal OT LTG, Time to Achieve by discharge  -AB      Eat Self Feed Goal OT LTG, [place] Level conditional independence  -AB      Toileting OT LTG    Toileting Goal OT LTG, Date Established 06/07/17  -AB      Toileting Goal OT LTG, Time to Achieve by discharge  -AB      Toileting Goal OT LTG, [place] Level contact guard assist  -AB      LB Dressing OT STG    LB Dressing Goal OT STG, Date Established 06/07/17  -AB      LB Dressing Goal OT STG, Time to Achieve 5 - 7 days  -AB      LB Dressing Goal OT STG, [place] Level moderate assist (50% patient effort)  -AB      LB Dressing OT LTG    LB Dressing Goal OT LTG, Date Established 06/07/17  -AB      LB Dressing Goal OT LTG, Time to Achieve by discharge  -AB      LB Dressing Goal OT LTG, [place] Level minimum assist (75% patient effort)  -AB        User Key  (r) = Recorded By, (t) = Taken By, (c) = Cosigned By    Initials Name Provider Type    AB Missy Pelayo, OT Occupational Therapist           Occupational Therapy Education     Title: PT OT SLP Therapies (Done)     Topic: Occupational Therapy (Done)     Point: ADL training (Done)    Description: Instruct learner(s) on proper safety adaptation and remediation techniques during self care or transfers.   Instruct in proper use of assistive devices.    Learning Progress Summary    Learner Readiness Method Response Comment Documented by Status   Patient Acceptance E,D VU,NR  AB 06/13/17 1450 Done    Acceptance E VU,NR  AMINA 06/13/17 1448 Done    Acceptance E,D NR  AB 06/12/17 0946 Active    Acceptance E NR  BC 06/10/17 1542 Active    Acceptance E,D NR  AB 06/09/17 1457 Active    Acceptance E,D NR  AB 06/08/17 1632 Active    Acceptance E,D NR  AB 06/07/17 1234 Active               Point: Home exercise program (Done)    Description: Instruct learner(s) on appropriate technique for monitoring, assisting and/or progressing therapeutic exercises/activities.    Learning Progress Summary    Learner Readiness Method Response Comment Documented by Status   Patient Acceptance E VU,NR  AMNIA 06/13/17 1448 Done               Point: Precautions (Done)    Description: Instruct learner(s) on prescribed precautions during self-care and functional transfers.    Learning Progress Summary    Learner Readiness Method Response Comment Documented by Status   Patient Acceptance E,D VU,NR  AB 06/13/17 1450 Done    Acceptance E VU,NR  AMINA 06/13/17 1448 Done    Acceptance E,D NR  AB 06/12/17 0946 Active    Acceptance E,D NR  AB 06/09/17 1457 Active    Acceptance E,D NR  AB 06/08/17 1632 Active    Acceptance E,D NR  AB 06/07/17 1234 Active               Point: Body mechanics (Done)    Description: Instruct learner(s) on proper positioning and spine alignment during self-care, functional mobility activities and/or exercises.    Learning Progress Summary    Learner Readiness Method Response Comment Documented by Status   Patient Acceptance E,D VU,NR  AB 06/13/17 1450 Done    Acceptance E  VU,NR  AMINA 06/13/17 1448 Done    Acceptance E,D NR  AB 06/12/17 0946 Active    Acceptance E,D NR  AB 06/09/17 1457 Active    Acceptance E,D NR  AB 06/08/17 1632 Active    Acceptance E,D NR  AB 06/07/17 1234 Active                      User Key     Initials Effective Dates Name Provider Type Discipline    AB 02/04/16 -  Missy Pelayo, OT Occupational Therapist OT    AMINA 05/02/16 -  Roberta Eisenberg PTA Physical Therapy Assistant PT    BC 01/21/17 -  Ursula Concepcion, OT Occupational Therapist OT                  OT Recommendation and Plan  Anticipated Equipment Needs At Discharge:  (TBD)  Anticipated Discharge Disposition:  (TBD)  Planned Therapy Interventions: activity intolerance, adaptive equipment training, ADL retraining, energy conservation, fine motor coordination training, home exercise program, motor coordination training, ROM (Range of Motion), strengthening, transfer training, other (see comments) (Therapeutic groups as beneficial to patient)  Therapy Frequency: 3-5 times/wk          Time Calculation:         Time Calculation- OT       06/13/17 1448          Time Calculation- OT    OT Start Time 0745  -AB      OT Stop Time 0915  -AB      OT Time Calculation (min) 90 min  -AB      Total Timed Code Minutes- OT 90 minute(s)  -AB      OT Non-Billable Time (min) 25 min  -AB        User Key  (r) = Recorded By, (t) = Taken By, (c) = Cosigned By    Initials Name Provider Type    AB Missy Pelayo OT Occupational Therapist           Therapy Charges for Today     Code Description Service Date Service Provider Modifiers Qty    60474373643 HC OT SELF CARE/MGMT/TRAIN EA 15 MIN 6/12/2017 Missy Pelayo OT GO 1    87465842230 HC OT THERAPEUTIC ACT EA 15 MIN 6/12/2017 Missy Pelayo OT GO 5    51190951825 HC OT THER SUPP EA 15 MIN 6/12/2017 Missy Pelayo OT GO 1    79410532613 HC OT CARE PLAN EA 15 MIN 6/13/2017 Missy Pelayo OT GO 1    69420447203 HC OT SELF  CARE/MGMT/TRAIN EA 15 MIN 6/13/2017 Missy Pelayo, OT GO 1    [id number]  OT THERAPEUTIC ACT EA 15 MIN 6/13/2017 Missy Pelayo OT GO 5               Missy Pelayo OT  6/13/2017

## 2017-06-14 PROCEDURE — 97535 SELF CARE MNGMENT TRAINING: CPT

## 2017-06-14 PROCEDURE — 97530 THERAPEUTIC ACTIVITIES: CPT

## 2017-06-14 PROCEDURE — 97116 GAIT TRAINING THERAPY: CPT

## 2017-06-14 PROCEDURE — 25010000002 ENOXAPARIN PER 10 MG: Performed by: FAMILY MEDICINE

## 2017-06-14 PROCEDURE — 97110 THERAPEUTIC EXERCISES: CPT

## 2017-06-14 RX ADMIN — PHENOBARBITAL 97.2 MG: 32.4 TABLET ORAL at 10:12

## 2017-06-14 RX ADMIN — PHENOBARBITAL 97.2 MG: 32.4 TABLET ORAL at 21:33

## 2017-06-14 RX ADMIN — BACLOFEN 20 MG: 10 TABLET ORAL at 21:33

## 2017-06-14 RX ADMIN — HYDROCODONE BITARTRATE AND ACETAMINOPHEN 1 TABLET: 10; 325 TABLET ORAL at 05:12

## 2017-06-14 RX ADMIN — HYDROCODONE BITARTRATE AND ACETAMINOPHEN 1 TABLET: 10; 325 TABLET ORAL at 21:33

## 2017-06-14 RX ADMIN — PHENYTOIN SODIUM 200 MG: 100 CAPSULE, EXTENDED RELEASE ORAL at 21:33

## 2017-06-14 RX ADMIN — MONTELUKAST SODIUM 10 MG: 10 TABLET ORAL at 21:33

## 2017-06-14 RX ADMIN — PANTOPRAZOLE SODIUM 40 MG: 40 TABLET, DELAYED RELEASE ORAL at 05:12

## 2017-06-14 RX ADMIN — SENNOSIDES 1 TABLET: 8.6 TABLET ORAL at 08:25

## 2017-06-14 RX ADMIN — SENNOSIDES 1 TABLET: 8.6 TABLET ORAL at 17:07

## 2017-06-14 RX ADMIN — ENOXAPARIN SODIUM 40 MG: 40 INJECTION SUBCUTANEOUS at 08:25

## 2017-06-14 RX ADMIN — ATORVASTATIN CALCIUM 40 MG: 40 TABLET, FILM COATED ORAL at 21:33

## 2017-06-14 RX ADMIN — CLONAZEPAM 0.5 MG: 0.5 TABLET ORAL at 21:33

## 2017-06-14 RX ADMIN — LEVETIRACETAM 500 MG: 500 TABLET, FILM COATED ORAL at 08:25

## 2017-06-14 RX ADMIN — PHENYTOIN SODIUM 200 MG: 100 CAPSULE, EXTENDED RELEASE ORAL at 08:25

## 2017-06-14 RX ADMIN — VENLAFAXINE HYDROCHLORIDE 150 MG: 150 CAPSULE, EXTENDED RELEASE ORAL at 08:25

## 2017-06-14 RX ADMIN — BACLOFEN 20 MG: 10 TABLET ORAL at 08:25

## 2017-06-14 RX ADMIN — NYSTATIN 500000 UNITS: 100000 SUSPENSION ORAL at 21:34

## 2017-06-14 RX ADMIN — PHENYTOIN 50 MG: 50 TABLET, CHEWABLE ORAL at 11:59

## 2017-06-14 RX ADMIN — LEVETIRACETAM 250 MG: 250 TABLET, FILM COATED ORAL at 21:33

## 2017-06-14 NOTE — THERAPY TREATMENT NOTE
Inpatient Rehabilitation - Occupational Therapy Progress Note   Ed     Patient Name: Maribel Staton  : 1945  MRN: [id number]  Today's Date: 2017  Onset of Illness/Injury or Date of Surgery Date: 17  Date of Referral to OT: 17  Referring Physician: Eva      Admit Date: 2017    Visit Dx:   No diagnosis found.  Patient Active Problem List   Diagnosis   • Closed right hip fracture             Adult Rehabilitation Note       17 1434 17 1440 17 1400    Rehab Assessment/Intervention    Discipline occupational therapist  -AB occupational therapist  -AB physical therapy assistant  -AMINA    Document Type therapy note (daily note);progress note  -AB therapy note (daily note)  -AB therapy note (daily note)   BID treatment session  -AMINA    Subjective Information no complaints;agree to therapy  -AB no complaints;agree to therapy  -AB agree to therapy;complains of;pain  -AMINA    Patient Effort, Rehab Treatment good  -AB good  -AB good  -AMINA    Precautions/Limitations fall precautions;non-weight bearing status;seizure precautions;other (see comments)   NWB RLE; <skin integrity; intermittent confusion  -AB fall precautions;non-weight bearing status;seizure precautions;other (see comments)   NWB RLE; <skin integrity; intermittent confusion  -AB fall precautions;non-weight bearing status;seizure precautions;other (see comments)   NWB R) LE, , skin integrity, intermittent confusion  -AMINA    Patient Response to Treatment Pt. continues to improve w/ self care tasks and fxl transfers.   -AB Pt. w/ good tolerance and rest breaks provided. She continues to display improvements w/ self care tasks and fxl mobility. See appropriate sections for updated levels.   -AB Patient tolerated tx well today, however reported increased fatigue in PM session.  Pt had episode of LOB during gait training in PM session, however corrected w/ assistance of therapist and PT tech.  No injuries or complaints noted;  RN notified and aware.   -AMINA    Recorded by [AB] Missy Pelayo OT [AB] Missy Pelayo OT [AMINA] Roberta Eisenberg PTA    Pain Assessment    Pain Assessment   0-10  -AMINA    Levin-Orozco FACES Pain Rating   8  -AMINA    Pain Type   Acute pain;Surgical pain  -AMINA    Pain Location   Hip  -AMINA    Pain Orientation   Right  -AMINA    Pain Intervention(s)   Repositioned;Rest;Other (Comment)   pt received meds to assist w/ pain prior to initiation of se  -AMINA    Recorded by   [AMINA] Roberta Eisenberg PTA    Cognitive Assessment/Intervention    Current Cognitive/Communication Assessment   functional  -AMINA    Orientation Status   oriented to;person;place  -AMINA    Follows Commands/Answers Questions able to follow single-step instructions;100% of the time  -AB able to follow single-step instructions;100% of the time  -AB able to follow single-step instructions;100% of the time;needs cueing  -AMINA    Personal Safety decreased awareness, need for assist;decreased awareness, need for safety  -AB decreased awareness, need for assist;decreased awareness, need for safety  -AB decreased awareness, need for assist;decreased awareness, need for safety  -AMINA    Personal Safety Interventions gait belt;nonskid shoes/slippers when out of bed;supervised activity  -AB gait belt;nonskid shoes/slippers when out of bed;supervised activity  -AB gait belt;nonskid shoes/slippers when out of bed;supervised activity  -AMINA    Recorded by [AB] Missy Pelayo OT [AB] Missy Pelayo OT [AMINA] Roberta Eisenberg PTA    Mobility Assessment/Training    Extremity Weight-Bearing Status right lower extremity  -AB right lower extremity  -AB     Right Lower Extremity Weight-Bearing non weight-bearing  -AB non weight-bearing  -AB     Recorded by [AB] Missy Pelayo OT [AB] Missy Pelayo OT     Bed Mobility, Assessment/Treatment    Bed Mob, Supine to Sit, [place]   verbal cues required;minimum assist (75% patient  effort)  -AMINA    Bed Mob, Sit to Supine, [place]   minimum assist (75% patient effort);verbal cues required  -AMINA    Bed Mobility, Safety Issues   decreased use of legs for bridging/pushing  -AMINA    Bed Mobility, Impairments   ROM decreased;strength decreased;impaired balance;coordination impaired;pain  -AMINA    Recorded by   [AMINA] Roberta Eisenberg PTA    Transfer Assessment/Treatment    Transfers, Bed-Chair [place]   verbal cues required;nonverbal cues required (demo/gesture);minimum assist (75% patient effort)  -AMINA    Transfers, Chair-Bed [place]   verbal cues required;nonverbal cues required (demo/gesture);minimum assist (75% patient effort)  -AMINA    Transfers, Bed-Chair-Bed, Assist Device   rolling walker  -AMINA    Transfers, Sit-Stand [place] contact guard assist;verbal cues required  -AB  verbal cues required;nonverbal cues required (demo/gesture);minimum assist (75% patient effort)  -AMINA    Transfers, Stand-Sit [place] contact guard assist;verbal cues required  -AB  verbal cues required;nonverbal cues required (demo/gesture);minimum assist (75% patient effort)  -AMINA    Transfers, Sit-Stand-Sit, Assist Device rolling walker  -AB  rolling walker  -AMINA    Toilet Transfer, [place]  contact guard assist;verbal cues required;nonverbal cues required (demo/gesture)  -AB     Toilet Transfer, Assistive Device  elevated toilet seat;wheelchair;other (see comments)   grab bar  -AB     Transfer, Safety Issues   balance decreased during turns  -AMINA    Transfer, Impairments   ROM decreased;strength decreased;impaired balance;coordination impaired;pain  -AMINA    Recorded by [AB] Missy Pelayo OT [AB] Missy Pelayo, OT [AMINA] Roberta Eisenberg PTA    Gait Assessment/Treatment    Gait, [place] Level   verbal cues required;nonverbal cues required (demo/gesture);minimum assist (75% patient effort)  -AMINA    Gait, Assistive Device   rolling walker  -AMINA    Gait, Distance (Feet)   45    45' x 2 AM session, 20' x 2 PM session  -AMINA    Gait, Gait Pattern Analysis   swing-to gait  -AMINA    Gait, Gait Deviations   antalgic;decreased heel strike;step length decreased;limb motion velocity decreased  -AMINA    Gait, Maintain Weight Bearing Status   able to maintain weight bearing status  -AMINA    Gait, Safety Issues   balance decreased during turns;step length decreased;weight-shifting ability decreased  -AMINA    Gait, Impairments   ROM decreased;strength decreased;impaired balance;coordination impaired;pain  -AMINA    Recorded by   [AMINA] Roberta Eisenberg, [place]    Upper Body Bathing Assessment/Training    UB Bathing Assess/Train, Position sitting  -AB      UB Bathing Assess/Train, [place] Level set up required  -AB      UB Bathing Assess/Train, Comment TB Bathing: CGA  -AB      Recorded by [AB] Missy Pelayo, OT      Lower Body Bathing Assessment/Training    LB Bathing Assess/Train Assistive Device long-handled sponge  -AB      LB Bathing Assess/Train, Position sitting;standing  -AB      LB Bathing Assess/Train, [place] Level CGA; verbal cues required;nonverbal cues required (demo/gesture)  -AB      LB Bathing Assess/Train, Comment TB Bathing: CGA  -AB      Recorded by [AB] Missy Pelayo, [place]      Upper Body Dressing Assessment/Training    UB Dressing Assess/Train, Position sitting  -AB      UB Dressing Assess/Train, [place] set up required  -AB      Recorded by [AB] Missy Pelayo, OT      Lower Body Dressing Assessment/Training    LB Dressing Assess/Train, Clothing Type donning:;doffing:;slipper socks;pants  -AB      LB Dressing Assess/Train, Assist Device reacher;sock-aid  -AB      LB Dressing Assess/Train, Position sitting;standing  -AB      LB Dressing Assess/Train, [place] minimum assist (75% patient effort);verbal cues required;nonverbal cues required (demo/gesture)  -AB      Recorded by [AB] Missy Pelayo, [place]      Toileting Assessment/Training     Toileting Assess/Train, Assistive Device  grab bars;raised toilet seat  -AB     Toileting Assess/Train, Position  sitting  -AB     Toileting Assess/Train, [place] Level  contact guard assist;verbal cues required;nonverbal cues required (demo/gesture)  -AB     Toileting Assess/Train, Comment CGA  -AB      Recorded by [AB] Missy Pelayo, OT [AB] Missy Pelayo, OT     Grooming Assessment/Training    Grooming Assess/Train, Position sitting  -AB sitting  -AB     Grooming Assess/Train, [place] Level set up required  -AB set up required  -AB     Recorded by [AB] Missy Pelayo OT [AB] Missy Pelayo, OT     Self-Feeding Assessment/Training    Self-Feeding Assess/Train, Comment Mod. I  -AB      Recorded by [AB] Missy Pelayo OT      Balance Skills Training    Standing-Level of Assistance   Contact guard;Minimum assistance  -AMINA    Static Standing Balance Support   assistive device  -AMINA    Gait Balance-Level of Assistance   Minimum assistance  -AMINA    Gait Balance Support   assistive device  -AMINA    Recorded by   [AMINA] Roberta Eisenberg PTA    Therapy Exercises    Bilateral Lower Extremities   AAROM:;AROM:;20 reps;30 reps;sitting;supine  -AMINA    BLE Resistance   theraband  -AMINA    Bilateral Upper Extremity AROM:;sitting   BUE CoordEx; G/FMC TherEx/Act; Strengthening  -AB AROM:;sitting   BUE CoordEx; G/FMC TherEx/Act; Strengthening  -AB     BUE Resistance manual resistance- minimal;other (comment)   ArmBike: [id number], min resistance; BUE Wrist Rolls X2  -AB manual resistance- minimal;other (comment)   ArmBike: 0oudbK0, min resistance; BUE Wrist Rolls X2  -AB     Recorded by [AB] Missy Pelayo, OT [AB] Missy Pelayo OT [AMINA] Roberta Eisenberg PTA    Positioning and Restraints    Pre-Treatment Position   sitting in chair/recliner  -AMINA    Post Treatment Position   wheelchair  -AMINA    In Wheelchair sitting;with PT;legs elevated  -AB sitting;call light within  reach;encouraged to call for assist;legs elevated  -AB sitting;call light within reach;notified nsg;heels elevated   in front of nursing station following AM/PM session  -AMINA    Recorded by [AB] Missy Pelayo OT [AB] Missy Pelayo OT [AMINA] Roberta Eisenberg PTA      06/12/17 1503 06/12/17 0941       Rehab Assessment/Intervention    Discipline physical therapy assistant  -LL occupational therapist  -AB     Document Type therapy note (daily note)   BID treatment session  -LL therapy note (daily note)  -AB     Subjective Information no complaints;agree to therapy  -LL no complaints;agree to therapy  -AB     Patient Effort, Rehab Treatment good  -LL good  -AB     Precautions/Limitations fall precautions;non-weight bearing status;seizure precautions;other (see comments)   NWB RLE; skin integrity, intermittent confusion  -LL fall precautions;non-weight bearing status;seizure precautions;other (see comments)   NWB RLE; <skin integrity; intermittent confusion  -AB     Patient Response to Treatment Patient did well with BID treatment session with adequate rest breaks.  -LL Pt. w/ good tolerance and rest breaks provided.   -AB     Recorded by [LL] Roberta Friedman PTA [AB] Missy Pelayo OT     Pain Assessment    Pain Assessment Levin-Orozco FACES  -LL      Levin-Baker FACES Pain Rating 6  -LL      Pain Type Acute pain;Surgical pain  -LL      Pain Location Hip  -LL      Pain Orientation Right  -LL      Pain Intervention(s) Repositioned;Rest  -LL      Recorded by [LL] Roberta Friedman PTA      Cognitive Assessment/Intervention    Current Cognitive/Communication Assessment functional  -LL      Orientation Status oriented to;person;place;situation;disoriented to;time  -LL      Follows Commands/Answers Questions able to follow single-step instructions;100% of the time;needs cueing  -LL able to follow single-step instructions;100% of the time;needs cueing  -AB     Personal Safety decreased awareness,  need for assist;decreased awareness, need for safety  -LL decreased awareness, need for assist;decreased awareness, need for safety  -AB     Personal Safety Interventions gait belt;nonskid shoes/slippers when out of bed;supervised activity  -LL gait belt;nonskid shoes/slippers when out of bed;supervised activity  -AB     Recorded by [LL] Roberta Friedman PTA [AB] Missy Pelayo OT     Mobility Assessment/Training    Extremity Weight-Bearing Status right lower extremity  -LL      Right Lower Extremity Weight-Bearing non weight-bearing  -LL      Recorded by [LL] Roberta Friedman PTA      Bed Mobility, Assessment/Treatment    Bed Mobility, Scoot/Bridge, [place] minimum assist (75% patient effort)  -LL      Bed Mob, Supine to Sit, [place] contact guard assist;minimum assist (75% patient effort)  -LL      Bed Mob, Sit to Supine, [place] minimum assist (75% patient effort)  -LL      Bed Mobility, Safety Issues decreased use of legs for bridging/pushing  -LL      Bed Mobility, Impairments ROM decreased;strength decreased;impaired balance;coordination impaired;pain  -LL      Recorded by [LL] Roberta Friedman PTA      Transfer Assessment/Treatment    Transfers, Bed-Chair [place] verbal cues required;nonverbal cues required (demo/gesture);contact guard assist;minimum assist (75% patient effort)  -LL minimum assist (75% patient effort);contact guard assist;verbal cues required;nonverbal cues required (demo/gesture)  -AB     Transfers, Chair-Bed [place] contact guard assist;minimum assist (75% patient effort)  -LL      Transfers, Bed-Chair-Bed, Assist Device rolling walker  -LL rolling walker;other (see comments)   w/c  -AB     Transfers, Sit-Stand [place] verbal cues required;nonverbal cues required (demo/gesture);contact guard assist;minimum assist (75% patient effort)  -LL      Transfers, Stand-Sit [place] verbal cues required;nonverbal cues required (demo/gesture);contact guard  assist;minimum assist (75% patient effort)  -LL      Transfers, Sit-Stand-Sit, Assist Device rolling walker  -LL      Transfer, Safety Issues balance decreased during turns  -LL      Transfer, Impairments ROM decreased;strength decreased;impaired balance;coordination impaired;pain  -LL      Recorded by [LL] Roberta Friedman PTA [AB] Missy Pelayo OT     Gait Assessment/Treatment    Gait, [place] Level contact guard assist;minimum assist (75% patient effort)  -LL      Gait, Assistive Device rolling walker  -LL      Gait, Distance (Feet) 70   then 60 in am; 40 x 2 in pm  -LL      Gait, Gait Pattern Analysis swing-to gait  -LL      Gait, Gait Deviations antalgic;decreased heel strike;limb motion velocity decreased;step length decreased;weight-shifting ability decreased  -LL      Gait, Maintain Weight Bearing Status able to maintain weight bearing status  -LL      Gait, Safety Issues balance decreased during turns;step length decreased;weight-shifting ability decreased  -LL      Gait, Impairments ROM decreased;strength decreased;impaired balance;coordination impaired;pain  -LL      Recorded by [LL] Roberta Friedman PTA      Grooming Assessment/Training    Grooming Assess/Train, Position  sitting;sink side  -AB     Grooming Assess/Train, [place] Level  supervision required;set up required  -AB     Recorded by  [AB] Missy Pelayo OT     Balance Skills Training    Sitting-Level of Assistance Distant supervision  -LL      Sitting-Balance Support Feet supported  -LL      Standing-Level of Assistance Contact guard  -LL      Static Standing Balance Support assistive device  -LL      Gait Balance-Level of Assistance Contact guard;Minimum assistance  -LL      Gait Balance Support assistive device  -LL      Recorded by [LL] Roberta Friedman PTA      Therapy Exercises    Bilateral Lower Extremities AROM:;25 reps;supine;sitting  -LL      BLE Resistance theraband  -LL      Bilateral Upper Extremity   AROM:;sitting   BUE CoordEx; G/FMC TherEx/Act; Strengthening  -AB     BUE Resistance  manual resistance- minimal;other (comment)   ArmBike: [id number], min resistance; BUE Wrist Roll X1  -AB     Recorded by [LL] Roberta Friedman PTA [AB] Missy Pelayo OT     Positioning and Restraints    Pre-Treatment Position --   WC in room in am & pm  -LL      Post Treatment Position wheelchair  -LL      In Wheelchair sitting;call light within reach;encouraged to call for assist;legs elevated   in room in am; with activities in pm  -LL sitting;call light within reach;encouraged to call for assist;legs elevated  -AB     Recorded by [LL] Roberta Friedman PTA [AB] Missy Pelayo OT       User Key  (r) = Recorded By, (t) = Taken By, (c) = Cosigned By    Initials Name Effective Dates    AB Missy Pelayo, OT 02/04/16 -     LL Roberta Friedman PTA 05/02/16 -     AMINA Roberta Eisenberg, PTA 05/02/16 -                 OT Goals       06/14/17 1445 06/07/17 1231       Transfer Training OT LTG    Transfer Training OT LTG, Date Established  06/07/17  -AB     Transfer Training OT LTG, Time to Achieve  by discharge  -AB     Transfer Training OT LTG, Activity Type  all transfers  -AB     Transfer Training OT LTG, [place] Level  contact guard assist  -AB     Patient Education OT LTG    Patient Education OT LTG, Date Established  06/07/17  -AB     Patient Education OT LTG, Time to Achieve  by discharge  -AB     Patient Education OT LTG, Education Type  HEP;adaptive equipment mgmt;home safety;work simplification  -AB     Bathing OT STG    Bathing Goal OT STG, Date Established  06/07/17  -AB     Bathing Goal OT STG, Time to Achieve  5 - 7 days  -AB     Bathing Goal OT STG, [place] Level  minimum assist (75% patient effort)  -AB     Bathing Goal OT STG, Outcome goal met  -AB      Bathing OT LTG    Bathing Goal OT LTG, Date Established  06/07/17  -AB     Bathing Goal OT LTG, Time to Achieve  by discharge  -AB      Bathing Goal OT LTG, [place] Level  contact guard assist  -AB     Eating Self-Feeding OT LTG    Eat Self Feeding Goal OT LTG, Date Established  06/07/17  -AB     Eat Self Feeding Goal OT LTG, Time to Achieve  by discharge  -AB     Eat Self Feed Goal OT LTG, [place] Level  conditional independence  -AB     Toileting OT LTG    Toileting Goal OT LTG, Date Established  06/07/17  -AB     Toileting Goal OT LTG, Time to Achieve  by discharge  -AB     Toileting Goal OT LTG, [place] Level  contact guard assist  -AB     LB Dressing OT STG    LB Dressing Goal OT STG, Date Established  06/07/17  -AB     LB Dressing Goal OT STG, Time to Achieve  5 - 7 days  -AB     LB Dressing Goal OT STG, [place] Level  moderate assist (50% patient effort)  -AB     LB Dressing Goal OT STG, Outcome goal met  -AB      LB Dressing OT LTG    LB Dressing Goal OT LTG, Date Established  06/07/17  -AB     LB Dressing Goal OT LTG, Time to Achieve  by discharge  -AB     LB Dressing Goal OT LTG, [place] Level  minimum assist (75% patient effort)  -AB       User Key  (r) = Recorded By, (t) = Taken By, (c) = Cosigned By    Initials Name Provider Type    AB Missy Pelayo OT Occupational Therapist          Occupational Therapy Education     Title: PT OT SLP Therapies (Done)     Topic: Occupational Therapy (Done)     Point: ADL training (Done)    Description: Instruct learner(s) on proper safety adaptation and remediation techniques during self care or transfers.   Instruct in proper use of assistive devices.    Learning Progress Summary    Learner Readiness Method Response Comment Documented by Status   Patient Acceptance E,D VU,NR  AB 06/14/17 1445 Done    Acceptance E VU  FH 06/14/17 0310 Done    Acceptance E,D VU,NR  AB 06/13/17 1450 Done    Acceptance E VU,NR  AMINA 06/13/17 1448 Done    Acceptance E,D NR  AB 06/12/17 0946 Active    Acceptance E NR  BC 06/10/17 1542 Active    Acceptance E,D NR  AB 06/09/17 1457 Active     Acceptance E,D NR  AB 06/08/17 1632 Active    Acceptance E,D NR  AB 06/07/17 1234 Active               Point: Home exercise program (Done)    Description: Instruct learner(s) on appropriate technique for monitoring, assisting and/or progressing therapeutic exercises/activities.    Learning Progress Summary    Learner Readiness Method Response Comment Documented by Status   Patient Acceptance E VU   06/14/17 0310 Done    Acceptance E VU,NR  AMINA 06/13/17 1448 Done               Point: Precautions (Done)    Description: Instruct learner(s) on prescribed precautions during self-care and functional transfers.    Learning Progress Summary    Learner Readiness Method Response Comment Documented by Status   Patient Acceptance E,D VU,NR  AB 06/14/17 1445 Done    Acceptance E VU   06/14/17 0310 Done    Acceptance E,D VU,NR  AB 06/13/17 1450 Done    Acceptance E VU,NR  AMINA 06/13/17 1448 Done    Acceptance E,D NR  AB 06/12/17 0946 Active    Acceptance E,D NR  AB 06/09/17 1457 Active    Acceptance E,D NR  AB 06/08/17 1632 Active    Acceptance E,D NR  AB 06/07/17 1234 Active               Point: Body mechanics (Done)    Description: Instruct learner(s) on proper positioning and spine alignment during self-care, functional mobility activities and/or exercises.    Learning Progress Summary    Learner Readiness Method Response Comment Documented by Status   Patient Acceptance E,D VU,NR  AB 06/14/17 1445 Done    Acceptance E VU   06/14/17 0310 Done    Acceptance E,D VU,NR  AB 06/13/17 1450 Done    Acceptance E VU,NR  AMINA 06/13/17 1448 Done    Acceptance E,D NR  AB 06/12/17 0946 Active    Acceptance E,D NR  AB 06/09/17 1457 Active    Acceptance E,D NR  AB 06/08/17 1632 Active    Acceptance E,D NR  AB 06/07/17 1234 Active                      User Key     Initials Effective Dates Name Provider Type Discipline    AB 02/04/16 -  Missy Pelayo OT Occupational Therapist OT     06/16/16 -  Yazmin Munoz RN Registered  Nurse Nurse    AMINA 05/02/16 -  Roberta Eisenberg, PTA Physical Therapy Assistant PT    BC 01/21/17 -  Ursula Concepcion, OT Occupational Therapist OT                  OT Recommendation and Plan  Anticipated Equipment Needs At Discharge:  (TBD)  Anticipated Discharge Disposition:  (TBD)  Planned Therapy Interventions: activity intolerance, adaptive equipment training, ADL retraining, energy conservation, fine motor coordination training, home exercise program, motor coordination training, ROM (Range of Motion), strengthening, transfer training, other (see comments) (Therapeutic groups as beneficial to patient)  Therapy Frequency: 3-5 times/wk          Time Calculation:         Time Calculation- OT       06/14/17 1446          Time Calculation- OT    OT Start Time 0745  -AB      OT Stop Time 0915  -AB      OT Time Calculation (min) 90 min  -AB      Total Timed Code Minutes- OT 90 minute(s)  -AB      OT Non-Billable Time (min) 25 min  -AB        User Key  (r) = Recorded By, (t) = Taken By, (c) = Cosigned By    Initials Name Provider Type    AB Missy Pelayo, OT Occupational Therapist           Therapy Charges for Today     Code Description Service Date Service Provider Modifiers Qty    99752282437 HC OT CARE PLAN EA 15 MIN 6/13/2017 Missy Pelayo OT GO 1    28454848533 HC OT SELF CARE/MGMT/TRAIN EA 15 MIN 6/13/2017 Missy Pelayo OT GO 1    40259637913 HC OT THERAPEUTIC ACT EA 15 MIN 6/13/2017 Missy Pelayo OT GO 5    [id number] HC OT SELF CARE/MGMT/TRAIN EA 15 MIN 6/14/2017 Missy Pelayo OT GO 3    32281820133 HC OT THER SUPP EA 15 MIN 6/14/2017 Missy Pelayo OT GO 1    [id number] HC OT THERAPEUTIC ACT EA 15 MIN 6/14/2017 Missy Pelayo OT GO 3               Missy Pelayo OT  6/14/2017

## 2017-06-14 NOTE — PROGRESS NOTES
Inpatient Rehabilitation Functional Measures Assessment    Functional Measures  RAS Eating:  RAS Grooming:  RAS Bathing:  RAS Upper Body Dressing:  RAS Lower Body Dressing:  RAS Toileting:    RAS Bladder Management  Level of Assistance:  Bladder Score = 5.  Patient is supervision/set-up for  bladder management, requiring: No assistive devices were required.  Frequency/Number of Accidents this Shift:  Bladder accidents this shift:  0 .  Patient has not had an accident this shift.    RAS Bowel Management  Level of Assistance: Bowel Score = 7.  Patient is completely independent for  bowel management.  Patient did not have bowel movement.  No  medication/intervention was provided.  Frequency/Number of Accidents this Shift: Bowel accidents this shift: 0 .  Patient has not had an accident this shift.    RAS Bed/Chair/Wheelchair Transfer:  Bed/chair/wheelchair Transfer Score = 4.  Patient performs 75% or more of effort and minimal assistance (little/incidental  help/lifting of one limb/steadying) for transferring to and from the  bed/chair/wheelchair, requiring: Steadying. Patient requires the following  assistive device(s): Seating system of wheelchair. Arm rest. Grab bars.  RAS Toilet Transfer:  Toilet Transfer Score = 4.  Patient performs 75% or more  of effort and minimal assistance (little/incidental help/steadying) for  transferring to and from the toilet/commode, requiring: Steadying. Patient  requires the following assistive device(s): Grab bars. Safety frame/over the  toilet.  RAS Tub/Shower Transfer:    Previously Documented Mode of Locomotion at Discharge:  [facility] Expected Mode of Locomotion at Discharge:  RAS Walk/Wheelchair:  RAS Stairs:    [facility] Comprehension:  RAS Expression:  [facility] Social Interaction:  [facility] Problem Solving:  [facility] Memory:    Therapy Mode Minutes  Occupational Therapy:  Physical Therapy:  Speech Language Pathology:    Discharge Functional Goals:    Signed by: RENNY Gonzalez

## 2017-06-14 NOTE — DISCHARGE INSTR - OTHER ORDERS
[facility] [phone] for PT and OT 2 x week x 4 weeks.    [facility] [phone] for 16 inch lightweight W/C, anti-tippers, ELR and basic cushion and bedside commode.

## 2017-06-14 NOTE — PROGRESS NOTES
Inpatient Rehabilitation Functional Measures Assessment    Functional Measures  RAS Eating:  RAS Grooming:  RAS Bathing:  RAS Upper Body Dressing:  RAS Lower Body Dressing:  RAS Toileting:    RAS Bladder Management  Level of Assistance:  Frequency/Number of Accidents this Shift:    RAS Bowel Management  Level of Assistance:  Frequency/Number of Accidents this Shift:    RAS Bed/Chair/Wheelchair Transfer:  Bed/chair/wheelchair Transfer Score = 4.  Patient performs 75% or more of effort and minimal assistance (little/incidental  help/lifting of one limb/steadying) for transferring to and from the  bed/chair/wheelchair, requiring: Hand placement. Contact guard. Steadying.  Patient requires the following assistive device(s): Arm rest. Bed rails.  RAS Toilet Transfer:  RAS Tub/Shower Transfer:    Previously Documented Mode of Locomotion at Discharge:  RAS Expected Mode of Locomotion at Discharge:  RAS Walk/Wheelchair:  WHEELCHAIR OBSERVATION   Activity was not observed.    WALK OBSERVATION   Walk Distance Scale = 2.  Distance walked is 50 -149 feet. Walk Score = 2.  Patient performs 75% or more of effort and requires minimal assistance.  Incidental assistance, contact guard or steadying was provided. Patient walked a  distance of 50 feet. Patient requires the following assistive device(s): Rolling  walker.  RAS Stairs:  Activity was not observed.    RAS Comprehension:  RAS Expression:  RAS Social Interaction:  RAS Problem Solving:  RAS Memory:    Therapy Mode Minutes  Occupational Therapy:  Physical Therapy: Individual: 90 minutes.  Speech Language Pathology:    Discharge Functional Goals:    Signed by: Roberta Friedman PTA

## 2017-06-14 NOTE — PROGRESS NOTES
Inpatient Rehabilitation Functional Measures Assessment    Functional Measures  RAS Eating:  Eating Score = 5. Patient is supervision/set-up for eating,  requiring: Opening containers. [place] bread. Cutting meat. No assistive  devices were required.  RAS Grooming:  RAS Bathing:  [facility] Upper Body Dressing:  RAS Lower Body Dressing:  RAS Toileting:  Patient requires moderate assistance for adjusting clothing  before using a toilet, commode, bedpan, or urinal. Patient requires moderate  assistance for hygiene. Patient requires moderate assistance for adjusting  clothing after using a toilet, commode, bedpan, or urinal. Patient performs 0 -  24% of toileting tasks.  Toileting Score = 1, Total Assistance. Patient requires  the following assistive device(s): Grab bar.    RAS Bladder Management  Level of Assistance:  Bladder Score = 5.  Patient is supervision/set-up for  bladder management, requiring: No assistive devices were required.  Frequency/Number of Accidents this Shift:  Bladder accidents this shift:  0 .  Patient has not had an accident this shift.    RAS Bowel Management  Level of Assistance: Bowel Score = 7.  Patient is completely independent for  bowel management.  Patient did not have bowel movement.  No  medication/intervention was provided.  Frequency/Number of Accidents this Shift: Bowel accidents this shift: 0 .  Patient has not had an accident this shift.    RAS Bed/Chair/Wheelchair Transfer:  Bed/chair/wheelchair Transfer Score = 3.  Patient performs 50-74% of effort and requires moderate assistance (some  lifting) for transferring to and from the bed/chair/wheelchair. No assistive  devices were required.  RAS Toilet Transfer:  Toilet Transfer Score = 3.  Patient performs 50-74% of  effort and requires moderate assistance (some lifting) for transferring to and  from the toilet/commode. Patient requires the following assistive device(s):  Grab bars.  RAS Tub/Shower Transfer:  Activity was not  observed.    Previously Documented Mode of Locomotion at Discharge:  RAS Expected Mode of Locomotion at Discharge:  RAS Walk/Wheelchair:  RAS Stairs:    RAS Comprehension:  RAS Expression:  RAS Social Interaction:  [facility] Problem Solving:  RAS Memory:    Therapy Mode Minutes  Occupational Therapy:  Physical Therapy:  Speech Language Pathology:    Discharge Functional Goals:    Signed by: RENNY Ball

## 2017-06-14 NOTE — PLAN OF CARE
Problem: Inpatient Physical Therapy  Goal: Bed Mobility Goal STG- PT  Outcome: Ongoing (interventions implemented as appropriate)    06/14/17 1540   Bed Mobility PT STG   Bed Mobility PT STG, Date Goal Reviewed 06/14/17   Bed Mobility PT STG, Outcome goal ongoing       Goal: Bed Mobility Goal LTG- PT  Outcome: Ongoing (interventions implemented as appropriate)    06/14/17 1540   Bed Mobility PT LTG   Bed Mobility PT LTG, Date Goal Reviewed 06/14/17   Bed Mobility PT LTG, Outcome goal ongoing       Goal: Transfer Training Goal 1 STG- PT  Outcome: Ongoing (interventions implemented as appropriate)    06/14/17 1540   Transfer Training PT STG   Transfer Training PT STG, Date Goal Reviewed 06/14/17   Transfer Training PT STG, Outcome goal ongoing       Goal: Transfer Training Goal 1 LTG- PT  Outcome: Ongoing (interventions implemented as appropriate)    06/14/17 1540   Transfer Training PT LTG   Transfer Training PT LTG, Date Goal Reviewed 06/14/17   Transfer Training PT LTG, Outcome goal ongoing       Goal: Gait Training Goal STG- PT  Outcome: Ongoing (interventions implemented as appropriate)    06/14/17 1540   Gait Training PT STG   Gait Training Goal PT STG, Date Goal Reviewed 06/14/17   Gait Training Goal PT STG, Outcome goal ongoing       Goal: Gait Training Goal LTG- PT  Outcome: Ongoing (interventions implemented as appropriate)    06/14/17 1540   Gait Training PT LTG   Gait Training Goal PT LTG, Date Goal Reviewed 06/14/17   Gait Training Goal PT LTG, Outcome goal ongoing       Goal: Patient Education Goal LTG- PT  Outcome: Ongoing (interventions implemented as appropriate)    06/14/17 1540   Patient Education PT LTG   Patient Education PT LTG, Date Goal Reviewed 06/14/17   Patient Education PT LTG Outcome goal ongoing

## 2017-06-14 NOTE — PLAN OF CARE
Problem: Patient Care Overview (Adult)  Goal: Adult Individualization and Mutuality  Outcome: Ongoing (interventions implemented as appropriate)    Problem: Pressure Ulcer (Adult)  Goal: Signs and Symptoms of Listed Potential Problems Will be Absent or Manageable (Pressure Ulcer)  Outcome: Ongoing (interventions implemented as appropriate)

## 2017-06-14 NOTE — PROGRESS NOTES
"     LOS: 8 days     Chief Complaint:  Hip fracture    Subjective     Interval History:     She feels like she is improving significantly.  No cough or fevers or chills.  No black or bloody bowel movements.  Bed mobility requires min assist and transfers require minimal assist.  Ambulating 45 feet with min assist with rolling walker.  No recurrent seizures.      Objective     Vital Signs  /59 (BP Location: Right arm, Patient Position: Lying)  Pulse 69  Temp 98.1 °F (36.7 °C) (Oral)   Resp 16  Ht 59\" (149.9 cm)  Wt 119 lb (54 kg)  SpO2 97%  BMI 24.04 kg/m2  Intake & Output (last day)       06/13 0701 - 06/14 0700 06/14 0701 - 06/15 0700    P.O. 1560     Total Intake(mL/kg) 1560 (28.9)     Net +1560            Unmeasured Urine Occurrence 7 x             Physical Exam:     General Appearance:    Alert, cooperative, in no acute distress   Head:    Normocephalic, without obvious abnormality, atraumatic   Eyes:            Lids and lashes normal, conjunctivae and sclerae normal, no   icterus, no pallor, corneas clear, PERRLA   Ears:    Ears appear intact with no abnormalities noted       Neck:   No adenopathy, supple, trachea midline, no thyromegaly, no   carotid bruit, no JVD   Lungs:     Clear to auscultation,respirations regular, even and                  unlabored    Heart:    Regular rhythm and normal rate, normal S1 and S2, no            murmur, no gallop, no rub, no click   Chest Wall:    No abnormalities observed   Abdomen:     Normal bowel sounds, no masses, no organomegaly, soft        non-tender, non-distended, no guarding, no rebound                tenderness   Extremities:   no edema, no cyanosis, no redness   Pulses:   Pulses palpable and equal bilaterally   Skin:   No bleeding, bruising or rash                Results Review:    Lab Results   Component Value Date    WBC 4.09 (L) 06/07/2017    HGB 11.1 (L) 06/07/2017    HCT 34.2 (L) 06/07/2017    MCV 98.8 (H) 06/07/2017     06/07/2017 "       Lab Results   Component Value Date    GLUCOSE 98 06/07/2017    BUN 6 (L) 06/07/2017    CREATININE 0.39 (L) 06/07/2017    EGFRIFNONA >150 06/07/2017    BCR 15.4 06/07/2017     06/07/2017    K 4.1 06/07/2017     06/07/2017    CO2 25.3 06/07/2017    CALCIUM 8.8 06/07/2017    ALBUMIN 3.30 (L) 06/07/2017    LABIL2 1.0 (L) 06/07/2017    AST 18 06/07/2017    ALT 12 06/07/2017     No results found for: INR    No results found for: POCGLU       Medication Review:     Current Facility-Administered Medications:   •  acetaminophen (TYLENOL) tablet 650 mg, 650 mg, Oral, Q4H PRN, Samuel Duane Kreis, MD, 650 mg at 06/13/17 2334  •  aluminum-magnesium hydroxide-simethicone (MAALOX MAX) 400-400-40 MG/5ML suspension 15 mL, 15 mL, Oral, Q6H PRN, Samuel Duane Kreis, MD, 15 mL at 06/10/17 2052  •  atorvastatin (LIPITOR) tablet 40 mg, 40 mg, Oral, Nightly, Samuel Duane Kreis, MD, 40 mg at 06/13/17 2118  •  baclofen (LIORESAL) tablet 20 mg, 20 mg, Oral, Q12H, Samuel Duane Kreis, MD, 20 mg at 06/13/17 2118  •  bisacodyl (DULCOLAX) suppository 10 mg, 10 mg, Rectal, Daily PRN, Samuel Duane Kreis, MD  •  clonazePAM (KlonoPIN) tablet 0.5 mg, 0.5 mg, Oral, BID PRN, Samuel Duane Kreis, MD, 0.5 mg at 06/13/17 2117  •  enoxaparin (LOVENOX) syringe 40 mg, 40 mg, Subcutaneous, Daily, Samuel Duane Kreis, MD, 40 mg at 06/13/17 0855  •  HYDROcodone-acetaminophen (NORCO)  MG per tablet 1 tablet, 1 tablet, Oral, Q4H PRN, Samuel Duane Kreis, MD, 1 tablet at 06/14/17 0512  •  levETIRAcetam (KEPPRA) tablet 250 mg, 250 mg, Oral, Nightly, Samuel Duane Kreis, MD, 250 mg at 06/13/17 2118  •  levETIRAcetam (KEPPRA) tablet 500 mg, 500 mg, Oral, Daily, Samuel Duane Kreis, MD, 500 mg at 06/13/17 0855  •  LORazepam (ATIVAN) injection 1 mg, 1 mg, Intramuscular, Once PRN, Samuel Duane Kreis, MD  •  magnesium hydroxide (MILK OF MAGNESIA) suspension 2400 mg/10mL 10 mL, 10 mL, Oral, Daily PRN, Samuel Duane Kreis, MD  •  montelukast (SINGULAIR)  tablet 10 mg, 10 mg, Oral, Nightly, Samuel Duane Kreis, MD, 10 mg at 06/13/17 2118  •  nystatin (MYCOSTATIN) 807496 UNIT/ML suspension 500,000 Units, 5 mL, Oral, 4x Daily, Samuel Duane Kreis, MD, 500,000 Units at 06/13/17 2118  •  ondansetron (ZOFRAN) tablet 4 mg, 4 mg, Oral, Q6H PRN **OR** ondansetron ODT (ZOFRAN-ODT) disintegrating tablet 4 mg, 4 mg, Oral, Q6H PRN **OR** ondansetron (ZOFRAN) injection 4 mg, 4 mg, Intravenous, Q6H PRN, Samuel Duane Kreis, MD  •  pantoprazole (PROTONIX) EC tablet 40 mg, 40 mg, Oral, Q AM, Samuel Duane Kreis, MD, 40 mg at 06/14/17 0512  •  PHENobarbital (LUMINAL) tablet 97.2 mg, 97.2 mg, Oral, Q12H, Samuel Duane Kreis, MD, 97.2 mg at 06/13/17 2117  •  phenytoin (DILANTIN) chewable tablet 50 mg, 50 mg, Oral, Daily, Samuel Duane Kreis, MD, 50 mg at 06/13/17 1223  •  phenytoin (DILANTIN) ER capsule 200 mg, 200 mg, Oral, Q12H, Samuel Duane Kreis, MD, 200 mg at 06/13/17 2118  •  senna (SENOKOT) tablet 1 tablet, 1 tablet, Oral, BID, Samuel Duane Kreis, MD, 1 tablet at 06/13/17 0854  •  venlafaxine XR (EFFEXOR-XR) 24 hr capsule 150 mg, 150 mg, Oral, Daily With Breakfast, Samuel Duane Kreis, MD, 150 mg at 06/13/17 0854      Assessment/Plan     Periprosthetic hip fracture status post ORIF  Osteoarthritis  Chronic obstructive pulmonary disease  Seizure disorder      Continue hydrocodone when necessary for pain    PT and OT ongoing.     Pulmonary status is stable.    Continue baclofen    Continue Keppra, Dilantin and phenobarbital.  Continue Dilantin 200 mg twice a day and add Dilantin 50 mg @ noon.  Dilantin tomorrow morning        Samuel Duane Kreis, MD  06/14/17  8:25 AM

## 2017-06-14 NOTE — PROGRESS NOTES
Inpatient Rehabilitation Functional Measures Assessment    Functional Measures  RAS Eating:  RAS Grooming:  RAS Bathing:  RAS Upper Body Dressing:  RAS Lower Body Dressing:  RAS Toileting:    RAS Bladder Management  Level of Assistance:  Frequency/Number of Accidents this Shift:    RAS Bowel Management  Level of Assistance:  Frequency/Number of Accidents this Shift:    RAS Bed/Chair/Wheelchair Transfer:  RAS Toilet Transfer:  RAS Tub/Shower Transfer:    Previously Documented Mode of Locomotion at Discharge:  RAS Expected Mode of Locomotion at Discharge:  RAS Walk/Wheelchair:  RAS Stairs:    RAS Comprehension:  Both ( auditory and visual) modes of comprehension are used  equally. Comprehension Score = 6, Modified [place].  Patient comprehends  complex/abstract information in their primary language, requiring:  RAS Expression:  Both ( vocal and non-vocal) modes of expression are used  equally. Expression Score = 6,  Modified Independent. Patient expresses  complex/abstract information in their primary language, requiring:  RAS Social Interaction:  Social Interaction Score = 6, Modified Independent.  Patient is modified independent for social interaction, requiring: Requires  additional time.  RAS Problem Solving:  Problem Solving Score = 6, Modified [place].  Patient  makes appropriate decisions in order to solve complex problems, but requires  extra time.  RAS Memory:  Memory Score = 6, Modified [place].  Patient is modified  independent for memory, requiring: Requires additional time.    Therapy Mode Minutes  Occupational Therapy:  Physical Therapy:  Speech Language Pathology:    Discharge Functional Goals:    Signed by: Nurse Nathen

## 2017-06-14 NOTE — PLAN OF CARE
Problem: Inpatient Occupational Therapy  Goal: Bathing Goal STG- OT  Outcome: Outcome(s) achieved Date Met:  06/14/17 06/14/17 1445   Bathing OT STG   Bathing Goal OT STG, Outcome goal met       Goal: LB Dressing Goal STG- OT  Outcome: Outcome(s) achieved Date Met:  06/14/17 06/14/17 1445   LB Dressing OT STG   LB Dressing Goal OT STG, Outcome goal met

## 2017-06-14 NOTE — SIGNIFICANT NOTE
06/14/17 1616   Case Management/Social Work Plan   Plan Spoke to Virid[place] at [facility] who states having standard 16 inch W/C in-stock which weighs 2 lbs more than lightweight.  [facility] will provide this W/C to pt.  Informed pt about talking to spouse and arrangements completed for DME and HH.  Will follow.

## 2017-06-14 NOTE — PROGRESS NOTES
Rehabilitation Nursing  Inpatient Rehabilitation Functional Measures Assessment and Plan of Care    Plan of Care/Interventions  Copy from POC    Functional Measures  RAS Eating:  RAS Grooming:  RAS Bathing:  RAS Upper Body Dressing:  RAS Lower Body Dressing:  RAS Toileting:    RAS Bladder Management  Level of Assistance:  Frequency/Number of Accidents this Shift:    RAS Bowel Management  Level of Assistance:  Frequency/Number of Accidents this Shift:    RAS Bed/Chair/Wheelchair Transfer:  RAS Toilet Transfer:  RAS Tub/Shower Transfer:    Previously Documented Mode of Locomotion at Discharge:  RAS Expected Mode of Locomotion at Discharge:  RAS Walk/Wheelchair:  RAS Stairs:    RAS Comprehension:  Auditory comprehension is the usual mode. Comprehension  Score = 6, Modified [place].  Patient comprehends complex/abstract  information in their primary language, requiring: Additional time.  RAS Expression:  Vocal expression is the usual mode. Expression Score = 6,  Modified Independent.  Patient expresses complex/abstract information in their  primary language, requiring: Additional time.  RAS Social Interaction:  Social Interaction Score = 6, Modified Independent.  Patient is modified independent for social interaction, requiring: Requires  additional time.  RAS Problem Solving:  Problem Solving Score = 6, Modified [place].  Patient  makes appropriate decisions in order to solve complex problems, but requires  extra time.  RAS Memory:  Memory Score = 6, Modified [place].  Patient is modified  independent for memory, requiring: Requires additional time.    Body Function Structure    Skin Integrity (Active)  Current Status (6/6/2017 9:00:00 PM): impaired skin integrity  Weekly Goal: prevent further skin integrity impairment  Discharge Goal: signs of regaining skin integrity    Safety    Potential for Injury (Active)  Current Status (6/6/2017 9:00:00 PM): risk for falls  Weekly Goal: remain free of  falls  Discharge Goal: identify measures to prevent falls    RN Interventions    Body Function Structure -  RN: assess skin every shift and prn [RN]  RN: apply moisture barrier cream as needed [RN]    Safety -  RN: encourage to call for help [RN]  RN: items within reach at all times [RN]  RN: BED ALARM [RN]    Signed by: Dorothea Crabtree Nurse

## 2017-06-14 NOTE — PROGRESS NOTES
Inpatient Rehabilitation Functional Measures Assessment    Functional Measures  RAS Eating:  RAS Grooming:  RAS Bathing:  RAS Upper Body Dressing:  RAS Lower Body Dressing:  RAS Toileting:    RAS Bladder Management  Level of Assistance:  Frequency/Number of Accidents this Shift:    RAS Bowel Management  Level of Assistance:  Frequency/Number of Accidents this Shift:    RAS Bed/Chair/Wheelchair Transfer:  RAS Toilet Transfer:  RAS Tub/Shower Transfer:    Previously Documented Mode of Locomotion at Discharge:  RAS Expected Mode of Locomotion at Discharge:  RAS Walk/Wheelchair:  RAS Stairs:    RAS Comprehension:  Both ( auditory and visual) modes of comprehension are used  equally. Comprehension Score = 6, Modified [place].  Patient comprehends  complex/abstract information in their primary language, requiring: Additional  time.  RAS Expression:  Both ( vocal and non-vocal) modes of expression are used  equally. Expression Score = 6,  Modified Independent. Patient expresses  complex/abstract information in their primary language, requiring: Additional  time.  RAS Social Interaction:  Social Interaction Score = 6, Modified Independent.  Patient is modified independent for social interaction, requiring: Requires  additional time.  RAS Problem Solving:  Problem Solving Score = 6, Modified [place].  Patient  makes appropriate decisions in order to solve complex problems, but requires  extra time.  RAS Memory:  Memory Score = 6, Modified [place].  Patient is modified  independent for memory, requiring: Requires additional time.    Therapy Mode Minutes  Occupational Therapy:  Physical Therapy:  Speech Language Pathology:    Discharge Functional Goals:    Signed by: Nurse Nathen

## 2017-06-14 NOTE — THERAPY PROGRESS REPORT/RE-CERT
Inpatient Rehabilitation - Physical Therapy Treatment Note   [place]     Patient Name: Maribel Staton  : 1945  MRN: [id number]  Today's Date: 2017  Onset of Illness/Injury or Date of Surgery Date: 17  Date of Referral to PT: 17  Referring Physician: Eva    Admit Date: 2017    Visit Dx:  No diagnosis found.  Patient Active Problem List   Diagnosis   • Closed right hip fracture               Adult Rehabilitation Note       17 1532 17 1434 17 1440    Rehab Assessment/Intervention    Discipline physical therapy assistant  -LL occupational therapist  -AB occupational therapist  -AB    Document Type therapy note (daily note);progress note   BID treatment session  -LL therapy note (daily note);progress note  -AB therapy note (daily note)  -AB    Subjective Information no complaints;agree to therapy  -LL no complaints;agree to therapy  -AB no complaints;agree to therapy  -AB    Patient Effort, Rehab Treatment good  -LL good  -AB good  -AB    Precautions/Limitations fall precautions;non-weight bearing status;seizure precautions;other (see comments)   NWB RLE, skin integrity, intermittent confusion  -LL fall precautions;non-weight bearing status;seizure precautions;other (see comments)   NWB RLE; <skin integrity; intermittent confusion  -AB fall precautions;non-weight bearing status;seizure precautions;other (see comments)   NWB RLE; <skin integrity; intermittent confusion  -AB    Patient Response to Treatment Patient did well with BID treatment session with adequate rest  breaks  -LL Pt. continues to improve w/ self care tasks and fxl transfers.   -AB Pt. w/ good tolerance and rest breaks provided. She continues to display improvements w/ self care tasks and fxl mobility. See appropriate sections for updated levels.   -AB    Recorded by [LL] Roberta Friedman PTA [AB] Missy Pelayo, OT [AB] Missy Pelayo, OT    Pain Assessment    Pain Assessment Levin-Baker  FACES  -LL      Levin-Orozco FACES Pain Rating 6  -LL      Pain Type Acute pain;Surgical pain  -LL      Pain Location Hip  -LL      Pain Orientation Right  -LL      Pain Intervention(s) Repositioned;Rest  -LL      Recorded by [LL] Roberta Friedman PTA      Cognitive Assessment/Intervention    Current Cognitive/Communication Assessment functional  -LL      Orientation Status oriented to;person;place  -LL      Follows Commands/Answers Questions able to follow single-step instructions;100% of the time  -LL able to follow single-step instructions;100% of the time  -AB able to follow single-step instructions;100% of the time  -AB    Personal Safety decreased awareness, need for assist;decreased awareness, need for safety  -LL decreased awareness, need for assist;decreased awareness, need for safety  -AB decreased awareness, need for assist;decreased awareness, need for safety  -AB    Personal Safety Interventions gait belt;nonskid shoes/slippers when out of bed  -LL gait belt;nonskid shoes/slippers when out of bed;supervised activity  -AB gait belt;nonskid shoes/slippers when out of bed;supervised activity  -AB    Recorded by [LL] Roberta Friedman PTA [AB] Missy Pelayo OT [AB] Missy Pelayo OT    Mobility Assessment/Training    Extremity Weight-Bearing Status  right lower extremity  -AB right lower extremity  -AB    Right Lower Extremity Weight-Bearing  non weight-bearing  -AB non weight-bearing  -AB    Recorded by  [AB] Missy Pelayo OT [AB] Missy Pelayo, OT    Bed Mobility, Assessment/Treatment    Bed Mob, Supine to Sit, [place] verbal cues required;minimum assist (75% patient effort)  -LL      Bed Mob, Sit to Supine, [place] minimum assist (75% patient effort);verbal cues required  -LL      Bed Mobility, Safety Issues decreased use of legs for bridging/pushing  -LL      Bed Mobility, Impairments ROM decreased;strength decreased;impaired balance;coordination impaired;pain   -LL      Recorded by [LL] Roberta Friedman PTA      Transfer Assessment/Treatment    Transfers, Bed-Chair [place] verbal cues required;nonverbal cues required (demo/gesture);minimum assist (75% patient effort)  -LL      Transfers, Chair-Bed [place] verbal cues required;nonverbal cues required (demo/gesture);minimum assist (75% patient effort)  -LL      Transfers, Bed-Chair-Bed, Assist Device rolling walker  -LL      Transfers, Sit-Stand [place] verbal cues required;nonverbal cues required (demo/gesture);minimum assist (75% patient effort)  -LL contact guard assist;verbal cues required  -AB     Transfers, Stand-Sit [place] verbal cues required;nonverbal cues required (demo/gesture);minimum assist (75% patient effort)  -LL contact guard assist;verbal cues required  -AB     Transfers, Sit-Stand-Sit, Assist Device rolling walker  -LL rolling walker  -AB     Toilet Transfer, [place]   contact guard assist;verbal cues required;nonverbal cues required (demo/gesture)  -AB    Toilet Transfer, Assistive Device   elevated toilet seat;wheelchair;other (see comments)   grab bar  -AB    Transfer, Safety Issues balance decreased during turns  -LL      Transfer, Impairments ROM decreased;strength decreased;impaired balance;coordination impaired;pain  -LL      Recorded by [LL] Roberta Friedman PTA [AB] Missy Pelayo OT [AB] Missy Pelayo, OT    Gait Assessment/Treatment    Gait, [place] Level verbal cues required;nonverbal cues required (demo/gesture);minimum assist (75% patient effort)  -LL      Gait, Assistive Device rolling walker  -LL      Gait, Distance (Feet) 50   x 2 in am; 20 x 2 in pm  -LL      Gait, Gait Pattern Analysis swing-to gait  -LL      Gait, Gait Deviations antalgic;decreased heel strike;limb motion velocity decreased;step length decreased;weight-shifting ability decreased  -LL      Gait, Maintain Weight Bearing Status able to maintain weight bearing status  -LL       Gait, Safety Issues balance decreased during turns;step length decreased;weight-shifting ability decreased  -LL      Gait, Impairments ROM decreased;strength decreased;impaired balance;coordination impaired;pain  -LL      Recorded by [LL] Roberta Friedman PTA      Upper Body Bathing Assessment/Training    UB Bathing Assess/Train, Position  sitting  -AB     UB Bathing Assess/Train, [place] Level  set up required  -AB     UB Bathing Assess/Train, Comment  TB Bathing: CGA  -AB     Recorded by  [AB] Missy Pelayo, [place]     Lower Body Bathing Assessment/Training    LB Bathing Assess/Train Assistive Device  long-handled sponge  -AB     LB Bathing Assess/Train, Position  sitting;standing  -AB     LB Bathing Assess/Train, [place] Level  contact guard assist;verbal cues required;nonverbal cues required (demo/gesture)  -AB     LB Bathing Assess/Train, Comment  TB Bathing: CGA  -AB     Recorded by  [AB] Missy Pelyao, [place]     Upper Body Dressing Assessment/Training    UB Dressing Assess/Train, Position  sitting  -AB     UB Dressing Assess/Train, [place]  set up required  -AB     Recorded by  [AB] Missy Pelayo, OT     Lower Body Dressing Assessment/Training    LB Dressing Assess/Train, Clothing Type  donning:;doffing:;slipper socks;pants  -AB     LB Dressing Assess/Train, Assist Device  reacher;sock-aid  -AB     LB Dressing Assess/Train, Position  sitting;standing  -AB     LB Dressing Assess/Train, [place]  minimum assist (75% patient effort);verbal cues required;nonverbal cues required (demo/gesture)  -AB     Recorded by  [AB] Missy Pelayo OT     Toileting Assessment/Training    Toileting Assess/Train, Assistive Device   grab bars;raised toilet seat  -AB    Toileting Assess/Train, Position   sitting  -AB    Toileting Assess/Train, [place] Level   contact guard assist;verbal cues required;nonverbal cues required (demo/gesture)  -AB    Toileting Assess/Train, Comment  CGA   -AB     Recorded by  [AB] Missy Pelayo, OT [AB] Missy Pelayo OT    Grooming Assessment/Training    Grooming Assess/Train, Position  sitting  -AB sitting  -AB    Grooming Assess/Train, [place] Level  set up required  -AB set up required  -AB    Recorded by  [AB] Missy Pelayo OT [AB] Missy Pelayo OT    Self-Feeding Assessment/Training    Self-Feeding Assess/Train, Comment  Mod. I  -AB     Recorded by  [AB] Missy Pelayo OT     Balance Skills Training    Standing-Level of Assistance Contact guard;Minimum assistance  -LL      Static Standing Balance Support assistive device  -LL      Gait Balance-Level of Assistance Minimum assistance  -LL      Gait Balance Support assistive device  -LL      Recorded by [LL] Roberta Friedman PTA      Therapy Exercises    Bilateral Lower Extremities AAROM:;AROM:;20 reps;30 reps;sitting;supine  -LL      BLE Resistance theraband  -LL      Bilateral Upper Extremity  AROM:;sitting   BUE CoordEx; G/FMC TherEx/Act; Strengthening  -AB AROM:;sitting   BUE CoordEx; G/FMC TherEx/Act; Strengthening  -AB    BUE Resistance  manual resistance- minimal;other (comment)   ArmBike: [id number], min resistance; BUE Wrist Rolls X2  -AB manual resistance- minimal;other (comment)   ArmBike: [id number], min resistance; BUE Wrist Rolls X2  -AB    Recorded by [LL] Roberta Friedman PTA [AB] Missy Pelayo, OT [AB] Missy Pelayo OT    Positioning and Restraints    Pre-Treatment Position --   WC in hallway in both am and pm  -LL      Post Treatment Position wheelchair  -LL      In Wheelchair sitting;legs elevated   In hallway in am & sitting in common area in pm  -LL sitting;with PT;legs elevated  -AB sitting;call light within reach;encouraged to call for assist;legs elevated  -AB    Recorded by [LL] Roberta Friedman PTA [AB] Missy Pelayo, OT [AB] Missy Pelayo OT      06/13/17 1400 06/12/17 1503 06/12/17 0941    Rehab  Assessment/Intervention    Discipline physical therapy assistant  -AMINA physical therapy assistant  -LL occupational therapist  -AB    Document Type therapy note (daily note)   BID treatment session  -AMINA therapy note (daily note)   BID treatment session  -LL therapy note (daily note)  -AB    Subjective Information agree to therapy;complains of;pain  -AMINA no complaints;agree to therapy  -LL no complaints;agree to therapy  -AB    Patient Effort, Rehab Treatment good  -AMINA good  -LL good  -AB    Precautions/Limitations fall precautions;non-weight bearing status;seizure precautions;other (see comments)   NWB R) LE, , skin integrity, intermittent confusion  -AMINA fall precautions;non-weight bearing status;seizure precautions;other (see comments)   NWB RLE; skin integrity, intermittent confusion  -LL fall precautions;non-weight bearing status;seizure precautions;other (see comments)   NWB RLE; <skin integrity; intermittent confusion  -AB    Patient Response to Treatment Patient tolerated tx well today, however reported increased fatigue in PM session.  Pt had episode of LOB during gait training in PM session, however corrected w/ assistance of therapist and PT tech.  No injuries or complaints noted; RN notified and aware.   -AMINA Patient did well with BID treatment session with adequate rest breaks.  -LL Pt. w/ good tolerance and rest breaks provided.   -AB    Recorded by [AMINA] Roberta Eisenberg PTA [LL] Roberta Friedman PTA [AB] Missy Pelayo OT    Pain Assessment    Pain Assessment 0-10  -AMINA Levin-Orozco FACES  -LL     Levin-Baker FACES Pain Rating 8  -AMINA 6  -LL     Pain Type Acute pain;Surgical pain  -AMINA Acute pain;Surgical pain  -LL     Pain Location Hip  -AMINA Hip  -LL     Pain Orientation Right  -AMINA Right  -LL     Pain Intervention(s) Repositioned;Rest;Other (Comment)   pt received meds to assist w/ pain prior to initiation of se  -AMINA Repositioned;Rest  -LL     Recorded by [AMINA] Roberta Eisenberg PTA [LL]  Roberta Friedman PTA     Cognitive Assessment/Intervention    Current Cognitive/Communication Assessment functional  -AMINA functional  -LL     Orientation Status oriented to;person;place  -AMINA oriented to;person;place;situation;disoriented to;time  -LL     Follows Commands/Answers Questions able to follow single-step instructions;100% of the time;needs cueing  -AMINA able to follow single-step instructions;100% of the time;needs cueing  -LL able to follow single-step instructions;100% of the time;needs cueing  -AB    Personal Safety decreased awareness, need for assist;decreased awareness, need for safety  -AMINA decreased awareness, need for assist;decreased awareness, need for safety  -LL decreased awareness, need for assist;decreased awareness, need for safety  -AB    Personal Safety Interventions gait belt;nonskid shoes/slippers when out of bed;supervised activity  -AMINA gait belt;nonskid shoes/slippers when out of bed;supervised activity  -LL gait belt;nonskid shoes/slippers when out of bed;supervised activity  -AB    Recorded by [AMINA] Roberta Eisenberg PTA [LL] Roberta Friedman PTA [AB] Missy Pelayo OT    Mobility Assessment/Training    Extremity Weight-Bearing Status  right lower extremity  -LL     Right Lower Extremity Weight-Bearing  non weight-bearing  -LL     Recorded by  [LL] Roberta Friedman PTA     Bed Mobility, Assessment/Treatment    Bed Mobility, [place]/Bridge, [place]  minimum assist (75% patient effort)  -LL     Bed Mob, Supine to Sit, [place] verbal cues required;minimum assist (75% patient effort)  -AMINA contact guard assist;minimum assist (75% patient effort)  -LL     Bed Mob, Sit to Supine, [place] minimum assist (75% patient effort);verbal cues required  -AMINA minimum assist (75% patient effort)  -LL     Bed Mobility, Safety Issues decreased use of legs for bridging/pushing  -AMINA decreased use of legs for bridging/pushing  -LL     Bed Mobility, Impairments ROM decreased;strength  decreased;impaired balance;coordination impaired;pain  -AMINA ROM decreased;strength decreased;impaired balance;coordination impaired;pain  -LL     Recorded by [AMINA] Roberta Eisenberg PTA [LL] Roberta Friedman PTA     Transfer Assessment/Treatment    Transfers, Bed-Chair [place] verbal cues required;nonverbal cues required (demo/gesture);minimum assist (75% patient effort)  -AMINA verbal cues required;nonverbal cues required (demo/gesture);contact guard assist;minimum assist (75% patient effort)  -LL minimum assist (75% patient effort);contact guard assist;verbal cues required;nonverbal cues required (demo/gesture)  -AB    Transfers, Chair-Bed [place] verbal cues required;nonverbal cues required (demo/gesture);minimum assist (75% patient effort)  -AMINA contact guard assist;minimum assist (75% patient effort)  -LL     Transfers, Bed-Chair-Bed, Assist Device rolling walker  -AMINA rolling walker  -LL rolling walker;other (see comments)   w/c  -AB    Transfers, Sit-Stand [place] verbal cues required;nonverbal cues required (demo/gesture);minimum assist (75% patient effort)  -AMINA verbal cues required;nonverbal cues required (demo/gesture);contact guard assist;minimum assist (75% patient effort)  -LL     Transfers, Stand-Sit [place] verbal cues required;nonverbal cues required (demo/gesture);minimum assist (75% patient effort)  -AMINA verbal cues required;nonverbal cues required (demo/gesture);contact guard assist;minimum assist (75% patient effort)  -LL     Transfers, Sit-Stand-Sit, Assist Device rolling walker  -AMINA rolling walker  -LL     Transfer, Safety Issues balance decreased during turns  -AMINA balance decreased during turns  -LL     Transfer, Impairments ROM decreased;strength decreased;impaired balance;coordination impaired;pain  -AMINA ROM decreased;strength decreased;impaired balance;coordination impaired;pain  -LL     Recorded by [AMINA] Roberta Eisenberg PTA [LL] Roberta Friedman PTA [AB] Missy  Dariana Pelayo OT    Gait Assessment/Treatment    Gait, [place] Level verbal cues required;nonverbal cues required (demo/gesture);minimum assist (75% patient effort)  -AMINA contact guard assist;minimum assist (75% patient effort)  -LL     Gait, Assistive Device rolling walker  -AMINA rolling walker  -LL     Gait, Distance (Feet) 45   45' x 2 AM session, 20' x 2 PM session  -AMINA 70   then 60 in am; 40 x 2 in pm  -LL     Gait, Gait Pattern Analysis swing-to gait  -AMINA swing-to gait  -LL     Gait, Gait Deviations antalgic;decreased heel strike;step length decreased;limb motion velocity decreased  -AMINA antalgic;decreased heel strike;limb motion velocity decreased;step length decreased;weight-shifting ability decreased  -LL     Gait, Maintain Weight Bearing Status able to maintain weight bearing status  -AMINA able to maintain weight bearing status  -LL     Gait, Safety Issues balance decreased during turns;step length decreased;weight-shifting ability decreased  -AMINA balance decreased during turns;step length decreased;weight-shifting ability decreased  -LL     Gait, Impairments ROM decreased;strength decreased;impaired balance;coordination impaired;pain  -AMINA ROM decreased;strength decreased;impaired balance;coordination impaired;pain  -LL     Recorded by [AMINA] Roberta Eisenberg PTA [LL] Roberta Friedman, PTA     Grooming Assessment/Training    Grooming Assess/Train, Position   sitting;sink side  -AB    Grooming Assess/Train, [place] Level   supervision required;set up required  -AB    Recorded by   [AB] Missy Pelayo, OT    Balance Skills Training    Sitting-Level of Assistance  Distant supervision  -LL     Sitting-Balance Support  Feet supported  -LL     Standing-Level of Assistance Contact guard;Minimum assistance  -AMINA Contact guard  -LL     Static Standing Balance Support assistive device  -AMINA assistive device  -LL     Gait Balance-Level of Assistance Minimum assistance  -AMINA Contact guard;Minimum assistance   -LL     Gait Balance Support assistive device  -AMINA assistive device  -LL     Recorded by [AMINA] Roberta Eisenberg PTA [LL] Roberta Friedman PTA     Therapy Exercises    Bilateral Lower Extremities AAROM:;AROM:;20 reps;30 reps;sitting;supine  -AMINA AROM:;25 reps;supine;sitting  -LL     BLE Resistance theraband  -AMNIA theraband  -LL     Bilateral Upper Extremity   AROM:;sitting   BUE CoordEx; G/FMC TherEx/Act; Strengthening  -AB    BUE Resistance   manual resistance- minimal;other (comment)   ArmBike: [id number], min resistance; BUE Wrist Roll X1  -AB    Recorded by [AMINA] Roberta Eisenberg PTA [LL] Roberta Friedman PTA [AB] Missy Pelayo OT    Positioning and Restraints    Pre-Treatment Position sitting in chair/recliner  -AMINA --   WC in room in am & pm  -LL     Post Treatment Position wheelchair  -AMINA wheelchair  -LL     In Wheelchair sitting;call light within reach;notified nsg;heels elevated   in front of nursing station following AM/PM session  -AMINA sitting;call light within reach;encouraged to call for assist;legs elevated   in room in am; with activities in pm  -LL sitting;call light within reach;encouraged to call for assist;legs elevated  -AB    Recorded by [AMINA] Roberta Eisenberg PTA [LL] Roberta Friedman PTA [AB] Missy Pelayo OT      User Key  (r) = Recorded By, (t) = Taken By, (c) = Cosigned By    Initials Name Effective Dates    AB Missy Pelayo OT 02/04/16 -     LL Roberta Friedman PTA 05/02/16 -     AMINA Roberta Eisenberg PTA 05/02/16 -                 IP PT Goals       06/14/17 1540 06/07/17 1703       Bed Mobility PT STG    Bed Mobility PT STG, Date Established  06/07/17  -CT     Bed Mobility PT STG, Time to Achieve  5 - 7 days  -CT     Bed Mobility PT STG, Activity Type  all bed mobility  -CT     Bed Mobility PT STG, [place] Level  contact guard assist  -CT     Transfer Training Goal, Assist Device  bed rails  -CT     Bed Mobility PT STG, Date Goal Reviewed 06/14/17   -LL      Bed Mobility PT STG, Outcome goal ongoing  -LL      Bed Mobility PT LTG    Bed Mobility PT LTG, Date Established  06/07/17  -CT     Bed Mobility PT LTG, Time to Achieve  by discharge  -CT     Bed Mobility PT LTG, Activity Type  all bed mobility  -CT     Bed Mobility PT LTG, [place] Level  conditional independence;supervision required  -CT     Bed Mobility PT LTG, Date Goal Reviewed 06/14/17  -LL      Bed Mobility PT LTG, Outcome goal ongoing  -LL      Transfer Training PT STG    Transfer Training PT STG, Date Established  06/07/17  -CT     Transfer Training PT STG, Time to Achieve  5 - 7 days  -CT     Transfer Training PT STG, Activity Type  all transfers  -CT     Transfer Training PT STG, [place] Level  contact guard assist  -CT     Transfer Training PT STG, Assist Device  other (see comments)   with appropriate AD  -CT     Transfer Training PT STG, Date Goal Reviewed 06/14/17  -LL      Transfer Training PT STG, Outcome goal ongoing  -LL      Transfer Training PT LTG    Transfer Training PT LTG, Date Established  06/07/17  -CT     Transfer Training PT LTG, Time to Achieve  by discharge  -CT     Transfer Training PT LTG, Activity Type  all transfers  -CT     Transfer Training PT LTG, [place] Level  conditional independence;supervision required  -CT     Transfer Training PT LTG, Assist Device  other (see comments)   with appropriate AD  -CT     Transfer Training PT  LTG, Date Goal Reviewed 06/14/17  -LL      Transfer Training PT LTG, Outcome goal ongoing  -LL      Gait Training PT STG    Gait Training Goal PT STG, Date Established  06/07/17  -CT     Gait Training Goal PT STG, Time to Achieve  5 - 7 days  -CT     Gait Training Goal PT STG, [place] Level  contact guard assist  -CT     Gait Training Goal PT STG, Assist Device  walker, rolling  -CT     Gait Training Goal PT STG, Distance to Achieve  50  -CT     Gait Training Goal PT STG, Date Goal Reviewed 06/14/17  -LL      Gait Training  Goal PT STG, Outcome goal ongoing  -LL      Gait Training PT LTG    Gait Training Goal PT LTG, Date Established  06/07/17  -CT     Gait Training Goal PT LTG, Time to Achieve  by discharge  -CT     Gait Training Goal PT LTG, [place] Level  conditional independence;supervision required  -CT     Gait Training Goal PT LTG, Assist Device  walker, rolling  -CT     Gait Training Goal PT LTG, Distance to Achieve  75  -CT     Gait Training Goal PT LTG, Date Goal Reviewed 06/14/17  -LL      Gait Training Goal PT LTG, Outcome goal ongoing  -LL      Patient Education PT LTG    Patient Education PT LTG, Date Established  06/07/17  -CT     Patient Education PT LTG, Time to Achieve  by discharge  -CT     Patient Education PT LTG, Education Type  written program;HEP;precaution per surgeon;positioning;posture/body mechanics;gait;transfers;bed mobility;pain management;progression of POC;benefits of activity;home safety;equipment management;skin care/inspection;energy conservation  -CT     Patient Education PT LTG, Education Understanding  demonstrate adequately;verbalize understanding  -CT     Patient Education PT LTG, Date Goal Reviewed 06/14/17  -LL      Patient Education PT LTG Outcome goal ongoing  -        User Key  (r) = Recorded By, (t) = Taken By, (c) = Cosigned By    Initials Name Provider Type    CT Yuli Moreno PT Physical Therapist     Roberta Friedman PTA Physical Therapy Assistant          Physical Therapy Education     Title: PT OT SLP Therapies (Done)     Topic: Physical Therapy (Done)     Point: Mobility training (Done)    Learning Progress Summary    Learner Readiness Method Response Comment Documented by Status   Patient Acceptance E,D VU,NR  LL 06/14/17 1538 Done    Acceptance E VU   06/14/17 0310 Done    Acceptance E VU,NR  AMINA 06/13/17 1448 Done    Acceptance E VU   06/13/17 0322 Done    Acceptance E,D VU,NR  LL 06/12/17 1511 Done    Acceptance E,D KERA DE ANDA   06/10/17 1345 Done    Acceptance E,D  VU,NR  LL 06/09/17 1700 Done    Acceptance E,D NR  LL 06/08/17 1820 Active    Acceptance E NR  CT 06/07/17 1707 Active               Point: Home exercise program (Done)    Learning Progress Summary    Learner Readiness Method Response Comment Documented by Status   Patient Acceptance E,D VU,NR  LL 06/14/17 1538 Done    Acceptance E VU   06/14/17 0310 Done    Acceptance E VU,NR  AMINA 06/13/17 1448 Done    Acceptance E VU   06/13/17 0322 Done    Acceptance E,D VU,NR  LL 06/12/17 1511 Done    Acceptance E,D NR,VU  AG 06/10/17 1345 Done               Point: Body mechanics (Done)    Learning Progress Summary    Learner Readiness Method Response Comment Documented by Status   Patient Acceptance E,D VU,NR  LL 06/14/17 1538 Done    Acceptance E VU   06/14/17 0310 Done    Acceptance E VU,NR  AMINA 06/13/17 1448 Done    Acceptance E VU   06/13/17 0322 Done    Acceptance E,D VU,NR  LL 06/12/17 1511 Done    Acceptance E,D NR,VU  AG 06/10/17 1345 Done    Acceptance E,D VU,NR  LL 06/09/17 1700 Done    Acceptance E,D NR  LL 06/08/17 1820 Active    Acceptance E NR  CT 06/07/17 1707 Active               Point: Precautions (Done)    Learning Progress Summary    Learner Readiness Method Response Comment Documented by Status   Patient Acceptance E,D VU,NR  LL 06/14/17 1538 Done    Acceptance E VU   06/14/17 0310 Done    Acceptance E VU,NR  AMINA 06/13/17 1448 Done    Acceptance E VU   06/13/17 0322 Done    Acceptance E,D VU,NR  LL 06/12/17 1511 Done    Acceptance E,D NR,VU  AG 06/10/17 1345 Done    Acceptance E,D VU,NR  LL 06/09/17 1700 Done    Acceptance E,D NR  LL 06/08/17 1820 Active    Acceptance E NR  CT 06/07/17 1707 Active                      User Key     Initials Effective Dates Name Provider Type Discipline     06/16/16 -  Yazmin Munoz, RN Registered Nurse Nurse     03/14/16 -  Becka No, PT Physical Therapist PT    CT 03/14/16 -  Yuli Moreno, PT Physical Therapist PT     05/02/16 -  Roberta Friedman,  DILLON Physical Therapy Assistant PT    AMINA 05/02/16 -  Roberta Eisenberg PTA Physical Therapy Assistant PT                    PT Recommendation and Plan  Anticipated Equipment Needs At Discharge:  (to be determined)  Anticipated Discharge Disposition: home with assist, home with home health  Planned Therapy Interventions: balance training, bed mobility training, gait training, home exercise program, manual therapy techniques, neuromuscular re-education, motor coordination training, patient/family education, postural re-education, ROM (Range of Motion), stair training, strengthening, transfer training  PT Frequency: 2 times/day, 5 times/wk, per priority policy            Time Calculation:         PT Charges       06/14/17 1539 06/14/17 1538       Time Calculation    Start Time 1300  -LL 0915  -LL     Stop Time 1345  -LL 1000  -LL     Time Calculation (min) 45 min  -LL 45 min  -LL     PT Non-Billable Time (min)  30 min  -LL     PT Received On  06/14/17  -LL     PT Goal Re-Cert Due Date  06/21/17  -LL     Time Calculation- PT    Total Timed Code Minutes- PT 45 minute(s)  -LL 45 minute(s)  -LL       User Key  (r) = Recorded By, (t) = Taken By, (c) = Cosigned By    Initials Name Provider Type    LL Roberta Friedman PTA Physical Therapy Assistant          Therapy Charges for Today     Code Description Service Date Service Provider Modifiers Qty    47622221633 HC GAIT TRAINING EA 15 MIN 6/14/2017 Roberta Friedman PTA GP 2    21262106512 HC PT THER PROC EA 15 MIN 6/14/2017 Roberta Friedman PTA GP 4    36586967504 HC PT THER SUPP EA 15 MIN 6/14/2017 Roberta Friedman PTA GP 2               Di Friedman PTA  6/14/2017

## 2017-06-14 NOTE — PROGRESS NOTES
Inpatient Rehabilitation Functional Measures Assessment    Functional Measures  RAS Eating:  RAS Grooming: Grooming Score = 5. Patient is supervision/set-up for grooming,  requiring: Setting out grooming equipment. No assistive devices were required.  RAS Bathing:  Patient bathed in bed. Bathing Score = 4.  Patient requires  minimal assistance for bathing, requiring steadying for balance only. Patient  requires the following assistive device(s): Grab bar/arm rest to maintain  balance. Long handled sponge.  RAS Upper Body Dressing:  Upper Body Dressing Score = 5. Patient is supervision  for upper body dressing, requiring: Gathering/setting out clothes. No assistive  devices were required.  RAS Lower Body Dressing:  Gathering clothes was not observed for this patient.  Wearing underwear or an undergarment was not observed for this patient. Patient  requires minimal/incidental assistance for threading the right leg through the  pants/skirt. Patient requires no physical assistance for donning and/or doffing  pants/skirt threading left leg. Patient requires no physical assistance for  donning and/or doffing pants/skirt over hips and adjusting fastener. Patient  requires minimal/incidental physical assistance for donning and/or doffing right  sock. Patient requires no physical assistance for donning and/or doffing left  sock. Patient requires total assistance for holding clothing and/or donning  and/or doffing right shoe. Patient requires minimal/incidental physical  assistance for donning and/or doffing left shoe. Patient performs 75 % of lower  body dressing tasks. Lower Body Dressing Score = 4, Minimal Assistance. Patient  requires the following assistive device(s): Reacher. Sock aid.  RAS Toileting:    RAS Bladder Management  Level of Assistance:  Frequency/Number of Accidents this Shift:    RAS Bowel Management  Level of Assistance:  Frequency/Number of Accidents this Shift:    RAS Bed/Chair/Wheelchair Transfer:   Bed/chair/wheelchair Transfer Score = 4.  Patient performs 75% or more of effort and minimal assistance (little/incidental  help/lifting of one limb/steadying) for transferring to and from the  bed/chair/wheelchair, requiring: Contact guard. Patient requires the following  assistive device(s): Walker.  RAS Toilet Transfer:  RAS Tub/Shower Transfer:    Previously Documented Mode of Locomotion at Discharge:  [facility] Expected Mode of Locomotion at Discharge:  [facility] Walk/Wheelchair:  [facility] Stairs:    [facility] Comprehension:  [facility] Expression:  [facility] Social Interaction:  [facility] Problem Solving:  RAS Memory:    Therapy Mode Minutes  Occupational Therapy: Individual: 90 minutes.  Physical Therapy:  Speech Language Pathology:    Discharge Functional Goals:    Signed by: Missy Pelayo Occupational Therapist

## 2017-06-15 LAB
ANION GAP SERPL CALCULATED.3IONS-SCNC: 5.6 MMOL/L (ref 3.6–11.2)
BASOPHILS # BLD AUTO: 0.05 10*3/MM3 (ref 0–0.3)
BASOPHILS NFR BLD AUTO: 1.1 % (ref 0–2)
BUN BLD-MCNC: 7 MG/DL (ref 7–21)
BUN/CREAT SERPL: 14.9 (ref 7–25)
CALCIUM SPEC-SCNC: 9.2 MG/DL (ref 7.7–10)
CHLORIDE SERPL-SCNC: 103 MMOL/L (ref 99–112)
CO2 SERPL-SCNC: 27.4 MMOL/L (ref 24.3–31.9)
CREAT BLD-MCNC: 0.47 MG/DL (ref 0.43–1.29)
DEPRECATED RDW RBC AUTO: 50.5 FL (ref 37–54)
EOSINOPHIL # BLD AUTO: 0.39 10*3/MM3 (ref 0–0.7)
EOSINOPHIL NFR BLD AUTO: 8.8 % (ref 0–7)
ERYTHROCYTE [DISTWIDTH] IN BLOOD BY AUTOMATED COUNT: 14.7 % (ref 11.5–14.5)
GFR SERPL CREATININE-BSD FRML MDRD: 131 ML/MIN/1.73
GLUCOSE BLD-MCNC: 127 MG/DL (ref 70–110)
HCT VFR BLD AUTO: 37.3 % (ref 37–47)
HGB BLD-MCNC: 12 G/DL (ref 12–16)
IMM GRANULOCYTES # BLD: 0.01 10*3/MM3 (ref 0–0.03)
IMM GRANULOCYTES NFR BLD: 0.2 % (ref 0–0.5)
LYMPHOCYTES # BLD AUTO: 1.17 10*3/MM3 (ref 1–3)
LYMPHOCYTES NFR BLD AUTO: 26.4 % (ref 16–46)
MCH RBC QN AUTO: 31.7 PG (ref 27–33)
MCHC RBC AUTO-ENTMCNC: 32.2 G/DL (ref 33–37)
MCV RBC AUTO: 98.4 FL (ref 80–94)
MONOCYTES # BLD AUTO: 0.51 10*3/MM3 (ref 0.1–0.9)
MONOCYTES NFR BLD AUTO: 11.5 % (ref 0–12)
NEUTROPHILS # BLD AUTO: 2.31 10*3/MM3 (ref 1.4–6.5)
NEUTROPHILS NFR BLD AUTO: 52 % (ref 40–75)
OSMOLALITY SERPL CALC.SUM OF ELEC: 271.5 MOSM/KG (ref 273–305)
PHENYTOIN SERPL-MCNC: 12 MCG/ML (ref 10–20)
PLATELET # BLD AUTO: 236 10*3/MM3 (ref 130–400)
PMV BLD AUTO: 8.6 FL (ref 6–10)
POTASSIUM BLD-SCNC: 3.9 MMOL/L (ref 3.5–5.3)
RBC # BLD AUTO: 3.79 10*6/MM3 (ref 4.2–5.4)
SODIUM BLD-SCNC: 136 MMOL/L (ref 135–153)
WBC NRBC COR # BLD: 4.44 10*3/MM3 (ref 4.5–12.5)

## 2017-06-15 PROCEDURE — 97116 GAIT TRAINING THERAPY: CPT

## 2017-06-15 PROCEDURE — 97530 THERAPEUTIC ACTIVITIES: CPT

## 2017-06-15 PROCEDURE — 80185 ASSAY OF PHENYTOIN TOTAL: CPT | Performed by: FAMILY MEDICINE

## 2017-06-15 PROCEDURE — 97110 THERAPEUTIC EXERCISES: CPT

## 2017-06-15 PROCEDURE — 85025 COMPLETE CBC W/AUTO DIFF WBC: CPT | Performed by: FAMILY MEDICINE

## 2017-06-15 PROCEDURE — 80048 BASIC METABOLIC PNL TOTAL CA: CPT | Performed by: FAMILY MEDICINE

## 2017-06-15 PROCEDURE — 97535 SELF CARE MNGMENT TRAINING: CPT

## 2017-06-15 PROCEDURE — 94799 UNLISTED PULMONARY SVC/PX: CPT

## 2017-06-15 PROCEDURE — 25010000002 ENOXAPARIN PER 10 MG: Performed by: FAMILY MEDICINE

## 2017-06-15 RX ADMIN — HYDROCODONE BITARTRATE AND ACETAMINOPHEN 1 TABLET: 10; 325 TABLET ORAL at 02:14

## 2017-06-15 RX ADMIN — ATORVASTATIN CALCIUM 40 MG: 40 TABLET, FILM COATED ORAL at 21:29

## 2017-06-15 RX ADMIN — SENNOSIDES 1 TABLET: 8.6 TABLET ORAL at 09:00

## 2017-06-15 RX ADMIN — PHENYTOIN SODIUM 200 MG: 100 CAPSULE, EXTENDED RELEASE ORAL at 08:59

## 2017-06-15 RX ADMIN — HYDROCODONE BITARTRATE AND ACETAMINOPHEN 1 TABLET: 10; 325 TABLET ORAL at 21:30

## 2017-06-15 RX ADMIN — PHENYTOIN SODIUM 200 MG: 100 CAPSULE, EXTENDED RELEASE ORAL at 21:30

## 2017-06-15 RX ADMIN — NYSTATIN 500000 UNITS: 100000 SUSPENSION ORAL at 21:31

## 2017-06-15 RX ADMIN — PANTOPRAZOLE SODIUM 40 MG: 40 TABLET, DELAYED RELEASE ORAL at 05:44

## 2017-06-15 RX ADMIN — NYSTATIN 500000 UNITS: 100000 SUSPENSION ORAL at 11:43

## 2017-06-15 RX ADMIN — BACLOFEN 20 MG: 10 TABLET ORAL at 08:59

## 2017-06-15 RX ADMIN — NYSTATIN 500000 UNITS: 100000 SUSPENSION ORAL at 08:59

## 2017-06-15 RX ADMIN — MONTELUKAST SODIUM 10 MG: 10 TABLET ORAL at 21:30

## 2017-06-15 RX ADMIN — CLONAZEPAM 0.5 MG: 0.5 TABLET ORAL at 21:30

## 2017-06-15 RX ADMIN — LEVETIRACETAM 500 MG: 500 TABLET, FILM COATED ORAL at 08:59

## 2017-06-15 RX ADMIN — BACLOFEN 20 MG: 10 TABLET ORAL at 21:29

## 2017-06-15 RX ADMIN — PHENYTOIN 50 MG: 50 TABLET, CHEWABLE ORAL at 11:42

## 2017-06-15 RX ADMIN — ENOXAPARIN SODIUM 40 MG: 40 INJECTION SUBCUTANEOUS at 08:59

## 2017-06-15 RX ADMIN — PHENOBARBITAL 97.2 MG: 32.4 TABLET ORAL at 08:59

## 2017-06-15 RX ADMIN — HYDROCODONE BITARTRATE AND ACETAMINOPHEN 1 TABLET: 10; 325 TABLET ORAL at 05:45

## 2017-06-15 RX ADMIN — VENLAFAXINE HYDROCHLORIDE 150 MG: 150 CAPSULE, EXTENDED RELEASE ORAL at 08:59

## 2017-06-15 RX ADMIN — PHENOBARBITAL 97.2 MG: 32.4 TABLET ORAL at 21:57

## 2017-06-15 RX ADMIN — SENNOSIDES 1 TABLET: 8.6 TABLET ORAL at 17:12

## 2017-06-15 RX ADMIN — LEVETIRACETAM 250 MG: 250 TABLET, FILM COATED ORAL at 21:30

## 2017-06-15 RX ADMIN — NYSTATIN 500000 UNITS: 100000 SUSPENSION ORAL at 17:12

## 2017-06-15 NOTE — PROGRESS NOTES
Inpatient Rehabilitation Functional Measures Assessment    Functional Measures  RAS Eating:  RAS Grooming: Patient requires minimal assistance for washing, rinsing and  drying the face. Patient requires minimal assistance for washing, rinsing and  drying the hands. Patient requires minimal assistance for brushing teeth.  Patient requires no physical assistance for brushing/combing hair. Patient  requires minimal assistance for shaving or applying makeup. Patient performs 20  -  24% of grooming tasks.  Grooming Score = 1, Total Assistance. No assistive  devices were required.  RAS Bathing:  Patient bathed in bed. Bathing Score = 5.  Patient is  supervision/set-up for bathing, requiring: Preparing washing materials.  Preparing the water. Setting out bathing equipment. No assistive devices were  required.  RAS Upper Body Dressing:  Patient requires total assistance for gathering  clothes. Wearing a bra or undershirt was not applicable for this patient.  Patient requires minimal/incidental assistance for threading the right arm  through the garment (shirt/sweater). Patient requires minimal/incidental  assistance for threading the left arm through the garment (shirt/sweater).  Patient requires no physical assistance for pulling an over-head-garment over  head or pulling front-fastening-garment around back. Patient requires  minimal/incidental physical assistance for pulling an over-head-garment down the  trunk or adjusting/fastening together a front-fastening-garment. Patient  performs 85 % of upper body dressing tasks. Upper Body Dressing Score = 4,  Minimal Assistance. No assistive devices were required.  RAS Lower Body Dressing:  Patient requires total assistance for gathering  clothes. Wearing underwear or an undergarment is not applicable for this  patient. Patient requires minimal/incidental assistance for threading the right  leg through the pants/skirt. Patient requires minimal/incidental assistance  for  threading the left leg through the pants/skirt. Patient requires  minimal/incidental physical assistance for pulling pants/skirt over hips and  adjusting fasteners. Patient requires minimal/incidental physical assistance for  donning and/or doffing right sock. Patient requires minimal/incidental physical  assistance for donning and/or doffing left sock. Donning and/or doffing right  shoe was not observed for this patient. Donning and/or doffing left shoe was not  observed for this patient. Patient performs 62.5 % of lower body dressing tasks.  Lower Body Dressing Score = 3, Moderate Assistance. No assistive devices were  required.  RAS Toileting:  Toileting Score = 4.  Patient requires minimal assistance for  toileting, such as steadying for balance while cleansing or adjusting clothes.  Patient requires the following assistive device(s): Grab bar. Arm rest of a  specialized seat.    RAS Bladder Management  Level of Assistance:  Bladder Score = 5.  Patient is supervision/set-up for  bladder management, requiring: No assistive devices were required.  Frequency/Number of Accidents this Shift:  Bladder accidents this shift:  0 .  Patient has not had an accident this shift.    RAS Bowel Management  Level of Assistance: Bowel Score = 7.  Patient is completely independent for  bowel management.  Patient did not have bowel movement.  No  medication/intervention was provided.  Frequency/Number of Accidents this Shift: Bowel accidents this shift: 0 .  Patient has not had an accident this shift.    RAS Bed/Chair/Wheelchair Transfer:  Bed/chair/wheelchair Transfer Score = 4.  Patient performs 75% or more of effort and minimal assistance (little/incidental  help/lifting of one limb/steadying) for transferring to and from the  bed/chair/wheelchair, requiring: Steadying. Patient requires the following  assistive device(s): Seating system of wheelchair. Grab bars.  RAS Toilet Transfer:  Toilet Transfer Score = 4.  Patient  performs 75% or more  of effort and minimal assistance (little/incidental help/steadying) for  transferring to and from the toilet/commode, requiring: Steadying. Patient  requires the following assistive device(s): Safety frame/over the toilet. Grab  bars.  RAS Tub/Shower Transfer:    Previously Documented Mode of Locomotion at Discharge:  RAS Expected Mode of Locomotion at Discharge:  RAS Walk/Wheelchair:  RAS Stairs:    RAS Comprehension:  RAS Expression:  [facility] Social Interaction:  [facility] Problem Solving:  [facility] Memory:    Therapy Mode Minutes  Occupational Therapy:  Physical Therapy:  Speech Language Pathology:    Discharge Functional Goals:    Signed by: RENNY Gonzalez

## 2017-06-15 NOTE — PLAN OF CARE
Problem: Patient Care Overview (Adult)  Goal: Plan of Care Review  Outcome: Ongoing (interventions implemented as appropriate)    Problem: Skin Integrity Impairment, Risk/Actual (Adult)  Goal: Skin Integrity/Wound Healing  Outcome: Ongoing (interventions implemented as appropriate)    Problem: Pressure Ulcer (Adult)  Goal: Signs and Symptoms of Listed Potential Problems Will be Absent or Manageable (Pressure Ulcer)  Outcome: Ongoing (interventions implemented as appropriate)    Problem: Fall Risk (Adult)  Goal: Absence of Falls  Outcome: Ongoing (interventions implemented as appropriate)    Problem: Fractured Hip (Adult)  Goal: Signs and Symptoms of Listed Potential Problems Will be Absent or Manageable (Fractured Hip)  Outcome: Ongoing (interventions implemented as appropriate)    Problem: Infection, Risk/Actual (Adult)  Goal: Infection Prevention/Resolution  Outcome: Ongoing (interventions implemented as appropriate)    Problem: Mobility, Physical Impaired (Adult)  Goal: Enhanced Mobility Skills  Outcome: Ongoing (interventions implemented as appropriate)    Problem: Seizure Disorder/Epilepsy (Adult)  Goal: Signs and Symptoms of Listed Potential Problems Will be Absent or Manageable (Seizure Disorder/Epilepsy)  Outcome: Ongoing (interventions implemented as appropriate)

## 2017-06-15 NOTE — PROGRESS NOTES
Inpatient Rehabilitation Functional Measures Assessment    Functional Measures  RAS Eating:  RAS Grooming: Grooming Score = 5. Patient is supervision/set-up for grooming,  requiring: Setting out grooming equipment. Setting out grooming equipment. No  assistive devices were required.  RAS Bathing:  RAS Upper Body Dressing:  RAS Lower Body Dressing:  RAS Toileting:    RAS Bladder Management  Level of Assistance:  Frequency/Number of Accidents this Shift:    RAS Bowel Management  Level of Assistance:  Frequency/Number of Accidents this Shift:    RAS Bed/Chair/Wheelchair Transfer:  [facility] Toilet Transfer:  [facility] Tub/Shower Transfer:    Previously Documented Mode of Locomotion at Discharge:  [facility] Expected Mode of Locomotion at Discharge:  RAS Walk/Wheelchair:  RAS Stairs:    RAS Comprehension:  RAS Expression:  [facility] Social Interaction:  [facility] Problem Solving:  RAS Memory:    Therapy Mode Minutes  Occupational Therapy: Individual: 90 minutes.  Physical Therapy:  Speech Language Pathology:    Discharge Functional Goals:    Signed by: Missy Pelayo Occupational Therapist

## 2017-06-15 NOTE — THERAPY TREATMENT NOTE
Inpatient Rehabilitation - Occupational Therapy Treatment Note   Ed     Patient Name: Maribel Staton  : 1945  MRN: [id number]  Today's Date: 6/15/2017  Onset of Illness/Injury or Date of Surgery Date: 17  Date of Referral to OT: 17  Referring Physician: Eva      Admit Date: 2017    Visit Dx:   No diagnosis found.  Patient Active Problem List   Diagnosis   • Closed right hip fracture             Adult Rehabilitation Note       06/15/17 1410 17 1532 17 1434    Rehab Assessment/Intervention    Discipline occupational therapist  -AB physical therapy assistant  -LL occupational therapist  -AB    Document Type therapy note (daily note)  -AB therapy note (daily note);progress note   BID treatment session  -LL therapy note (daily note);progress note  -AB    Subjective Information no complaints;agree to therapy  -AB no complaints;agree to therapy  -LL no complaints;agree to therapy  -AB    Patient Effort, Rehab Treatment good  -AB good  -LL good  -AB    Precautions/Limitations fall precautions;non-weight bearing status;seizure precautions;other (see comments)   NWB RLE; <skin integrity; intermittent confusion  -AB fall precautions;non-weight bearing status;seizure precautions;other (see comments)   NWB RLE, skin integrity, intermittent confusion  -LL fall precautions;non-weight bearing status;seizure precautions;other (see comments)   NWB RLE; <skin integrity; intermittent confusion  -AB    Patient Response to Treatment Pt. w/ good tolerance and rest breaks provided.   -AB Patient did well with BID treatment session with adequate rest  breaks  -LL Pt. continues to improve w/ self care tasks and fxl transfers.   -AB    Recorded by [AB] Missy Pelayo OT [LL] Roberta Friedman PTA [AB] Missy Pelayo OT    Pain Assessment    Pain Assessment  Levin-Baker FACES  -LL     Levin-Baker FACES Pain Rating  6  -LL     Pain Type  Acute pain;Surgical pain  -LL     Pain Location   Hip  -LL     Pain Orientation  Right  -LL     Pain Intervention(s)  Repositioned;Rest  -LL     Recorded by  [LL] Roberta Friedman PTA     Cognitive Assessment/Intervention    Current Cognitive/Communication Assessment  functional  -LL     Orientation Status  oriented to;person;place  -LL     Follows Commands/Answers Questions able to follow single-step instructions;100% of the time  -AB able to follow single-step instructions;100% of the time  -LL able to follow single-step instructions;100% of the time  -AB    Personal Safety decreased awareness, need for assist;decreased awareness, need for safety  -AB decreased awareness, need for assist;decreased awareness, need for safety  -LL decreased awareness, need for assist;decreased awareness, need for safety  -AB    Personal Safety Interventions gait belt;nonskid shoes/slippers when out of bed;supervised activity  -AB gait belt;nonskid shoes/slippers when out of bed  -LL gait belt;nonskid shoes/slippers when out of bed;supervised activity  -AB    Recorded by [AB] Missy Pelayo OT [LL] Roberta Friedman PTA [AB] Missy Pelayo OT    Mobility Assessment/Training    Extremity Weight-Bearing Status   right lower extremity  -AB    Right Lower Extremity Weight-Bearing   non weight-bearing  -AB    Recorded by   [AB] Missy Pelayo OT    Bed Mobility, Assessment/Treatment    Bed Mob, Supine to Sit, [place]  verbal cues required;minimum assist (75% patient effort)  -LL     Bed Mob, Sit to Supine, [place]  minimum assist (75% patient effort);verbal cues required  -LL     Bed Mobility, Safety Issues  decreased use of legs for bridging/pushing  -LL     Bed Mobility, Impairments  ROM decreased;strength decreased;impaired balance;coordination impaired;pain  -LL     Recorded by  [LL] Roberta Friedman PTA     Transfer Assessment/Treatment    Transfers, Bed-Chair [place]  verbal cues required;nonverbal cues required (demo/gesture);minimum assist  (75% patient effort)  -LL     Transfers, Chair-Bed [place]  verbal cues required;nonverbal cues required (demo/gesture);minimum assist (75% patient effort)  -LL     Transfers, Bed-Chair-Bed, Assist Device  rolling walker  -LL     Transfers, Sit-Stand [place]  verbal cues required;nonverbal cues required (demo/gesture);minimum assist (75% patient effort)  -LL contact guard assist;verbal cues required  -AB    Transfers, Stand-Sit [place]  verbal cues required;nonverbal cues required (demo/gesture);minimum assist (75% patient effort)  -LL contact guard assist;verbal cues required  -AB    Transfers, Sit-Stand-Sit, Assist Device  rolling walker  -LL rolling walker  -AB    Transfer, Safety Issues  balance decreased during turns  -LL     Transfer, Impairments  ROM decreased;strength decreased;impaired balance;coordination impaired;pain  -LL     Recorded by  [LL] Roberta Friedman PTA [AB] Missy Pelayo, [place]    Gait Assessment/Treatment    Gait, [place] Level  verbal cues required;nonverbal cues required (demo/gesture);minimum assist (75% patient effort)  -LL     Gait, Assistive Device  rolling walker  -LL     Gait, Distance (Feet)  50   x 2 in am; 20 x 2 in pm  -LL     Gait, Gait Pattern Analysis  swing-to gait  -LL     Gait, Gait Deviations  antalgic;decreased heel strike;limb motion velocity decreased;step length decreased;weight-shifting ability decreased  -LL     Gait, Maintain Weight Bearing Status  able to maintain weight bearing status  -LL     Gait, Safety Issues  balance decreased during turns;step length decreased;weight-shifting ability decreased  -LL     Gait, Impairments  ROM decreased;strength decreased;impaired balance;coordination impaired;pain  -LL     Recorded by  [LL] Roberta Friedman PTA     Upper Body Bathing Assessment/Training    UB Bathing Assess/Train, Position   sitting  -AB    UB Bathing Assess/Train, [place] Level   set up required  -AB    UB Bathing Assess/Train,  Comment   TB Bathing: CGA  -AB    Recorded by   [AB] Missy Pelayo, OT    Lower Body Bathing Assessment/Training    LB Bathing Assess/Train Assistive Device   long-handled sponge  -AB    LB Bathing Assess/Train, Position   sitting;standing  -AB    LB Bathing Assess/Train, [place] Level   contact guard assist;verbal cues required;nonverbal cues required (demo/gesture)  -AB    LB Bathing Assess/Train, Comment   TB Bathing: CGA  -AB    Recorded by   [AB] Missy Pelayo, OT    Upper Body Dressing Assessment/Training    UB Dressing Assess/Train, Position   sitting  -AB    UB Dressing Assess/Train, [place]   set up required  -AB    Recorded by   [AB] Missy Pelayo, OT    Lower Body Dressing Assessment/Training    LB Dressing Assess/Train, Clothing Type   donning:;doffing:;slipper socks;pants  -AB    LB Dressing Assess/Train, Assist Device   reacher;sock-aid  -AB    LB Dressing Assess/Train, Position   sitting;standing  -AB    LB Dressing Assess/Train, [place]   minimum assist (75% patient effort);verbal cues required;nonverbal cues required (demo/gesture)  -AB    Recorded by   [AB] Missy Pelayo, OT    Toileting Assessment/Training    Toileting Assess/Train, Comment   CGA  -AB    Recorded by   [AB] Missy Pelayo, OT    Grooming Assessment/Training    Grooming Assess/Train, Position sitting  -AB  sitting  -AB    Grooming Assess/Train, Indepen Level set up required  -AB  set up required  -AB    Recorded by [AB] Missy Pelayo, OT  [AB] Missy Pelayo, OT    Self-Feeding Assessment/Training    Self-Feeding Assess/Train, Comment   Mod. I  -AB    Recorded by   [AB] Missy Pelayo, OT    Balance Skills Training    Standing-Level of Assistance  Contact guard;Minimum assistance  -LL     Static Standing Balance Support  assistive device  -LL     Gait Balance-Level of Assistance  Minimum assistance  -LL     Gait Balance Support  assistive device  -LL      Recorded by  [LL] Roberta Friedman PTA     Therapy Exercises    Bilateral Lower Extremities  AAROM:;AROM:;20 reps;30 reps;sitting;supine  -LL     BLE Resistance  theraband  -LL     Bilateral Upper Extremity AROM:;sitting   BUE CoordEx; G/FMC TherEx/Act; Strengthening  -AB  AROM:;sitting   BUE CoordEx; G/FMC TherEx/Act; Strengthening  -AB    BUE Resistance manual resistance- minimal;theraputty;other (comment)   ArmBike: [id number], min resistance; BUE Wrist Rolls X2  -AB  manual resistance- minimal;other (comment)   ArmBike: [id number], min resistance; BUE Wrist Rolls X2  -AB    Recorded by [AB] Missy Pelayo OT [LL] Roberta Friedman PTA [AB] Missy Pelayo OT    Positioning and Restraints    Pre-Treatment Position  --   WC in hallway in both am and pm  -LL     Post Treatment Position  wheelchair  -LL     In Wheelchair sitting;legs elevated;notified nsg;encouraged to call for assist;with PT   outside in AM w/ NSG aware; w/ PT in PM  -AB sitting;legs elevated   In hallway in am & sitting in common area in pm  -LL sitting;with PT;legs elevated  -AB    Recorded by [AB] Missy Pelayo OT [LL] Roberta Friedman PTA [AB] Missy Pelayo OT      06/13/17 1440 06/13/17 1400 06/12/17 1503    Rehab Assessment/Intervention    Discipline occupational therapist  -AB physical therapy assistant  -AMINA physical therapy assistant  -LL    Document Type therapy note (daily note)  -AB therapy note (daily note)   BID treatment session  -AMINA therapy note (daily note)   BID treatment session  -LL    Subjective Information no complaints;agree to therapy  -AB agree to therapy;complains of;pain  -AMINA no complaints;agree to therapy  -LL    Patient Effort, Rehab Treatment good  -AB good  -AMINA good  -LL    Precautions/Limitations fall precautions;non-weight bearing status;seizure precautions;other (see comments)   NWB RLE; <skin integrity; intermittent confusion  -AB fall precautions;non-weight bearing status;seizure  precautions;other (see comments)   NWB R) LE, , skin integrity, intermittent confusion  -AMINA fall precautions;non-weight bearing status;seizure precautions;other (see comments)   NWB RLE; skin integrity, intermittent confusion  -LL    Patient Response to Treatment Pt. w/ good tolerance and rest breaks provided. She continues to display improvements w/ self care tasks and fxl mobility. See appropriate sections for updated levels.   -AB Patient tolerated tx well today, however reported increased fatigue in PM session.  Pt had episode of LOB during gait training in PM session, however corrected w/ assistance of therapist and PT tech.  No injuries or complaints noted; RN notified and aware.   -AMINA Patient did well with BID treatment session with adequate rest breaks.  -LL    Recorded by [AB] Missy Pelayo OT [AMINA] Roberta Eisenberg PTA [LL] Roberta Friedman PTA    Pain Assessment    Pain Assessment  0-10  -AMINA Levin-Orozco FACES  -LL    Levin-Baker FACES Pain Rating  8  -AMINA 6  -LL    Pain Type  Acute pain;Surgical pain  -AMINA Acute pain;Surgical pain  -LL    Pain Location  Hip  -AMINA Hip  -LL    Pain Orientation  Right  -AMINA Right  -LL    Pain Intervention(s)  Repositioned;Rest;Other (Comment)   pt received meds to assist w/ pain prior to initiation of se  -AMINA Repositioned;Rest  -LL    Recorded by  [AMINA] Roberta Eisenberg PTA [LL] Roberta Friedman PTA    Cognitive Assessment/Intervention    Current Cognitive/Communication Assessment  functional  -AMINA functional  -LL    Orientation Status  oriented to;person;place  -AMINA oriented to;person;place;situation;disoriented to;time  -LL    Follows Commands/Answers Questions able to follow single-step instructions;100% of the time  -AB able to follow single-step instructions;100% of the time;needs cueing  -AMINA able to follow single-step instructions;100% of the time;needs cueing  -LL    Personal Safety decreased awareness, need for assist;decreased awareness, need for safety   -AB decreased awareness, need for assist;decreased awareness, need for safety  -AMINA decreased awareness, need for assist;decreased awareness, need for safety  -LL    Personal Safety Interventions gait belt;nonskid shoes/slippers when out of bed;supervised activity  -AB gait belt;nonskid shoes/slippers when out of bed;supervised activity  -AMINA gait belt;nonskid shoes/slippers when out of bed;supervised activity  -LL    Recorded by [AB] Missy Pelayo OT [AMINA] Roberta Eisenberg PTA [LL] Roberta Friedman PTA    Mobility Assessment/Training    Extremity Weight-Bearing Status right lower extremity  -AB  right lower extremity  -LL    Right Lower Extremity Weight-Bearing non weight-bearing  -AB  non weight-bearing  -LL    Recorded by [AB] Missy Pelayo OT  [LL] Roberta Friedman PTA    Bed Mobility, Assessment/Treatment    Bed Mobility, [place]/Bridge, [place]   minimum assist (75% patient effort)  -LL    Bed Mob, Supine to Sit, [place]  verbal cues required;minimum assist (75% patient effort)  -AMINA contact guard assist;minimum assist (75% patient effort)  -LL    Bed Mob, Sit to Supine, [place]  minimum assist (75% patient effort);verbal cues required  -AMINA minimum assist (75% patient effort)  -LL    Bed Mobility, Safety Issues  decreased use of legs for bridging/pushing  -AMINA decreased use of legs for bridging/pushing  -LL    Bed Mobility, Impairments  ROM decreased;strength decreased;impaired balance;coordination impaired;pain  -AMINA ROM decreased;strength decreased;impaired balance;coordination impaired;pain  -LL    Recorded by  [AMINA] Roberta Eisenberg PTA [LL] Roberta Friedman PTA    Transfer Assessment/Treatment    Transfers, Bed-Chair [place]  verbal cues required;nonverbal cues required (demo/gesture);minimum assist (75% patient effort)  -AMINA verbal cues required;nonverbal cues required (demo/gesture);contact guard assist;minimum assist (75% patient effort)  -LL    Transfers,  Chair-Bed [place]  verbal cues required;nonverbal cues required (demo/gesture);minimum assist (75% patient effort)  -AMINA contact guard assist;minimum assist (75% patient effort)  -LL    Transfers, Bed-Chair-Bed, Assist Device  rolling walker  -AMINA rolling walker  -LL    Transfers, Sit-Stand [place]  verbal cues required;nonverbal cues required (demo/gesture);minimum assist (75% patient effort)  -AMINA verbal cues required;nonverbal cues required (demo/gesture);contact guard assist;minimum assist (75% patient effort)  -LL    Transfers, Stand-Sit [place]  verbal cues required;nonverbal cues required (demo/gesture);minimum assist (75% patient effort)  -AMINA verbal cues required;nonverbal cues required (demo/gesture);contact guard assist;minimum assist (75% patient effort)  -LL    Transfers, Sit-Stand-Sit, Assist Device  rolling walker  -AMINA rolling walker  -LL    Toilet Transfer, [place] contact guard assist;verbal cues required;nonverbal cues required (demo/gesture)  -AB      Toilet Transfer, Assistive Device elevated toilet seat;wheelchair;other (see comments)   grab bar  -AB      Transfer, Safety Issues  balance decreased during turns  -AMINA balance decreased during turns  -LL    Transfer, Impairments  ROM decreased;strength decreased;impaired balance;coordination impaired;pain  -AMINA ROM decreased;strength decreased;impaired balance;coordination impaired;pain  -LL    Recorded by [AB] Missy Pelayo, OT [AMINA] Roberta Eisenberg PTA [LL] Roberta Friedman PTA    Gait Assessment/Treatment    Gait, [place] Level  verbal cues required;nonverbal cues required (demo/gesture);minimum assist (75% patient effort)  -AMINA contact guard assist;minimum assist (75% patient effort)  -LL    Gait, Assistive Device  rolling walker  -AMINA rolling walker  -LL    Gait, Distance (Feet)  45   45' x 2 AM session, 20' x 2 PM session  -AMINA 70   then 60 in am; 40 x 2 in pm  -LL    Gait, Gait Pattern Analysis  swing-to gait   -AMINA swing-to gait  -LL    Gait, Gait Deviations  antalgic;decreased heel strike;step length decreased;limb motion velocity decreased  -AMINA antalgic;decreased heel strike;limb motion velocity decreased;step length decreased;weight-shifting ability decreased  -LL    Gait, Maintain Weight Bearing Status  able to maintain weight bearing status  -AMINA able to maintain weight bearing status  -LL    Gait, Safety Issues  balance decreased during turns;step length decreased;weight-shifting ability decreased  -AMINA balance decreased during turns;step length decreased;weight-shifting ability decreased  -LL    Gait, Impairments  ROM decreased;strength decreased;impaired balance;coordination impaired;pain  -AMINA ROM decreased;strength decreased;impaired balance;coordination impaired;pain  -LL    Recorded by  [AMINA] Roberta Eisenberg PTA [LL] Roberta Friedman PTA    Toileting Assessment/Training    Toileting Assess/Train, Assistive Device grab bars;raised toilet seat  -AB      Toileting Assess/Train, Position sitting  -AB      Toileting Assess/Train, Indep[place] Level contact guard assist;verbal cues required;nonverbal cues required (demo/gesture)  -AB      Recorded by [AB] Missy Pelayo OT      Grooming Assessment/Training    Grooming Assess/Train, Position sitting  -AB      Grooming Assess/Train, Indep[place] Level set up required  -AB      Recorded by [AB] Missy Pelayo OT      Balance Skills Training    Sitting-Level of Assistance   Distant supervision  -LL    Sitting-Balance Support   Feet supported  -LL    Standing-Level of Assistance  Contact guard;Minimum assistance  -AMINA Contact guard  -LL    Static Standing Balance Support  assistive device  -AMINA assistive device  -LL    Gait Balance-Level of Assistance  Minimum assistance  -AMINA Contact guard;Minimum assistance  -LL    Gait Balance Support  assistive device  -AMINA assistive device  -LL    Recorded by  [AMINA] Roberta Eisenberg PTA [LL] Roberta Friedman PTA    Therapy  Exercises    Bilateral Lower Extremities  AAROM:;AROM:;20 reps;30 reps;sitting;supine  -AMINA AROM:;25 reps;supine;sitting  -LL    BLE Resistance  theraband  -AMINA theraband  -LL    Bilateral Upper Extremity AROM:;sitting   BUE CoordEx; G/FMC TherEx/Act; Strengthening  -AB      BUE Resistance manual resistance- minimal;other (comment)   ArmBike: [id number], min resistance; BUE Wrist Rolls X2  -AB      Recorded by [AB] Missy Pelayo, OT [AMINA] Roberta Eisenberg, [place] [LL] Roberta Friedman PTA    Positioning and Restraints    Pre-Treatment Position  sitting in chair/recliner  -AMINA --   WC in room in am & pm  -LL    Post Treatment Position  wheelchair  -AMINA wheelchair  -LL    In Wheelchair sitting;call light within reach;encouraged to call for assist;legs elevated  -AB sitting;call light within reach;notified nsg;heels elevated   in front of nursing station following AM/PM session  -AMINA sitting;call light within reach;encouraged to call for assist;legs elevated   in room in am; with activities in pm  -LL    Recorded by [AB] Missy Pelayo, OT [AMINA] Roberta Eisenberg, [place] [LL] Roberta Friedman PTA      User Key  (r) = Recorded By, (t) = Taken By, (c) = Cosigned By    Initials Name Effective Dates    AB Missy Pelayo, OT 02/04/16 -     LL Roberta Friedman PTA 05/02/16 -     AMINA Roberta Eisenberg PTA 05/02/16 -                 OT Goals       06/14/17 1445 06/07/17 1231       Transfer Training OT LTG    Transfer Training OT LTG, Date Established  06/07/17  -AB     Transfer Training OT LTG, Time to Achieve  by discharge  -AB     Transfer Training OT LTG, Activity Type  all transfers  -AB     Transfer Training OT LTG, [place] Level  contact guard assist  -AB     Patient Education OT LTG    Patient Education OT LTG, Date Established  06/07/17  -AB     Patient Education OT LTG, Time to Achieve  by discharge  -AB     Patient Education OT LTG, Education Type  HEP;adaptive equipment mgmt;home safety;work  simplification  -AB     Bathing OT STG    Bathing Goal OT STG, Date Established  06/07/17  -AB     Bathing Goal OT STG, Time to Achieve  5 - 7 days  -AB     Bathing Goal OT STG, [place] Level  minimum assist (75% patient effort)  -AB     Bathing Goal OT STG, Outcome goal met  -AB      Bathing OT LTG    Bathing Goal OT LTG, Date Established  06/07/17  -AB     Bathing Goal OT LTG, Time to Achieve  by discharge  -AB     Bathing Goal OT LTG, [place] Level  contact guard assist  -AB     Eating Self-Feeding OT LTG    Eat Self Feeding Goal OT LTG, Date Established  06/07/17  -AB     Eat Self Feeding Goal OT LTG, Time to Achieve  by discharge  -AB     Eat Self Feed Goal OT LTG, [place] Level  conditional independence  -AB     Toileting OT LTG    Toileting Goal OT LTG, Date Established  06/07/17  -AB     Toileting Goal OT LTG, Time to Achieve  by discharge  -AB     Toileting Goal OT LTG, [place] Level  contact guard assist  -AB     LB Dressing OT STG    LB Dressing Goal OT STG, Date Established  06/07/17  -AB     LB Dressing Goal OT STG, Time to Achieve  5 - 7 days  -AB     LB Dressing Goal OT STG, [place] Level  moderate assist (50% patient effort)  -AB     LB Dressing Goal OT STG, Outcome goal met  -AB      LB Dressing OT LTG    LB Dressing Goal OT LTG, Date Established  06/07/17  -AB     LB Dressing Goal OT LTG, Time to Achieve  by discharge  -AB     LB Dressing Goal OT LTG, [place] Level  minimum assist (75% patient effort)  -AB       User Key  (r) = Recorded By, (t) = Taken By, (c) = Cosigned By    Initials Name Provider Type    AB Missy Pelayo OT Occupational Therapist          Occupational Therapy Education     Title: PT OT SLP Therapies (Done)     Topic: Occupational Therapy (Done)     Point: ADL training (Done)    Description: Instruct learner(s) on proper safety adaptation and remediation techniques during self care or transfers.   Instruct in proper use of assistive  devices.    Learning Progress Summary    Learner Readiness Method Response Comment Documented by Status   Patient Acceptance E,D VU,NR  AB 06/15/17 1414 Done    Acceptance E VU   06/15/17 0046 Done    Acceptance E,D VU,NR  AB 06/14/17 1445 Done    Acceptance E VU   06/14/17 0310 Done    Acceptance E,D VU,NR  AB 06/13/17 1450 Done    Acceptance E VU,NR  AMINA 06/13/17 1448 Done    Acceptance E,D NR  AB 06/12/17 0946 Active    Acceptance E NR  BC 06/10/17 1542 Active    Acceptance E,D NR  AB 06/09/17 1457 Active    Acceptance E,D NR  AB 06/08/17 1632 Active    Acceptance E,D NR  AB 06/07/17 1234 Active               Point: Home exercise program (Done)    Description: Instruct learner(s) on appropriate technique for monitoring, assisting and/or progressing therapeutic exercises/activities.    Learning Progress Summary    Learner Readiness Method Response Comment Documented by Status   Patient Acceptance E VU   06/15/17 0046 Done    Acceptance E VU   06/14/17 0310 Done    Acceptance E VU,NR  AMINA 06/13/17 1448 Done               Point: Precautions (Done)    Description: Instruct learner(s) on prescribed precautions during self-care and functional transfers.    Learning Progress Summary    Learner Readiness Method Response Comment Documented by Status   Patient Acceptance E,D VU,NR  AB 06/15/17 1414 Done    Acceptance E VU   06/15/17 0046 Done    Acceptance E,D VU,NR  AB 06/14/17 1445 Done    Acceptance E VU   06/14/17 0310 Done    Acceptance E,D VU,NR  AB 06/13/17 1450 Done    Acceptance E VU,NR  AMINA 06/13/17 1448 Done    Acceptance E,D NR  AB 06/12/17 0946 Active    Acceptance E,D NR  AB 06/09/17 1457 Active    Acceptance E,D NR  AB 06/08/17 1632 Active    Acceptance E,D NR  AB 06/07/17 1234 Active               Point: Body mechanics (Done)    Description: Instruct learner(s) on proper positioning and spine alignment during self-care, functional mobility activities and/or exercises.    Learning Progress Summary     Learner Readiness Method Response Comment Documented by Status   Patient Acceptance E,D VU,NR  AB 06/15/17 1414 Done    Acceptance E VU   06/15/17 0046 Done    Acceptance E,D VU,NR  AB 06/14/17 1445 Done    Acceptance E VU   06/14/17 0310 Done    Acceptance E,D VU,NR  AB 06/13/17 1450 Done    Acceptance E VU,NR  AMINA 06/13/17 1448 Done    Acceptance E,D NR  AB 06/12/17 0946 Active    Acceptance E,D NR  AB 06/09/17 1457 Active    Acceptance E,D NR  AB 06/08/17 1632 Active    Acceptance E,D NR  AB 06/07/17 1234 Active                      User Key     Initials Effective Dates Name Provider Type Discipline    AB 02/04/16 -  Missy Pelayo, OT Occupational Therapist OT     06/16/16 -  Yazmin Munoz, RN Registered Nurse Nurse     05/02/16 -  Roberta Eisenberg, PTA Physical Therapy Assistant PT    BC 01/21/17 -  Ursula Concepcion, OT Occupational Therapist OT                  OT Recommendation and Plan  Anticipated Equipment Needs At Discharge:  (TBD)  Anticipated Discharge Disposition:  (TBD)  Planned Therapy Interventions: activity intolerance, adaptive equipment training, ADL retraining, energy conservation, fine motor coordination training, home exercise program, motor coordination training, ROM (Range of Motion), strengthening, transfer training, other (see comments) (Therapeutic groups as beneficial to patient)  Therapy Frequency: 3-5 times/wk          Time Calculation:         Time Calculation- OT       06/15/17 1414 06/15/17 0915       Time Calculation- OT    OT Start Time 1245  -AB 0915  -AB     OT Stop Time 1330  -AB 1000  -AB     OT Time Calculation (min) 45 min  -AB 45 min  -AB     Total Timed Code Minutes- OT 45 minute(s)  -AB 45 minute(s)  -AB     OT Non-Billable Time (min)  15 min  -AB       User Key  (r) = Recorded By, (t) = Taken By, (c) = Cosigned By    Initials Name Provider Type    AB Missy Pelayo, OT Occupational Therapist           Therapy Charges for Today     Code  Description Service Date Service Provider Modifiers Qty    78131686518 HC OT SELF CARE/MGMT/TRAIN EA 15 MIN 6/14/2017 Missy Pelayo OT GO 3    [id number] HC OT THER SUPP EA 15 MIN 6/14/2017 Missy Pelayo OT GO 1    [id number] HC OT THERAPEUTIC ACT EA 15 MIN 6/14/2017 Missy Pelayo OT GO 3    [id number] HC OT SELF CARE/MGMT/TRAIN EA 15 MIN 6/15/2017 Missy Pelayo OT GO 1    [id number] HC OT THERAPEUTIC ACT EA 15 MIN 6/15/2017 Missy Pelayo OT GO 5    [id number] HC OT THER SUPP EA 15 MIN 6/15/2017 Missy Pelayo OT GO 1               Missy Pelayo OT  6/15/2017

## 2017-06-15 NOTE — THERAPY TREATMENT NOTE
Inpatient Rehabilitation - Physical Therapy Treatment Note  [facility]     Patient Name: Maribel Staton  : 1945  MRN: [id number]  Today's Date: 6/15/2017  Onset of Illness/Injury or Date of Surgery Date: 17  Date of Referral to PT: 17  Referring Physician: Eva    Admit Date: 2017    Visit Dx:  No diagnosis found.  Patient Active Problem List   Diagnosis   • Closed right hip fracture               Adult Rehabilitation Note       06/15/17 1419 06/15/17 1410 17 1532    Rehab Assessment/Intervention    Discipline physical therapy assistant  -LL occupational therapist  -AB physical therapy assistant  -LL    Document Type therapy note (daily note)   BID treatment sessions  -LL therapy note (daily note)  -AB therapy note (daily note);progress note   BID treatment session  -LL    Subjective Information no complaints;agree to therapy  -LL no complaints;agree to therapy  -AB no complaints;agree to therapy  -LL    Patient Effort, Rehab Treatment good  -LL good  -AB good  -LL    Precautions/Limitations fall precautions;non-weight bearing status;seizure precautions;other (see comments)   skin integrity, intermittent confusion  -LL fall precautions;non-weight bearing status;seizure precautions;other (see comments)   NWB RLE; <skin integrity; intermittent confusion  -AB fall precautions;non-weight bearing status;seizure precautions;other (see comments)   NWB RLE, skin integrity, intermittent confusion  -LL    Patient Response to Treatment Patient did well with BID treatment session with adequate rest breaks.    -LL Pt. w/ good tolerance and rest breaks provided.   -AB Patient did well with BID treatment session with adequate rest  breaks  -LL    Recorded by [LL] Roberta Friedman PTA [AB] Missy Pelayo OT [LL] Roberta Friedman PTA    Pain Assessment    Pain Assessment Levin-Orozco FACES  -LL  Levin-Orozco FACES  -LL    Levin-Baker FACES Pain Rating 4  -LL  6  -LL    Pain Type Acute pain;Surgical  pain  -LL  Acute pain;Surgical pain  -LL    Pain Location Hip  -LL  Hip  -LL    Pain Orientation Right  -LL  Right  -LL    Pain Intervention(s) Repositioned;Rest  -LL  Repositioned;Rest  -LL    Recorded by [LL] Roberta Friedman PTA  [LL] Roberta Friedman PTA    Cognitive Assessment/Intervention    Current Cognitive/Communication Assessment functional  -LL  functional  -LL    Orientation Status oriented to;person;place  -LL  oriented to;person;place  -LL    Follows Commands/Answers Questions able to follow single-step instructions;100% of the time  -LL able to follow single-step instructions;100% of the time  -AB able to follow single-step instructions;100% of the time  -LL    Personal Safety decreased awareness, need for assist;decreased awareness, need for safety  -LL decreased awareness, need for assist;decreased awareness, need for safety  -AB decreased awareness, need for assist;decreased awareness, need for safety  -LL    Personal Safety Interventions gait belt;nonskid shoes/slippers when out of bed  -LL gait belt;nonskid shoes/slippers when out of bed;supervised activity  -AB gait belt;nonskid shoes/slippers when out of bed  -LL    Recorded by [LL] Roberta Friedman PTA [AB] Missy Pelayo OT [LL] Roberta Friedman PTA    Bed Mobility, Assessment/Treatment    Bed Mob, Supine to Sit, [place] verbal cues required;minimum assist (75% patient effort)  -LL  verbal cues required;minimum assist (75% patient effort)  -LL    Bed Mob, Sit to Supine, [place] minimum assist (75% patient effort);verbal cues required  -LL  minimum assist (75% patient effort);verbal cues required  -LL    Bed Mobility, Safety Issues decreased use of legs for bridging/pushing  -LL  decreased use of legs for bridging/pushing  -LL    Bed Mobility, Impairments ROM decreased;strength decreased;impaired balance;coordination impaired;pain  -LL  ROM decreased;strength decreased;impaired balance;coordination impaired;pain  -LL    Recorded  by [LL] Roberta Friedman PTA  [LL] Roberta Friedman PTA    Transfer Assessment/Treatment    Transfers, Bed-Chair [place] verbal cues required;nonverbal cues required (demo/gesture);minimum assist (75% patient effort)  -LL  verbal cues required;nonverbal cues required (demo/gesture);minimum assist (75% patient effort)  -LL    Transfers, Chair-Bed [place] verbal cues required;nonverbal cues required (demo/gesture);minimum assist (75% patient effort)  -LL  verbal cues required;nonverbal cues required (demo/gesture);minimum assist (75% patient effort)  -LL    Transfers, Bed-Chair-Bed, Assist Device rolling walker  -LL  rolling walker  -LL    Transfers, Sit-Stand [place] verbal cues required;nonverbal cues required (demo/gesture);minimum assist (75% patient effort)  -LL  verbal cues required;nonverbal cues required (demo/gesture);minimum assist (75% patient effort)  -LL    Transfers, Stand-Sit [place] verbal cues required;nonverbal cues required (demo/gesture);minimum assist (75% patient effort)  -LL  verbal cues required;nonverbal cues required (demo/gesture);minimum assist (75% patient effort)  -LL    Transfers, Sit-Stand-Sit, Assist Device rolling walker  -LL  rolling walker  -LL    Transfer, Safety Issues balance decreased during turns  -LL  balance decreased during turns  -LL    Transfer, Impairments ROM decreased;strength decreased;impaired balance;coordination impaired;pain  -LL  ROM decreased;strength decreased;impaired balance;coordination impaired;pain  -LL    Recorded by [LL] Roberta Friedman PTA  [LL] Roberta Friedman PTA    Gait Assessment/Treatment    Gait, [place] Level verbal cues required;nonverbal cues required (demo/gesture);minimum assist (75% patient effort)  -LL  verbal cues required;nonverbal cues required (demo/gesture);minimum assist (75% patient effort)  -LL    Gait, Assistive Device rolling walker  -LL  rolling walker  -LL    Gait, Distance (Feet) 100   yhen 60 in am; 40 x  3 in pm  -LL  50   x 2 in am; 20 x 2 in pm  -LL    Gait, Gait Pattern Analysis swing-to gait  -LL  swing-to gait  -LL    Gait, Gait Deviations antalgic;decreased heel strike;limb motion velocity decreased;step length decreased;weight-shifting ability decreased  -LL  antalgic;decreased heel strike;limb motion velocity decreased;step length decreased;weight-shifting ability decreased  -LL    Gait, Maintain Weight Bearing Status able to maintain weight bearing status  -LL  able to maintain weight bearing status  -LL    Gait, Safety Issues balance decreased during turns;step length decreased;weight-shifting ability decreased  -LL  balance decreased during turns;step length decreased;weight-shifting ability decreased  -LL    Gait, Impairments ROM decreased;strength decreased;impaired balance;coordination impaired;pain  -LL  ROM decreased;strength decreased;impaired balance;coordination impaired;pain  -LL    Recorded by [LL] Roberta Friedman PTA  [LL] Roberta Friedman PTA    Grooming Assessment/Training    Grooming Assess/Train, Position  sitting  -AB     Grooming Assess/Train, Indep[place] Level  set up required  -AB     Recorded by  [AB] Missy Pelayo OT     Balance Skills Training    Standing-Level of Assistance Contact guard;Minimum assistance  -LL  Contact guard;Minimum assistance  -LL    Static Standing Balance Support assistive device  -LL  assistive device  -LL    Gait Balance-Level of Assistance Minimum assistance  -LL  Minimum assistance  -LL    Gait Balance Support assistive device  -LL  assistive device  -LL    Recorded by [LL] Roberta Friedman PTA  [LL] Roberta Friedman PTA    Therapy Exercises    Bilateral Lower Extremities AROM:;20 reps;30 reps;sitting;supine  -LL  AAROM:;AROM:;20 reps;30 reps;sitting;supine  -LL    BLE Resistance theraband  -LL  theraband  -LL    Bilateral Upper Extremity  AROM:;sitting   BUE CoordEx; G/FMC TherEx/Act; Strengthening  -AB     BUE Resistance  manual resistance-  minimal;theraputty;other (comment)   ArmBike: [id number], min resistance; BUE Wrist Rolls X2  -AB     Recorded by [LL] Roberta Friedman PTA [AB] Missy Pelayo OT [LL] Roberta Friedman PTA    Positioning and Restraints    Pre-Treatment Position --   WC in roomin am; WC with OT in pm  -LL  --   WC in hallway in both am and pm  -LL    Post Treatment Position wheelchair  -LL  wheelchair  -LL    In Wheelchair sitting;legs elevated   in hallway in am & pm  -LL sitting;legs elevated;notified nsg;encouraged to call for assist;with PT   outside in AM w/ NSG aware; w/ PT in PM  -AB sitting;legs elevated   In hallway in am & sitting in common area in pm  -LL    Recorded by [LL] Roberta Friedman PTA [AB] Missy Pelayo OT [LL] Roberta Friedman PTA      06/14/17 1434 06/13/17 1440 06/13/17 1400    Rehab Assessment/Intervention    Discipline occupational therapist  -AB occupational therapist  -AB physical therapy assistant  -AMINA    Document Type therapy note (daily note);progress note  -AB therapy note (daily note)  -AB therapy note (daily note)   BID treatment session  -AMINA    Subjective Information no complaints;agree to therapy  -AB no complaints;agree to therapy  -AB agree to therapy;complains of;pain  -AMINA    Patient Effort, Rehab Treatment good  -AB good  -AB good  -AMINA    Precautions/Limitations fall precautions;non-weight bearing status;seizure precautions;other (see comments)   NWB RLE; <skin integrity; intermittent confusion  -AB fall precautions;non-weight bearing status;seizure precautions;other (see comments)   NWB RLE; <skin integrity; intermittent confusion  -AB fall precautions;non-weight bearing status;seizure precautions;other (see comments)   NWB R) LE, , skin integrity, intermittent confusion  -AMINA    Patient Response to Treatment Pt. continues to improve w/ self care tasks and fxl transfers.   -AB Pt. w/ good tolerance and rest breaks provided. She continues to display improvements w/ self care tasks  and fxl mobility. See appropriate sections for updated levels.   -AB Patient tolerated tx well today, however reported increased fatigue in PM session.  Pt had episode of LOB during gait training in PM session, however corrected w/ assistance of therapist and PT tech.  No injuries or complaints noted; RN notified and aware.   -AMINA    Recorded by [AB] Missy Pelayo OT [AB] Missy Pelayo, OT [AMINA] Roberta Eisenberg PTA    Pain Assessment    Pain Assessment   0-10  -AMINA    Levin-Orozco FACES Pain Rating   8  -AMINA    Pain Type   Acute pain;Surgical pain  -AMINA    Pain Location   Hip  -AMINA    Pain Orientation   Right  -AMINA    Pain Intervention(s)   Repositioned;Rest;Other (Comment)   pt received meds to assist w/ pain prior to initiation of se  -AMINA    Recorded by   [AMINA] Roberta Eisenberg PTA    Cognitive Assessment/Intervention    Current Cognitive/Communication Assessment   functional  -AMINA    Orientation Status   oriented to;person;place  -AMINA    Follows Commands/Answers Questions able to follow single-step instructions;100% of the time  -AB able to follow single-step instructions;100% of the time  -AB able to follow single-step instructions;100% of the time;needs cueing  -AMINA    Personal Safety decreased awareness, need for assist;decreased awareness, need for safety  -AB decreased awareness, need for assist;decreased awareness, need for safety  -AB decreased awareness, need for assist;decreased awareness, need for safety  -AMINA    Personal Safety Interventions gait belt;nonskid shoes/slippers when out of bed;supervised activity  -AB gait belt;nonskid shoes/slippers when out of bed;supervised activity  -AB gait belt;nonskid shoes/slippers when out of bed;supervised activity  -AMINA    Recorded by [AB] Missy Pelayo OT [AB] Missy Pelayo, OT [AMINA] Roberta Eisenberg PTA    Mobility Assessment/Training    Extremity Weight-Bearing Status right lower extremity  -AB right lower extremity   -AB     Right Lower Extremity Weight-Bearing non weight-bearing  -AB non weight-bearing  -AB     Recorded by [AB] Missy Pelayo OT [AB] Missy Pelayo, OT     Bed Mobility, Assessment/Treatment    Bed Mob, Supine to Sit, [place]   verbal cues required;minimum assist (75% patient effort)  -AMINA    Bed Mob, Sit to Supine, [place]   minimum assist (75% patient effort);verbal cues required  -AMINA    Bed Mobility, Safety Issues   decreased use of legs for bridging/pushing  -AMINA    Bed Mobility, Impairments   ROM decreased;strength decreased;impaired balance;coordination impaired;pain  -AMINA    Recorded by   [AMINA] Roberta Eisenberg PTA    Transfer Assessment/Treatment    Transfers, Bed-Chair [place]   verbal cues required;nonverbal cues required (demo/gesture);minimum assist (75% patient effort)  -AMINA    Transfers, Chair-Bed [place]   verbal cues required;nonverbal cues required (demo/gesture);minimum assist (75% patient effort)  -AMINA    Transfers, Bed-Chair-Bed, Assist Device   rolling walker  -AMINA    Transfers, Sit-Stand [place] contact guard assist;verbal cues required  -AB  verbal cues required;nonverbal cues required (demo/gesture);minimum assist (75% patient effort)  -AMINA    Transfers, Stand-Sit [place] contact guard assist;verbal cues required  -AB  verbal cues required;nonverbal cues required (demo/gesture);minimum assist (75% patient effort)  -AMINA    Transfers, Sit-Stand-Sit, Assist Device rolling walker  -AB  rolling walker  -AMINA    Toilet Transfer, [place]  contact guard assist;verbal cues required;nonverbal cues required (demo/gesture)  -AB     Toilet Transfer, Assistive Device  elevated toilet seat;wheelchair;other (see comments)   grab bar  -AB     Transfer, Safety Issues   balance decreased during turns  -AMINA    Transfer, Impairments   ROM decreased;strength decreased;impaired balance;coordination impaired;pain  -AMINA    Recorded by [AB] Missy Pelayo, OT  [AB] Missy Pelayo OT [AMINA] Roberta Eisenberg PTA    Gait Assessment/Treatment    Gait, [place] Level   verbal cues required;nonverbal cues required (demo/gesture);minimum assist (75% patient effort)  -AMINA    Gait, Assistive Device   rolling walker  -AMINA    Gait, Distance (Feet)   45   45' x 2 AM session, 20' x 2 PM session  -AMINA    Gait, Gait Pattern Analysis   swing-to gait  -AMINA    Gait, Gait Deviations   antalgic;decreased heel strike;step length decreased;limb motion velocity decreased  -AMINA    Gait, Maintain Weight Bearing Status   able to maintain weight bearing status  -AMINA    Gait, Safety Issues   balance decreased during turns;step length decreased;weight-shifting ability decreased  -AMINA    Gait, Impairments   ROM decreased;strength decreased;impaired balance;coordination impaired;pain  -AMINA    Recorded by   [AMINA] Roberta Eisenberg PTA    Upper Body Bathing Assessment/Training    UB Bathing Assess/Train, Position sitting  -AB      UB Bathing Assess/Train, [place] Level set up required  -AB      UB Bathing Assess/Train, Comment TB Bathing: CGA  -AB      Recorded by [AB] Missy Pelayo OT      Lower Body Bathing Assessment/Training    LB Bathing Assess/Train Assistive Device long-handled sponge  -AB      LB Bathing Assess/Train, Position sitting;standing  -AB      LB Bathing Assess/Train, [place] Level contact guard assist;verbal cues required;nonverbal cues required (demo/gesture)  -AB      LB Bathing Assess/Train, Comment TB Bathing: CGA  -AB      Recorded by [AB] Missy Pelayo OT      Upper Body Dressing Assessment/Training    UB Dressing Assess/Train, Position sitting  -AB      UB Dressing Assess/Train, [place] set up required  -AB      Recorded by [AB] Missy Pelayo OT      Lower Body Dressing Assessment/Training    LB Dressing Assess/Train, Clothing Type donning:;doffing:;slipper socks;pants  -AB      LB Dressing Assess/Train, Assist Device  reacher;sock-aid  -AB      LB Dressing Assess/Train, Position sitting;standing  -AB      LB Dressing Assess/Train, [place] minimum assist (75% patient effort);verbal cues required;nonverbal cues required (demo/gesture)  -AB      Recorded by [AB] Missy Pelayo OT      Toileting Assessment/Training    Toileting Assess/Train, Assistive Device  grab bars;raised toilet seat  -AB     Toileting Assess/Train, Position  sitting  -AB     Toileting Assess/Train, Indepen Level  contact guard assist;verbal cues required;nonverbal cues required (demo/gesture)  -AB     Toileting Assess/Train, Comment CGA  -AB      Recorded by [AB] Missy Pelayo OT [AB] Missy Pelayo, OT     Grooming Assessment/Training    Grooming Assess/Train, Position sitting  -AB sitting  -AB     Grooming Assess/Train, Indepen Level set up required  -AB set up required  -AB     Recorded by [AB] Missy Pelayo, OT [AB] Missy Pelayo, OT     Self-Feeding Assessment/Training    Self-Feeding Assess/Train, Comment Mod. I  -AB      Recorded by [AB] Missy Pelayo, [place]      Balance Skills Training    Standing-Level of Assistance   Contact guard;Minimum assistance  -AMINA    Static Standing Balance Support   assistive device  -AMINA    Gait Balance-Level of Assistance   Minimum assistance  -AMINA    Gait Balance Support   assistive device  -AMINA    Recorded by   [AMINA] Roberta Eisenberg, DILLON    Therapy Exercises    Bilateral Lower Extremities   AAROM:;AROM:;20 reps;30 reps;sitting;supine  -AMINA    BLE Resistance   theraband  -AMINA    Bilateral Upper Extremity AROM:;sitting   BUE CoordEx; G/FMC TherEx/Act; Strengthening  -AB AROM:;sitting   BUE CoordEx; G/FMC TherEx/Act; Strengthening  -AB     BUE Resistance manual resistance- minimal;other (comment)   ArmBike: [id number], min resistance; BUE Wrist Rolls X2  -AB manual resistance- minimal;other (comment)   ArmBike: [id number], min resistance; BUE Wrist Rolls X2  -AB     Recorded by  [AB] Missy Pelayo OT [AB] Missy Pelayo OT [AMINA] Roberta Eisenberg PTA    Positioning and Restraints    Pre-Treatment Position   sitting in chair/recliner  -AMINA    Post Treatment Position   wheelchair  -AMINA    In Wheelchair sitting;with PT;legs elevated  -AB sitting;call light within reach;encouraged to call for assist;legs elevated  -AB sitting;call light within reach;notified nsg;heels elevated   in front of nursing station following AM/PM session  -AMINA    Recorded by [AB] Missy Pelayo OT [AB] Missy Pelayo, OT [AMINA] Roberta Eisenberg PTA      06/12/17 1503          Rehab Assessment/Intervention    Discipline physical therapy assistant  -LL      Document Type therapy note (daily note)   BID treatment session  -LL      Subjective Information no complaints;agree to therapy  -LL      Patient Effort, Rehab Treatment good  -LL      Precautions/Limitations fall precautions;non-weight bearing status;seizure precautions;other (see comments)   NWB RLE; skin integrity, intermittent confusion  -LL      Patient Response to Treatment Patient did well with BID treatment session with adequate rest breaks.  -LL      Recorded by [LL] Roberta Friedman PTA      Pain Assessment    Pain Assessment Levin-Orozco FACES  -LL      Levin-Baker FACES Pain Rating 6  -LL      Pain Type Acute pain;Surgical pain  -LL      Pain Location Hip  -LL      Pain Orientation Right  -LL      Pain Intervention(s) Repositioned;Rest  -LL      Recorded by [LL] Roberta Friedman PTA      Cognitive Assessment/Intervention    Current Cognitive/Communication Assessment functional  -LL      Orientation Status oriented to;person;place;situation;disoriented to;time  -LL      Follows Commands/Answers Questions able to follow single-step instructions;100% of the time;needs cueing  -LL      Personal Safety decreased awareness, need for assist;decreased awareness, need for safety  -LL      Personal Safety Interventions gait  belt;nonskid shoes/slippers when out of bed;supervised activity  -LL      Recorded by [LL] Roberta Friedman PTA      Mobility Assessment/Training    Extremity Weight-Bearing Status right lower extremity  -LL      Right Lower Extremity Weight-Bearing non weight-bearing  -LL      Recorded by [LL] Roberta Friedman PTA      Bed Mobility, Assessment/Treatment    Bed Mobility, Scoot/Bridge, [place] minimum assist (75% patient effort)  -LL      Bed Mob, Supine to Sit, [place] contact guard assist;minimum assist (75% patient effort)  -LL      Bed Mob, Sit to Supine, [place] minimum assist (75% patient effort)  -LL      Bed Mobility, Safety Issues decreased use of legs for bridging/pushing  -LL      Bed Mobility, Impairments ROM decreased;strength decreased;impaired balance;coordination impaired;pain  -LL      Recorded by [LL] Roberta Friedman PTA      Transfer Assessment/Treatment    Transfers, Bed-Chair [place] verbal cues required;nonverbal cues required (demo/gesture);contact guard assist;minimum assist (75% patient effort)  -LL      Transfers, Chair-Bed [place] contact guard assist;minimum assist (75% patient effort)  -LL      Transfers, Bed-Chair-Bed, Assist Device rolling walker  -LL      Transfers, Sit-Stand [place] verbal cues required;nonverbal cues required (demo/gesture);contact guard assist;minimum assist (75% patient effort)  -LL      Transfers, Stand-Sit [place] verbal cues required;nonverbal cues required (demo/gesture);contact guard assist;minimum assist (75% patient effort)  -LL      Transfers, Sit-Stand-Sit, Assist Device rolling walker  -LL      Transfer, Safety Issues balance decreased during turns  -LL      Transfer, Impairments ROM decreased;strength decreased;impaired balance;coordination impaired;pain  -LL      Recorded by [LL] Roberta Friedman PTA      Gait Assessment/Treatment    Gait, [place] Level contact guard assist;minimum assist (75% patient effort)  -LL       Gait, Assistive Device rolling walker  -LL      Gait, Distance (Feet) 70   then 60 in am; 40 x 2 in pm  -LL      Gait, Gait Pattern Analysis swing-to gait  -LL      Gait, Gait Deviations antalgic;decreased heel strike;limb motion velocity decreased;step length decreased;weight-shifting ability decreased  -LL      Gait, Maintain Weight Bearing Status able to maintain weight bearing status  -LL      Gait, Safety Issues balance decreased during turns;step length decreased;weight-shifting ability decreased  -LL      Gait, Impairments ROM decreased;strength decreased;impaired balance;coordination impaired;pain  -LL      Recorded by [LL] Roberta Friedman PTA      Balance Skills Training    Sitting-Level of Assistance Distant supervision  -LL      Sitting-Balance Support Feet supported  -LL      Standing-Level of Assistance Contact guard  -LL      Static Standing Balance Support assistive device  -LL      Gait Balance-Level of Assistance Contact guard;Minimum assistance  -LL      Gait Balance Support assistive device  -LL      Recorded by [LL] Roberta Friedman PTA      Therapy Exercises    Bilateral Lower Extremities AROM:;25 reps;supine;sitting  -LL      BLE Resistance theraband  -LL      Recorded by [LL] Roberta Friedman PTA      Positioning and Restraints    Pre-Treatment Position --   WC in room in am & pm  -LL      Post Treatment Position wheelchair  -LL      In Wheelchair sitting;call light within reach;encouraged to call for assist;legs elevated   in room in am; with activities in pm  -LL      Recorded by [LL] Roberta Friedman PTA        User Key  (r) = Recorded By, (t) = Taken By, (c) = Cosigned By    Initials Name Effective Dates    AB Missy Pelayo, OT 02/04/16 -     LL Roberta Friedman, DILLON 05/02/16 -     AMINA Roberta Eisenberg, PTA 05/02/16 -                 IP PT Goals       06/14/17 1540 06/07/17 1703       Bed Mobility PT STG    Bed Mobility PT STG, Date Established  06/07/17  -CT     Bed Mobility PT  STG, Time to Achieve  5 - 7 days  -CT     Bed Mobility PT STG, Activity Type  all bed mobility  -CT     Bed Mobility PT STG, [place] Level  contact guard assist  -CT     Transfer Training Goal, Assist Device  bed rails  -CT     Bed Mobility PT STG, Date Goal Reviewed 06/14/17  -LL      Bed Mobility PT STG, Outcome goal ongoing  -LL      Bed Mobility PT LTG    Bed Mobility PT LTG, Date Established  06/07/17  -CT     Bed Mobility PT LTG, Time to Achieve  by discharge  -CT     Bed Mobility PT LTG, Activity Type  all bed mobility  -CT     Bed Mobility PT LTG, [place] Level  conditional independence;supervision required  -CT     Bed Mobility PT LTG, Date Goal Reviewed 06/14/17  -LL      Bed Mobility PT LTG, Outcome goal ongoing  -LL      Transfer Training PT STG    Transfer Training PT STG, Date Established  06/07/17  -CT     Transfer Training PT STG, Time to Achieve  5 - 7 days  -CT     Transfer Training PT STG, Activity Type  all transfers  -CT     Transfer Training PT STG, [place] Level  contact guard assist  -CT     Transfer Training PT STG, Assist Device  other (see comments)   with appropriate AD  -CT     Transfer Training PT STG, Date Goal Reviewed 06/14/17  -LL      Transfer Training PT STG, Outcome goal ongoing  -LL      Transfer Training PT LTG    Transfer Training PT LTG, Date Established  06/07/17  -CT     Transfer Training PT LTG, Time to Achieve  by discharge  -CT     Transfer Training PT LTG, Activity Type  all transfers  -CT     Transfer Training PT LTG, [place] Level  conditional independence;supervision required  -CT     Transfer Training PT LTG, Assist Device  other (see comments)   with appropriate AD  -CT     Transfer Training PT  LTG, Date Goal Reviewed 06/14/17  -LL      Transfer Training PT LTG, Outcome goal ongoing  -LL      Gait Training PT STG    Gait Training Goal PT STG, Date Established  06/07/17  -CT     Gait Training Goal PT STG, Time to Achieve  5 - 7 days  -CT      Gait Training Goal PT STG, [place] Level  contact guard assist  -CT     Gait Training Goal PT STG, Assist Device  walker, rolling  -CT     Gait Training Goal PT STG, Distance to Achieve  50  -CT     Gait Training Goal PT STG, Date Goal Reviewed 06/14/17  -LL      Gait Training Goal PT STG, Outcome goal ongoing  -LL      Gait Training PT LTG    Gait Training Goal PT LTG, Date Established  06/07/17  -CT     Gait Training Goal PT LTG, Time to Achieve  by discharge  -CT     Gait Training Goal PT LTG, [place] Level  conditional independence;supervision required  -CT     Gait Training Goal PT LTG, Assist Device  walker, rolling  -CT     Gait Training Goal PT LTG, Distance to Achieve  75  -CT     Gait Training Goal PT LTG, Date Goal Reviewed 06/14/17  -LL      Gait Training Goal PT LTG, Outcome goal ongoing  -LL      Patient Education PT LTG    Patient Education PT LTG, Date Established  06/07/17  -CT     Patient Education PT LTG, Time to Achieve  by discharge  -CT     Patient Education PT LTG, Education Type  written program;HEP;precaution per surgeon;positioning;posture/body mechanics;gait;transfers;bed mobility;pain management;progression of POC;benefits of activity;home safety;equipment management;skin care/inspection;energy conservation  -CT     Patient Education PT LTG, Education Understanding  demonstrate adequately;verbalize understanding  -CT     Patient Education PT LTG, Date Goal Reviewed 06/14/17  -LL      Patient Education PT LTG Outcome goal ongoing  -LL        User Key  (r) = Recorded By, (t) = Taken By, (c) = Cosigned By    Initials Name Provider Type    CT Yuli Moreno, PT Physical Therapist    PRICILA Friedman PTA Physical Therapy Assistant          Physical Therapy Education     Title: PT OT SLP Therapies (Done)     Topic: Physical Therapy (Done)     Point: Mobility training (Done)    Learning Progress Summary    Learner Readiness Method Response Comment Documented by Status   Patient  Acceptance E,D VU,NR  LL 06/15/17 1424 Done    Acceptance E VU   06/15/17 0046 Done    Acceptance E,D VU,NR  LL 06/14/17 1538 Done    Acceptance E VU   06/14/17 0310 Done    Acceptance E VU,NR  AMINA 06/13/17 1448 Done    Acceptance E VU   06/13/17 0322 Done    Acceptance E,D VU,NR  LL 06/12/17 1511 Done    Acceptance E,D NR,VU  AG 06/10/17 1345 Done    Acceptance E,D VU,NR  LL 06/09/17 1700 Done    Acceptance E,D NR  LL 06/08/17 1820 Active    Acceptance E NR  CT 06/07/17 1707 Active               Point: Home exercise program (Done)    Learning Progress Summary    Learner Readiness Method Response Comment Documented by Status   Patient Acceptance E,D VU,NR  LL 06/15/17 1424 Done    Acceptance E VU   06/15/17 0046 Done    Acceptance E,D VU,NR  LL 06/14/17 1538 Done    Acceptance E VU   06/14/17 0310 Done    Acceptance E VU,NR  AMINA 06/13/17 1448 Done    Acceptance E VU   06/13/17 0322 Done    Acceptance E,D VU,NR  LL 06/12/17 1511 Done    Acceptance E,D NR,VU  AG 06/10/17 1345 Done               Point: Body mechanics (Done)    Learning Progress Summary    Learner Readiness Method Response Comment Documented by Status   Patient Acceptance E,D VU,NR  LL 06/15/17 1424 Done    Acceptance E VU   06/15/17 0046 Done    Acceptance E,D VU,NR  LL 06/14/17 1538 Done    Acceptance E VU   06/14/17 0310 Done    Acceptance E VU,NR  AMINA 06/13/17 1448 Done    Acceptance E VU   06/13/17 0322 Done    Acceptance E,D VU,NR  LL 06/12/17 1511 Done    Acceptance E,D NR,VU  AG 06/10/17 1345 Done    Acceptance E,D VU,NR  LL 06/09/17 1700 Done    Acceptance E,D NR  LL 06/08/17 1820 Active    Acceptance E NR  CT 06/07/17 1707 Active               Point: Precautions (Done)    Learning Progress Summary    Learner Readiness Method Response Comment Documented by Status   Patient Acceptance E,D VU,NR  LL 06/15/17 1424 Done    Acceptance E VU   06/15/17 0046 Done    Acceptance E,D VU,NR   06/14/17 1538 Done    Acceptance E KERA  FH  06/14/17 0310 Done    Acceptance E VU,NR  AMINA 06/13/17 1448 Done    Acceptance E VU   06/13/17 0322 Done    Acceptance E,D VU,NR  LL 06/12/17 1511 Done    Acceptance E,D NR,VU  AG 06/10/17 1345 Done    Acceptance E,D VU,NR  LL 06/09/17 1700 Done    Acceptance E,D NR  LL 06/08/17 1820 Active    Acceptance E NR  CT 06/07/17 1707 Active                      User Key     Initials Effective Dates Name Provider Type Discipline     06/16/16 -  Yazmin Munoz, RN Registered Nurse Nurse     03/14/16 -  Becka No, PT Physical Therapist PT    CT 03/14/16 -  Yuli Moreno, PT Physical Therapist PT    LL 05/02/16 -  Roberta Friedman PTA Physical Therapy Assistant PT    AMINA 05/02/16 -  Roberta Eisenberg PTA Physical Therapy Assistant PT                    PT Recommendation and Plan  Anticipated Equipment Needs At Discharge:  (to be determined)  Anticipated Discharge Disposition: home with assist, home with home health  Planned Therapy Interventions: balance training, bed mobility training, gait training, home exercise program, manual therapy techniques, neuromuscular re-education, motor coordination training, patient/family education, postural re-education, ROM (Range of Motion), stair training, strengthening, transfer training  PT Frequency: 2 times/day, 5 times/wk, per priority policy            Time Calculation:         PT Charges       06/15/17 1426 06/15/17 1424       Time Calculation    Start Time 1330  -LL 0735  -LL     Stop Time 1415  -LL 0830  -LL     Time Calculation (min) 45 min  -LL 55 min  -LL     PT Non-Billable Time (min)  15 min  -LL     PT Received On  06/15/17  -LL     PT Goal Re-Cert Due Date  06/21/17  -LL     Time Calculation- PT    Total Timed Code Minutes- PT 45 minute(s)  -LL 55 minute(s)  -LL       User Key  (r) = Recorded By, (t) = Taken By, (c) = Cosigned By    Initials Name Provider Type    LL Roberta Friedman PTA Physical Therapy Assistant          Therapy Charges for Today     Code  Description Service Date Service Provider Modifiers Qty    13991128862 HC GAIT TRAINING EA 15 MIN 6/14/2017 Roberta Friedman, PTA GP 2    57220911275 HC PT THER PROC EA 15 MIN 6/14/2017 Roberta Friedman, PTA GP 4    61728853868 HC PT THER SUPP EA 15 MIN 6/14/2017 Roberta Friedman, PTA GP 2    26504595915 HC GAIT TRAINING EA 15 MIN 6/15/2017 Roberta Friedman PTA GP 2    80548181546 HC PT THER PROC EA 15 MIN 6/15/2017 Roberta Friedman PTA GP 4    87335626811 HC PT THERAPEUTIC ACT EA 15 MIN 6/15/2017 Roberta Friedman, PTA GP 1    00303457415 HC PT THER SUPP EA 15 MIN 6/15/2017 Roberta Friedman PTA GP 2               Di Friedman PTA  6/15/2017

## 2017-06-15 NOTE — DISCHARGE INSTR - APPOINTMENTS
MS. MALAVE HAS AN APPOINTMENT TO SEE DR. RAMOS ON: 06-29 @ 10:30. [phone].    MS. MALAVE HAS AN APPOINTMENT TO SEE Ronny) ON: 06-21 @ 10:00.  [phone].

## 2017-06-15 NOTE — PROGRESS NOTES
Inpatient Rehabilitation Functional Measures Assessment    Functional Measures  RAS Eating:  Eating Score = 5. Patient is supervision/set-up for eating,  requiring: Opening containers. Buttering bread. Cutting meat. Pouring liquids.  No assistive devices were required.  RAS Grooming: Activity was not observed.  RAS Bathing:  Activity was not observed.  RAS Upper Body Dressing:  Activity was not observed.  RAS Lower Body Dressing:  Activity was not observed.  RAS Toileting:  Patient requires moderate assistance for adjusting clothing  before using a toilet, commode, bedpan, or urinal. Patient requires moderate  assistance for hygiene. Patient requires moderate assistance for adjusting  clothing after using a toilet, commode, bedpan, or urinal. Patient performs 0 -  24% of toileting tasks.  Toileting Score = 1, Total Assistance. Patient requires  the following assistive device(s): Grab bar. Arm rest of a specialized seat.    RAS Bladder Management  Level of Assistance:  Bladder Score = 5.  Patient is supervision/set-up for  bladder management, requiring: Stand by assistance. No assistive devices were  required.  Frequency/Number of Accidents this Shift:  Bladder accidents this shift:  0 .  Patient has not had an accident this shift.    RAS Bowel Management  Level of Assistance: Bowel Score = 5.  Patient is supervision/set-up for bowel  management, requiring: Stand by assistance. No assistive devices were required.    Frequency/Number of Accidents this Shift: Bowel accidents this shift: 0 .  Patient has not had an accident this shift.    RAS Bed/Chair/Wheelchair Transfer:  Bed/chair/wheelchair Transfer Score = 3.  Patient performs 50-74% of effort and requires moderate assistance (some  lifting) for transferring to and from the bed/chair/wheelchair. Patient requires  the following assistive device(s): Grab bars. Seating system of wheelchair.  RAS Toilet Transfer:  Toilet Transfer Score = 3.  Patient performs 50-74%  of  effort and requires moderate assistance (some lifting) for transferring to and  from the toilet/commode. Patient requires the following assistive device(s):  Grab bars. Specialized seat.  RAS Tub/Shower Transfer:  Activity was not observed.    Previously Documented Mode of Locomotion at Discharge:  RAS Expected Mode of Locomotion at Discharge:  RAS Walk/Wheelchair:  RAS Stairs:    RAS Comprehension:  RAS Expression:  RAS Social Interaction:  Psychiatric Problem Solving:  Psychiatric Memory:    Therapy Mode Minutes  Occupational Therapy:  Physical Therapy:  Speech Language Pathology:    Discharge Functional Goals:    Signed by: RENNY Coppola

## 2017-06-15 NOTE — PROGRESS NOTES
Inpatient Rehabilitation Functional Measures Assessment    Functional Measures  RAS Eating:  RAS Grooming:  RAS Bathing:  RAS Upper Body Dressing:  RAS Lower Body Dressing:  RAS Toileting:    RAS Bladder Management  Level of Assistance:  Frequency/Number of Accidents this Shift:    RAS Bowel Management  Level of Assistance:  Frequency/Number of Accidents this Shift:    RAS Bed/Chair/Wheelchair Transfer:  RAS Toilet Transfer:  RAS Tub/Shower Transfer:    Previously Documented Mode of Locomotion at Discharge:  RAS Expected Mode of Locomotion at Discharge:  RAS Walk/Wheelchair:  RAS Stairs:    RAS Comprehension:  Auditory comprehension is the usual mode. Comprehension  Score = 6, Modified [place].  Patient comprehends complex/abstract  information in their primary language, requiring:  RAS Expression:  Vocal expression is the usual mode. Expression Score = 6,  Modified Independent.  Patient expresses complex/abstract information in their  primary language, requiring: Additional time. Additional time.  RAS Social Interaction:  Social Interaction Score = 6, Modified Independent.  Patient is modified independent for social interaction, requiring: Requires  additional time. Requires additional time.  RAS Problem Solving:  Problem Solving Score = 6, Modified [place].  Patient  makes appropriate decisions in order to solve complex problems, but requires  extra time.  RAS Memory:  Memory Score = 6, Modified [place].  Patient is modified  independent for memory, requiring: Requires additional time.    Therapy Mode Minutes  Occupational Therapy:  Physical Therapy:  Speech Language Pathology:    Discharge Functional Goals:    Signed by: Nurse Christianne

## 2017-06-15 NOTE — PROGRESS NOTES
Inpatient Rehabilitation Functional Measures Assessment    Functional Measures  RAS Eating:  RAS Grooming:  RAS Bathing:  RAS Upper Body Dressing:  RAS Lower Body Dressing:  RAS Toileting:    RAS Bladder Management  Level of Assistance:  Frequency/Number of Accidents this Shift:    RAS Bowel Management  Level of Assistance:  Frequency/Number of Accidents this Shift:    RAS Bed/Chair/Wheelchair Transfer:  Bed/chair/wheelchair Transfer Score = 4.  Patient performs 75% or more of effort and minimal assistance (little/incidental  help/lifting of one limb/steadying) for transferring to and from the  bed/chair/wheelchair, requiring: Contact guard. Steadying. Patient requires the  following assistive device(s): Arm rest. Bed rails.  RAS Toilet Transfer:  Toilet Transfer Score = 4.  Patient performs 75% or more  of effort and minimal assistance (little/incidental help/steadying) for  transferring to and from the toilet/commode, requiring: Contact guard.  Steadying. Patient requires the following assistive device(s): Safety frame/over  the toilet. Grab bars.  RAS Tub/Shower Transfer:    Previously Documented Mode of Locomotion at Discharge:  [facility] Expected Mode of Locomotion at Discharge:  [facility] Walk/Wheelchair:  WHEELCHAIR OBSERVATION   Wheelchair locomotion was observed using a manual wheelchair. Wheelchair  Distance Scale = 2.  Distance traveled in wheelchair is 50 -149 feet. Wheelchair  Score = 2.  Patient is able to go at least 50 feet (household distance) in  wheelchair but requires assistance. Patient was able to propel a distance of  100 feet in a wheelchair.  No other assistive devices were required.    WALK OBSERVATION   Walk Distance Scale = 2.  Distance walked is 50 -149 feet. Walk Score = 2.  Patient performs 75% or more of effort and requires minimal assistance.  Incidental assistance, contact guard or steadying was provided. Patient walked a  distance of 100 feet. Patient requires the following assistive  device(s):  Rolling walker.  RAS Stairs:  Activity was not observed.    RAS Comprehension:  RAS Expression:  RAS Social Interaction:  RAS Problem Solving:  RAS Memory:    Therapy Mode Minutes  Occupational Therapy:  Physical Therapy: Individual: 100 minutes.  Speech Language Pathology:    Discharge Functional Goals:    Signed by: Roberta Friedman PTA

## 2017-06-15 NOTE — PROGRESS NOTES
"     LOS: 9 days     Chief Complaint:  Hip fracture    Subjective     Interval History:     Gait, balance and transfers improving.  She plans to go home tomorrow.  She denies any cough, fevers or chills.  Phenytoin level up to 12.  No recurrent fevers      Objective     Vital Signs  /67 (BP Location: Left arm, Patient Position: Lying)  Pulse 76  Temp 97.9 °F (36.6 °C) (Oral)   Resp 18  Ht 59\" (149.9 cm)  Wt 119 lb (54 kg)  SpO2 94%  BMI 24.04 kg/m2  Intake & Output (last day)       06/14 0701 - 06/15 0700 06/15 0701 - 06/16 0700    P.O. 1320     Total Intake(mL/kg) 1320 (24.5)     Net +1320            Unmeasured Urine Occurrence 8 x             Physical Exam:     General Appearance:    Alert, cooperative, in no acute distress   Head:    Normocephalic, without obvious abnormality, atraumatic   Eyes:            Lids and lashes normal, conjunctivae and sclerae normal, no   icterus, no pallor, corneas clear, PERRLA   Ears:    Ears appear intact with no abnormalities noted       Neck:   No adenopathy, supple, trachea midline, no thyromegaly, no   carotid bruit, no JVD   Lungs:     Clear to auscultation,respirations regular, even and                  unlabored    Heart:    Regular rhythm and normal rate, normal S1 and S2, no            murmur, no gallop, no rub, no click   Chest Wall:    No abnormalities observed   Abdomen:     Normal bowel sounds, no masses, no organomegaly, soft        non-tender, non-distended, no guarding, no rebound                tenderness   Extremities:   no edema, no cyanosis, no redness   Pulses:   Pulses palpable and equal bilaterally   Skin:   No bleeding, bruising or rash                Results Review:    Lab Results   Component Value Date    WBC 4.44 (L) 06/15/2017    HGB 12.0 06/15/2017    HCT 37.3 06/15/2017    MCV 98.4 (H) 06/15/2017     06/15/2017       Lab Results   Component Value Date    GLUCOSE 127 (H) 06/15/2017    BUN 7 06/15/2017    CREATININE 0.47 06/15/2017 "    EGFRIFNONA 131 06/15/2017    BCR 14.9 06/15/2017     06/15/2017    K 3.9 06/15/2017     06/15/2017    CO2 27.4 06/15/2017    CALCIUM 9.2 06/15/2017    ALBUMIN 3.30 (L) 06/07/2017    LABIL2 1.0 (L) 06/07/2017    AST 18 06/07/2017    ALT 12 06/07/2017     No results found for: INR    No results found for: POCGLU       Medication Review:     Current Facility-Administered Medications:   •  acetaminophen (TYLENOL) tablet 650 mg, 650 mg, Oral, Q4H PRN, Samuel Duane Kreis, MD, 650 mg at 06/13/17 2334  •  aluminum-magnesium hydroxide-simethicone (MAALOX MAX) 400-400-40 MG/5ML suspension 15 mL, 15 mL, Oral, Q6H PRN, Samuel Duane Kreis, MD, 15 mL at 06/10/17 2052  •  atorvastatin (LIPITOR) tablet 40 mg, 40 mg, Oral, Nightly, Samuel Duane Kreis, MD, 40 mg at 06/14/17 2133  •  baclofen (LIORESAL) tablet 20 mg, 20 mg, Oral, Q12H, Samuel Duane Kreis, MD, 20 mg at 06/15/17 0859  •  bisacodyl (DULCOLAX) suppository 10 mg, 10 mg, Rectal, Daily PRN, Samuel Duane Kreis, MD  •  clonazePAM (KlonoPIN) tablet 0.5 mg, 0.5 mg, Oral, BID PRN, Samuel Duane Kreis, MD, 0.5 mg at 06/14/17 2133  •  enoxaparin (LOVENOX) syringe 40 mg, 40 mg, Subcutaneous, Daily, Samuel Duane Kreis, MD, 40 mg at 06/15/17 0859  •  HYDROcodone-acetaminophen (NORCO)  MG per tablet 1 tablet, 1 tablet, Oral, Q4H PRN, Samuel Duane Kreis, MD, 1 tablet at 06/15/17 0545  •  levETIRAcetam (KEPPRA) tablet 250 mg, 250 mg, Oral, Nightly, Samuel Duane Kreis, MD, 250 mg at 06/14/17 2133  •  levETIRAcetam (KEPPRA) tablet 500 mg, 500 mg, Oral, Daily, Samuel Duane Kreis, MD, 500 mg at 06/15/17 0859  •  LORazepam (ATIVAN) injection 1 mg, 1 mg, Intramuscular, Once PRN, Samuel Duane Kreis, MD  •  magnesium hydroxide (MILK OF MAGNESIA) suspension 2400 mg/10mL 10 mL, 10 mL, Oral, Daily PRN, Samuel Duane Kreis, MD  •  montelukast (SINGULAIR) tablet 10 mg, 10 mg, Oral, Nightly, Samuel Duane Kreis, MD, 10 mg at 06/14/17 2133  •  nystatin (MYCOSTATIN) 105496 UNIT/ML  suspension 500,000 Units, 5 mL, Oral, 4x Daily, Samuel Duane Kreis, MD, 500,000 Units at 06/15/17 0859  •  ondansetron (ZOFRAN) tablet 4 mg, 4 mg, Oral, Q6H PRN **OR** ondansetron ODT (ZOFRAN-ODT) disintegrating tablet 4 mg, 4 mg, Oral, Q6H PRN **OR** ondansetron (ZOFRAN) injection 4 mg, 4 mg, Intravenous, Q6H PRN, Samuel Duane Kreis, MD  •  pantoprazole (PROTONIX) EC tablet 40 mg, 40 mg, Oral, Q AM, Samuel Duane Kreis, MD, 40 mg at 06/15/17 0544  •  PHENobarbital (LUMINAL) tablet 97.2 mg, 97.2 mg, Oral, Q12H, Samuel Duane Kreis, MD, 97.2 mg at 06/15/17 0859  •  phenytoin (DILANTIN) chewable tablet 50 mg, 50 mg, Oral, Daily, Samuel Duane Kreis, MD, 50 mg at 06/14/17 1159  •  phenytoin (DILANTIN) ER capsule 200 mg, 200 mg, Oral, Q12H, Samuel Duane Kreis, MD, 200 mg at 06/15/17 0859  •  senna (SENOKOT) tablet 1 tablet, 1 tablet, Oral, BID, Samuel Duane Kreis, MD, 1 tablet at 06/15/17 0900  •  venlafaxine XR (EFFEXOR-XR) 24 hr capsule 150 mg, 150 mg, Oral, Daily With Breakfast, Samuel Duane Kreis, MD, 150 mg at 06/15/17 0859      Assessment/Plan     Periprosthetic hip fracture status post ORIF  Osteoarthritis  Chronic obstructive pulmonary disease  Seizure disorder      Continue hydrocodone when necessary for pain    PT and OT ongoing.     Pulmonary status is stable.    Continue baclofen    Continue Keppra, Dilantin and phenobarbital.  Continue Dilantin 200 mg twice a day and Dilantin 50 mg @ noon.  Dilantin level now therapeutic        Samuel Duane Kreis, MD  06/15/17  9:05 AM

## 2017-06-16 VITALS
DIASTOLIC BLOOD PRESSURE: 68 MMHG | SYSTOLIC BLOOD PRESSURE: 113 MMHG | BODY MASS INDEX: 23.99 KG/M2 | HEIGHT: 59 IN | HEART RATE: 71 BPM | RESPIRATION RATE: 20 BRPM | OXYGEN SATURATION: 96 % | TEMPERATURE: 98.4 F | WEIGHT: 119 LBS

## 2017-06-16 PROCEDURE — G8979 MOBILITY GOAL STATUS: HCPCS

## 2017-06-16 PROCEDURE — 25010000002 ENOXAPARIN PER 10 MG: Performed by: FAMILY MEDICINE

## 2017-06-16 PROCEDURE — G8985 CARRY GOAL STATUS: HCPCS

## 2017-06-16 PROCEDURE — G8980 MOBILITY D/C STATUS: HCPCS

## 2017-06-16 PROCEDURE — G8986 CARRY D/C STATUS: HCPCS

## 2017-06-16 PROCEDURE — 94799 UNLISTED PULMONARY SVC/PX: CPT

## 2017-06-16 PROCEDURE — 97535 SELF CARE MNGMENT TRAINING: CPT

## 2017-06-16 RX ORDER — ACETAMINOPHEN 325 MG/1
650 TABLET ORAL EVERY 4 HOURS PRN
Refills: 0 | Status: ON HOLD
Start: 2017-06-16 | End: 2018-06-04

## 2017-06-16 RX ORDER — PHENYTOIN SODIUM 200 MG/1
200 CAPSULE, EXTENDED RELEASE ORAL EVERY 12 HOURS SCHEDULED
Qty: 60 CAPSULE | Refills: 0 | Status: ON HOLD | OUTPATIENT
Start: 2017-06-16 | End: 2018-06-04

## 2017-06-16 RX ORDER — PHENYTOIN 50 MG/1
50 TABLET, CHEWABLE ORAL DAILY
Qty: 30 TABLET | Refills: 0 | Status: ON HOLD | OUTPATIENT
Start: 2017-06-16 | End: 2018-06-04

## 2017-06-16 RX ORDER — HYDROCODONE BITARTRATE AND ACETAMINOPHEN 10; 325 MG/1; MG/1
1 TABLET ORAL EVERY 4 HOURS PRN
Qty: 40 TABLET | Refills: 0 | Status: ON HOLD | OUTPATIENT
Start: 2017-06-16 | End: 2018-06-04

## 2017-06-16 RX ORDER — ATORVASTATIN CALCIUM 40 MG/1
40 TABLET, FILM COATED ORAL NIGHTLY
Qty: 30 TABLET | Refills: 0 | Status: SHIPPED | OUTPATIENT
Start: 2017-06-16

## 2017-06-16 RX ADMIN — PHENYTOIN 50 MG: 50 TABLET, CHEWABLE ORAL at 12:00

## 2017-06-16 RX ADMIN — HYDROCODONE BITARTRATE AND ACETAMINOPHEN 1 TABLET: 10; 325 TABLET ORAL at 06:14

## 2017-06-16 RX ADMIN — ENOXAPARIN SODIUM 40 MG: 40 INJECTION SUBCUTANEOUS at 09:25

## 2017-06-16 RX ADMIN — PHENOBARBITAL 97.2 MG: 32.4 TABLET ORAL at 09:25

## 2017-06-16 RX ADMIN — NYSTATIN 500000 UNITS: 100000 SUSPENSION ORAL at 12:00

## 2017-06-16 RX ADMIN — HYDROCODONE BITARTRATE AND ACETAMINOPHEN 1 TABLET: 10; 325 TABLET ORAL at 01:04

## 2017-06-16 RX ADMIN — VENLAFAXINE HYDROCHLORIDE 150 MG: 150 CAPSULE, EXTENDED RELEASE ORAL at 08:30

## 2017-06-16 RX ADMIN — LEVETIRACETAM 500 MG: 500 TABLET, FILM COATED ORAL at 09:25

## 2017-06-16 RX ADMIN — PANTOPRAZOLE SODIUM 40 MG: 40 TABLET, DELAYED RELEASE ORAL at 05:01

## 2017-06-16 RX ADMIN — BACLOFEN 20 MG: 10 TABLET ORAL at 09:25

## 2017-06-16 RX ADMIN — SENNOSIDES 1 TABLET: 8.6 TABLET ORAL at 09:25

## 2017-06-16 RX ADMIN — PHENYTOIN SODIUM 200 MG: 100 CAPSULE, EXTENDED RELEASE ORAL at 09:25

## 2017-06-16 NOTE — PLAN OF CARE
Problem: Patient Care Overview (Adult)  Goal: Discharge Needs Assessment  Outcome: Outcome(s) achieved Date Met:  06/16/17

## 2017-06-16 NOTE — PROGRESS NOTES
Patient Assessment Instrument  Quality Indicators - Discharge    Section GG. Self-Care Performance     Eating: Patient completed the activities by him/herself with no assistance from  a helper.   Oral Hygiene: [place] sets up or cleans up; patient completes activity. [place]  assists only prior to or following the activity.   Toileting Hygiene: : [place] provides verbal cues or touching/steadying  assistance as patient completes activity.   Shower/Bathe Self: [place] provides verbal cues or touching/steadying assistance  as patient completes activity.   Upper Body Dressing: [place] sets up or cleans up; patient completes activity.  [place] assists only prior to or following the activity.   Lower Body Dressing: [place] provides verbal cues or touching/steadying  assistance as patient completes activity.   Putting On/Taking Off Footwear: [place] does less than half the effort. [place]  lifts, holds or supports trunk or limbs but provides less than half the effort.    Section GG. Mobility Performance      Section J. Health Conditions Discharge      Section M. Skin Conditions Discharge      . Current Number of Unhealed Pressure Ulcers      . Worsening in Pressure Ulcer Status Since Admission      . Healed Pressure Ulcer(s)  (Voluntary)      Section O. Special Treatments, Procedures, and Programs Discharge  . Influenza Vaccine - Discharge      Date Influenza Vaccine Received (enter date ONLY if received in this unit;  otherwise, skip this entry)      OPTIONAL BRANCH FOR UNPLANNED DISCHARGES  Unplanned Discharge:    Signed by: Lorraine Rojas Occupational Therapist

## 2017-06-16 NOTE — PROGRESS NOTES
Patient Assessment Instrument  Quality Indicators - Discharge    Section GG. Self-Care Performance      Section GG. Mobility Performance      Section J. Health Conditions Discharge  Fall(s) Since Admission:  No    Section M. Skin Conditions Discharge  Unhealed Pressure Ulcer(s) at Stage 1 or Higher:  No    . Current Number of Unhealed Pressure Ulcers    Number of Unhealed Stage 2: 0  Number of Unhealed Stage 3: 0  Number of Unhealed Stage 4: 0    . Worsening in Pressure Ulcer Status Since Admission    Number of Worsening Stage 2: 0  Number of Worsening Stage 3: 0  Number of Worsening Stage 4: 0    . Healed Pressure Ulcer(s)  (Voluntary)    Number of Healed Stage 1: 0  Number of Healed Stage 2: 0  Number of Healed Stage 3: 0  Number of Healed Stage 4: 0    Section O. Special Treatments, Procedures, and Programs Discharge  . Influenza Vaccine - Discharge  Received in this facility for this year's influenza vaccination season:  No.  Influenza Vaccine Not Received Due To: Offered and declined.    Date Influenza Vaccine Received (enter date ONLY if received in this unit;  otherwise, skip this entry)      OPTIONAL BRANCH FOR UNPLANNED DISCHARGES  Unplanned Discharge:    Signed by: Mary Oliveira Nurse

## 2017-06-16 NOTE — THERAPY DISCHARGE NOTE
Inpatient Rehabilitation - Physical Therapy Discharge Summary   Ed       Patient Name: Maribel Staton  : 1945  MRN: [id number]    Today's Date: 2017  Onset of Illness/Injury or Date of Surgery Date: 17    Date of Referral to PT: 17  Referring Physician: Eva      Admit Date: 2017      PT Recommendation and Plan    Visit Dx:  No diagnosis found.                    IP PT Goals       17 1540 17 1703       Bed Mobility PT STG    Bed Mobility PT STG, Date Established  17  -CT     Bed Mobility PT STG, Time to Achieve  5 - 7 days  -CT     Bed Mobility PT STG, Activity Type  all bed mobility  -CT     Bed Mobility PT STG, [place] Level  contact guard assist  -CT     Transfer Training Goal, Assist Device  bed rails  -CT     Bed Mobility PT STG, Date Goal Reviewed 17  -LL      Bed Mobility PT STG, Outcome goal ongoing  -LL      Bed Mobility PT LTG    Bed Mobility PT LTG, Date Established  17  -CT     Bed Mobility PT LTG, Time to Achieve  by discharge  -CT     Bed Mobility PT LTG, Activity Type  all bed mobility  -CT     Bed Mobility PT LTG, [place] Level  conditional independence;supervision required  -CT     Bed Mobility PT LTG, Date Goal Reviewed 17  -LL      Bed Mobility PT LTG, Outcome goal ongoing  -LL      Transfer Training PT STG    Transfer Training PT STG, Date Established  17  -CT     Transfer Training PT STG, Time to Achieve  5 - 7 days  -CT     Transfer Training PT STG, Activity Type  all transfers  -CT     Transfer Training PT STG, [place] Level  contact guard assist  -CT     Transfer Training PT STG, Assist Device  other (see comments)   with appropriate AD  -CT     Transfer Training PT STG, Date Goal Reviewed 17  -LL      Transfer Training PT STG, Outcome goal ongoing  -LL      Transfer Training PT LTG    Transfer Training PT LTG, Date Established  17  -CT     Transfer Training PT LTG, Time to Achieve  by  discharge  -CT     Transfer Training PT LTG, Activity Type  all transfers  -CT     Transfer Training PT LTG, [place] Level  conditional independence;supervision required  -CT     Transfer Training PT LTG, Assist Device  other (see comments)   with appropriate AD  -CT     Transfer Training PT  LTG, Date Goal Reviewed 06/14/17  -LL      Transfer Training PT LTG, Outcome goal ongoing  -LL      Gait Training PT STG    Gait Training Goal PT STG, Date Established  06/07/17  -CT     Gait Training Goal PT STG, Time to Achieve  5 - 7 days  -CT     Gait Training Goal PT STG, [place] Level  contact guard assist  -CT     Gait Training Goal PT STG, Assist Device  walker, rolling  -CT     Gait Training Goal PT STG, Distance to Achieve  50  -CT     Gait Training Goal PT STG, Date Goal Reviewed 06/14/17  -LL      Gait Training Goal PT STG, Outcome goal ongoing  -LL      Gait Training PT LTG    Gait Training Goal PT LTG, Date Established  06/07/17  -CT     Gait Training Goal PT LTG, Time to Achieve  by discharge  -CT     Gait Training Goal PT LTG, [place] Level  conditional independence;supervision required  -CT     Gait Training Goal PT LTG, Assist Device  walker, rolling  -CT     Gait Training Goal PT LTG, Distance to Achieve  75  -CT     Gait Training Goal PT LTG, Date Goal Reviewed 06/14/17  -LL      Gait Training Goal PT LTG, Outcome goal ongoing  -LL      Patient Education PT LTG    Patient Education PT LTG, Date Established  06/07/17  -CT     Patient Education PT LTG, Time to Achieve  by discharge  -CT     Patient Education PT LTG, Education Type  written program;HEP;precaution per surgeon;positioning;posture/body mechanics;gait;transfers;bed mobility;pain management;progression of POC;benefits of activity;home safety;equipment management;skin care/inspection;energy conservation  -CT     Patient Education PT LTG, Education Understanding  demonstrate adequately;verbalize understanding  -CT     Patient Education  PT LTG, Date Goal Reviewed 06/14/17  -LL      Patient Education PT LTG Outcome goal ongoing  -LL        User Key  (r) = Recorded By, (t) = Taken By, (c) = Cosigned By    Initials Name Provider Type    CT Yuli Moreno, PT Physical Therapist    LL Roberta Friedman, PTA Physical Therapy Assistant              PT Discharge Summary  Reason for Discharge: Discharge from facility  Outcomes Achieved: Patient able to partially acheive established goals  Discharge Destination: Home with home health, Home with assist      Susanne Majano, PT   6/16/2017

## 2017-06-16 NOTE — PROGRESS NOTES
Inpatient Rehabilitation Functional Measures Assessment and Plan of Care    Plan of Care    Body Function Structure    Skin Integrity (Active)  Current Status (6/6/2017 9:00:00 PM): impaired skin integrity  Weekly Goal: prevent further skin integrity impairment  Discharge Goal: signs of regaining skin integrity    Safety    Potential for Injury (Active)  Current Status (6/6/2017 9:00:00 PM): risk for falls  Weekly Goal: remain free of falls  Discharge Goal: identify measures to prevent falls  Functional Measures  RAS Eating:  RAS Grooming:  RAS Bathing:  RAS Upper Body Dressing:  RAS Lower Body Dressing:  RAS Toileting:    RAS Bladder Management  Level of Assistance:  Frequency/Number of Accidents this Shift:    RAS Bowel Management  Level of Assistance:  Frequency/Number of Accidents this Shift:    RAS Bed/Chair/Wheelchair Transfer:  RAS Toilet Transfer:  RAS Tub/Shower Transfer:    Previously Documented Mode of Locomotion at Discharge:  RAS Expected Mode of Locomotion at Discharge:  RAS Walk/Wheelchair:  RAS Stairs:    RAS Comprehension:  Auditory comprehension is the usual mode. Comprehension  Score = 6, Modified [place].  Patient comprehends complex/abstract  information in their primary language, requiring: Additional time.  RAS Expression:  Vocal expression is the usual mode. Expression Score = 6,  Modified Independent.  Patient expresses complex/abstract information in their  primary language, requiring: Additional time.  RAS Social Interaction:  Social Interaction Score = 6, Modified Independent.  Patient is modified independent for social interaction, requiring: Requires  additional time.  RAS Problem Solving:  Problem Solving Score = 6, Modified [place].  Patient  makes appropriate decisions in order to solve complex problems, but requires  extra time.  RAS Memory:  Memory Score = 6, Modified [place].  Patient is modified  independent for memory, requiring: Requires additional  time.    Therapy Mode Minutes  Occupational Therapy:  Physical Therapy:  Speech Language Pathology:    Discharge Functional Goals:    Signed by: Nupur Crouch RN

## 2017-06-16 NOTE — PLAN OF CARE
Problem: Inpatient Occupational Therapy  Goal: Transfer Training Goal 1 LTG- OT  Outcome: Outcome(s) achieved Date Met:  06/16/17 06/16/17 1446   Transfer Training OT LTG   Transfer Training OT LTG, Outcome goal met       Goal: Patient Education Goal LTG- OT  Outcome: Outcome(s) achieved Date Met:  06/16/17 06/16/17 1446   Patient Education OT LTG   Patient Education OT LTG Outcome goal met       Goal: Bathing Goal LTG- OT  Outcome: Outcome(s) achieved Date Met:  06/16/17 06/16/17 1446   Bathing OT LTG   Bathing Goal OT LTG, Outcome goal met       Goal: Eating Self-Feeding Goal LTG- OT  Outcome: Outcome(s) achieved Date Met:  06/16/17 06/16/17 1446   Eating Self-Feeding OT LTG   Eat Self Feeding Goal OT LTG, Outcome goal met       Goal: Toileting Goal LTG- OT  Outcome: Outcome(s) achieved Date Met:  06/16/17 06/16/17 1446   Toileting OT LTG   Toileting Goal OT LTG, Outcome goal met       Goal: LB Dressing Goal LTG- OT  Outcome: Outcome(s) achieved Date Met:  06/16/17 06/16/17 1446   LB Dressing OT LTG   LB Dressing Goal OT LTG, Outcome goal met

## 2017-06-16 NOTE — PLAN OF CARE
Problem: Patient Care Overview (Adult)  Goal: Plan of Care Review  Outcome: Outcome(s) achieved Date Met:  06/16/17    Problem: Skin Integrity Impairment, Risk/Actual (Adult)  Goal: Skin Integrity/Wound Healing  Outcome: Outcome(s) achieved Date Met:  06/16/17    Problem: Pressure Ulcer (Adult)  Goal: Signs and Symptoms of Listed Potential Problems Will be Absent or Manageable (Pressure Ulcer)  Outcome: Outcome(s) achieved Date Met:  06/16/17    Problem: Fall Risk (Adult)  Goal: Absence of Falls  Outcome: Ongoing (interventions implemented as appropriate)    Problem: Fractured Hip (Adult)  Goal: Signs and Symptoms of Listed Potential Problems Will be Absent or Manageable (Fractured Hip)  Outcome: Ongoing (interventions implemented as appropriate)    Problem: Infection, Risk/Actual (Adult)  Goal: Infection Prevention/Resolution  Outcome: Ongoing (interventions implemented as appropriate)    Problem: Mobility, Physical Impaired (Adult)  Goal: Enhanced Mobility Skills  Outcome: Ongoing (interventions implemented as appropriate)    Problem: Seizure Disorder/Epilepsy (Adult)  Goal: Signs and Symptoms of Listed Potential Problems Will be Absent or Manageable (Seizure Disorder/Epilepsy)  Outcome: Outcome(s) achieved Date Met:  06/16/17

## 2017-06-16 NOTE — PROGRESS NOTES
Rehabilitation Nursing  Inpatient Rehabilitation Functional Measures Assessment and Plan of Care    Plan of Care/Interventions  Copy from POC    Functional Measures  RAS Eating:  RAS Grooming:  RAS Bathing:  RAS Upper Body Dressing:  RAS Lower Body Dressing:  RAS Toileting:    RAS Bladder Management  Level of Assistance:  Frequency/Number of Accidents this Shift:    RAS Bowel Management  Level of Assistance:  Frequency/Number of Accidents this Shift:    RAS Bed/Chair/Wheelchair Transfer:  RAS Toilet Transfer:  RAS Tub/Shower Transfer:    Previously Documented Mode of Locomotion at Discharge:  RAS Expected Mode of Locomotion at Discharge:  RAS Walk/Wheelchair:  RAS Stairs:    RAS Comprehension:  Auditory comprehension is the usual mode. Comprehension  Score = 6, Modified [place].  Patient comprehends complex/abstract  information in their primary language, requiring:  RAS Expression:  Vocal expression is the usual mode. Expression Score = 6,  Modified Independent.  Patient expresses complex/abstract information in their  primary language, requiring:  RAS Social Interaction:  Social Interaction Score = 6, Modified Independent.  Patient is modified independent for social interaction, requiring:  RAS Problem Solving:  Problem Solving Score = 6, Modified [place].  Patient  makes appropriate decisions in order to solve complex problems, but requires  extra time.  RAS Memory:  Memory Score = 6, Modified [place].  Patient is modified  independent for memory, requiring: Requires additional time.    Signed by: aMry Oliveira Nurse

## 2017-06-16 NOTE — SIGNIFICANT NOTE
06/16/17 1200   Case Management/Social Work Plan   Plan Spoke to [employer]  [phone] about discharge.  Faxed discharge summary to  [phone].  HH will start therapy on 6-19-17.  SS spoke to pt and spouse about discharge and HH services.  Provided phone number for HH and [facility].  Spouse will pick-up W/C and BSC from [facility] this afternoon.  Pt is going home with spouse today who is providing transportation.

## 2017-06-16 NOTE — PROGRESS NOTES
Inpatient Rehabilitation Functional Measures Assessment    Functional Measures  RAS Eating:  Eating Score = 6. Patient is modified independent for eating,  requiring:  RAS Grooming: Grooming Score = 5. Patient is supervision/set-up for grooming,  requiring: Initial preparation. No assistive devices were required.  RAS Bathing:  Patient took sponge bath. Bathing Score = 4.  Patient requires  minimal assistance for bathing, requiring steadying for balance only. Patient  requires the following assistive device(s): Grab bar/arm rest to maintain  balance. Long handled sponge.  RAS Upper Body Dressing:  Upper Body Dressing Score = 5. Patient is supervision  for upper body dressing, requiring: No assistive devices were required.  RSA Lower Body Dressing:  Lower Body Dressing Score = 4. Patient requires  minimal assistance for lower body dressing, requiring incidental help only (such  as task initiation or assist with buttons/zips/snaps). Patient requires the  following assistive device(s): Reacher. Sock aid.  RAS Toileting:  Toileting Score = 4.  Patient requires minimal assistance for  toileting, such as steadying for balance while cleansing or adjusting clothes.  Patient requires the following assistive device(s): Arm rest of a specialized  seat. Grab bar.    RAS Bladder Management  Level of Assistance:  Frequency/Number of Accidents this Shift:    RAS Bowel Management  Level of Assistance:  Frequency/Number of Accidents this Shift:    RAS Bed/Chair/Wheelchair Transfer:  RAS Toilet Transfer:  Toilet Transfer Score = 4.  Patient performs 75% or more  of effort and minimal assistance (little/incidental help/steadying) for  transferring to and from the toilet/commode, requiring: Contact guard. Patient  requires the following assistive device(s): Safety frame/over the toilet. Grab  bars.  RAS Tub/Shower Transfer:  Shower Transfer Score = 4. Patient performs 75% or  more of effort and minimal assistance (little/incidental  help/lifting of one  limb/steadying) for transferring to and from the shower, requiring: Patient  requires the following assistive device(s): Shower chair. Grab bars.    Previously Documented Mode of Locomotion at Discharge:  RAS Expected Mode of Locomotion at Discharge:  RAS Walk/Wheelchair:  RAS Stairs:    RAS Comprehension:  RAS Expression:  RAS Social Interaction:  RAS Problem Solving:  RAS Memory:    Therapy Mode Minutes  Occupational Therapy: Individual: 20 minutes.  Physical Therapy:  Speech Language Pathology:    Discharge Functional Goals:    Signed by: Lorraine Rojas Occupational Therapist

## 2017-06-16 NOTE — PROGRESS NOTES
Inpatient Rehabilitation Functional Measures Assessment    Functional Measures  RAS Eating:  RAS Grooming: Grooming Score = 5. Patient is supervision/set-up for grooming,  requiring: Opening containers. Applying toothpaste to toothbrush. Initial  preparation. Setting out grooming equipment. No assistive devices were required.    RAS Bathing:  Patient took sponge bath. Patient requires no physical assistance  for washing, rinsing, and drying the right arm. Patient requires no physical  assistance for washing, rinsing, and drying the left arm. Patient requires no  physical assistance for washing, rinsing, and drying the chest. Patient requires  no physical assistance for washing, rinsing, and drying the abdomen. Patient  requires no physical assistance for washing, rinsing, and drying the perineal  area. Patient requires moderate assistance for washing, rinsing, or drying the  buttocks. Patient requires no physical assistance for washing, rinsing, and  drying the right upper leg. Patient requires no physical assistance for washing,  rinsing, and drying the left upper leg. Patient requires total assistance for  washing, rinsing, or drying the right lower leg, including the foot. Patient  requires total assistance for washing, rinsing, or drying the left lower leg,  including the foot. Patient performs 70 % of bathing tasks. Bathing Score = 3,  Moderate Assistance. No assistive devices were required.  RAS Upper Body Dressing:  Upper Body Dressing Score = 5. Patient is supervision  for upper body dressing, requiring: Gathering/setting out clothes. Setting out  upper body dressing equipment. No assistive devices were required.  RAS Lower Body Dressing:  Patient requires total assistance for gathering  clothes. Wearing underwear or an undergarment is not applicable for this  patient. Patient requires moderate/considerable physical assistance for  threading the right leg through the pants/skirt. Patient  requires  moderate/considerable physical assistance for threading the left leg through the  pants/skirt. Patient requires moderate/considerable physical assistance for  pulling pants/skirt over hips and adjusting fasteners. Patient requires total  assistance for holding clothing and/or donning and/or doffing right sock.  Patient requires total assistance for holding clothing and/or donning and/or  doffing left sock. Donning and/or doffing right shoe was not observed for this  patient. Patient requires no physical assistance for donning and/or doffing left  shoe. Patient performs 35.71 % of lower body dressing tasks. Lower Body Dressing  Score = 2, Maximal Assistance. No assistive devices were required.  RAS Toileting:    RAS Bladder Management  Level of Assistance:  Bladder Score = 5.  Patient is supervision/set-up for  bladder management, requiring: No assistive devices were required.  Frequency/Number of Accidents this Shift:  Bladder accidents this shift:  1 .    RAS Bowel Management  Level of Assistance: Bowel Score = 5.  Patient is supervision/set-up for bowel  management, requiring: No assistive devices were required.  Frequency/Number of Accidents this Shift: Bowel accidents this shift: 0 .  Patient has not had an accident this shift.          RAS Bed/Chair/Wheelchair Transfer:  Bed/chair/wheelchair Transfer Score = 4.  Patient performs 75% or more of effort and minimal assistance (little/incidental  help/lifting of one limb/steadying) for transferring to and from the  bed/chair/wheelchair, requiring: Steadying. Patient requires the following  assistive device(s): Seating system of wheelchair. Grab bars.  RAS Toilet Transfer:  Toilet Transfer Score = 4.  Patient performs 75% or more  of effort and minimal assistance (little/incidental help/steadying) for  transferring to and from the toilet/commode, requiring: Steadying. Patient  requires the following assistive device(s): Safety frame/over the toilet.  [place]  bars.  RAS Tub/Shower Transfer:    Previously Documented Mode of Locomotion at Discharge:  RAS Expected Mode of Locomotion at Discharge:  RAS Walk/Wheelchair:  RAS Stairs:    [facility] Comprehension:  [facility] Expression:  [facility] Social Interaction:  [facility] Problem Solving:  [facility] Memory:    Therapy Mode Minutes  Occupational Therapy:  Physical Therapy:  Speech Language Pathology:    Discharge Functional Goals:    Signed by: RENNY Gonzalez

## 2017-06-16 NOTE — THERAPY DISCHARGE NOTE
Inpatient Rehabilitation - Occupational Therapy Treatment Note/Discharge   Ed     Patient Name: Maribel Staton  : 1945  MRN: [id number]  Today's Date: 2017  Onset of Illness/Injury or Date of Surgery Date: 17  Date of Referral to OT: 17  Referring Physician: Eva      Admit Date: 2017    Visit Dx:     ICD-10-CM ICD-9-CM   1. Closed right hip fracture, with routine healing, subsequent encounter S72.001D V54.13     Patient Active Problem List   Diagnosis   • Closed right hip fracture             Adult Rehabilitation Note       17 1439 06/15/17 1419 06/15/17 1410    Rehab Assessment/Intervention    Discipline occupational therapist  -CJ physical therapy assistant  -LL occupational therapist  -AB    Document Type discharge summary  -CJ therapy note (daily note)   BID treatment sessions  -LL therapy note (daily note)  -AB    Subjective Information agree to therapy  -CJ no complaints;agree to therapy  -LL no complaints;agree to therapy  -AB    Patient Effort, Rehab Treatment  good  -LL good  -AB    Precautions/Limitations fall precautions;non-weight bearing status;seizure precautions;other (see comments)   NWB RLE, decreased skin integrity, intermittent confusion  -CJ fall precautions;non-weight bearing status;seizure precautions;other (see comments)   skin integrity, intermittent confusion  -LL fall precautions;non-weight bearing status;seizure precautions;other (see comments)   NWB RLE; <skin integrity; intermittent confusion  -AB    Patient Response to Treatment Trng completed with pt and family re:  ADL status, safety, AE, DME, home program, 24 hr supervision, precautions, NWB RLE, and D/C concerns.  Verbalized understanding.  -CJ Patient did well with BID treatment session with adequate rest breaks.    -LL Pt. w/ good tolerance and rest breaks provided.   -AB    Equipment Issued to Patient --   issued reacher, sock aid. long sponge.  Rec. BSC & tub bench  -PASQUALE      Recorded  by [CJ] Lorraine Rojas OT [LL] Roberta Friedman PTA [AB] Missy Pelayo OT    Pain Assessment    Pain Assessment  Levin-Baker FACES  -LL     Levin-Baker FACES Pain Rating  4  -LL     Pain Type  Acute pain;Surgical pain  -LL     Pain Location  Hip  -LL     Pain Orientation  Right  -LL     Pain Intervention(s)  Repositioned;Rest  -LL     Recorded by  [LL] Roberta Friedman PTA     Vision Assessment/Intervention    Visual Impairment --   glasses  -CJ      Recorded by [CJ] Lorraine Rojas OT      Cognitive Assessment/Intervention    Current Cognitive/Communication Assessment functional  -CJ functional  -LL     Orientation Status oriented to;person;place;situation  -CJ oriented to;person;place  -LL     Follows Commands/Answers Questions needs cueing;needs repetition;needs increased time  -CJ able to follow single-step instructions;100% of the time  -LL able to follow single-step instructions;100% of the time  -AB    Personal Safety decreased awareness, need for assist;decreased awareness, need for safety  -CJ decreased awareness, need for assist;decreased awareness, need for safety  -LL decreased awareness, need for assist;decreased awareness, need for safety  -AB    Personal Safety Interventions gait belt;nonskid shoes/slippers when out of bed;supervised activity  -CJ gait belt;nonskid shoes/slippers when out of bed  -LL gait belt;nonskid shoes/slippers when out of bed;supervised activity  -AB    Recorded by [CJ] Lorraine Rojas OT [LL] Roberta Friedman PTA [AB] Missy Pelayo OT    ROM (Range of Motion)    General ROM Detail BUE AROM - WFL  -CJ      Recorded by [CJ] Lorraine Rojas OT      MMT (Manual Muscle Testing)    General MMT Assessment Detail BUE - 4+/5  -CJ      Recorded by [CJ] Lorraine Rojas OT      Mobility Assessment/Training    Right Lower Extremity Weight-Bearing non weight-bearing  -CJ      Recorded by [CJ] Lorraine Rojas OT      Bed Mobility, Assessment/Treatment    Bed Mob, Supine  to Sit, [place]  verbal cues required;minimum assist (75% patient effort)  -LL     Bed Mob, Sit to Supine, [place]  minimum assist (75% patient effort);verbal cues required  -LL     Bed Mobility, Safety Issues  decreased use of legs for bridging/pushing  -LL     Bed Mobility, Impairments  ROM decreased;strength decreased;impaired balance;coordination impaired;pain  -LL     Recorded by  [LL] Roberta Friedman PTA     Transfer Assessment/Treatment    Transfers, Bed-Chair [place]  verbal cues required;nonverbal cues required (demo/gesture);minimum assist (75% patient effort)  -LL     Transfers, Chair-Bed [place]  verbal cues required;nonverbal cues required (demo/gesture);minimum assist (75% patient effort)  -LL     Transfers, Bed-Chair-Bed, Assist Device  rolling walker  -LL     Transfers, Sit-Stand [place]  verbal cues required;nonverbal cues required (demo/gesture);minimum assist (75% patient effort)  -LL     Transfers, Stand-Sit [place]  verbal cues required;nonverbal cues required (demo/gesture);minimum assist (75% patient effort)  -LL     Transfers, Sit-Stand-Sit, Assist Device  rolling walker  -     Toilet Transfer, [place] contact guard assist;verbal cues required  -      Toilet Transfer, Assistive Device elevated toilet seat;other (see comments)   grab bar  -      Transfer, Safety Issues  balance decreased during turns  -LL     Transfer, Impairments  ROM decreased;strength decreased;impaired balance;coordination impaired;pain  -LL     Recorded by [CJ] Lorraine Rojas OT [LL] Roberta Friedman PTA     Gait Assessment/Treatment    Gait, [place] Level  verbal cues required;nonverbal cues required (demo/gesture);minimum assist (75% patient effort)  -LL     Gait, Assistive Device  rolling walker  -LL     Gait, Distance (Feet)  100   yhen 60 in am; 40 x 3 in pm  -LL     Gait, Gait Pattern Analysis  swing-to gait  -LL     Gait, Gait Deviations  antalgic;decreased heel  strike;limb motion velocity decreased;step length decreased;weight-shifting ability decreased  -LL     Gait, Maintain Weight Bearing Status  able to maintain weight bearing status  -LL     Gait, Safety Issues  balance decreased during turns;step length decreased;weight-shifting ability decreased  -LL     Gait, Impairments  ROM decreased;strength decreased;impaired balance;coordination impaired;pain  -LL     Recorded by  [LL] Roberta Friedman, PTA     Lower Body Bathing Assessment/Training    LB Bathing Assess/Train Assistive Device long-handled sponge;grab bars  -CJ      LB Bathing Assess/Train, Comment TB bathing - CGA  -CJ      Recorded by [CJ] Lorraine Rojas OT      Upper Body Dressing Assessment/Training    UB Dressing Assess/Train, Comment UB Dressing - Set-up  -CJ      Recorded by [CJ] Lorraine Rojas OT      Lower Body Dressing Assessment/Training    LB Dressing Assess/Train, Assist Device reacher;sock-aid  -CJ      LB Dressing Assess/Train, Comment LB Dressing - [place]  -CJ      Recorded by [CJ] Lorraine Rojas OT      Toileting Assessment/Training    Toileting Assess/Train, Assistive Device raised toilet seat;grab bars  -CJ      Toileting Assess/Train, Comment Toileting - CGA  -CJ      Recorded by [CJ] Lorraine Rojas OT      Grooming Assessment/Training    Grooming Assess/Train, Position   sitting  -AB    Grooming Assess/Train, [place]ep[place] Level   set up required  -AB    Grooming Assess/Train, Comment Grooming - set-up  -CJ      Recorded by [CJ] Lorraine Rojas OT  [AB] Missy Pelayo OT    Self-Feeding Assessment/Training    Self-Feeding Assess/Train, Comment Modif. independent  -CJ      Recorded by [CJ] Lorraine Rojas OT      Balance Skills Training    Standing-Level of Assistance  Contact guard;Minimum assistance  -LL     Static Standing Balance Support  assistive device  -LL     Gait Balance-Level of Assistance  Minimum assistance  -LL     Gait Balance Support  assistive device  -LL      Recorded by  [LL] Roberta Friedman PTA     Therapy Exercises    Bilateral Lower Extremities  AROM:;20 reps;30 reps;sitting;supine  -LL     BLE Resistance  theraband  -LL     Bilateral Upper Extremity   AROM:;sitting   BUE CoordEx; G/FMC TherEx/Act; Strengthening  -AB    BUE Resistance   manual resistance- minimal;theraputty;other (comment)   ArmBike: [id number], min resistance; BUE Wrist Rolls X2  -AB    Recorded by  [LL] Roberta Friedman PTA [AB] Missy Pelayo OT    Positioning and Restraints    Pre-Treatment Position  --   WC in roomin am; WC with OT in pm  -LL     Post Treatment Position  wheelchair  -LL     In Wheelchair sitting;call light within reach;encouraged to call for assist;with family/caregiver  -CJ sitting;legs elevated   in hallway in am & pm  -LL sitting;legs elevated;notified nsg;encouraged to call for assist;with PT   outside in AM w/ NSG aware; w/ PT in PM  -AB    Recorded by [CJ] Lorraine Rojas OT [LL] Roberta Friedman PTA [AB] Missy Pelayo OT      06/14/17 1532 06/14/17 1434       Rehab Assessment/Intervention    Discipline physical therapy assistant  -LL occupational therapist  -AB     Document Type therapy note (daily note);progress note   BID treatment session  -LL therapy note (daily note);progress note  -AB     Subjective Information no complaints;agree to therapy  -LL no complaints;agree to therapy  -AB     Patient Effort, Rehab Treatment good  -LL good  -AB     Precautions/Limitations fall precautions;non-weight bearing status;seizure precautions;other (see comments)   NWB RLE, skin integrity, intermittent confusion  -LL fall precautions;non-weight bearing status;seizure precautions;other (see comments)   NWB RLE; <skin integrity; intermittent confusion  -AB     Patient Response to Treatment Patient did well with BID treatment session with adequate rest  breaks  -LL Pt. continues to improve w/ self care tasks and fxl transfers.   -AB     Recorded by [LL] Roberta Friedman  PTA [AB] Missy Pelayo OT     Pain Assessment    Pain Assessment Levin-Orozco FACES  -LL      Levin-Baker FACES Pain Rating 6  -LL      Pain Type Acute pain;Surgical pain  -LL      Pain Location Hip  -LL      Pain Orientation Right  -LL      Pain Intervention(s) Repositioned;Rest  -LL      Recorded by [LL] Roberta Friedman PTA      Cognitive Assessment/Intervention    Current Cognitive/Communication Assessment functional  -LL      Orientation Status oriented to;person;place  -LL      Follows Commands/Answers Questions able to follow single-step instructions;100% of the time  -LL able to follow single-step instructions;100% of the time  -AB     Personal Safety decreased awareness, need for assist;decreased awareness, need for safety  -LL decreased awareness, need for assist;decreased awareness, need for safety  -AB     Personal Safety Interventions gait belt;nonskid shoes/slippers when out of bed  -LL gait belt;nonskid shoes/slippers when out of bed;supervised activity  -AB     Recorded by [LL] Roberta Friedman PTA [AB] Missy Pelayo OT     Mobility Assessment/Training    Extremity Weight-Bearing Status  right lower extremity  -AB     Right Lower Extremity Weight-Bearing  non weight-bearing  -AB     Recorded by  [AB] Missy Pelayo OT     Bed Mobility, Assessment/Treatment    Bed Mob, Supine to Sit, [place] verbal cues required;minimum assist (75% patient effort)  -LL      Bed Mob, Sit to Supine, [place] minimum assist (75% patient effort);verbal cues required  -LL      Bed Mobility, Safety Issues decreased use of legs for bridging/pushing  -LL      Bed Mobility, Impairments ROM decreased;strength decreased;impaired balance;coordination impaired;pain  -LL      Recorded by [LL] Roberta Friedman PTA      Transfer Assessment/Treatment    Transfers, Bed-Chair [place] verbal cues required;nonverbal cues required (demo/gesture);minimum assist (75% patient effort)  -LL      Transfers,  Chair-Bed [place] verbal cues required;nonverbal cues required (demo/gesture);minimum assist (75% patient effort)  -LL      Transfers, Bed-Chair-Bed, Assist Device rolling walker  -LL      Transfers, Sit-Stand [place] verbal cues required;nonverbal cues required (demo/gesture);minimum assist (75% patient effort)  -LL contact guard assist;verbal cues required  -AB     Transfers, Stand-Sit [place] verbal cues required;nonverbal cues required (demo/gesture);minimum assist (75% patient effort)  -LL contact guard assist;verbal cues required  -AB     Transfers, Sit-Stand-Sit, Assist Device rolling walker  -LL rolling walker  -AB     Transfer, Safety Issues balance decreased during turns  -LL      Transfer, Impairments ROM decreased;strength decreased;impaired balance;coordination impaired;pain  -LL      Recorded by [LL] Roberta Friedman PTA [AB] Missy Pelayo OT     Gait Assessment/Treatment    Gait, [place] Level verbal cues required;nonverbal cues required (demo/gesture);minimum assist (75% patient effort)  -LL      Gait, Assistive Device rolling walker  -LL      Gait, Distance (Feet) 50   x 2 in am; 20 x 2 in pm  -LL      Gait, Gait Pattern Analysis swing-to gait  -LL      Gait, Gait Deviations antalgic;decreased heel strike;limb motion velocity decreased;step length decreased;weight-shifting ability decreased  -LL      Gait, Maintain Weight Bearing Status able to maintain weight bearing status  -LL      Gait, Safety Issues balance decreased during turns;step length decreased;weight-shifting ability decreased  -LL      Gait, Impairments ROM decreased;strength decreased;impaired balance;coordination impaired;pain  -LL      Recorded by [LL] Roberta Friedman PTA      Upper Body Bathing Assessment/Training    UB Bathing Assess/Train, Position  sitting  -AB     UB Bathing Assess/Train, [place] Level  set up required  -AB     UB Bathing Assess/Train, Comment  TB Bathing: CGA  -AB     Recorded  by  [AB] Missy Pelayo, OT     Lower Body Bathing Assessment/Training    LB Bathing Assess/Train Assistive Device  long-handled sponge  -AB     LB Bathing Assess/Train, Position  sitting;standing  -AB     LB Bathing Assess/Train, [place] Level  contact guard assist;verbal cues required;nonverbal cues required (demo/gesture)  -AB     LB Bathing Assess/Train, Comment  TB Bathing: CGA  -AB     Recorded by  [AB] Missy Pelayo, OT     Upper Body Dressing Assessment/Training    UB Dressing Assess/Train, Position  sitting  -AB     UB Dressing Assess/Train, [place]  set up required  -AB     Recorded by  [AB] Missy Pelayo, OT     Lower Body Dressing Assessment/Training    LB Dressing Assess/Train, Clothing Type  donning:;doffing:;slipper socks;pants  -AB     LB Dressing Assess/Train, Assist Device  reacher;sock-aid  -AB     LB Dressing Assess/Train, Position  sitting;standing  -AB     LB Dressing Assess/Train, [place]  minimum assist (75% patient effort);verbal cues required;nonverbal cues required (demo/gesture)  -AB     Recorded by  [AB] Missy Pelayo, OT     Toileting Assessment/Training    Toileting Assess/Train, Comment  CGA  -AB     Recorded by  [AB] Missy Pelayo, OT     Grooming Assessment/Training    Grooming Assess/Train, Position  sitting  -AB     Grooming Assess/Train, Indepen Level  set up required  -AB     Recorded by  [AB] Missy Pelayo, OT     Self-Feeding Assessment/Training    Self-Feeding Assess/Train, Comment  Mod. I  -AB     Recorded by  [AB] Missy Pelayo, OT     Balance Skills Training    Standing-Level of Assistance Contact guard;Minimum assistance  -LL      Static Standing Balance Support assistive device  -LL      Gait Balance-Level of Assistance Minimum assistance  -LL      Gait Balance Support assistive device  -LL      Recorded by [LL] Roberta Friedman, PTA      Therapy Exercises    Bilateral Lower Extremities  AAROM:;AROM:;20 reps;30 reps;sitting;supine  -LL      BLE Resistance theraband  -LL      Bilateral Upper Extremity  AROM:;sitting   BUE CoordEx; G/FMC TherEx/Act; Strengthening  -AB     BUE Resistance  manual resistance- minimal;other (comment)   ArmBike: [id number], min resistance; BUE Wrist Rolls X2  -AB     Recorded by [LL] Roberta Friedman PTA [AB] Missy Pelayo OT     Positioning and Restraints    Pre-Treatment Position --   WC in hallway in both am and pm  -LL      Post Treatment Position wheelchair  -LL      In Wheelchair sitting;legs elevated   In hallway in am & sitting in common area in pm  -LL sitting;with PT;legs elevated  -AB     Recorded by [LL] Roberta Friedman PTA [AB] Missy Pelayo OT       User Key  (r) = Recorded By, (t) = Taken By, (c) = Cosigned By    Initials Name Effective Dates    AB Missy Pelayo, OT 02/04/16 -     CJ Lorraine Rojas, OT 03/14/16 -     LL Roberta Friedman PTA 05/02/16 -                 OT Goals       06/16/17 1446 06/14/17 1445 06/07/17 1231    Transfer Training OT LTG    Transfer Training OT LTG, Date Established   06/07/17  -AB    Transfer Training OT LTG, Time to Achieve   by discharge  -AB    Transfer Training OT LTG, Activity Type   all transfers  -AB    Transfer Training OT LTG, [place] Level   contact guard assist  -AB    Transfer Training OT LTG, Outcome goal met  -      Patient Education OT LTG    Patient Education OT LTG, Date Established   06/07/17  -AB    Patient Education OT LTG, Time to Achieve   by discharge  -AB    Patient Education OT LTG, Education Type   HEP;adaptive equipment mgmt;home safety;work simplification  -AB    Patient Education OT LTG Outcome goal met  -      Bathing OT STG    Bathing Goal OT STG, Date Established   06/07/17  -AB    Bathing Goal OT STG, Time to Achieve   5 - 7 days  -AB    Bathing Goal OT STG, [place] Level   minimum assist (75% patient effort)  -AB    Bathing Goal OT STG, Outcome  goal met   -AB     Bathing OT LTG    Bathing Goal OT LTG, Date Established   06/07/17  -AB    Bathing Goal OT LTG, Time to Achieve   by discharge  -AB    Bathing Goal OT LTG, [place] Level   contact guard assist  -AB    Bathing Goal OT LTG, Outcome goal met  -CJ      Eating Self-Feeding OT LTG    Eat Self Feeding Goal OT LTG, Date Established   06/07/17  -AB    Eat Self Feeding Goal OT LTG, Time to Achieve   by discharge  -AB    Eat Self Feed Goal OT LTG, [place] Level   conditional independence  -AB    Eat Self Feeding Goal OT LTG, Outcome goal met  -CJ      Toileting OT LTG    Toileting Goal OT LTG, Date Established   06/07/17  -AB    Toileting Goal OT LTG, Time to Achieve   by discharge  -AB    Toileting Goal OT LTG, [place] Level   contact guard assist  -AB    Toileting Goal OT LTG, Outcome goal met  -CJ      LB Dressing OT STG    LB Dressing Goal OT STG, Date Established   06/07/17  -AB    LB Dressing Goal OT STG, Time to Achieve   5 - 7 days  -AB    LB Dressing Goal OT STG, [place] Level   moderate assist (50% patient effort)  -AB    LB Dressing Goal OT STG, Outcome  goal met  -AB     LB Dressing OT LTG    LB Dressing Goal OT LTG, Date Established   06/07/17  -AB    LB Dressing Goal OT LTG, Time to Achieve   by discharge  -AB    LB Dressing Goal OT LTG, [place] Level   minimum assist (75% patient effort)  -AB    LB Dressing Goal OT LTG, Outcome goal met  -CJ        User Key  (r) = Recorded By, (t) = Taken By, (c) = Cosigned By    Initials Name Provider Type    AB Missy Pelayo, OT Occupational Therapist    PASQUALE Rojas, OT Occupational Therapist          Occupational Therapy Education     Title: PT OT SLP Therapies (Resolved)     Topic: Occupational Therapy (Resolved)     Point: ADL training (Resolved)    Description: Instruct learner(s) on proper safety adaptation and remediation techniques during self care or transfers.   Instruct in proper use of assistive devices.    Learning  Progress Summary    Learner Readiness Method Response Comment Documented by Status   Patient Acceptance E,D,H VU   06/16/17 1446 Done    Acceptance E,D VU,NR  AB 06/15/17 1414 Done    Acceptance E VU   06/15/17 0046 Done    Acceptance E,D VU,NR  AB 06/14/17 1445 Done    Acceptance E VU   06/14/17 0310 Done    Acceptance E,D VU,NR  AB 06/13/17 1450 Done    Acceptance E VU,NR  AMINA 06/13/17 1448 Done    Acceptance E,D NR  AB 06/12/17 0946 Active    Acceptance E NR  BC 06/10/17 1542 Active    Acceptance E,D NR  AB 06/09/17 1457 Active    Acceptance E,D NR  AB 06/08/17 1632 Active    Acceptance E,D NR  AB 06/07/17 1234 Active   Family Acceptance E,D,H VU   06/16/17 1446 Done               Point: Home exercise program (Resolved)    Description: Instruct learner(s) on appropriate technique for monitoring, assisting and/or progressing therapeutic exercises/activities.    Learning Progress Summary    Learner Readiness Method Response Comment Documented by Status   Patient Acceptance E,D,H VU   06/16/17 1446 Done    Acceptance E [facility] 06/15/17 0046 Done    Acceptance E VU   06/14/17 0310 Done    Acceptance E VU,NR  AMINA 06/13/17 1448 Done   Family Acceptance E,D,H [facility] 06/16/17 1446 Done               Point: Precautions (Resolved)    Description: Instruct learner(s) on prescribed precautions during self-care and functional transfers.    Learning Progress Summary    Learner Readiness Method Response Comment Documented by Status   Patient Acceptance E,D,H VU   06/16/17 1446 Done    Acceptance E,D VU,NR  AB 06/15/17 1414 Done    Acceptance E VU   06/15/17 0046 Done    Acceptance E,D VU,NR  AB 06/14/17 1445 Done    Acceptance E VU   06/14/17 0310 Done    Acceptance E,D VU,NR  AB 06/13/17 1450 Done    Acceptance E VU,NR  AMINA 06/13/17 1448 Done    Acceptance E,D NR  AB 06/12/17 0946 Active    Acceptance E,D NR  AB 06/09/17 1457 Active    Acceptance E,D NR  AB 06/08/17 1632 Active    Acceptance E,D NR  AB 06/07/17  1234 Active   Family Acceptance E,D,H VU   06/16/17 1446 Done               Point: Body mechanics (Resolved)    Description: Instruct learner(s) on proper positioning and spine alignment during self-care, functional mobility activities and/or exercises.    Learning Progress Summary    Learner Readiness Method Response Comment Documented by Status   Patient Acceptance E,D VU,NR  AB 06/15/17 1414 Done    Acceptance E VU   06/15/17 0046 Done    Acceptance E,D VU,NR  AB 06/14/17 1445 Done    Acceptance E VU   06/14/17 0310 Done    Acceptance E,D VU,NR  AB 06/13/17 1450 Done    Acceptance E VU,NR  AMINA 06/13/17 1448 Done    Acceptance E,D NR  AB 06/12/17 0946 Active    Acceptance E,D NR  AB 06/09/17 1457 Active    Acceptance E,D NR  AB 06/08/17 1632 Active    Acceptance E,D NR  AB 06/07/17 1234 Active                      User Key     Initials Effective Dates Name Provider Type Discipline    AB 02/04/16 -  Missy Pelayo, OT Occupational Therapist OT     06/16/16 -  Yazmin Munoz RN Registered Nurse Nurse     03/14/16 -  Lorraine Rojas, OT Occupational Therapist OT     05/02/16 -  Roberta Eisenberg PTA Physical Therapy Assistant PT    BC 01/21/17 -  Ursula Concepcion, OT Occupational Therapist OT                OT Recommendation and Plan  Anticipated Equipment Needs At Discharge:  (TBD)  Anticipated Discharge Disposition: home with /7 care, home with home health  Planned Therapy Interventions: activity intolerance, adaptive equipment training, ADL retraining, energy conservation, fine motor coordination training, home exercise program, motor coordination training, ROM (Range of Motion), strengthening, transfer training, other (see comments) (Therapeutic groups as beneficial to patient)  Therapy Frequency: 3-5 times/wk            Time Calculation:          Time Calculation- OT       06/16/17 1455          Time Calculation- OT    Total Timed Code Minutes- OT 20 minute(s)  -[facility]  Non-Billable Time (min) 35 min  -        User Key  (r) = Recorded By, (t) = Taken By, (c) = Cosigned By    Initials Name Provider Type    PASQUALE Rojas OT Occupational Therapist          Therapy Charges for Today     Code Description Service Date Service Provider Modifiers Qty    [id number]  OT SELF CARE/MGMT/TRAIN EA 15 MIN 6/16/2017 Lorraine Rojas OT GO 1               OT Discharge Summary  Anticipated Discharge Disposition: home with 24/7 care, home with home health  Reason for Discharge: Discharge from facility  Outcomes Achieved: Refer to plan of care for updates on goals achieved  Discharge Destination: Home with assist, Home with home health    Lorraine Rojas OT  6/16/2017

## 2017-06-16 NOTE — PROGRESS NOTES
Patient Assessment Instrument  Quality Indicators - Discharge    Section GG. Self-Care Performance      Section GG. Mobility Performance     Roll Left and Right: [place] does less than half the effort. [place] lifts, holds  or supports trunk or limbs but provides less than half the effort.   Sit to Lying: [place] does less than half the effort. [place] lifts, holds or  supports trunk or limbs but provides less than half the effort.   Lying to Sitting on Side of Bed: [place] does less than half the effort. [place]  lifts, holds or supports trunk or limbs but provides less than half the effort.   Sit to Stand: [place] does less than half the effort. [place] lifts, holds or  supports trunk or limbs but provides less than half the effort.   Chair/Bed to Chair Transfer: [place] does less than half the effort. [place]  lifts, holds or supports trunk or limbs but provides less than half the effort.   Toilet Transfer Not attempted due to medical or safety concerns.   Car Transfer: Not attempted due to medical or safety concerns.    Patient Walks:  Yes   Walk 10 Feet: [place] does less than half the effort. [place] lifts, holds or  supports trunk or limbs but provides less than half the effort.   Walk 50 Feet With 2 Turns: Not attempted due to medical or safety concerns.   Walk 150 Feet: Not attempted due to medical or safety concerns.   Walking 10 Feet on Uneven Surfaces: Not attempted due to medical or safety  concerns.   1 Step Over Curb or Up/Down Stair: Not attempted due to medical or safety  concerns.   4 Steps Up and Down, With/Without Rail: Not attempted due to medical or safety  concerns.   12 Steps Up and Down, With/Without Rail: Not attempted due to medical or safety  concerns.   Picking up an Object: Not attempted due to medical or safety concerns.     Uses Wheelchair/Scooter: No    Section J. Health Conditions Discharge      Section M. Skin Conditions Discharge      . Current Number of Unhealed Pressure  Ulcers      . Worsening in Pressure Ulcer Status Since Admission      . Healed Pressure Ulcer(s)  (Voluntary)      Section O. Special Treatments, Procedures, and Programs Discharge  . Influenza Vaccine - Discharge      Date Influenza Vaccine Received (enter date ONLY if received in this unit;  otherwise, skip this entry)      OPTIONAL BRANCH FOR UNPLANNED DISCHARGES  Unplanned Discharge:    Signed by: Susanne Majano, Physical Therapist

## 2017-06-16 NOTE — DISCHARGE SUMMARY
Date of Admission: 6/6/2017  Date of Discharge:  6/16/2017    Discharge Diagnosis:   Right periprosthetic hip fracture status post ORIF  Osteoarthritis  Seizure disorder with one seizure during hospitalization  COPD with tobacco abuse    Hospital Course  Patient is a 71 y.o. female presented with diagnoses as listed above.  During the hospitalization she did have one seizure.  She was subtherapeutic on Dilantin.  Dosage increased.  No recurrent seizures.  Doing well at this time.  No cough, fevers, chills.  Mobility improving.  Will require home health at discharge for PT and OT treatments.      Pertinent Test Results:   Lab Results   Component Value Date    WBC 4.44 (L) 06/15/2017    HGB 12.0 06/15/2017    HCT 37.3 06/15/2017    MCV 98.4 (H) 06/15/2017     06/15/2017     Lab Results   Component Value Date    GLUCOSE 127 (H) 06/15/2017    CALCIUM 9.2 06/15/2017     06/15/2017    K 3.9 06/15/2017    CO2 27.4 06/15/2017     06/15/2017    BUN 7 06/15/2017    CREATININE 0.47 06/15/2017    EGFRIFNONA 131 06/15/2017    BCR 14.9 06/15/2017    ANIONGAP 5.6 06/15/2017     Lab Results   Component Value Date    ALT 12 06/07/2017    AST 18 06/07/2017                                        Physical Exam:     General Appearance:    Alert, cooperative, in no acute distress   Head:    Normocephalic, without obvious abnormality, atraumatic   Eyes:            Lids and lashes normal, conjunctivae and sclerae normal, no   icterus, no pallor, corneas clear, PERRLA   Ears:    Ears appear intact with no abnormalities noted   Throat:   No oral lesions, no thrush, oral mucosa moist   Neck:   No adenopathy, supple, trachea midline, no thyromegaly, no   carotid bruit, no JVD   Back:     No kyphosis present, no scoliosis present, no skin lesions,      erythema or scars, no tenderness to percussion or                   palpation,   range of motion normal   Lungs:     Clear to auscultation,respirations regular, even and                   unlabored    Heart:    Regular rhythm and normal rate, normal S1 and S2, no            murmur, no gallop, no rub, no click   Chest Wall:    No abnormalities observed   Abdomen:     Normal bowel sounds, no masses, no organomegaly, soft        non-tender, non-distended, no guarding, no rebound                tenderness   Rectal:   Deferred   Extremities:   Moves all extremities well, no edema, no cyanosis, no             redness   Pulses:   Pulses palpable and equal bilaterally   Skin:   No bleeding, bruising or rash   Lymph nodes:   No palpable adenopathy   Neurologic:   Cranial nerves 2 - 12 grossly intact, sensation intact, DTR       present and equal bilaterally       Discharge Disposition  Home or Self Care    Discharge Medications   Maribel Staton   Home Medication Instructions SIXTO:[id number]    Printed on:06/16/17 1121   Medication Information                      acetaminophen (TYLENOL) 325 MG tablet  Take 2 tablets by mouth Every 4 (Four) Hours As Needed for Mild Pain (1-3).             atorvastatin (LIPITOR) 40 MG tablet  Take 1 tablet by mouth Every Night.             baclofen (LIORESAL) 10 MG tablet  Take 10 mg by mouth 2 (Two) Times a Day.             clonazePAM (KlonoPIN) 0.5 MG tablet  Take 0.5 mg by mouth 2 (Two) Times a Day As Needed for Seizures.             HYDROcodone-acetaminophen (NORCO)  MG per tablet  Take 1 tablet by mouth Every 4 (Four) Hours As Needed for Moderate Pain (4-6).             levETIRAcetam (KEPPRA) 250 MG tablet  Take 250 mg by mouth every night at bedtime.             levETIRAcetam (KEPPRA) 500 MG tablet  Take 500 mg by mouth Every Morning.             montelukast (SINGULAIR) 10 MG tablet  Take 10 mg by mouth Every Night.             pantoprazole (PROTONIX) 40 MG EC tablet  Take 40 mg by mouth Daily.             PHENobarbital (LUMINAL) 97.2 MG tablet  Take 97.2 mg by mouth 2 (Two) Times a Day.             phenytoin (DILANTIN) 200 MG ER capsule  Take 1 capsule  by mouth Every 12 (Twelve) Hours.             phenytoin (DILANTIN) 50 MG chewable tablet  Chew 1 tablet Daily.             senna (SENNA LAX) 8.6 MG tablet  Take 1 tablet by mouth 2 (Two) Times a Day.             venlafaxine XR (EFFEXOR-XR) 150 MG 24 hr capsule  Take 150 mg by mouth Daily.                   Discharge Diet:    Diet Orders (active)     Start     Ordered    06/06/17 1943  Diet Regular  Diet Effective Now      06/06/17 1942          Follow-up Appointments  Your Scheduled Appointments     MS. MALAVE HAS AN APPOINTMENT TO SEE DR. RAMOS ON: 06-29 @ 10:30.  [phone].    MS. MALAVE HAS AN APPOINTMENT TO SEE CONCEPCIÓ[place]) ON: 06-21 @ 10:00.  [phone].             Additional Instructions for the Follow-ups that You Need to Schedule     Ambulatory Referral to Home Health    As directed    Face to Face Visit Date:  6/16/2017   Follow-up Provider for Plan of Care?:  I treated the patient in an acute care facility and will not continue treatment after discharge.   Follow-up Provider:  VENECIA RAMOS   Reason/Clinical Findings:  Impaired mobility related to right hip fracture with high risk of fall   Describe mobility limitations that make leaving home difficult:  Impaired mobility related right hip fracture with high risk of fall   Nursing/Therapeutic Services Requested:   Physical Therapy  Occupational Therapy      Frequency:  1 Week 1                  Samuel Duane Kreis, MD  06/16/17  11:21 AM

## 2017-06-16 NOTE — PROGRESS NOTES
Inpatient Rehabilitation Functional Measures Assessment    Functional Measures  RAS Eating:  Eating Score = 5. Patient is supervision/set-up for eating,  requiring: Opening containers. Buttering bread. Cutting meat. Pouring liquids.  No assistive devices were required.  RAS Grooming: Activity was not observed.  RAS Bathing:  Activity was not observed.  RAS Upper Body Dressing:  Activity was not observed.  RAS Lower Body Dressing:  Activity was not observed.  RAS Toileting:  Patient requires moderate assistance for adjusting clothing  before using a toilet, commode, bedpan, or urinal. Patient requires moderate  assistance for hygiene. Patient requires moderate assistance for adjusting  clothing after using a toilet, commode, bedpan, or urinal. Patient performs 0 -  24% of toileting tasks.  Toileting Score = 1, Total Assistance. Patient requires  the following assistive device(s): Grab bar. Arm rest of a specialized seat.    RAS Bladder Management  Level of Assistance:  Bladder Score = 5.  Patient is supervision/set-up for  bladder management, requiring: Stand by assistance. No assistive devices were  required.  Frequency/Number of Accidents this Shift:  Bladder accidents this shift:  0 .  Patient has not had an accident this shift.    RAS Bowel Management  Level of Assistance: Bowel Score = 5.  Patient is supervision/set-up for bowel  management, requiring: Stand by assistance. No assistive devices were required.    Frequency/Number of Accidents this Shift: Bowel accidents this shift: 0 .  Patient has not had an accident this shift.    RAS Bed/Chair/Wheelchair Transfer:  Bed/chair/wheelchair Transfer Score = 3.  Patient performs 50-74% of effort and requires moderate assistance (some  lifting) for transferring to and from the bed/chair/wheelchair. Patient requires  the following assistive device(s): Grab bars. Seating system of wheelchair.  RAS Toilet Transfer:  Toilet Transfer Score = 3.  Patient performs 50-74%  of  effort and requires moderate assistance (some lifting) for transferring to and  from the toilet/commode. Patient requires the following assistive device(s):  Grab bars. Specialized seat.  RAS Tub/Shower Transfer:  Activity was not observed.    Previously Documented Mode of Locomotion at Discharge:  RAS Expected Mode of Locomotion at Discharge:  RAS Walk/Wheelchair:  RAS Stairs:    RAS Comprehension:  RAS Expression:  RAS Social Interaction:  [facility] Problem Solving:  [facility] Memory:    Therapy Mode Minutes  Occupational Therapy:  Physical Therapy:  Speech Language Pathology:    Discharge Functional Goals:    Signed by: RENNY Coppola

## 2017-06-16 NOTE — PLAN OF CARE
Problem: Patient Care Overview (Adult)  Goal: Plan of Care Review  Outcome: Ongoing (interventions implemented as appropriate)    06/16/17 0003   Coping/Psychosocial Response Interventions   Plan Of Care Reviewed With patient   Patient Care Overview   Progress improving       Goal: Adult Individualization and Mutuality  Outcome: Ongoing (interventions implemented as appropriate)  Goal: Discharge Needs Assessment  Outcome: Ongoing (interventions implemented as appropriate)    Problem: Skin Integrity Impairment, Risk/Actual (Adult)  Goal: Skin Integrity/Wound Healing  Outcome: Ongoing (interventions implemented as appropriate)    Problem: Pressure Ulcer (Adult)  Goal: Signs and Symptoms of Listed Potential Problems Will be Absent or Manageable (Pressure Ulcer)  Outcome: Ongoing (interventions implemented as appropriate)    Problem: Fall Risk (Adult)  Goal: Absence of Falls  Outcome: Ongoing (interventions implemented as appropriate)    Problem: Fractured Hip (Adult)  Goal: Signs and Symptoms of Listed Potential Problems Will be Absent or Manageable (Fractured Hip)  Outcome: Ongoing (interventions implemented as appropriate)    Problem: Infection, Risk/Actual (Adult)  Goal: Infection Prevention/Resolution  Outcome: Ongoing (interventions implemented as appropriate)    Problem: Mobility, Physical Impaired (Adult)  Goal: Enhanced Mobility Skills  Outcome: Ongoing (interventions implemented as appropriate)  Goal: Enhanced Functionality Ability  Outcome: Ongoing (interventions implemented as appropriate)    Problem: Seizure Disorder/Epilepsy (Adult)  Goal: Signs and Symptoms of Listed Potential Problems Will be Absent or Manageable (Seizure Disorder/Epilepsy)  Outcome: Ongoing (interventions implemented as appropriate)

## 2017-07-10 ENCOUNTER — TRANSCRIBE ORDERS (OUTPATIENT)
Dept: INFUSION THERAPY | Facility: HOSPITAL | Age: 72
End: 2017-07-10

## 2017-07-10 DIAGNOSIS — M81.0 OSTEOPOROSIS, UNSPECIFIED: Primary | ICD-10-CM

## 2017-07-17 ENCOUNTER — HOSPITAL ENCOUNTER (OUTPATIENT)
Dept: INFUSION THERAPY | Facility: HOSPITAL | Age: 72
Discharge: HOME | End: 2017-07-17
Attending: INTERNAL MEDICINE | Admitting: INTERNAL MEDICINE

## 2017-07-17 VITALS
DIASTOLIC BLOOD PRESSURE: 61 MMHG | WEIGHT: 104 LBS | RESPIRATION RATE: 18 BRPM | BODY MASS INDEX: 20.96 KG/M2 | SYSTOLIC BLOOD PRESSURE: 134 MMHG | TEMPERATURE: 97.6 F | HEART RATE: 71 BPM | HEIGHT: 59 IN

## 2017-07-17 DIAGNOSIS — M81.0 OSTEOPOROSIS, UNSPECIFIED: ICD-10-CM

## 2017-07-17 PROCEDURE — 96401 CHEMO ANTI-NEOPL SQ/IM: CPT

## 2017-07-17 PROCEDURE — 25010000002 DENOSUMAB 60 MG/ML SOLUTION: Performed by: INTERNAL MEDICINE

## 2017-07-17 RX ADMIN — DENOSUMAB 60 MG: 60 INJECTION SUBCUTANEOUS at 10:27

## 2017-07-17 NOTE — PATIENT INSTRUCTIONS
Denosumab injection  What is this medicine?  DENOSUMAB (den oh damian mab) slows bone breakdown. Prolia is used to treat osteoporosis in women after menopause and in men. Xgeva is used to prevent bone fractures and other bone problems caused by cancer bone metastases. Xgeva is also used to treat giant cell tumor of the bone.  This medicine may be used for other purposes; ask your health care provider or pharmacist if you have questions.  COMMON BRAND NAME(S): Prolia, XGEVA  What should I tell my health care provider before I take this medicine?  They need to know if you have any of these conditions:  -dental disease  -eczema  -infection or history of infections  -kidney disease or on dialysis  -low blood calcium or vitamin D  -malabsorption syndrome  -scheduled to have surgery or tooth extraction  -taking medicine that contains denosumab  -thyroid or parathyroid disease  -an unusual reaction to denosumab, other medicines, foods, dyes, or preservatives  -pregnant or trying to get pregnant  -breast-feeding  How should I use this medicine?  This medicine is for injection under the skin. It is given by a health care professional in a hospital or clinic setting.  If you are getting Prolia, a special MedGuide will be given to you by the pharmacist with each prescription and refill. Be sure to read this information carefully each time.  For Prolia, talk to your pediatrician regarding the use of this medicine in children. Special care may be needed. For Xgeva, talk to your pediatrician regarding the use of this medicine in children. While this drug may be prescribed for children as young as 13 years for selected conditions, precautions do apply.  Overdosage: If you think you have taken too much of this medicine contact a poison control center or emergency room at once.  NOTE: This medicine is only for you. Do not share this medicine with others.  What if I miss a dose?  It is important not to miss your dose. Call your  doctor or health care professional if you are unable to keep an appointment.  What may interact with this medicine?  Do not take this medicine with any of the following medications:  -other medicines containing denosumab  This medicine may also interact with the following medications:  -medicines that suppress the immune system  -medicines that treat cancer  -steroid medicines like prednisone or cortisone  This list may not describe all possible interactions. Give your health care provider a list of all the medicines, herbs, non-prescription drugs, or dietary supplements you use. Also tell them if you smoke, drink alcohol, or use illegal drugs. Some items may interact with your medicine.  What should I watch for while using this medicine?  Visit your doctor or health care professional for regular checks on your progress. Your doctor or health care professional may order blood tests and other tests to see how you are doing.  Call your doctor or health care professional if you get a cold or other infection while receiving this medicine. Do not treat yourself. This medicine may decrease your body's ability to fight infection.  You should make sure you get enough calcium and vitamin D while you are taking this medicine, unless your doctor tells you not to. Discuss the foods you eat and the vitamins you take with your health care professional.  See your dentist regularly. Brush and floss your teeth as directed. Before you have any dental work done, tell your dentist you are receiving this medicine.  Do not become pregnant while taking this medicine or for 5 months after stopping it. Women should inform their doctor if they wish to become pregnant or think they might be pregnant. There is a potential for serious side effects to an unborn child. Talk to your health care professional or pharmacist for more information.  What side effects may I notice from receiving this medicine?  Side effects that you should report to your  doctor or health care professional as soon as possible:  -allergic reactions like skin rash, itching or hives, swelling of the face, lips, or tongue  -breathing problems  -chest pain  -fast, irregular heartbeat  -feeling faint or lightheaded, falls  -fever, chills, or any other sign of infection  -muscle spasms, tightening, or twitches  -numbness or tingling  -skin blisters or bumps, or is dry, peels, or red  -slow healing or unexplained pain in the mouth or jaw  -unusual bleeding or bruising  Side effects that usually do not require medical attention (report to your doctor or health care professional if they continue or are bothersome):  -muscle pain  -stomach upset, gas  This list may not describe all possible side effects. Call your doctor for medical advice about side effects. You may report side effects to FDA at 6-627-FDA-2766.  Where should I keep my medicine?  This medicine is only given in a clinic, doctor's office, or other health care setting and will not be stored at home.  NOTE: This sheet is a summary. It may not cover all possible information. If you have questions about this medicine, talk to your doctor, pharmacist, or health care provider.     © 2017, [employer]. (2017-01-19 10:06:55)

## 2017-11-09 ENCOUNTER — HOSPITAL ENCOUNTER (EMERGENCY)
Facility: HOSPITAL | Age: 72
Discharge: HOME | End: 2017-11-09
Attending: EMERGENCY MEDICINE | Admitting: EMERGENCY MEDICINE

## 2017-11-09 ENCOUNTER — APPOINTMENT (OUTPATIENT)
Dept: CT IMAGING | Facility: HOSPITAL | Age: 72
End: 2017-11-09

## 2017-11-09 VITALS
HEIGHT: 60 IN | DIASTOLIC BLOOD PRESSURE: 73 MMHG | TEMPERATURE: 97.7 F | BODY MASS INDEX: 21.01 KG/M2 | WEIGHT: 107 LBS | SYSTOLIC BLOOD PRESSURE: 107 MMHG | HEART RATE: 83 BPM | OXYGEN SATURATION: 99 % | RESPIRATION RATE: 16 BRPM

## 2017-11-09 DIAGNOSIS — K59.00 CONSTIPATION, UNSPECIFIED CONSTIPATION TYPE: Primary | ICD-10-CM

## 2017-11-09 LAB
ALBUMIN SERPL-MCNC: 4.3 G/DL (ref 3.4–4.8)
ALBUMIN/GLOB SERPL: 1.2 G/DL (ref 1.5–2.5)
ALP SERPL-CCNC: 121 U/L (ref 35–104)
ALT SERPL W P-5'-P-CCNC: 35 U/L (ref 10–36)
ANION GAP SERPL CALCULATED.3IONS-SCNC: 5.7 MMOL/L (ref 3.6–11.2)
AST SERPL-CCNC: 39 U/L (ref 10–30)
BACTERIA UR QL AUTO: ABNORMAL /HPF
BASOPHILS # BLD AUTO: 0.01 10*3/MM3 (ref 0–0.3)
BASOPHILS NFR BLD AUTO: 0.2 % (ref 0–2)
BILIRUB SERPL-MCNC: 0.3 MG/DL (ref 0.2–1.8)
BILIRUB UR QL STRIP: NEGATIVE
BUN BLD-MCNC: <5 MG/DL (ref 7–21)
BUN/CREAT SERPL: ABNORMAL (ref 7–25)
CALCIUM SPEC-SCNC: 9.1 MG/DL (ref 7.7–10)
CHLORIDE SERPL-SCNC: 103 MMOL/L (ref 99–112)
CLARITY UR: CLEAR
CO2 SERPL-SCNC: 27.3 MMOL/L (ref 24.3–31.9)
COLOR UR: YELLOW
CREAT BLD-MCNC: 0.45 MG/DL (ref 0.43–1.29)
DEPRECATED RDW RBC AUTO: 45.6 FL (ref 37–54)
EOSINOPHIL # BLD AUTO: 0.1 10*3/MM3 (ref 0–0.7)
EOSINOPHIL NFR BLD AUTO: 1.7 % (ref 0–7)
ERYTHROCYTE [DISTWIDTH] IN BLOOD BY AUTOMATED COUNT: 13.8 % (ref 11.5–14.5)
FLUAV AG NPH QL: NEGATIVE
FLUBV AG NPH QL IA: NEGATIVE
GFR SERPL CREATININE-BSD FRML MDRD: 138 ML/MIN/1.73
GLOBULIN UR ELPH-MCNC: 3.6 GM/DL
GLUCOSE BLD-MCNC: 96 MG/DL (ref 70–110)
GLUCOSE UR STRIP-MCNC: NEGATIVE MG/DL
HCT VFR BLD AUTO: 38 % (ref 37–47)
HGB BLD-MCNC: 13.6 G/DL (ref 12–16)
HGB UR QL STRIP.AUTO: ABNORMAL
HYALINE CASTS UR QL AUTO: ABNORMAL /LPF
IMM GRANULOCYTES # BLD: 0 10*3/MM3 (ref 0–0.03)
IMM GRANULOCYTES NFR BLD: 0 % (ref 0–0.5)
KETONES UR QL STRIP: NEGATIVE
LEUKOCYTE ESTERASE UR QL STRIP.AUTO: ABNORMAL
LIPASE SERPL-CCNC: 36 U/L (ref 13–60)
LYMPHOCYTES # BLD AUTO: 1.69 10*3/MM3 (ref 1–3)
LYMPHOCYTES NFR BLD AUTO: 29.4 % (ref 16–46)
MCH RBC QN AUTO: 33.3 PG (ref 27–33)
MCHC RBC AUTO-ENTMCNC: 35.8 G/DL (ref 33–37)
MCV RBC AUTO: 93.1 FL (ref 80–94)
MONOCYTES # BLD AUTO: 0.42 10*3/MM3 (ref 0.1–0.9)
MONOCYTES NFR BLD AUTO: 7.3 % (ref 0–12)
NEUTROPHILS # BLD AUTO: 3.52 10*3/MM3 (ref 1.4–6.5)
NEUTROPHILS NFR BLD AUTO: 61.4 % (ref 40–75)
NITRITE UR QL STRIP: NEGATIVE
OSMOLALITY SERPL CALC.SUM OF ELEC: NORMAL MOSM/KG (ref 273–305)
PH UR STRIP.AUTO: 7 [PH] (ref 5–8)
PLATELET # BLD AUTO: 211 10*3/MM3 (ref 130–400)
PMV BLD AUTO: 8.4 FL (ref 6–10)
POTASSIUM BLD-SCNC: 3.7 MMOL/L (ref 3.5–5.3)
PROT SERPL-MCNC: 7.9 G/DL (ref 6–8)
PROT UR QL STRIP: NEGATIVE
RBC # BLD AUTO: 4.08 10*6/MM3 (ref 4.2–5.4)
RBC # UR: ABNORMAL /HPF
REF LAB TEST METHOD: ABNORMAL
SODIUM BLD-SCNC: 136 MMOL/L (ref 135–153)
SP GR UR STRIP: <=1.005 (ref 1–1.03)
SQUAMOUS #/AREA URNS HPF: ABNORMAL /HPF
UROBILINOGEN UR QL STRIP: ABNORMAL
WBC NRBC COR # BLD: 5.74 10*3/MM3 (ref 4.5–12.5)
WBC UR QL AUTO: ABNORMAL /HPF

## 2017-11-09 PROCEDURE — 74177 CT ABD & PELVIS W/CONTRAST: CPT

## 2017-11-09 PROCEDURE — 74177 CT ABD & PELVIS W/CONTRAST: CPT | Performed by: RADIOLOGY

## 2017-11-09 PROCEDURE — 96374 THER/PROPH/DIAG INJ IV PUSH: CPT

## 2017-11-09 PROCEDURE — 99284 EMERGENCY DEPT VISIT MOD MDM: CPT

## 2017-11-09 PROCEDURE — 83690 ASSAY OF LIPASE: CPT | Performed by: PHYSICIAN ASSISTANT

## 2017-11-09 PROCEDURE — 96361 HYDRATE IV INFUSION ADD-ON: CPT

## 2017-11-09 PROCEDURE — 80053 COMPREHEN METABOLIC PANEL: CPT | Performed by: PHYSICIAN ASSISTANT

## 2017-11-09 PROCEDURE — 81001 URINALYSIS AUTO W/SCOPE: CPT | Performed by: PHYSICIAN ASSISTANT

## 2017-11-09 PROCEDURE — 96375 TX/PRO/DX INJ NEW DRUG ADDON: CPT

## 2017-11-09 PROCEDURE — 87804 INFLUENZA ASSAY W/OPTIC: CPT | Performed by: PHYSICIAN ASSISTANT

## 2017-11-09 PROCEDURE — 0 IOPAMIDOL 61 % SOLUTION: Performed by: EMERGENCY MEDICINE

## 2017-11-09 PROCEDURE — 25010000002 ONDANSETRON PER 1 MG: Performed by: PHYSICIAN ASSISTANT

## 2017-11-09 PROCEDURE — 85025 COMPLETE CBC W/AUTO DIFF WBC: CPT | Performed by: PHYSICIAN ASSISTANT

## 2017-11-09 RX ORDER — ONDANSETRON 2 MG/ML
4 INJECTION INTRAMUSCULAR; INTRAVENOUS ONCE
Status: COMPLETED | OUTPATIENT
Start: 2017-11-09 | End: 2017-11-09

## 2017-11-09 RX ORDER — POLYETHYLENE GLYCOL 3350 17 G/17G
17 POWDER, FOR SOLUTION ORAL DAILY
Qty: 225 G | Refills: 0 | Status: ON HOLD | OUTPATIENT
Start: 2017-11-09 | End: 2019-02-20

## 2017-11-09 RX ORDER — MEPERIDINE HYDROCHLORIDE 25 MG/ML
25 INJECTION INTRAMUSCULAR; INTRAVENOUS; SUBCUTANEOUS ONCE
Status: COMPLETED | OUTPATIENT
Start: 2017-11-09 | End: 2017-11-09

## 2017-11-09 RX ORDER — SODIUM CHLORIDE 0.9 % (FLUSH) 0.9 %
10 SYRINGE (ML) INJECTION AS NEEDED
Status: DISCONTINUED | OUTPATIENT
Start: 2017-11-09 | End: 2017-11-10 | Stop reason: HOSPADM

## 2017-11-09 RX ADMIN — IOPAMIDOL 90 ML: 612 INJECTION, SOLUTION INTRAVENOUS at 22:57

## 2017-11-09 RX ADMIN — ONDANSETRON 4 MG: 2 INJECTION, SOLUTION INTRAMUSCULAR; INTRAVENOUS at 22:05

## 2017-11-09 RX ADMIN — SODIUM CHLORIDE 500 ML: 9 INJECTION, SOLUTION INTRAVENOUS at 22:03

## 2017-11-09 RX ADMIN — MEPERIDINE HYDROCHLORIDE 25 MG: 25 INJECTION INTRAMUSCULAR; INTRAVENOUS; SUBCUTANEOUS at 22:06

## 2017-11-10 ENCOUNTER — TRANSCRIBE ORDERS (OUTPATIENT)
Dept: ADMINISTRATIVE | Facility: HOSPITAL | Age: 72
End: 2017-11-10

## 2017-11-10 DIAGNOSIS — Z87.891 PERSONAL HISTORY OF TOBACCO USE, PRESENTING HAZARDS TO HEALTH: Primary | ICD-10-CM

## 2017-11-10 NOTE — ED NOTES
Consent for IV contrast signed by patient. Patient denies any needs, no concerns voiced regarding contrast. Resting comfortably on stretcher. Will continue to monitor.        Ade No RN  11/09/17 8309

## 2017-11-10 NOTE — ED PROVIDER NOTES
Subjective   Patient is a 72 y.o. female presenting with abdominal pain.   History provided by:  Patient   used: No    Abdominal Pain   Pain location:  LLQ  Pain quality: cramping    Pain radiates to:  Does not radiate  Pain severity:  Moderate  Duration:  2 days  Timing:  Constant  Progression:  Worsening  Chronicity:  New  Context: awakening from sleep    Context: not recent illness, not sick contacts and not suspicious food intake    Relieved by:  Nothing  Worsened by:  Nothing  Ineffective treatments:  None tried  Associated symptoms: constipation    Associated symptoms: no chest pain, no diarrhea, no dysuria, no fever, no nausea, no shortness of breath and no vomiting    Risk factors: not obese        Review of Systems   Constitutional: Negative.  Negative for fever.   HENT: Negative.    Respiratory: Negative.  Negative for shortness of breath.    Cardiovascular: Negative.  Negative for chest pain.   Gastrointestinal: Positive for abdominal pain and constipation. Negative for diarrhea, nausea and vomiting.   Endocrine: Negative.    Genitourinary: Negative.  Negative for dysuria.   Skin: Negative.    Neurological: Negative.    Psychiatric/Behavioral: Negative.    All other systems reviewed and are negative.      Past Medical History:   Diagnosis Date   • Anxiety    • Arthritis    • Asthma    • Chronic low back pain    • COPD (chronic obstructive pulmonary disease)    • Depression    • GERD (gastroesophageal reflux disease)    • History of transfusion    • Hypertension    • Seizures    • Tobacco abuse        Allergies   Allergen Reactions   • Dilaudid [Hydromorphone Hcl] Delirium   • Morphine And Related        Past Surgical History:   Procedure Laterality Date   • APPENDECTOMY      about 15 years ago   • CARDIAC CATHETERIZATION  2012   • CATARACT EXTRACTION Bilateral    • CHOLECYSTECTOMY  about 4 years ago   • CHOLECYSTECTOMY     • HIP ARTHROPLASTY Bilateral    • HYSTERECTOMY     • ORIF HIP  FRACTURE Right 05/2017   • SKIN GRAFT      1970's       Family History   Problem Relation Age of Onset   • Arthritis Mother    • Arthritis Father    • Cancer Father    • Alcohol abuse Sister    • Arthritis Sister    • Cancer Sister    • Alcohol abuse Brother    • Arthritis Brother    • Cancer Brother    • Early death Brother    • Breast cancer Neg Hx        Social History     Social History   • Marital status:      Spouse name: N/A   • Number of children: N/A   • Years of education: N/A     Social History Main Topics   • Smoking status: Current Every Day Smoker     Packs/day: 1.50     Years: 53.00     Types: Cigarettes     Start date: 6/6/1965   • Smokeless tobacco: None   • Alcohol use No   • Drug use: No   • Sexual activity: Defer     Other Topics Concern   • None     Social History Narrative           Objective   Physical Exam   Constitutional: She is oriented to person, place, and time. She appears well-developed and well-nourished. No distress.   HENT:   Head: Normocephalic and atraumatic.   Right Ear: External ear normal.   Left Ear: External ear normal.   Nose: Nose normal.   Eyes: Conjunctivae and EOM are normal. Pupils are equal, round, and reactive to light.   Neck: Normal range of motion. Neck supple. No JVD present. No tracheal deviation present.   Cardiovascular: Normal rate, regular rhythm and normal heart sounds.    No murmur heard.  Pulmonary/Chest: Effort normal and breath sounds normal. No respiratory distress. She has no wheezes.   Abdominal: Soft. Bowel sounds are normal. There is tenderness in the left lower quadrant.   Musculoskeletal: Normal range of motion. She exhibits no edema or deformity.   Neurological: She is alert and oriented to person, place, and time. No cranial nerve deficit.   Skin: Skin is warm and dry. No rash noted. She is not diaphoretic. No erythema. No pallor.   Psychiatric: She has a normal mood and affect. Her behavior is normal. Thought content normal.   Nursing  note and vitals reviewed.      Procedures         ED Course  ED Course                  MDM  Number of Diagnoses or Management Options  new and requires workup     Amount and/or Complexity of Data Reviewed  Clinical lab tests: reviewed and ordered  Tests in the radiology section of CPT®: reviewed and ordered  Discuss the patient with other providers: yes  Independent visualization of images, tracings, or specimens: yes    Risk of Complications, Morbidity, and/or Mortality  Presenting problems: moderate  Diagnostic procedures: moderate  Management options: moderate    Patient Progress  Patient progress: stable      Final diagnoses:   Constipation, unspecified constipation type            Chacho Angulo PA-C  11/10/17 0240

## 2018-01-03 ENCOUNTER — HOSPITAL ENCOUNTER (EMERGENCY)
Facility: HOSPITAL | Age: 73
Discharge: HOME | End: 2018-01-03
Attending: EMERGENCY MEDICINE | Admitting: EMERGENCY MEDICINE

## 2018-01-03 ENCOUNTER — APPOINTMENT (OUTPATIENT)
Dept: CT IMAGING | Facility: HOSPITAL | Age: 73
End: 2018-01-03

## 2018-01-03 ENCOUNTER — APPOINTMENT (OUTPATIENT)
Dept: GENERAL RADIOLOGY | Facility: HOSPITAL | Age: 73
End: 2018-01-03

## 2018-01-03 DIAGNOSIS — N39.0 URINARY TRACT INFECTION IN FEMALE: Primary | ICD-10-CM

## 2018-01-03 DIAGNOSIS — F03.90 DEMENTIA WITHOUT BEHAVIORAL DISTURBANCE, UNSPECIFIED DEMENTIA TYPE: ICD-10-CM

## 2018-01-03 LAB
ALBUMIN SERPL-MCNC: 4.2 G/DL (ref 3.4–4.8)
ALBUMIN/GLOB SERPL: 1.1 G/DL (ref 1.5–2.5)
ALP SERPL-CCNC: 122 U/L (ref 35–104)
ALT SERPL W P-5'-P-CCNC: 24 U/L (ref 10–36)
ANION GAP SERPL CALCULATED.3IONS-SCNC: 12 MMOL/L (ref 3.6–11.2)
AST SERPL-CCNC: 36 U/L (ref 10–30)
BACTERIA UR QL AUTO: ABNORMAL /HPF
BASOPHILS # BLD AUTO: 0.01 10*3/MM3 (ref 0–0.3)
BASOPHILS NFR BLD AUTO: 0.1 % (ref 0–2)
BILIRUB SERPL-MCNC: 0.7 MG/DL (ref 0.2–1.8)
BILIRUB UR QL STRIP: ABNORMAL
BUN BLD-MCNC: 9 MG/DL (ref 7–21)
BUN/CREAT SERPL: 16.7 (ref 7–25)
CALCIUM SPEC-SCNC: 8.9 MG/DL (ref 7.7–10)
CHLORIDE SERPL-SCNC: 92 MMOL/L (ref 99–112)
CLARITY UR: ABNORMAL
CO2 SERPL-SCNC: 22 MMOL/L (ref 24.3–31.9)
COLOR UR: ABNORMAL
CREAT BLD-MCNC: 0.54 MG/DL (ref 0.43–1.29)
DEPRECATED RDW RBC AUTO: 43.4 FL (ref 37–54)
EOSINOPHIL # BLD AUTO: 0 10*3/MM3 (ref 0–0.7)
EOSINOPHIL NFR BLD AUTO: 0 % (ref 0–7)
ERYTHROCYTE [DISTWIDTH] IN BLOOD BY AUTOMATED COUNT: 13.2 % (ref 11.5–14.5)
GFR SERPL CREATININE-BSD FRML MDRD: 112 ML/MIN/1.73
GLOBULIN UR ELPH-MCNC: 4 GM/DL
GLUCOSE BLD-MCNC: 124 MG/DL (ref 70–110)
GLUCOSE BLDC GLUCOMTR-MCNC: 119 MG/DL (ref 70–130)
GLUCOSE UR STRIP-MCNC: NEGATIVE MG/DL
HCT VFR BLD AUTO: 39.4 % (ref 37–47)
HGB BLD-MCNC: 14 G/DL (ref 12–16)
HGB UR QL STRIP.AUTO: ABNORMAL
HOLD SPECIMEN: NORMAL
HOLD SPECIMEN: NORMAL
HYALINE CASTS UR QL AUTO: ABNORMAL /LPF
IMM GRANULOCYTES # BLD: 0.04 10*3/MM3 (ref 0–0.03)
IMM GRANULOCYTES NFR BLD: 0.3 % (ref 0–0.5)
KETONES UR QL STRIP: ABNORMAL
LEUKOCYTE ESTERASE UR QL STRIP.AUTO: ABNORMAL
LYMPHOCYTES # BLD AUTO: 0.98 10*3/MM3 (ref 1–3)
LYMPHOCYTES NFR BLD AUTO: 8.4 % (ref 16–46)
MAGNESIUM SERPL-MCNC: 1.7 MG/DL (ref 1.7–2.6)
MCH RBC QN AUTO: 32 PG (ref 27–33)
MCHC RBC AUTO-ENTMCNC: 35.5 G/DL (ref 33–37)
MCV RBC AUTO: 90 FL (ref 80–94)
MONOCYTES # BLD AUTO: 1.07 10*3/MM3 (ref 0.1–0.9)
MONOCYTES NFR BLD AUTO: 9.2 % (ref 0–12)
NEUTROPHILS # BLD AUTO: 9.59 10*3/MM3 (ref 1.4–6.5)
NEUTROPHILS NFR BLD AUTO: 82 % (ref 40–75)
NITRITE UR QL STRIP: NEGATIVE
OSMOLALITY SERPL CALC.SUM OF ELEC: 253.5 MOSM/KG (ref 273–305)
PH UR STRIP.AUTO: <=5 [PH] (ref 5–8)
PHENOBARB SERPL-MCNC: 44.4 MCG/ML (ref 15–40)
PHENYTOIN SERPL-MCNC: 8.4 MCG/ML (ref 10–20)
PLATELET # BLD AUTO: 229 10*3/MM3 (ref 130–400)
PMV BLD AUTO: 8.8 FL (ref 6–10)
POTASSIUM BLD-SCNC: 3.4 MMOL/L (ref 3.5–5.3)
PROT SERPL-MCNC: 8.2 G/DL (ref 6–8)
PROT UR QL STRIP: NEGATIVE
RBC # BLD AUTO: 4.38 10*6/MM3 (ref 4.2–5.4)
RBC # UR: ABNORMAL /HPF
REF LAB TEST METHOD: ABNORMAL
SODIUM BLD-SCNC: 126 MMOL/L (ref 135–153)
SP GR UR STRIP: 1.02 (ref 1–1.03)
SQUAMOUS #/AREA URNS HPF: ABNORMAL /HPF
TROPONIN I SERPL-MCNC: 0.01 NG/ML
UROBILINOGEN UR QL STRIP: ABNORMAL
WBC NRBC COR # BLD: 11.69 10*3/MM3 (ref 4.5–12.5)
WBC UR QL AUTO: ABNORMAL /HPF
WHOLE BLOOD HOLD SPECIMEN: NORMAL
WHOLE BLOOD HOLD SPECIMEN: NORMAL

## 2018-01-03 PROCEDURE — 70450 CT HEAD/BRAIN W/O DYE: CPT | Performed by: RADIOLOGY

## 2018-01-03 PROCEDURE — 80185 ASSAY OF PHENYTOIN TOTAL: CPT | Performed by: EMERGENCY MEDICINE

## 2018-01-03 PROCEDURE — 25010000002 CEFTRIAXONE PER 250 MG: Performed by: EMERGENCY MEDICINE

## 2018-01-03 PROCEDURE — 93005 ELECTROCARDIOGRAM TRACING: CPT | Performed by: EMERGENCY MEDICINE

## 2018-01-03 PROCEDURE — 70450 CT HEAD/BRAIN W/O DYE: CPT

## 2018-01-03 PROCEDURE — 36415 COLL VENOUS BLD VENIPUNCTURE: CPT

## 2018-01-03 PROCEDURE — 80184 ASSAY OF PHENOBARBITAL: CPT | Performed by: EMERGENCY MEDICINE

## 2018-01-03 PROCEDURE — 84484 ASSAY OF TROPONIN QUANT: CPT | Performed by: EMERGENCY MEDICINE

## 2018-01-03 PROCEDURE — 80053 COMPREHEN METABOLIC PANEL: CPT | Performed by: EMERGENCY MEDICINE

## 2018-01-03 PROCEDURE — 81001 URINALYSIS AUTO W/SCOPE: CPT | Performed by: EMERGENCY MEDICINE

## 2018-01-03 PROCEDURE — 83735 ASSAY OF MAGNESIUM: CPT | Performed by: EMERGENCY MEDICINE

## 2018-01-03 PROCEDURE — 87086 URINE CULTURE/COLONY COUNT: CPT | Performed by: EMERGENCY MEDICINE

## 2018-01-03 PROCEDURE — 71045 X-RAY EXAM CHEST 1 VIEW: CPT | Performed by: RADIOLOGY

## 2018-01-03 PROCEDURE — 85025 COMPLETE CBC W/AUTO DIFF WBC: CPT | Performed by: EMERGENCY MEDICINE

## 2018-01-03 PROCEDURE — 99285 EMERGENCY DEPT VISIT HI MDM: CPT

## 2018-01-03 PROCEDURE — 71045 X-RAY EXAM CHEST 1 VIEW: CPT

## 2018-01-03 PROCEDURE — 96365 THER/PROPH/DIAG IV INF INIT: CPT

## 2018-01-03 PROCEDURE — 82962 GLUCOSE BLOOD TEST: CPT

## 2018-01-03 RX ORDER — SODIUM CHLORIDE 0.9 % (FLUSH) 0.9 %
10 SYRINGE (ML) INJECTION AS NEEDED
Status: DISCONTINUED | OUTPATIENT
Start: 2018-01-03 | End: 2018-01-04 | Stop reason: HOSPADM

## 2018-01-03 RX ORDER — NITROFURANTOIN 25; 75 MG/1; MG/1
100 CAPSULE ORAL EVERY 12 HOURS SCHEDULED
Qty: 14 CAPSULE | Refills: 0 | Status: SHIPPED | OUTPATIENT
Start: 2018-01-03 | End: 2018-01-10

## 2018-01-03 RX ADMIN — CEFTRIAXONE 1 G: 1 INJECTION, SOLUTION INTRAVENOUS at 22:45

## 2018-01-03 RX ADMIN — SODIUM CHLORIDE 500 ML: 9 INJECTION, SOLUTION INTRAVENOUS at 21:15

## 2018-01-04 VITALS
SYSTOLIC BLOOD PRESSURE: 114 MMHG | WEIGHT: 104 LBS | DIASTOLIC BLOOD PRESSURE: 72 MMHG | TEMPERATURE: 98 F | HEART RATE: 82 BPM | HEIGHT: 59 IN | RESPIRATION RATE: 16 BRPM | BODY MASS INDEX: 20.96 KG/M2 | OXYGEN SATURATION: 99 %

## 2018-01-04 NOTE — ED NOTES
Patient presents to Emergency Department with her  and son.  reports patient has not been eating or drinking x2 days and has been confused. Assisted patient to undress and placed in hospital gown.      Dee Dee Isabel RN  01/04/18 0751

## 2018-01-04 NOTE — DISCHARGE INSTRUCTIONS
Hold phenobarbital for 24 hours    Hypertension  Hypertension, commonly called high blood pressure, is when the force of blood pumping through your arteries is too strong. Your arteries are the blood vessels that carry blood from your heart throughout your body. A blood pressure reading consists of a higher number over a lower number, such as 110/72. The higher number (systolic) is the pressure inside your arteries when your heart pumps. The lower number (diastolic) is the pressure inside your arteries when your heart relaxes. Ideally you want your blood pressure below 120/80.  Hypertension forces your heart to work harder to pump blood. Your arteries may become narrow or stiff. Having untreated or uncontrolled hypertension can cause heart attack, stroke, kidney disease, and other problems.  RISK FACTORS  Some risk factors for high blood pressure are controllable. Others are not.   Risk factors you cannot control include:   · Race. You may be at higher risk if you are .  · Age. Risk increases with age.  · Gender. Men are at higher risk than women before age 45 years. After age 65, women are at higher risk than men.  Risk factors you can control include:  · Not getting enough exercise or physical activity.  · Being overweight.  · Getting too much fat, sugar, calories, or salt in your diet.  · Drinking too much alcohol.  SIGNS AND SYMPTOMS  Hypertension does not usually cause signs or symptoms. Extremely high blood pressure (hypertensive crisis) may cause headache, anxiety, shortness of breath, and nosebleed.  DIAGNOSIS  To check if you have hypertension, your health care provider will measure your blood pressure while you are seated, with your arm held at the level of your heart. It should be measured at least twice using the same arm. Certain conditions can cause a difference in blood pressure between your right and left arms. A blood pressure reading that is higher than normal on one occasion does  not mean that you need treatment. If it is not clear whether you have high blood pressure, you may be asked to return on a different day to have your blood pressure checked again. Or, you may be asked to monitor your blood pressure at home for 1 or more weeks.  TREATMENT  Treating high blood pressure includes making lifestyle changes and possibly taking medicine. Living a healthy lifestyle can help lower high blood pressure. You may need to change some of your habits.  Lifestyle changes may include:  · Following the DASH diet. This diet is high in fruits, vegetables, and whole grains. It is low in salt, red meat, and added sugars.  · Keep your sodium intake below 2,300 mg per day.  · Getting at least 30-45 minutes of aerobic exercise at least 4 times per week.  · Losing weight if necessary.  · Not smoking.  · Limiting alcoholic beverages.  · Learning ways to reduce stress.  Your health care provider may prescribe medicine if lifestyle changes are not enough to get your blood pressure under control, and if one of the following is true:  · You are 18-59 years of age and your systolic blood pressure is above 140.  · You are 60 years of age or older, and your systolic blood pressure is above 150.  · Your diastolic blood pressure is above 90.  · You have diabetes, and your systolic blood pressure is over 140 or your diastolic blood pressure is over 90.  · You have kidney disease and your blood pressure is above 140/90.  · You have heart disease and your blood pressure is above 140/90.  Your personal target blood pressure may vary depending on your medical conditions, your age, and other factors.  HOME CARE INSTRUCTIONS  · Have your blood pressure rechecked as directed by your health care provider.    · Take medicines only as directed by your health care provider. Follow the directions carefully. Blood pressure medicines must be taken as prescribed. The medicine does not work as well when you skip doses. Skipping doses  also puts you at risk for problems.  · Do not smoke.    · Monitor your blood pressure at home as directed by your health care provider.   SEEK MEDICAL CARE IF:   · You think you are having a reaction to medicines taken.  · You have recurrent headaches or feel dizzy.  · You have swelling in your ankles.  · You have trouble with your vision.  SEEK IMMEDIATE MEDICAL CARE IF:  · You develop a severe headache or confusion.  · You have unusual weakness, numbness, or feel faint.  · You have severe chest or abdominal pain.  · You vomit repeatedly.  · You have trouble breathing.  MAKE SURE YOU:   · Understand these instructions.  · Will watch your condition.  · Will get help right away if you are not doing well or get worse.     This information is not intended to replace advice given to you by your health care provider. Make sure you discuss any questions you have with your health care provider.     Document Released: 12/18/2006 Document Revised: 05/03/2016 Document Reviewed: 10/10/2014  [employer] Patient Education ©2017 [employer]

## 2018-01-04 NOTE — ED PROVIDER NOTES
Subjective   HPI Comments: Patient was diagnosed with dementia last month and she just seems to be getting worse.  Getting everything not eating or drinking well.  Claims her spouse's trying to hurt her, lying about her    Patient is a 72 y.o. female presenting with altered mental status.   History provided by:  Patient and spouse  History limited by:  Dementia   used: No    Altered Mental Status   Presenting symptoms: behavior changes, confusion and memory loss    Severity:  Moderate  Most recent episode:  Today  Episode history:  Continuous  Timing:  Constant  Progression:  Worsening  Chronicity:  Recurrent  Context: dementia    Associated symptoms: agitation and weakness    Associated symptoms: no abdominal pain and no fever        Review of Systems   Constitutional: Negative for fever.   HENT: Negative.    Respiratory: Negative.    Cardiovascular: Negative.  Negative for chest pain.   Gastrointestinal: Negative.  Negative for abdominal pain.   Endocrine: Negative.    Genitourinary: Negative.  Negative for dysuria.   Skin: Negative.    Neurological: Positive for weakness.   Psychiatric/Behavioral: Positive for agitation, confusion and memory loss.   All other systems reviewed and are negative.      Past Medical History:   Diagnosis Date   • Anxiety    • Arthritis    • Asthma    • Chronic low back pain    • COPD (chronic obstructive pulmonary disease)    • Depression    • Fall 01/03/2018   • GERD (gastroesophageal reflux disease)    • History of transfusion    • Hypertension    • Seizures    • Tobacco abuse        Allergies   Allergen Reactions   • Dilaudid [Hydromorphone Hcl] Delirium   • Morphine And Related        Past Surgical History:   Procedure Laterality Date   • APPENDECTOMY      about 15 years ago   • CARDIAC CATHETERIZATION  2012   • CATARACT EXTRACTION Bilateral    • CHOLECYSTECTOMY  about 4 years ago   • CHOLECYSTECTOMY     • HIP ARTHROPLASTY Bilateral    • HYSTERECTOMY     • ORIF  HIP FRACTURE Right 05/2017   • SKIN GRAFT      1970's       Family History   Problem Relation Age of Onset   • Arthritis Mother    • Arthritis Father    • Cancer Father    • Alcohol abuse Sister    • Arthritis Sister    • Cancer Sister    • Alcohol abuse Brother    • Arthritis Brother    • Cancer Brother    • Early death Brother    • Breast cancer Neg Hx        Social History     Social History   • Marital status:      Spouse name: N/A   • Number of children: N/A   • Years of education: N/A     Social History Main Topics   • Smoking status: Current Every Day Smoker     Packs/day: 1.50     Years: 53.00     Types: Cigarettes     Start date: 6/6/1965   • Smokeless tobacco: None   • Alcohol use No   • Drug use: No   • Sexual activity: Defer     Other Topics Concern   • None     Social History Narrative   • None           Objective   Physical Exam   Constitutional: Vital signs are normal. She appears well-developed. No distress.   Thin white female   HENT:   Head: Normocephalic and atraumatic.   Right Ear: External ear normal.   Left Ear: External ear normal.   Nose: Nose normal.   Eyes: Conjunctivae and EOM are normal. Pupils are equal, round, and reactive to light.   Neck: Normal range of motion. Neck supple. No JVD present. No tracheal deviation present.   Cardiovascular: Normal rate, regular rhythm and normal heart sounds.    No murmur heard.  Pulmonary/Chest: Effort normal. No respiratory distress. She has decreased breath sounds in the right lower field and the left lower field. She has no wheezes.   Abdominal: Soft. Bowel sounds are normal. There is no tenderness.   Musculoskeletal: Normal range of motion. She exhibits no edema or deformity.   Neurological: She is alert. She has normal strength. She is disoriented. No cranial nerve deficit or sensory deficit.   Skin: Skin is warm and dry. No rash noted. She is not diaphoretic. No erythema. No pallor.   Psychiatric: She has a normal mood and affect. Her  speech is normal and behavior is normal. Thought content is paranoid. Cognition and memory are impaired. She exhibits abnormal recent memory.   Nursing note and vitals reviewed.      Procedures         ED Course  ED Course    Worsening dementia.  Patient with UTI and mild dehydration and borderline elevated phenobarbital level.  Patient was rehydrated and given IV antibiotics and hold phenobarbital 1 day cubic trial at home took to refer to neurologist have an appointment Monday with Dr. mora copy          MDM    Final diagnoses:   Urinary tract infection in female   Dementia without behavioral disturbance, unspecified dementia type            Bennett Valenzuela MD  01/03/18 [id number]

## 2018-01-06 LAB — BACTERIA SPEC AEROBE CULT: NORMAL

## 2018-02-22 ENCOUNTER — TRANSCRIBE ORDERS (OUTPATIENT)
Dept: LAB | Facility: HOSPITAL | Age: 73
End: 2018-02-22

## 2018-02-22 ENCOUNTER — HOSPITAL ENCOUNTER (OUTPATIENT)
Dept: GENERAL RADIOLOGY | Facility: HOSPITAL | Age: 73
Discharge: HOME | End: 2018-02-22
Attending: INTERNAL MEDICINE | Admitting: INTERNAL MEDICINE

## 2018-02-22 ENCOUNTER — HOSPITAL ENCOUNTER (OUTPATIENT)
Dept: GENERAL RADIOLOGY | Facility: HOSPITAL | Age: 73
Discharge: HOME | End: 2018-02-22
Attending: INTERNAL MEDICINE
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
## 2018-02-22 PROCEDURE — 73110 X-RAY EXAM OF WRIST: CPT

## 2018-02-22 PROCEDURE — 71100 X-RAY EXAM RIBS UNI 2 VIEWS: CPT

## 2018-02-22 PROCEDURE — 73090 X-RAY EXAM OF FOREARM: CPT | Performed by: RADIOLOGY

## 2018-02-22 PROCEDURE — 73110 X-RAY EXAM OF WRIST: CPT | Performed by: RADIOLOGY

## 2018-02-22 PROCEDURE — 73090 X-RAY EXAM OF FOREARM: CPT

## 2018-02-22 PROCEDURE — 71100 X-RAY EXAM RIBS UNI 2 VIEWS: CPT | Performed by: RADIOLOGY

## 2018-02-23 ENCOUNTER — TRANSCRIBE ORDERS (OUTPATIENT)
Dept: ADMINISTRATIVE | Facility: HOSPITAL | Age: 73
End: 2018-02-23

## 2018-02-23 DIAGNOSIS — Z87.891 PERSONAL HISTORY OF TOBACCO USE, PRESENTING HAZARDS TO HEALTH: ICD-10-CM

## 2018-02-23 DIAGNOSIS — Z12.31 VISIT FOR SCREENING MAMMOGRAM: Primary | ICD-10-CM

## 2018-05-09 ENCOUNTER — APPOINTMENT (OUTPATIENT)
Dept: GENERAL RADIOLOGY | Facility: HOSPITAL | Age: 73
End: 2018-05-09

## 2018-05-09 ENCOUNTER — HOSPITAL ENCOUNTER (EMERGENCY)
Facility: HOSPITAL | Age: 73
Discharge: HOME | End: 2018-05-09
Attending: EMERGENCY MEDICINE | Admitting: EMERGENCY MEDICINE

## 2018-05-09 VITALS
WEIGHT: 107 LBS | OXYGEN SATURATION: 96 % | SYSTOLIC BLOOD PRESSURE: 105 MMHG | RESPIRATION RATE: 17 BRPM | DIASTOLIC BLOOD PRESSURE: 82 MMHG | HEIGHT: 59 IN | BODY MASS INDEX: 21.57 KG/M2 | HEART RATE: 95 BPM | TEMPERATURE: 98.2 F

## 2018-05-09 DIAGNOSIS — S42.291A OTHER CLOSED DISPLACED FRACTURE OF PROXIMAL END OF RIGHT HUMERUS, INITIAL ENCOUNTER: Primary | ICD-10-CM

## 2018-05-09 PROCEDURE — 73060 X-RAY EXAM OF HUMERUS: CPT

## 2018-05-09 PROCEDURE — 73030 X-RAY EXAM OF SHOULDER: CPT

## 2018-05-09 PROCEDURE — 73030 X-RAY EXAM OF SHOULDER: CPT | Performed by: RADIOLOGY

## 2018-05-09 PROCEDURE — 99283 EMERGENCY DEPT VISIT LOW MDM: CPT

## 2018-05-09 PROCEDURE — 73060 X-RAY EXAM OF HUMERUS: CPT | Performed by: RADIOLOGY

## 2018-05-09 RX ORDER — HYDROCODONE BITARTRATE AND ACETAMINOPHEN 10; 325 MG/1; MG/1
1 TABLET ORAL ONCE
Status: COMPLETED | OUTPATIENT
Start: 2018-05-09 | End: 2018-05-09

## 2018-05-09 RX ADMIN — HYDROCODONE BITARTRATE AND ACETAMINOPHEN 1 TABLET: 10; 325 TABLET ORAL at 18:43

## 2018-05-09 NOTE — ED PROVIDER NOTES
Subjective   Upper Extremity Issue   Location:  Shoulder and arm  Shoulder location:  R shoulder  Arm location:  R arm  Injury: yes    Time since incident:  1 day  Mechanism of injury: fall    Fall:     Fall occurred:  Tripped    Impact surface:  Hard floor  Pain details:     Quality:  Aching and throbbing    Severity:  Moderate    Onset quality:  Sudden    Timing:  Constant    Progression:  Worsening  Relieved by:  Nothing  Associated symptoms: decreased range of motion, stiffness and swelling    Associated symptoms: no fever        Review of Systems   Constitutional: Negative.  Negative for fever.   HENT: Negative.    Respiratory: Negative.    Cardiovascular: Negative.  Negative for chest pain.   Gastrointestinal: Negative.  Negative for abdominal pain.   Endocrine: Negative.    Genitourinary: Negative.  Negative for dysuria.   Musculoskeletal: Positive for arthralgias, gait problem, myalgias and stiffness.   Skin: Negative.    Psychiatric/Behavioral: Negative.    All other systems reviewed and are negative.      Past Medical History:   Diagnosis Date   • Anxiety    • Arthritis    • Asthma    • Chronic low back pain    • COPD (chronic obstructive pulmonary disease)    • Depression    • Fall 01/03/2018   • GERD (gastroesophageal reflux disease)    • History of transfusion    • Hypertension    • Seizures    • Tobacco abuse        Allergies   Allergen Reactions   • Dilaudid [Hydromorphone Hcl] Delirium   • Morphine And Related        Past Surgical History:   Procedure Laterality Date   • APPENDECTOMY      about 15 years ago   • CARDIAC CATHETERIZATION  2012   • CATARACT EXTRACTION Bilateral    • CHOLECYSTECTOMY  about 4 years ago   • CHOLECYSTECTOMY     • HIP ARTHROPLASTY Bilateral    • HYSTERECTOMY     • ORIF HIP FRACTURE Right 05/2017   • SKIN GRAFT      1970's       Family History   Problem Relation Age of Onset   • Arthritis Mother    • Arthritis Father    • Cancer Father    • Alcohol abuse Sister    • Arthritis  Sister    • Cancer Sister    • Alcohol abuse Brother    • Arthritis Brother    • Cancer Brother    • Early death Brother    • Breast cancer Neg Hx        Social History     Social History   • Marital status:      Social History Main Topics   • Smoking status: Current Every Day Smoker     Packs/day: 1.50     Years: 53.00     Types: Cigarettes     Start date: 6/6/1965   • Alcohol use No   • Drug use: No   • Sexual activity: Defer     Other Topics Concern   • Not on file           Objective   Physical Exam   Constitutional: She is oriented to person, place, and time. She appears well-developed and well-nourished. No distress.   HENT:   Head: Normocephalic and atraumatic.   Nose: Nose normal.   Eyes: Conjunctivae are normal.   Neck: Normal range of motion. Neck supple. No JVD present. No tracheal deviation present.   Cardiovascular: Normal rate.    No murmur heard.  Pulmonary/Chest: Effort normal. No respiratory distress. She has no wheezes.   Abdominal: Soft. There is no tenderness.   Musculoskeletal: She exhibits edema and tenderness. She exhibits no deformity.   Extremely tender to palpation of the right shoulder.  Pain was a level that I did not for comfortable abduct and adduct the shoulder.  Tenderness is also along the right humerus.   Neurological: She is alert and oriented to person, place, and time. No cranial nerve deficit.   Skin: Skin is warm and dry. No rash noted. She is not diaphoretic. No erythema. No pallor.   Psychiatric: She has a normal mood and affect. Her behavior is normal. Thought content normal.   Nursing note and vitals reviewed.      Procedures           ED Course  ED Course   Comment By Time   X-ray interpreted by Dr. Madsen.  Displaced proximal humerus fracture of the right humerus KENYA Menchaca 05/09 1902   Discussed case with :  Sary carter follow-up in office. KENYA Menchaca 05/09 1904                  MDM  Number of Diagnoses or Management Options  Other closed  displaced fracture of proximal end of right humerus, initial encounter: new and requires workup     Amount and/or Complexity of Data Reviewed  Tests in the radiology section of CPT®: reviewed  Discuss the patient with other providers: yes    Risk of Complications, Morbidity, and/or Mortality  Presenting problems: low  Diagnostic procedures: low  Management options: low    Patient Progress  Patient progress: stable        Final diagnoses:   Other closed displaced fracture of proximal end of right humerus, initial encounter            KENYA Menchaca  05/09/18 1928       KENYA Menchaca  05/09/18 1929

## 2018-05-10 ENCOUNTER — OFFICE VISIT (OUTPATIENT)
Dept: ORTHOPEDIC SURGERY | Facility: CLINIC | Age: 73
End: 2018-05-10

## 2018-05-10 VITALS
WEIGHT: 107 LBS | DIASTOLIC BLOOD PRESSURE: 64 MMHG | HEIGHT: 59 IN | BODY MASS INDEX: 21.57 KG/M2 | HEART RATE: 88 BPM | SYSTOLIC BLOOD PRESSURE: 112 MMHG

## 2018-05-10 DIAGNOSIS — S42.331A CLOSED DISPLACED OBLIQUE FRACTURE OF SHAFT OF RIGHT HUMERUS, INITIAL ENCOUNTER: Primary | ICD-10-CM

## 2018-05-10 PROCEDURE — 24500 CLTX HUMRL SHFT FX W/O MNPJ: CPT | Performed by: ORTHOPAEDIC SURGERY

## 2018-05-10 PROCEDURE — 99406 BEHAV CHNG SMOKING 3-10 MIN: CPT | Performed by: ORTHOPAEDIC SURGERY

## 2018-05-10 NOTE — PROGRESS NOTES
New Patient Visit        Patient: Maribel Staton  YOB: 1945  Date of encounter: 5/10/2018      History of Present Illness:   Maribel Staton is a 72 y.o. female who was referred here today by [facility] emergency room for evaluation of acute injury to her right shoulder.  He states on May 7, 2018 she fell at home and hit her right shoulder on the wall and fell onto the floor and hit her again on the wet floor.  She is complaining of moderate pain in her shoulder that radiates down her arm.  She denies paresthesias.  She denies associated chest pain palpitations or syncope episode with fall.  She was seen in the emergency room where x-rays were taken and she was placed in a sling.    PMH:   Patient Active Problem List   Diagnosis   • Closed right hip fracture   • Osteoporosis, unspecified     Past Medical History:   Diagnosis Date   • Anxiety    • Arthritis    • Asthma    • Chronic low back pain    • COPD (chronic obstructive pulmonary disease)    • Depression    • Fall 01/03/2018   • GERD (gastroesophageal reflux disease)    • History of transfusion    • Hypertension    • Seizures    • Tobacco abuse        PSH:  Past Surgical History:   Procedure Laterality Date   • APPENDECTOMY      about 15 years ago   • CARDIAC CATHETERIZATION  2012   • CATARACT EXTRACTION Bilateral    • CHOLECYSTECTOMY  about 4 years ago   • CHOLECYSTECTOMY     • HIP ARTHROPLASTY Bilateral    • HYSTERECTOMY     • ORIF HIP FRACTURE Right 05/2017   • SKIN GRAFT      1970's       Allergies:     Allergies   Allergen Reactions   • Dilaudid [Hydromorphone Hcl] Delirium   • Morphine And Related        Medications:     Current Outpatient Prescriptions:   •  acetaminophen (TYLENOL) 325 MG tablet, Take 2 tablets by mouth Every 4 (Four) Hours As Needed for Mild Pain (1-3)., Disp: , Rfl: 0  •  atorvastatin (LIPITOR) 40 MG tablet, Take 1 tablet by mouth Every Night., Disp: 30 tablet, Rfl: 0  •  baclofen (LIORESAL) 10 MG tablet,  Take 10 mg by mouth 2 (Two) Times a Day., Disp: , Rfl:   •  clonazePAM (KlonoPIN) 0.5 MG tablet, Take 0.5 mg by mouth 2 (Two) Times a Day As Needed for Seizures., Disp: , Rfl:   •  HYDROcodone-acetaminophen (NORCO)  MG per tablet, Take 1 tablet by mouth Every 4 (Four) Hours As Needed for Moderate Pain (4-6)., Disp: 40 tablet, Rfl: 0  •  levETIRAcetam (KEPPRA) 250 MG tablet, Take 250 mg by mouth every night at bedtime., Disp: , Rfl:   •  levETIRAcetam (KEPPRA) 500 MG tablet, Take 500 mg by mouth Every Morning., Disp: , Rfl:   •  montelukast (SINGULAIR) 10 MG tablet, Take 10 mg by mouth Every Night., Disp: , Rfl:   •  pantoprazole (PROTONIX) 40 MG EC tablet, Take 40 mg by mouth Daily., Disp: , Rfl:   •  PHENobarbital (LUMINAL) 97.2 MG tablet, Take 97.2 mg by mouth 2 (Two) Times a Day., Disp: , Rfl:   •  phenytoin (DILANTIN) 200 MG ER capsule, Take 1 capsule by mouth Every 12 (Twelve) Hours., Disp: 60 capsule, Rfl: 0  •  phenytoin (DILANTIN) 50 MG chewable tablet, Chew 1 tablet Daily., Disp: 30 tablet, Rfl: 0  •  polyethylene glycol (MIRALAX) powder, Take 17 g by mouth Daily., Disp: 225 g, Rfl: 0  •  senna (SENNA LAX) 8.6 MG tablet, Take 1 tablet by mouth 2 (Two) Times a Day., Disp: , Rfl:   •  venlafaxine XR (EFFEXOR-XR) 150 MG 24 hr capsule, Take 150 mg by mouth Daily., Disp: , Rfl:   No current facility-administered medications for this visit.     Social History:  Social History     Social History   • Marital status:      Spouse name: N/A   • Number of children: N/A   • Years of education: N/A     Occupational History   • Not on file.     Social History Main Topics   • Smoking status: Current Every Day Smoker     Packs/day: 1.50     Years: 53.00     Types: Cigarettes     Start date: 6/6/1965   • Smokeless tobacco: Not on file   • Alcohol use No   • Drug use: No   • Sexual activity: Defer     Other Topics Concern   • Not on file     Social History Narrative   • No narrative on file       Family  "History:     Family History   Problem Relation Age of Onset   • Arthritis Mother    • Arthritis Father    • Cancer Father    • Alcohol abuse Sister    • Arthritis Sister    • Cancer Sister    • Alcohol abuse Brother    • Arthritis Brother    • Cancer Brother    • Early death Brother    • Breast cancer Neg Hx        Review of Systems:   Review of Systems   HENT: Positive for rhinorrhea and sinus pain.    Eyes: Negative.    Respiratory: Positive for apnea and wheezing.    Cardiovascular: Positive for leg swelling.   Gastrointestinal: Positive for constipation.   Endocrine: Negative.    Genitourinary: Negative.    Musculoskeletal: Positive for back pain and gait problem.   Skin: Negative.    Neurological: Positive for seizures and weakness.   Hematological: Negative.    Psychiatric/Behavioral: Positive for confusion and dysphoric mood. The patient is nervous/anxious.        Physical Exam: 72 y.o. female  General Appearance:    Alert and oriented x 3, cooperative, in no acute distress                   Vitals:    05/10/18 1027   BP: 112/64   Pulse: 88   Weight: 48.5 kg (107 lb)   Height: 149.9 cm (59\")              Body mass index is 21.61 kg/m².        Musculoskeletal:Examination of the right shoulder reveals moderate swelling with ecchymosis.  She has tenderness along the proximal humerus.  Her range of motion is not tested secondary to known fracture.  She does have good mobility of the digits and wrist.  Her neurovascular status is intact.     Radiology:     2 views of the right shoulder were reviewed revealing an obliquely oriented fracture through the proximal humeral shaft with mild displacement.    Assessment    ICD-10-CM ICD-9-CM   1. Closed displaced oblique fracture of shaft of right humerus, initial encounter S42.331A 812.21       Plan:   A 72-year-old female sustained a fall 3 days ago at her home.  X-rays were reviewed revealing an obliquely oriented fracture through the proximal humeral shaft.  There is " mild displacement but alignment is still acceptable.  Today we provided her with the sling and swath.  She is to wear full time but can remove for bathing purposes.  She can also work on gentle range of motion in the wrist and hand.  We'll have her return back in one week for repeat x-rays to check alignment.    Written by, Lucia ZAMBRANO, acting as a scribe for Dr. Costello         I advised the patient of the risks in continuing to use tobacco, and I provided this patient with smoking cessation educational materials.    During this visit, I spent 3 minutes counseling the patient regarding smoking cessation.        Patient's Body mass index is 21.61 kg/m². BMI is within normal parameters. No follow-up required.

## 2018-05-15 DIAGNOSIS — S42.331D CLOSED DISPLACED OBLIQUE FRACTURE OF SHAFT OF RIGHT HUMERUS WITH ROUTINE HEALING, SUBSEQUENT ENCOUNTER: Primary | ICD-10-CM

## 2018-05-17 ENCOUNTER — HOSPITAL ENCOUNTER (OUTPATIENT)
Dept: GENERAL RADIOLOGY | Facility: HOSPITAL | Age: 73
Discharge: HOME | End: 2018-05-17
Attending: ORTHOPAEDIC SURGERY | Admitting: ORTHOPAEDIC SURGERY

## 2018-05-17 ENCOUNTER — OFFICE VISIT (OUTPATIENT)
Dept: ORTHOPEDIC SURGERY | Facility: CLINIC | Age: 73
End: 2018-05-17

## 2018-05-17 VITALS — HEIGHT: 59 IN | WEIGHT: 107 LBS | BODY MASS INDEX: 21.57 KG/M2

## 2018-05-17 DIAGNOSIS — S42.331S CLOSED DISPLACED OBLIQUE FRACTURE OF SHAFT OF RIGHT HUMERUS, SEQUELA: Primary | ICD-10-CM

## 2018-05-17 DIAGNOSIS — S42.331D CLOSED DISPLACED OBLIQUE FRACTURE OF SHAFT OF RIGHT HUMERUS WITH ROUTINE HEALING, SUBSEQUENT ENCOUNTER: ICD-10-CM

## 2018-05-17 PROCEDURE — 99024 POSTOP FOLLOW-UP VISIT: CPT | Performed by: ORTHOPAEDIC SURGERY

## 2018-05-17 PROCEDURE — 73060 X-RAY EXAM OF HUMERUS: CPT

## 2018-05-17 PROCEDURE — 73060 X-RAY EXAM OF HUMERUS: CPT | Performed by: RADIOLOGY

## 2018-05-17 NOTE — PROGRESS NOTES
"Maribel Staton   :1945    Date of encounter:2018        HPI:  Maribel Staton is a 72 y.o. female who returns here today for follow-up of a right proximal humeral shaft fracture.  She presents here today for x-rays to check alignment.  She is one week post injury.  She states pain is less and some she still continuing with the sling and swath.  She denies paresthesias.    PMH:   Patient Active Problem List   Diagnosis   • Closed right hip fracture   • Osteoporosis, unspecified       Exam:  General Appearance:    72 y.o. female  cooperative, in no acute distress.  Alert and oriented x 3,                   Vitals:    18 1010   Weight: 48.5 kg (107 lb)   Height: 149.9 cm (59\")          Body mass index is 21.61 kg/m².   Examination reveals sling and swath in place.  There is no significant swelling.  There is mild ecchymosis.  Range of motion not tested.  Neurovascular status is intact.    Radiology:  2 views of the right humerus were reviewed revealing the obliquely oriented fracture through the proximal shaft of the humerus with minimal displacement.  There is been no change in alignment since previous x-rays.    Assessment    ICD-10-CM ICD-9-CM   1. Closed displaced oblique fracture of shaft of right humerus, sequela S42.331S 905.2       Plan:   A 72-year-old female with a right proximal humerus fracture sustained a week ago.  Repeat x-rays today reveal no change in alignment.  We've advised to continue with the sling and swath.  She can remove it for bathing purposes.  We did give her Norco 10/325 one by mouth every 6 hours as needed #24.  She'll return back in 5 weeks for repeat x-rays.    Written by, Lucia ZAMBRANO, acting as a scribe for Dr. Costello                    "

## 2018-06-04 ENCOUNTER — APPOINTMENT (OUTPATIENT)
Dept: GENERAL RADIOLOGY | Facility: HOSPITAL | Age: 73
End: 2018-06-04

## 2018-06-04 ENCOUNTER — HOSPITAL ENCOUNTER (INPATIENT)
Facility: HOSPITAL | Age: 73
LOS: 8 days | Discharge: NURSING, REHAB OR LONG-TERM CARE | End: 2018-06-12
Attending: EMERGENCY MEDICINE | Admitting: INTERNAL MEDICINE

## 2018-06-04 ENCOUNTER — APPOINTMENT (OUTPATIENT)
Dept: CT IMAGING | Facility: HOSPITAL | Age: 73
End: 2018-06-04

## 2018-06-04 DIAGNOSIS — T42.3X1A: ICD-10-CM

## 2018-06-04 DIAGNOSIS — S72.001D CLOSED FRACTURE OF RIGHT HIP WITH ROUTINE HEALING, SUBSEQUENT ENCOUNTER: ICD-10-CM

## 2018-06-04 DIAGNOSIS — S62.657A CLOSED NONDISPLACED FRACTURE OF MIDDLE PHALANX OF LEFT LITTLE FINGER, INITIAL ENCOUNTER: ICD-10-CM

## 2018-06-04 DIAGNOSIS — R29.6 FREQUENT FALLS: Primary | ICD-10-CM

## 2018-06-04 LAB
A-A DO2: 16.1 MMHG (ref 0–300)
ALBUMIN SERPL-MCNC: 3.7 G/DL (ref 3.4–4.8)
ALBUMIN/GLOB SERPL: 1 G/DL (ref 1.5–2.5)
ALP SERPL-CCNC: 179 U/L (ref 35–104)
ALT SERPL W P-5'-P-CCNC: 14 U/L (ref 10–36)
ANION GAP SERPL CALCULATED.3IONS-SCNC: 6.9 MMOL/L (ref 3.6–11.2)
ARTERIAL PATENCY WRIST A: ABNORMAL
AST SERPL-CCNC: 33 U/L (ref 10–30)
ATMOSPHERIC PRESS: 726 MMHG
BACTERIA UR QL AUTO: ABNORMAL /HPF
BASE EXCESS BLDA CALC-SCNC: 0.6 MMOL/L
BASOPHILS # BLD AUTO: 0.02 10*3/MM3 (ref 0–0.3)
BASOPHILS NFR BLD AUTO: 0.3 % (ref 0–2)
BDY SITE: ABNORMAL
BILIRUB SERPL-MCNC: 0.4 MG/DL (ref 0.2–1.8)
BILIRUB UR QL STRIP: NEGATIVE
BODY TEMPERATURE: 98.6 C
BUN BLD-MCNC: 14 MG/DL (ref 7–21)
BUN/CREAT SERPL: 27.5 (ref 7–25)
CALCIUM SPEC-SCNC: 8.9 MG/DL (ref 7.7–10)
CHLORIDE SERPL-SCNC: 102 MMOL/L (ref 99–112)
CK MB SERPL-CCNC: 0.57 NG/ML (ref 0–5)
CK MB SERPL-RTO: 0.9 % (ref 0–3)
CK SERPL-CCNC: 61 U/L (ref 24–173)
CLARITY UR: CLEAR
CO2 SERPL-SCNC: 24.1 MMOL/L (ref 24.3–31.9)
COHGB MFR BLD: 3.6 % (ref 0–5)
COLOR UR: YELLOW
CREAT BLD-MCNC: 0.51 MG/DL (ref 0.43–1.29)
D-LACTATE SERPL-SCNC: 0.4 MMOL/L (ref 0.5–2)
DEPRECATED RDW RBC AUTO: 48.6 FL (ref 37–54)
EOSINOPHIL # BLD AUTO: 0.05 10*3/MM3 (ref 0–0.7)
EOSINOPHIL NFR BLD AUTO: 0.9 % (ref 0–7)
ERYTHROCYTE [DISTWIDTH] IN BLOOD BY AUTOMATED COUNT: 14.1 % (ref 11.5–14.5)
GFR SERPL CREATININE-BSD FRML MDRD: 119 ML/MIN/1.73
GLOBULIN UR ELPH-MCNC: 3.7 GM/DL
GLUCOSE BLD-MCNC: 89 MG/DL (ref 70–110)
GLUCOSE BLDC GLUCOMTR-MCNC: 77 MG/DL (ref 70–130)
GLUCOSE BLDC GLUCOMTR-MCNC: 82 MG/DL (ref 70–130)
GLUCOSE BLDC GLUCOMTR-MCNC: 99 MG/DL (ref 70–130)
GLUCOSE UR STRIP-MCNC: NEGATIVE MG/DL
HCO3 BLDA-SCNC: 25.1 MMOL/L (ref 22–26)
HCT VFR BLD AUTO: 33.5 % (ref 37–47)
HCT VFR BLD CALC: 37 % (ref 37–47)
HGB BLD-MCNC: 11.4 G/DL (ref 12–16)
HGB BLDA-MCNC: 12.5 G/DL (ref 12–16)
HGB UR QL STRIP.AUTO: ABNORMAL
HOROWITZ INDEX BLD+IHG-RTO: 21 %
HYALINE CASTS UR QL AUTO: ABNORMAL /LPF
IMM GRANULOCYTES # BLD: 0.02 10*3/MM3 (ref 0–0.03)
IMM GRANULOCYTES NFR BLD: 0.3 % (ref 0–0.5)
KETONES UR QL STRIP: NEGATIVE
LEUKOCYTE ESTERASE UR QL STRIP.AUTO: NEGATIVE
LIPASE SERPL-CCNC: 24 U/L (ref 13–60)
LYMPHOCYTES # BLD AUTO: 1.36 10*3/MM3 (ref 1–3)
LYMPHOCYTES NFR BLD AUTO: 23.2 % (ref 16–46)
MAGNESIUM SERPL-MCNC: 1.8 MG/DL (ref 1.7–2.6)
MCH RBC QN AUTO: 32 PG (ref 27–33)
MCHC RBC AUTO-ENTMCNC: 34 G/DL (ref 33–37)
MCV RBC AUTO: 94.1 FL (ref 80–94)
METHGB BLD QL: 0.3 % (ref 0–3)
MODALITY: ABNORMAL
MONOCYTES # BLD AUTO: 0.34 10*3/MM3 (ref 0.1–0.9)
MONOCYTES NFR BLD AUTO: 5.8 % (ref 0–12)
NEUTROPHILS # BLD AUTO: 4.06 10*3/MM3 (ref 1.4–6.5)
NEUTROPHILS NFR BLD AUTO: 69.5 % (ref 40–75)
NITRITE UR QL STRIP: NEGATIVE
OSMOLALITY SERPL CALC.SUM OF ELEC: 266.3 MOSM/KG (ref 273–305)
OXYHGB MFR BLDV: 91.8 % (ref 85–100)
PCO2 BLDA: 39.8 MM HG (ref 35–45)
PH BLDA: 7.42 PH UNITS (ref 7.35–7.45)
PH UR STRIP.AUTO: 6 [PH] (ref 5–8)
PHENOBARB SERPL-MCNC: 48.2 MCG/ML (ref 15–40)
PHENYTOIN SERPL-MCNC: 8.7 MCG/ML (ref 10–20)
PHENYTOIN SERPL-MCNC: 9.4 MCG/ML (ref 10–20)
PHOSPHATE SERPL-MCNC: 4.2 MG/DL (ref 2.7–4.5)
PLATELET # BLD AUTO: 226 10*3/MM3 (ref 130–400)
PMV BLD AUTO: 8.6 FL (ref 6–10)
PO2 BLDA: 78.8 MM HG (ref 80–100)
POTASSIUM BLD-SCNC: 3.6 MMOL/L (ref 3.5–5.3)
PROT SERPL-MCNC: 7.4 G/DL (ref 6–8)
PROT UR QL STRIP: NEGATIVE
RBC # BLD AUTO: 3.56 10*6/MM3 (ref 4.2–5.4)
RBC # UR: ABNORMAL /HPF
REF LAB TEST METHOD: ABNORMAL
SAO2 % BLDCOA: 95.5 % (ref 90–100)
SODIUM BLD-SCNC: 133 MMOL/L (ref 135–153)
SP GR UR STRIP: 1.01 (ref 1–1.03)
SQUAMOUS #/AREA URNS HPF: ABNORMAL /HPF
TROPONIN I SERPL-MCNC: <0.006 NG/ML
UROBILINOGEN UR QL STRIP: ABNORMAL
WBC NRBC COR # BLD: 5.85 10*3/MM3 (ref 4.5–12.5)
WBC UR QL AUTO: ABNORMAL /HPF

## 2018-06-04 PROCEDURE — 83735 ASSAY OF MAGNESIUM: CPT | Performed by: NURSE PRACTITIONER

## 2018-06-04 PROCEDURE — 82550 ASSAY OF CK (CPK): CPT | Performed by: NURSE PRACTITIONER

## 2018-06-04 PROCEDURE — 80185 ASSAY OF PHENYTOIN TOTAL: CPT | Performed by: EMERGENCY MEDICINE

## 2018-06-04 PROCEDURE — 73060 X-RAY EXAM OF HUMERUS: CPT

## 2018-06-04 PROCEDURE — 82962 GLUCOSE BLOOD TEST: CPT

## 2018-06-04 PROCEDURE — 82375 ASSAY CARBOXYHB QUANT: CPT | Performed by: EMERGENCY MEDICINE

## 2018-06-04 PROCEDURE — 84484 ASSAY OF TROPONIN QUANT: CPT | Performed by: EMERGENCY MEDICINE

## 2018-06-04 PROCEDURE — 80186 ASSAY OF PHENYTOIN FREE: CPT | Performed by: NURSE PRACTITIONER

## 2018-06-04 PROCEDURE — 84484 ASSAY OF TROPONIN QUANT: CPT | Performed by: NURSE PRACTITIONER

## 2018-06-04 PROCEDURE — 71045 X-RAY EXAM CHEST 1 VIEW: CPT | Performed by: RADIOLOGY

## 2018-06-04 PROCEDURE — 94799 UNLISTED PULMONARY SVC/PX: CPT

## 2018-06-04 PROCEDURE — 99223 1ST HOSP IP/OBS HIGH 75: CPT | Performed by: INTERNAL MEDICINE

## 2018-06-04 PROCEDURE — 93005 ELECTROCARDIOGRAM TRACING: CPT | Performed by: EMERGENCY MEDICINE

## 2018-06-04 PROCEDURE — 84100 ASSAY OF PHOSPHORUS: CPT | Performed by: NURSE PRACTITIONER

## 2018-06-04 PROCEDURE — 93010 ELECTROCARDIOGRAM REPORT: CPT | Performed by: INTERNAL MEDICINE

## 2018-06-04 PROCEDURE — 87040 BLOOD CULTURE FOR BACTERIA: CPT | Performed by: EMERGENCY MEDICINE

## 2018-06-04 PROCEDURE — 82805 BLOOD GASES W/O2 SATURATION: CPT | Performed by: EMERGENCY MEDICINE

## 2018-06-04 PROCEDURE — 73130 X-RAY EXAM OF HAND: CPT | Performed by: RADIOLOGY

## 2018-06-04 PROCEDURE — 80177 DRUG SCRN QUAN LEVETIRACETAM: CPT | Performed by: EMERGENCY MEDICINE

## 2018-06-04 PROCEDURE — 70450 CT HEAD/BRAIN W/O DYE: CPT

## 2018-06-04 PROCEDURE — 73060 X-RAY EXAM OF HUMERUS: CPT | Performed by: RADIOLOGY

## 2018-06-04 PROCEDURE — 71045 X-RAY EXAM CHEST 1 VIEW: CPT

## 2018-06-04 PROCEDURE — 80053 COMPREHEN METABOLIC PANEL: CPT | Performed by: EMERGENCY MEDICINE

## 2018-06-04 PROCEDURE — 72170 X-RAY EXAM OF PELVIS: CPT

## 2018-06-04 PROCEDURE — 83605 ASSAY OF LACTIC ACID: CPT | Performed by: EMERGENCY MEDICINE

## 2018-06-04 PROCEDURE — 73130 X-RAY EXAM OF HAND: CPT

## 2018-06-04 PROCEDURE — 36600 WITHDRAWAL OF ARTERIAL BLOOD: CPT | Performed by: EMERGENCY MEDICINE

## 2018-06-04 PROCEDURE — 80184 ASSAY OF PHENOBARBITAL: CPT | Performed by: EMERGENCY MEDICINE

## 2018-06-04 PROCEDURE — 80185 ASSAY OF PHENYTOIN TOTAL: CPT | Performed by: NURSE PRACTITIONER

## 2018-06-04 PROCEDURE — 83050 HGB METHEMOGLOBIN QUAN: CPT | Performed by: EMERGENCY MEDICINE

## 2018-06-04 PROCEDURE — 83690 ASSAY OF LIPASE: CPT | Performed by: EMERGENCY MEDICINE

## 2018-06-04 PROCEDURE — 25010000002 HEPARIN (PORCINE) PER 1000 UNITS: Performed by: NURSE PRACTITIONER

## 2018-06-04 PROCEDURE — 82553 CREATINE MB FRACTION: CPT | Performed by: NURSE PRACTITIONER

## 2018-06-04 PROCEDURE — 36415 COLL VENOUS BLD VENIPUNCTURE: CPT

## 2018-06-04 PROCEDURE — 73502 X-RAY EXAM HIP UNI 2-3 VIEWS: CPT | Performed by: RADIOLOGY

## 2018-06-04 PROCEDURE — 81001 URINALYSIS AUTO W/SCOPE: CPT | Performed by: EMERGENCY MEDICINE

## 2018-06-04 PROCEDURE — 99285 EMERGENCY DEPT VISIT HI MDM: CPT

## 2018-06-04 PROCEDURE — 80177 DRUG SCRN QUAN LEVETIRACETAM: CPT | Performed by: NURSE PRACTITIONER

## 2018-06-04 PROCEDURE — 85025 COMPLETE CBC W/AUTO DIFF WBC: CPT | Performed by: EMERGENCY MEDICINE

## 2018-06-04 PROCEDURE — 70450 CT HEAD/BRAIN W/O DYE: CPT | Performed by: RADIOLOGY

## 2018-06-04 RX ORDER — HEPARIN SODIUM 5000 [USP'U]/ML
5000 INJECTION, SOLUTION INTRAVENOUS; SUBCUTANEOUS EVERY 12 HOURS SCHEDULED
Status: DISCONTINUED | OUTPATIENT
Start: 2018-06-04 | End: 2018-06-12 | Stop reason: HOSPADM

## 2018-06-04 RX ORDER — BUSPIRONE HYDROCHLORIDE 15 MG/1
7.5 TABLET ORAL 3 TIMES DAILY PRN
COMMUNITY
End: 2018-08-17

## 2018-06-04 RX ORDER — NICOTINE POLACRILEX 4 MG
15 LOZENGE BUCCAL
Status: DISCONTINUED | OUTPATIENT
Start: 2018-06-04 | End: 2018-06-12 | Stop reason: HOSPADM

## 2018-06-04 RX ORDER — BUSPIRONE HYDROCHLORIDE 5 MG/1
7.5 TABLET ORAL 3 TIMES DAILY PRN
Status: CANCELLED | OUTPATIENT
Start: 2018-06-04

## 2018-06-04 RX ORDER — HYDROCODONE BITARTRATE AND ACETAMINOPHEN 10; 325 MG/1; MG/1
1 TABLET ORAL EVERY 12 HOURS
COMMUNITY
End: 2019-02-21 | Stop reason: HOSPADM

## 2018-06-04 RX ORDER — CETIRIZINE HYDROCHLORIDE 10 MG/1
10 TABLET ORAL DAILY
Status: ON HOLD | COMMUNITY
End: 2018-06-12

## 2018-06-04 RX ORDER — NITROGLYCERIN 0.4 MG/1
0.4 TABLET SUBLINGUAL
Status: DISCONTINUED | OUTPATIENT
Start: 2018-06-04 | End: 2018-06-12 | Stop reason: HOSPADM

## 2018-06-04 RX ORDER — CLONAZEPAM 2 MG/1
1 TABLET ORAL EVERY 12 HOURS PRN
Status: ON HOLD | COMMUNITY
End: 2019-02-20

## 2018-06-04 RX ORDER — PHENYTOIN SODIUM 100 MG/1
100 CAPSULE, EXTENDED RELEASE ORAL DAILY
Status: DISCONTINUED | OUTPATIENT
Start: 2018-06-05 | End: 2018-06-12 | Stop reason: HOSPADM

## 2018-06-04 RX ORDER — SODIUM CHLORIDE 0.9 % (FLUSH) 0.9 %
10 SYRINGE (ML) INJECTION AS NEEDED
Status: DISCONTINUED | OUTPATIENT
Start: 2018-06-04 | End: 2018-06-12 | Stop reason: HOSPADM

## 2018-06-04 RX ORDER — CEFDINIR 300 MG/1
600 CAPSULE ORAL DAILY
COMMUNITY
End: 2018-06-12 | Stop reason: HOSPADM

## 2018-06-04 RX ORDER — BACLOFEN 20 MG/1
20 TABLET ORAL 2 TIMES DAILY
COMMUNITY
End: 2019-02-15

## 2018-06-04 RX ORDER — IPRATROPIUM BROMIDE AND ALBUTEROL SULFATE 2.5; .5 MG/3ML; MG/3ML
3 SOLUTION RESPIRATORY (INHALATION)
Status: DISCONTINUED | OUTPATIENT
Start: 2018-06-04 | End: 2018-06-05

## 2018-06-04 RX ORDER — CETIRIZINE HYDROCHLORIDE 10 MG/1
5 TABLET ORAL NIGHTLY
Status: DISCONTINUED | OUTPATIENT
Start: 2018-06-04 | End: 2018-06-12 | Stop reason: HOSPADM

## 2018-06-04 RX ORDER — MONTELUKAST SODIUM 10 MG/1
10 TABLET ORAL NIGHTLY
Status: DISCONTINUED | OUTPATIENT
Start: 2018-06-04 | End: 2018-06-12 | Stop reason: HOSPADM

## 2018-06-04 RX ORDER — LEVETIRACETAM 500 MG/1
500 TABLET ORAL EVERY 12 HOURS SCHEDULED
Status: DISCONTINUED | OUTPATIENT
Start: 2018-06-04 | End: 2018-06-12 | Stop reason: HOSPADM

## 2018-06-04 RX ORDER — VENLAFAXINE HYDROCHLORIDE 150 MG/1
150 CAPSULE, EXTENDED RELEASE ORAL DAILY
Status: CANCELLED | OUTPATIENT
Start: 2018-06-04

## 2018-06-04 RX ORDER — PHENOBARBITAL 32.4 MG/1
97.2 TABLET ORAL EVERY 12 HOURS SCHEDULED
Status: DISCONTINUED | OUTPATIENT
Start: 2018-06-04 | End: 2018-06-04

## 2018-06-04 RX ORDER — SODIUM CHLORIDE 0.9 % (FLUSH) 0.9 %
1-10 SYRINGE (ML) INJECTION AS NEEDED
Status: DISCONTINUED | OUTPATIENT
Start: 2018-06-04 | End: 2018-06-12 | Stop reason: HOSPADM

## 2018-06-04 RX ORDER — BACLOFEN 10 MG/1
20 TABLET ORAL 2 TIMES DAILY
Status: CANCELLED | OUTPATIENT
Start: 2018-06-04

## 2018-06-04 RX ORDER — ATORVASTATIN CALCIUM 40 MG/1
40 TABLET, FILM COATED ORAL NIGHTLY
Status: DISCONTINUED | OUTPATIENT
Start: 2018-06-04 | End: 2018-06-12 | Stop reason: HOSPADM

## 2018-06-04 RX ORDER — DOXYCYCLINE HYCLATE 100 MG/1
100 CAPSULE ORAL 2 TIMES DAILY
COMMUNITY
End: 2018-06-12 | Stop reason: HOSPADM

## 2018-06-04 RX ORDER — PANTOPRAZOLE SODIUM 40 MG/1
40 TABLET, DELAYED RELEASE ORAL DAILY
Status: CANCELLED | OUTPATIENT
Start: 2018-06-04

## 2018-06-04 RX ORDER — HYDROCODONE BITARTRATE AND ACETAMINOPHEN 10; 325 MG/1; MG/1
1 TABLET ORAL EVERY 12 HOURS PRN
Status: CANCELLED | OUTPATIENT
Start: 2018-06-04

## 2018-06-04 RX ORDER — PHENYTOIN SODIUM 100 MG/1
100 CAPSULE, EXTENDED RELEASE ORAL EVERY MORNING
COMMUNITY
End: 2019-02-15

## 2018-06-04 RX ORDER — DEXTROSE MONOHYDRATE 25 G/50ML
25 INJECTION, SOLUTION INTRAVENOUS
Status: DISCONTINUED | OUTPATIENT
Start: 2018-06-04 | End: 2018-06-12 | Stop reason: HOSPADM

## 2018-06-04 RX ORDER — DOXYCYCLINE 100 MG/1
100 CAPSULE ORAL EVERY 12 HOURS SCHEDULED
Status: DISCONTINUED | OUTPATIENT
Start: 2018-06-04 | End: 2018-06-05

## 2018-06-04 RX ORDER — PHENYTOIN SODIUM 100 MG/1
200 CAPSULE, EXTENDED RELEASE ORAL NIGHTLY
COMMUNITY
End: 2018-08-03 | Stop reason: SDUPTHER

## 2018-06-04 RX ORDER — CLONAZEPAM 1 MG/1
1 TABLET ORAL EVERY 12 HOURS PRN
Status: CANCELLED | OUTPATIENT
Start: 2018-06-04

## 2018-06-04 RX ORDER — PHENYTOIN SODIUM 100 MG/1
200 CAPSULE, EXTENDED RELEASE ORAL NIGHTLY
Status: DISCONTINUED | OUTPATIENT
Start: 2018-06-04 | End: 2018-06-12 | Stop reason: HOSPADM

## 2018-06-04 RX ORDER — PHENOBARBITAL 32.4 MG/1
64.8 TABLET ORAL EVERY 12 HOURS SCHEDULED
Status: DISCONTINUED | OUTPATIENT
Start: 2018-06-05 | End: 2018-06-06

## 2018-06-04 RX ORDER — POLYETHYLENE GLYCOL 3350 17 G/17G
17 POWDER, FOR SOLUTION ORAL DAILY
Status: CANCELLED | OUTPATIENT
Start: 2018-06-04

## 2018-06-04 RX ORDER — CEFDINIR 300 MG/1
600 CAPSULE ORAL NIGHTLY
Status: DISCONTINUED | OUTPATIENT
Start: 2018-06-04 | End: 2018-06-05

## 2018-06-04 RX ORDER — POLYETHYLENE GLYCOL 3350 17 G/17G
17 POWDER, FOR SOLUTION ORAL DAILY
Status: DISCONTINUED | OUTPATIENT
Start: 2018-06-04 | End: 2018-06-09 | Stop reason: CLARIF

## 2018-06-04 RX ADMIN — DOXYCYCLINE 100 MG: 100 CAPSULE ORAL at 21:25

## 2018-06-04 RX ADMIN — HEPARIN SODIUM 5000 UNITS: 5000 INJECTION, SOLUTION INTRAVENOUS; SUBCUTANEOUS at 21:26

## 2018-06-04 RX ADMIN — ATORVASTATIN CALCIUM 40 MG: 40 TABLET, FILM COATED ORAL at 21:25

## 2018-06-04 RX ADMIN — MONTELUKAST SODIUM 10 MG: 10 TABLET, COATED ORAL at 21:25

## 2018-06-04 RX ADMIN — PHENOBARBITAL 97.2 MG: 32.4 TABLET ORAL at 21:25

## 2018-06-04 RX ADMIN — CEFDINIR 600 MG: 300 CAPSULE ORAL at 21:25

## 2018-06-04 RX ADMIN — POLYETHYLENE GLYCOL 3350 17 G: 1 POWDER ORAL at 21:26

## 2018-06-04 RX ADMIN — LEVETIRACETAM 500 MG: 500 TABLET, FILM COATED ORAL at 21:26

## 2018-06-04 RX ADMIN — CETIRIZINE HYDROCHLORIDE 5 MG: 10 TABLET ORAL at 21:26

## 2018-06-04 RX ADMIN — PHENYTOIN SODIUM 200 MG: 100 CAPSULE, EXTENDED RELEASE ORAL at 23:17

## 2018-06-05 ENCOUNTER — APPOINTMENT (OUTPATIENT)
Dept: MRI IMAGING | Facility: HOSPITAL | Age: 73
End: 2018-06-05

## 2018-06-05 ENCOUNTER — APPOINTMENT (OUTPATIENT)
Dept: ULTRASOUND IMAGING | Facility: HOSPITAL | Age: 73
End: 2018-06-05
Attending: INTERNAL MEDICINE

## 2018-06-05 ENCOUNTER — APPOINTMENT (OUTPATIENT)
Dept: CARDIOLOGY | Facility: HOSPITAL | Age: 73
End: 2018-06-05

## 2018-06-05 LAB
25(OH)D3 SERPL-MCNC: 45 NG/ML
ALBUMIN SERPL-MCNC: 3.6 G/DL (ref 3.4–4.8)
ALBUMIN/GLOB SERPL: 1 G/DL (ref 1.5–2.5)
ALP SERPL-CCNC: 187 U/L (ref 35–104)
ALT SERPL W P-5'-P-CCNC: 20 U/L (ref 10–36)
ANION GAP SERPL CALCULATED.3IONS-SCNC: 3.1 MMOL/L (ref 3.6–11.2)
AST SERPL-CCNC: 28 U/L (ref 10–30)
BASOPHILS # BLD AUTO: 0.01 10*3/MM3 (ref 0–0.3)
BASOPHILS NFR BLD AUTO: 0.2 % (ref 0–2)
BILIRUB SERPL-MCNC: 0.5 MG/DL (ref 0.2–1.8)
BUN BLD-MCNC: 14 MG/DL (ref 7–21)
BUN/CREAT SERPL: 24.1 (ref 7–25)
CALCIUM SPEC-SCNC: 8.8 MG/DL (ref 7.7–10)
CHLORIDE SERPL-SCNC: 104 MMOL/L (ref 99–112)
CK MB SERPL-CCNC: 1.04 NG/ML (ref 0–5)
CK MB SERPL-RTO: 2.2 % (ref 0–3)
CK SERPL-CCNC: 48 U/L (ref 24–173)
CO2 SERPL-SCNC: 26.9 MMOL/L (ref 24.3–31.9)
CREAT BLD-MCNC: 0.58 MG/DL (ref 0.43–1.29)
CRP SERPL-MCNC: 2.47 MG/DL (ref 0–0.99)
DEPRECATED RDW RBC AUTO: 47 FL (ref 37–54)
EOSINOPHIL # BLD AUTO: 0.08 10*3/MM3 (ref 0–0.7)
EOSINOPHIL NFR BLD AUTO: 2 % (ref 0–7)
ERYTHROCYTE [DISTWIDTH] IN BLOOD BY AUTOMATED COUNT: 14.4 % (ref 11.5–14.5)
FOLATE SERPL-MCNC: 6.06 NG/ML (ref 5.4–20)
GFR SERPL CREATININE-BSD FRML MDRD: 103 ML/MIN/1.73
GLOBULIN UR ELPH-MCNC: 3.6 GM/DL
GLUCOSE BLD-MCNC: 85 MG/DL (ref 70–110)
GLUCOSE BLDC GLUCOMTR-MCNC: 84 MG/DL (ref 70–130)
GLUCOSE BLDC GLUCOMTR-MCNC: 98 MG/DL (ref 70–130)
HBA1C MFR BLD: 4.7 % (ref 4.5–5.7)
HCT VFR BLD AUTO: 34.7 % (ref 37–47)
HGB BLD-MCNC: 12.1 G/DL (ref 12–16)
IMM GRANULOCYTES # BLD: 0 10*3/MM3 (ref 0–0.03)
IMM GRANULOCYTES NFR BLD: 0 % (ref 0–0.5)
LYMPHOCYTES # BLD AUTO: 1.03 10*3/MM3 (ref 1–3)
LYMPHOCYTES NFR BLD AUTO: 25.2 % (ref 16–46)
MCH RBC QN AUTO: 33.2 PG (ref 27–33)
MCHC RBC AUTO-ENTMCNC: 34.9 G/DL (ref 33–37)
MCV RBC AUTO: 95.3 FL (ref 80–94)
MONOCYTES # BLD AUTO: 0.36 10*3/MM3 (ref 0.1–0.9)
MONOCYTES NFR BLD AUTO: 8.8 % (ref 0–12)
MYOGLOBIN SERPL-MCNC: 21 NG/ML (ref 0–109)
NEUTROPHILS # BLD AUTO: 2.6 10*3/MM3 (ref 1.4–6.5)
NEUTROPHILS NFR BLD AUTO: 63.8 % (ref 40–75)
OSMOLALITY SERPL CALC.SUM OF ELEC: 268 MOSM/KG (ref 273–305)
PLATELET # BLD AUTO: 256 10*3/MM3 (ref 130–400)
PMV BLD AUTO: 8.5 FL (ref 6–10)
POTASSIUM BLD-SCNC: 3.7 MMOL/L (ref 3.5–5.3)
PROT SERPL-MCNC: 7.2 G/DL (ref 6–8)
RBC # BLD AUTO: 3.64 10*6/MM3 (ref 4.2–5.4)
SODIUM BLD-SCNC: 134 MMOL/L (ref 135–153)
TROPONIN I SERPL-MCNC: <0.006 NG/ML
VIT B12 BLD-MCNC: 487 PG/ML (ref 211–911)
WBC NRBC COR # BLD: 4.08 10*3/MM3 (ref 4.5–12.5)

## 2018-06-05 PROCEDURE — 82962 GLUCOSE BLOOD TEST: CPT

## 2018-06-05 PROCEDURE — 94799 UNLISTED PULMONARY SVC/PX: CPT

## 2018-06-05 PROCEDURE — G8978 MOBILITY CURRENT STATUS: HCPCS

## 2018-06-05 PROCEDURE — 97167 OT EVAL HIGH COMPLEX 60 MIN: CPT

## 2018-06-05 PROCEDURE — 92610 EVALUATE SWALLOWING FUNCTION: CPT

## 2018-06-05 PROCEDURE — G8979 MOBILITY GOAL STATUS: HCPCS

## 2018-06-05 PROCEDURE — 97530 THERAPEUTIC ACTIVITIES: CPT

## 2018-06-05 PROCEDURE — 82553 CREATINE MB FRACTION: CPT | Performed by: INTERNAL MEDICINE

## 2018-06-05 PROCEDURE — 93880 EXTRACRANIAL BILAT STUDY: CPT | Performed by: RADIOLOGY

## 2018-06-05 PROCEDURE — G8987 SELF CARE CURRENT STATUS: HCPCS

## 2018-06-05 PROCEDURE — 25010000002 HEPARIN (PORCINE) PER 1000 UNITS: Performed by: NURSE PRACTITIONER

## 2018-06-05 PROCEDURE — 84484 ASSAY OF TROPONIN QUANT: CPT | Performed by: INTERNAL MEDICINE

## 2018-06-05 PROCEDURE — G8988 SELF CARE GOAL STATUS: HCPCS

## 2018-06-05 PROCEDURE — G8997 SWALLOW GOAL STATUS: HCPCS

## 2018-06-05 PROCEDURE — 93306 TTE W/DOPPLER COMPLETE: CPT | Performed by: INTERNAL MEDICINE

## 2018-06-05 PROCEDURE — 80053 COMPREHEN METABOLIC PANEL: CPT | Performed by: INTERNAL MEDICINE

## 2018-06-05 PROCEDURE — 97116 GAIT TRAINING THERAPY: CPT

## 2018-06-05 PROCEDURE — 93880 EXTRACRANIAL BILAT STUDY: CPT

## 2018-06-05 PROCEDURE — 93306 TTE W/DOPPLER COMPLETE: CPT

## 2018-06-05 PROCEDURE — 72148 MRI LUMBAR SPINE W/O DYE: CPT

## 2018-06-05 PROCEDURE — 72148 MRI LUMBAR SPINE W/O DYE: CPT | Performed by: RADIOLOGY

## 2018-06-05 PROCEDURE — 82607 VITAMIN B-12: CPT | Performed by: INTERNAL MEDICINE

## 2018-06-05 PROCEDURE — G8996 SWALLOW CURRENT STATUS: HCPCS

## 2018-06-05 PROCEDURE — 83874 ASSAY OF MYOGLOBIN: CPT | Performed by: INTERNAL MEDICINE

## 2018-06-05 PROCEDURE — 82550 ASSAY OF CK (CPK): CPT | Performed by: INTERNAL MEDICINE

## 2018-06-05 PROCEDURE — 97163 PT EVAL HIGH COMPLEX 45 MIN: CPT

## 2018-06-05 PROCEDURE — 82746 ASSAY OF FOLIC ACID SERUM: CPT | Performed by: INTERNAL MEDICINE

## 2018-06-05 PROCEDURE — 99232 SBSQ HOSP IP/OBS MODERATE 35: CPT | Performed by: HOSPITALIST

## 2018-06-05 PROCEDURE — 83036 HEMOGLOBIN GLYCOSYLATED A1C: CPT | Performed by: INTERNAL MEDICINE

## 2018-06-05 PROCEDURE — 85025 COMPLETE CBC W/AUTO DIFF WBC: CPT | Performed by: INTERNAL MEDICINE

## 2018-06-05 PROCEDURE — G8998 SWALLOW D/C STATUS: HCPCS

## 2018-06-05 PROCEDURE — 86140 C-REACTIVE PROTEIN: CPT | Performed by: INTERNAL MEDICINE

## 2018-06-05 PROCEDURE — 82306 VITAMIN D 25 HYDROXY: CPT | Performed by: NURSE PRACTITIONER

## 2018-06-05 RX ORDER — UREA 10 %
1 LOTION (ML) TOPICAL DAILY
Status: DISCONTINUED | OUTPATIENT
Start: 2018-06-05 | End: 2018-06-12 | Stop reason: HOSPADM

## 2018-06-05 RX ORDER — IPRATROPIUM BROMIDE AND ALBUTEROL SULFATE 2.5; .5 MG/3ML; MG/3ML
3 SOLUTION RESPIRATORY (INHALATION) EVERY 6 HOURS PRN
Status: DISCONTINUED | OUTPATIENT
Start: 2018-06-05 | End: 2018-06-12 | Stop reason: HOSPADM

## 2018-06-05 RX ADMIN — IPRATROPIUM BROMIDE AND ALBUTEROL SULFATE 3 ML: .5; 3 SOLUTION RESPIRATORY (INHALATION) at 19:05

## 2018-06-05 RX ADMIN — ATORVASTATIN CALCIUM 40 MG: 40 TABLET, FILM COATED ORAL at 21:24

## 2018-06-05 RX ADMIN — IPRATROPIUM BROMIDE AND ALBUTEROL SULFATE 3 ML: .5; 3 SOLUTION RESPIRATORY (INHALATION) at 07:08

## 2018-06-05 RX ADMIN — LEVETIRACETAM 500 MG: 500 TABLET, FILM COATED ORAL at 21:24

## 2018-06-05 RX ADMIN — Medication 1 TABLET: at 10:13

## 2018-06-05 RX ADMIN — PHENOBARBITAL 64.8 MG: 32.4 TABLET ORAL at 10:12

## 2018-06-05 RX ADMIN — CEFAZOLIN 1 G: 1 INJECTION, POWDER, FOR SOLUTION INTRAMUSCULAR; INTRAVENOUS; PARENTERAL at 21:00

## 2018-06-05 RX ADMIN — PHENYTOIN SODIUM 100 MG: 100 CAPSULE, EXTENDED RELEASE ORAL at 10:14

## 2018-06-05 RX ADMIN — PHENOBARBITAL 64.8 MG: 32.4 TABLET ORAL at 21:24

## 2018-06-05 RX ADMIN — CETIRIZINE HYDROCHLORIDE 5 MG: 10 TABLET ORAL at 21:24

## 2018-06-05 RX ADMIN — POLYETHYLENE GLYCOL 3350 17 G: 1 POWDER ORAL at 10:17

## 2018-06-05 RX ADMIN — PHENYTOIN SODIUM 200 MG: 100 CAPSULE, EXTENDED RELEASE ORAL at 21:24

## 2018-06-05 RX ADMIN — MONTELUKAST SODIUM 10 MG: 10 TABLET, COATED ORAL at 21:24

## 2018-06-05 RX ADMIN — HEPARIN SODIUM 5000 UNITS: 5000 INJECTION, SOLUTION INTRAVENOUS; SUBCUTANEOUS at 10:13

## 2018-06-05 RX ADMIN — LEVETIRACETAM 500 MG: 500 TABLET, FILM COATED ORAL at 09:13

## 2018-06-05 RX ADMIN — HEPARIN SODIUM 5000 UNITS: 5000 INJECTION, SOLUTION INTRAVENOUS; SUBCUTANEOUS at 21:24

## 2018-06-05 RX ADMIN — CEFAZOLIN 1 G: 1 INJECTION, POWDER, FOR SOLUTION INTRAMUSCULAR; INTRAVENOUS; PARENTERAL at 14:35

## 2018-06-06 ENCOUNTER — APPOINTMENT (OUTPATIENT)
Dept: GENERAL RADIOLOGY | Facility: HOSPITAL | Age: 73
End: 2018-06-06

## 2018-06-06 LAB
GLUCOSE BLDC GLUCOMTR-MCNC: 90 MG/DL (ref 70–130)
PHENOBARB SERPL-MCNC: 46.3 MCG/ML (ref 15–40)

## 2018-06-06 PROCEDURE — 25010000002 HEPARIN (PORCINE) PER 1000 UNITS: Performed by: NURSE PRACTITIONER

## 2018-06-06 PROCEDURE — 97530 THERAPEUTIC ACTIVITIES: CPT

## 2018-06-06 PROCEDURE — 72040 X-RAY EXAM NECK SPINE 2-3 VW: CPT | Performed by: RADIOLOGY

## 2018-06-06 PROCEDURE — 82962 GLUCOSE BLOOD TEST: CPT

## 2018-06-06 PROCEDURE — 99232 SBSQ HOSP IP/OBS MODERATE 35: CPT | Performed by: HOSPITALIST

## 2018-06-06 PROCEDURE — 97116 GAIT TRAINING THERAPY: CPT

## 2018-06-06 PROCEDURE — 97110 THERAPEUTIC EXERCISES: CPT

## 2018-06-06 PROCEDURE — 72040 X-RAY EXAM NECK SPINE 2-3 VW: CPT

## 2018-06-06 PROCEDURE — 94799 UNLISTED PULMONARY SVC/PX: CPT

## 2018-06-06 PROCEDURE — 80184 ASSAY OF PHENOBARBITAL: CPT | Performed by: HOSPITALIST

## 2018-06-06 RX ORDER — HYDROCODONE BITARTRATE AND ACETAMINOPHEN 5; 325 MG/1; MG/1
1 TABLET ORAL EVERY 6 HOURS PRN
Status: DISCONTINUED | OUTPATIENT
Start: 2018-06-06 | End: 2018-06-12 | Stop reason: HOSPADM

## 2018-06-06 RX ORDER — PHENOBARBITAL 32.4 MG/1
32.4 TABLET ORAL DAILY
Status: DISCONTINUED | OUTPATIENT
Start: 2018-06-07 | End: 2018-06-12 | Stop reason: HOSPADM

## 2018-06-06 RX ORDER — ACETAMINOPHEN AND CODEINE PHOSPHATE 300; 30 MG/1; MG/1
1 TABLET ORAL ONCE
Status: COMPLETED | OUTPATIENT
Start: 2018-06-06 | End: 2018-06-06

## 2018-06-06 RX ORDER — PHENOBARBITAL 32.4 MG/1
64.8 TABLET ORAL NIGHTLY
Status: DISCONTINUED | OUTPATIENT
Start: 2018-06-06 | End: 2018-06-12 | Stop reason: HOSPADM

## 2018-06-06 RX ADMIN — PHENYTOIN SODIUM 200 MG: 100 CAPSULE, EXTENDED RELEASE ORAL at 19:51

## 2018-06-06 RX ADMIN — HYDROCODONE BITARTRATE AND ACETAMINOPHEN 1 TABLET: 5; 325 TABLET ORAL at 19:50

## 2018-06-06 RX ADMIN — CETIRIZINE HYDROCHLORIDE 5 MG: 10 TABLET ORAL at 19:51

## 2018-06-06 RX ADMIN — PHENOBARBITAL 64.8 MG: 32.4 TABLET ORAL at 08:54

## 2018-06-06 RX ADMIN — ACETAMINOPHEN AND CODEINE PHOSPHATE 1 TABLET: 300; 30 TABLET ORAL at 05:03

## 2018-06-06 RX ADMIN — HEPARIN SODIUM 5000 UNITS: 5000 INJECTION, SOLUTION INTRAVENOUS; SUBCUTANEOUS at 19:50

## 2018-06-06 RX ADMIN — MONTELUKAST SODIUM 10 MG: 10 TABLET, COATED ORAL at 19:51

## 2018-06-06 RX ADMIN — PHENYTOIN SODIUM 100 MG: 100 CAPSULE, EXTENDED RELEASE ORAL at 08:51

## 2018-06-06 RX ADMIN — PHENOBARBITAL 64.8 MG: 32.4 TABLET ORAL at 20:34

## 2018-06-06 RX ADMIN — ATORVASTATIN CALCIUM 40 MG: 40 TABLET, FILM COATED ORAL at 19:51

## 2018-06-06 RX ADMIN — CEFAZOLIN 1 G: 1 INJECTION, POWDER, FOR SOLUTION INTRAMUSCULAR; INTRAVENOUS; PARENTERAL at 19:50

## 2018-06-06 RX ADMIN — CEFAZOLIN 1 G: 1 INJECTION, POWDER, FOR SOLUTION INTRAMUSCULAR; INTRAVENOUS; PARENTERAL at 11:15

## 2018-06-06 RX ADMIN — CEFAZOLIN 1 G: 1 INJECTION, POWDER, FOR SOLUTION INTRAMUSCULAR; INTRAVENOUS; PARENTERAL at 03:00

## 2018-06-06 RX ADMIN — Medication 1 TABLET: at 08:51

## 2018-06-06 RX ADMIN — LEVETIRACETAM 500 MG: 500 TABLET, FILM COATED ORAL at 19:51

## 2018-06-06 RX ADMIN — HEPARIN SODIUM 5000 UNITS: 5000 INJECTION, SOLUTION INTRAVENOUS; SUBCUTANEOUS at 08:51

## 2018-06-06 RX ADMIN — LEVETIRACETAM 500 MG: 500 TABLET, FILM COATED ORAL at 08:51

## 2018-06-07 ENCOUNTER — APPOINTMENT (OUTPATIENT)
Dept: MRI IMAGING | Facility: HOSPITAL | Age: 73
End: 2018-06-07

## 2018-06-07 LAB
BH CV ECHO MEAS - AO ROOT AREA (BSA CORRECTED): 2.5
BH CV ECHO MEAS - AO ROOT AREA: 8.3 CM^2
BH CV ECHO MEAS - AO ROOT DIAM: 3.2 CM
BH CV ECHO MEAS - BSA(HAYCOCK): 1.3 M^2
BH CV ECHO MEAS - BSA: 1.3 M^2
BH CV ECHO MEAS - BZI_BMI: 16.6 KILOGRAMS/M^2
BH CV ECHO MEAS - BZI_METRIC_HEIGHT: 152.4 CM
BH CV ECHO MEAS - BZI_METRIC_WEIGHT: 38.6 KG
BH CV ECHO MEAS - CONTRAST EF 4CH: 71.8 ML/M^2
BH CV ECHO MEAS - EDV(MOD-SP4): 39 ML
BH CV ECHO MEAS - EF(MOD-SP4): 71.8 %
BH CV ECHO MEAS - ESV(MOD-SP4): 11 ML
BH CV ECHO MEAS - LA DIMENSION: 4 CM
BH CV ECHO MEAS - LA/AO: 1.2
BH CV ECHO MEAS - LV DIASTOLIC VOL/BSA (35-75): 30.1 ML/M^2
BH CV ECHO MEAS - LV SYSTOLIC VOL/BSA (12-30): 8.5 ML/M^2
BH CV ECHO MEAS - LVLD AP4: 5.7 CM
BH CV ECHO MEAS - LVLS AP4: 5 CM
BH CV ECHO MEAS - LVOT AREA (M): 3.5 CM^2
BH CV ECHO MEAS - LVOT AREA: 3.6 CM^2
BH CV ECHO MEAS - LVOT DIAM: 2.1 CM
BH CV ECHO MEAS - MV A MAX VEL: 64.2 CM/SEC
BH CV ECHO MEAS - MV E MAX VEL: 84.9 CM/SEC
BH CV ECHO MEAS - MV E/A: 1.3
BH CV ECHO MEAS - SI(MOD-SP4): 21.6 ML/M^2
BH CV ECHO MEAS - SV(MOD-SP4): 28 ML
MAXIMAL PREDICTED HEART RATE: 150 BPM
PHENOBARB SERPL-MCNC: 44.7 MCG/ML (ref 15–40)
PHENYTOIN FREE SERPL-MCNC: 1 UG/ML (ref 1–2)
STRESS TARGET HR: 128 BPM

## 2018-06-07 PROCEDURE — 99232 SBSQ HOSP IP/OBS MODERATE 35: CPT | Performed by: HOSPITALIST

## 2018-06-07 PROCEDURE — 97530 THERAPEUTIC ACTIVITIES: CPT

## 2018-06-07 PROCEDURE — 97116 GAIT TRAINING THERAPY: CPT

## 2018-06-07 PROCEDURE — 25010000002 HEPARIN (PORCINE) PER 1000 UNITS: Performed by: NURSE PRACTITIONER

## 2018-06-07 PROCEDURE — 94799 UNLISTED PULMONARY SVC/PX: CPT

## 2018-06-07 PROCEDURE — 99222 1ST HOSP IP/OBS MODERATE 55: CPT | Performed by: PSYCHIATRY & NEUROLOGY

## 2018-06-07 PROCEDURE — 80184 ASSAY OF PHENOBARBITAL: CPT | Performed by: HOSPITALIST

## 2018-06-07 RX ADMIN — LEVETIRACETAM 500 MG: 500 TABLET, FILM COATED ORAL at 19:36

## 2018-06-07 RX ADMIN — IPRATROPIUM BROMIDE AND ALBUTEROL SULFATE 3 ML: .5; 3 SOLUTION RESPIRATORY (INHALATION) at 20:24

## 2018-06-07 RX ADMIN — CEFAZOLIN 1 G: 1 INJECTION, POWDER, FOR SOLUTION INTRAMUSCULAR; INTRAVENOUS; PARENTERAL at 19:39

## 2018-06-07 RX ADMIN — HEPARIN SODIUM 5000 UNITS: 5000 INJECTION, SOLUTION INTRAVENOUS; SUBCUTANEOUS at 19:37

## 2018-06-07 RX ADMIN — CEFAZOLIN 1 G: 1 INJECTION, POWDER, FOR SOLUTION INTRAMUSCULAR; INTRAVENOUS; PARENTERAL at 04:08

## 2018-06-07 RX ADMIN — HYDROCODONE BITARTRATE AND ACETAMINOPHEN 1 TABLET: 5; 325 TABLET ORAL at 23:25

## 2018-06-07 RX ADMIN — HYDROCODONE BITARTRATE AND ACETAMINOPHEN 1 TABLET: 5; 325 TABLET ORAL at 09:07

## 2018-06-07 RX ADMIN — Medication 1 TABLET: at 09:07

## 2018-06-07 RX ADMIN — PHENYTOIN SODIUM 100 MG: 100 CAPSULE, EXTENDED RELEASE ORAL at 09:08

## 2018-06-07 RX ADMIN — LEVETIRACETAM 500 MG: 500 TABLET, FILM COATED ORAL at 09:08

## 2018-06-07 RX ADMIN — HYDROCODONE BITARTRATE AND ACETAMINOPHEN 1 TABLET: 5; 325 TABLET ORAL at 17:55

## 2018-06-07 RX ADMIN — CETIRIZINE HYDROCHLORIDE 5 MG: 10 TABLET ORAL at 19:37

## 2018-06-07 RX ADMIN — PHENOBARBITAL 64.8 MG: 32.4 TABLET ORAL at 19:37

## 2018-06-07 RX ADMIN — MONTELUKAST SODIUM 10 MG: 10 TABLET, COATED ORAL at 19:36

## 2018-06-07 RX ADMIN — PHENOBARBITAL 32.4 MG: 32.4 TABLET ORAL at 09:10

## 2018-06-07 RX ADMIN — POLYETHYLENE GLYCOL 3350 17 G: 1 POWDER ORAL at 09:09

## 2018-06-07 RX ADMIN — ATORVASTATIN CALCIUM 40 MG: 40 TABLET, FILM COATED ORAL at 19:36

## 2018-06-07 RX ADMIN — CEFAZOLIN 1 G: 1 INJECTION, POWDER, FOR SOLUTION INTRAMUSCULAR; INTRAVENOUS; PARENTERAL at 11:03

## 2018-06-07 RX ADMIN — PHENYTOIN SODIUM 200 MG: 100 CAPSULE, EXTENDED RELEASE ORAL at 19:37

## 2018-06-07 RX ADMIN — HEPARIN SODIUM 5000 UNITS: 5000 INJECTION, SOLUTION INTRAVENOUS; SUBCUTANEOUS at 09:08

## 2018-06-08 LAB
LEVETIRACETAM SERPL-MCNC: 12.6 UG/ML (ref 10–40)
LEVETIRACETAM SERPL-MCNC: 22.1 UG/ML (ref 10–40)

## 2018-06-08 PROCEDURE — 94799 UNLISTED PULMONARY SVC/PX: CPT

## 2018-06-08 PROCEDURE — 97530 THERAPEUTIC ACTIVITIES: CPT

## 2018-06-08 PROCEDURE — 99232 SBSQ HOSP IP/OBS MODERATE 35: CPT | Performed by: HOSPITALIST

## 2018-06-08 PROCEDURE — 25010000002 HEPARIN (PORCINE) PER 1000 UNITS: Performed by: NURSE PRACTITIONER

## 2018-06-08 PROCEDURE — 25010000002 ZIPRASIDONE MESYLATE PER 10 MG: Performed by: INTERNAL MEDICINE

## 2018-06-08 PROCEDURE — 97116 GAIT TRAINING THERAPY: CPT

## 2018-06-08 RX ADMIN — ATORVASTATIN CALCIUM 40 MG: 40 TABLET, FILM COATED ORAL at 20:47

## 2018-06-08 RX ADMIN — POLYETHYLENE GLYCOL 3350 17 G: 1 POWDER ORAL at 08:46

## 2018-06-08 RX ADMIN — LEVETIRACETAM 500 MG: 500 TABLET, FILM COATED ORAL at 08:44

## 2018-06-08 RX ADMIN — WATER 10 MG: 1 INJECTION INTRAMUSCULAR; INTRAVENOUS; SUBCUTANEOUS at 06:34

## 2018-06-08 RX ADMIN — PHENOBARBITAL 32.4 MG: 32.4 TABLET ORAL at 08:46

## 2018-06-08 RX ADMIN — HEPARIN SODIUM 5000 UNITS: 5000 INJECTION, SOLUTION INTRAVENOUS; SUBCUTANEOUS at 08:44

## 2018-06-08 RX ADMIN — HYDROCODONE BITARTRATE AND ACETAMINOPHEN 1 TABLET: 5; 325 TABLET ORAL at 08:44

## 2018-06-08 RX ADMIN — PHENYTOIN SODIUM 200 MG: 100 CAPSULE, EXTENDED RELEASE ORAL at 20:46

## 2018-06-08 RX ADMIN — IPRATROPIUM BROMIDE AND ALBUTEROL SULFATE 3 ML: .5; 3 SOLUTION RESPIRATORY (INHALATION) at 08:39

## 2018-06-08 RX ADMIN — Medication 1 TABLET: at 08:44

## 2018-06-08 RX ADMIN — MONTELUKAST SODIUM 10 MG: 10 TABLET, COATED ORAL at 20:47

## 2018-06-08 RX ADMIN — PHENOBARBITAL 64.8 MG: 32.4 TABLET ORAL at 20:46

## 2018-06-08 RX ADMIN — PHENYTOIN SODIUM 100 MG: 100 CAPSULE, EXTENDED RELEASE ORAL at 08:44

## 2018-06-08 RX ADMIN — CETIRIZINE HYDROCHLORIDE 5 MG: 10 TABLET ORAL at 20:46

## 2018-06-08 RX ADMIN — LEVETIRACETAM 500 MG: 500 TABLET, FILM COATED ORAL at 20:46

## 2018-06-08 RX ADMIN — HEPARIN SODIUM 5000 UNITS: 5000 INJECTION, SOLUTION INTRAVENOUS; SUBCUTANEOUS at 20:47

## 2018-06-08 RX ADMIN — CEFAZOLIN 1 G: 1 INJECTION, POWDER, FOR SOLUTION INTRAMUSCULAR; INTRAVENOUS; PARENTERAL at 03:26

## 2018-06-09 LAB
BACTERIA SPEC AEROBE CULT: NORMAL
BACTERIA SPEC AEROBE CULT: NORMAL
PHENOBARB SERPL-MCNC: 32.5 MCG/ML (ref 15–40)

## 2018-06-09 PROCEDURE — 99232 SBSQ HOSP IP/OBS MODERATE 35: CPT | Performed by: HOSPITALIST

## 2018-06-09 PROCEDURE — 80184 ASSAY OF PHENOBARBITAL: CPT | Performed by: HOSPITALIST

## 2018-06-09 PROCEDURE — 94799 UNLISTED PULMONARY SVC/PX: CPT

## 2018-06-09 PROCEDURE — 25010000002 HEPARIN (PORCINE) PER 1000 UNITS: Performed by: NURSE PRACTITIONER

## 2018-06-09 RX ORDER — LORAZEPAM 0.5 MG/1
0.25 TABLET ORAL 2 TIMES DAILY PRN
Status: DISCONTINUED | OUTPATIENT
Start: 2018-06-09 | End: 2018-06-12 | Stop reason: HOSPADM

## 2018-06-09 RX ORDER — QUETIAPINE FUMARATE 25 MG/1
50 TABLET, FILM COATED ORAL NIGHTLY
Status: DISCONTINUED | OUTPATIENT
Start: 2018-06-09 | End: 2018-06-09

## 2018-06-09 RX ADMIN — PHENOBARBITAL 32.4 MG: 32.4 TABLET ORAL at 08:50

## 2018-06-09 RX ADMIN — HYDROCODONE BITARTRATE AND ACETAMINOPHEN 1 TABLET: 5; 325 TABLET ORAL at 20:31

## 2018-06-09 RX ADMIN — HEPARIN SODIUM 5000 UNITS: 5000 INJECTION, SOLUTION INTRAVENOUS; SUBCUTANEOUS at 20:32

## 2018-06-09 RX ADMIN — PHENYTOIN SODIUM 100 MG: 100 CAPSULE, EXTENDED RELEASE ORAL at 08:46

## 2018-06-09 RX ADMIN — Medication 1 TABLET: at 08:46

## 2018-06-09 RX ADMIN — LEVETIRACETAM 500 MG: 500 TABLET, FILM COATED ORAL at 08:45

## 2018-06-09 RX ADMIN — ATORVASTATIN CALCIUM 40 MG: 40 TABLET, FILM COATED ORAL at 20:31

## 2018-06-09 RX ADMIN — HEPARIN SODIUM 5000 UNITS: 5000 INJECTION, SOLUTION INTRAVENOUS; SUBCUTANEOUS at 08:51

## 2018-06-09 RX ADMIN — PHENOBARBITAL 64.8 MG: 32.4 TABLET ORAL at 20:31

## 2018-06-09 RX ADMIN — LEVETIRACETAM 500 MG: 500 TABLET, FILM COATED ORAL at 20:32

## 2018-06-09 RX ADMIN — PHENYTOIN SODIUM 200 MG: 100 CAPSULE, EXTENDED RELEASE ORAL at 20:31

## 2018-06-09 RX ADMIN — POLYETHYLENE GLYCOL 3350 17 G: 1 POWDER ORAL at 08:57

## 2018-06-09 RX ADMIN — LORAZEPAM 0.25 MG: 0.5 TABLET ORAL at 15:48

## 2018-06-09 RX ADMIN — MONTELUKAST SODIUM 10 MG: 10 TABLET, COATED ORAL at 20:31

## 2018-06-09 RX ADMIN — CETIRIZINE HYDROCHLORIDE 5 MG: 10 TABLET ORAL at 20:31

## 2018-06-10 PROCEDURE — 99232 SBSQ HOSP IP/OBS MODERATE 35: CPT | Performed by: INTERNAL MEDICINE

## 2018-06-10 PROCEDURE — 25010000002 HEPARIN (PORCINE) PER 1000 UNITS: Performed by: NURSE PRACTITIONER

## 2018-06-10 PROCEDURE — 94799 UNLISTED PULMONARY SVC/PX: CPT

## 2018-06-10 RX ADMIN — PHENYTOIN SODIUM 100 MG: 100 CAPSULE, EXTENDED RELEASE ORAL at 08:48

## 2018-06-10 RX ADMIN — MONTELUKAST SODIUM 10 MG: 10 TABLET, COATED ORAL at 21:42

## 2018-06-10 RX ADMIN — ATORVASTATIN CALCIUM 40 MG: 40 TABLET, FILM COATED ORAL at 21:42

## 2018-06-10 RX ADMIN — POLYETHYLENE GLYCOL (3350) 17 G: 17 POWDER, FOR SOLUTION ORAL at 08:49

## 2018-06-10 RX ADMIN — LORAZEPAM 0.25 MG: 0.5 TABLET ORAL at 01:16

## 2018-06-10 RX ADMIN — PHENOBARBITAL 32.4 MG: 32.4 TABLET ORAL at 08:52

## 2018-06-10 RX ADMIN — CETIRIZINE HYDROCHLORIDE 5 MG: 10 TABLET ORAL at 21:42

## 2018-06-10 RX ADMIN — PHENYTOIN SODIUM 200 MG: 100 CAPSULE, EXTENDED RELEASE ORAL at 21:42

## 2018-06-10 RX ADMIN — Medication 1 TABLET: at 08:49

## 2018-06-10 RX ADMIN — LEVETIRACETAM 500 MG: 500 TABLET, FILM COATED ORAL at 08:47

## 2018-06-10 RX ADMIN — HEPARIN SODIUM 5000 UNITS: 5000 INJECTION, SOLUTION INTRAVENOUS; SUBCUTANEOUS at 08:47

## 2018-06-10 RX ADMIN — LEVETIRACETAM 500 MG: 500 TABLET, FILM COATED ORAL at 21:42

## 2018-06-10 RX ADMIN — PHENOBARBITAL 64.8 MG: 32.4 TABLET ORAL at 21:42

## 2018-06-10 RX ADMIN — HEPARIN SODIUM 5000 UNITS: 5000 INJECTION, SOLUTION INTRAVENOUS; SUBCUTANEOUS at 21:41

## 2018-06-11 PROCEDURE — 25010000002 HEPARIN (PORCINE) PER 1000 UNITS: Performed by: NURSE PRACTITIONER

## 2018-06-11 PROCEDURE — 97530 THERAPEUTIC ACTIVITIES: CPT

## 2018-06-11 PROCEDURE — 99232 SBSQ HOSP IP/OBS MODERATE 35: CPT | Performed by: INTERNAL MEDICINE

## 2018-06-11 PROCEDURE — 97116 GAIT TRAINING THERAPY: CPT

## 2018-06-11 PROCEDURE — 94799 UNLISTED PULMONARY SVC/PX: CPT

## 2018-06-11 PROCEDURE — 97535 SELF CARE MNGMENT TRAINING: CPT

## 2018-06-11 RX ORDER — MEGESTROL ACETATE 40 MG/ML
400 SUSPENSION ORAL 2 TIMES DAILY
Status: DISCONTINUED | OUTPATIENT
Start: 2018-06-11 | End: 2018-06-12 | Stop reason: HOSPADM

## 2018-06-11 RX ADMIN — HEPARIN SODIUM 5000 UNITS: 5000 INJECTION, SOLUTION INTRAVENOUS; SUBCUTANEOUS at 21:45

## 2018-06-11 RX ADMIN — PHENYTOIN SODIUM 100 MG: 100 CAPSULE, EXTENDED RELEASE ORAL at 08:06

## 2018-06-11 RX ADMIN — ATORVASTATIN CALCIUM 40 MG: 40 TABLET, FILM COATED ORAL at 21:48

## 2018-06-11 RX ADMIN — PHENOBARBITAL 32.4 MG: 32.4 TABLET ORAL at 08:10

## 2018-06-11 RX ADMIN — MONTELUKAST SODIUM 10 MG: 10 TABLET, COATED ORAL at 21:47

## 2018-06-11 RX ADMIN — LEVETIRACETAM 500 MG: 500 TABLET, FILM COATED ORAL at 21:46

## 2018-06-11 RX ADMIN — LEVETIRACETAM 500 MG: 500 TABLET, FILM COATED ORAL at 08:06

## 2018-06-11 RX ADMIN — PHENOBARBITAL 64.8 MG: 32.4 TABLET ORAL at 21:51

## 2018-06-11 RX ADMIN — Medication 1 TABLET: at 08:06

## 2018-06-11 RX ADMIN — HEPARIN SODIUM 5000 UNITS: 5000 INJECTION, SOLUTION INTRAVENOUS; SUBCUTANEOUS at 08:06

## 2018-06-11 RX ADMIN — CETIRIZINE HYDROCHLORIDE 5 MG: 10 TABLET ORAL at 21:48

## 2018-06-11 RX ADMIN — POLYETHYLENE GLYCOL (3350) 17 G: 17 POWDER, FOR SOLUTION ORAL at 08:07

## 2018-06-11 RX ADMIN — PHENYTOIN SODIUM 200 MG: 100 CAPSULE, EXTENDED RELEASE ORAL at 21:49

## 2018-06-12 VITALS
OXYGEN SATURATION: 96 % | SYSTOLIC BLOOD PRESSURE: 132 MMHG | WEIGHT: 85.44 LBS | HEIGHT: 60 IN | RESPIRATION RATE: 18 BRPM | TEMPERATURE: 98.2 F | BODY MASS INDEX: 16.77 KG/M2 | DIASTOLIC BLOOD PRESSURE: 72 MMHG | HEART RATE: 95 BPM

## 2018-06-12 LAB
ANION GAP SERPL CALCULATED.3IONS-SCNC: 4.1 MMOL/L (ref 3.6–11.2)
BUN BLD-MCNC: 13 MG/DL (ref 7–21)
BUN/CREAT SERPL: 23.2 (ref 7–25)
CALCIUM SPEC-SCNC: 9.2 MG/DL (ref 7.7–10)
CHLORIDE SERPL-SCNC: 102 MMOL/L (ref 99–112)
CO2 SERPL-SCNC: 25.9 MMOL/L (ref 24.3–31.9)
CREAT BLD-MCNC: 0.56 MG/DL (ref 0.43–1.29)
DEPRECATED RDW RBC AUTO: 49.7 FL (ref 37–54)
ERYTHROCYTE [DISTWIDTH] IN BLOOD BY AUTOMATED COUNT: 14.5 % (ref 11.5–14.5)
GFR SERPL CREATININE-BSD FRML MDRD: 107 ML/MIN/1.73
GLUCOSE BLD-MCNC: 94 MG/DL (ref 70–110)
HCT VFR BLD AUTO: 34.9 % (ref 37–47)
HGB BLD-MCNC: 11.8 G/DL (ref 12–16)
MAGNESIUM SERPL-MCNC: 2.1 MG/DL (ref 1.7–2.6)
MCH RBC QN AUTO: 33.6 PG (ref 27–33)
MCHC RBC AUTO-ENTMCNC: 33.8 G/DL (ref 33–37)
MCV RBC AUTO: 99.4 FL (ref 80–94)
OSMOLALITY SERPL CALC.SUM OF ELEC: 264.4 MOSM/KG (ref 273–305)
PHOSPHATE SERPL-MCNC: 4 MG/DL (ref 2.7–4.5)
PLATELET # BLD AUTO: 231 10*3/MM3 (ref 130–400)
PMV BLD AUTO: 8.8 FL (ref 6–10)
POTASSIUM BLD-SCNC: 3.9 MMOL/L (ref 3.5–5.3)
RBC # BLD AUTO: 3.51 10*6/MM3 (ref 4.2–5.4)
SODIUM BLD-SCNC: 132 MMOL/L (ref 135–153)
WBC NRBC COR # BLD: 3.92 10*3/MM3 (ref 4.5–12.5)

## 2018-06-12 PROCEDURE — 83735 ASSAY OF MAGNESIUM: CPT | Performed by: INTERNAL MEDICINE

## 2018-06-12 PROCEDURE — 80048 BASIC METABOLIC PNL TOTAL CA: CPT | Performed by: INTERNAL MEDICINE

## 2018-06-12 PROCEDURE — 94799 UNLISTED PULMONARY SVC/PX: CPT

## 2018-06-12 PROCEDURE — 25010000002 HEPARIN (PORCINE) PER 1000 UNITS: Performed by: NURSE PRACTITIONER

## 2018-06-12 PROCEDURE — 85027 COMPLETE CBC AUTOMATED: CPT | Performed by: INTERNAL MEDICINE

## 2018-06-12 PROCEDURE — 97530 THERAPEUTIC ACTIVITIES: CPT

## 2018-06-12 PROCEDURE — 84100 ASSAY OF PHOSPHORUS: CPT | Performed by: INTERNAL MEDICINE

## 2018-06-12 RX ORDER — PHENOBARBITAL 32.4 MG/1
32.4 TABLET ORAL 3 TIMES DAILY
Qty: 45 TABLET | Refills: 1 | Status: SHIPPED | OUTPATIENT
Start: 2018-06-12 | End: 2018-06-27

## 2018-06-12 RX ORDER — CETIRIZINE HYDROCHLORIDE 10 MG/1
5 TABLET ORAL DAILY
Qty: 8 TABLET | Refills: 0 | Status: SHIPPED | OUTPATIENT
Start: 2018-06-12 | End: 2018-06-27

## 2018-06-12 RX ADMIN — LEVETIRACETAM 500 MG: 500 TABLET, FILM COATED ORAL at 09:12

## 2018-06-12 RX ADMIN — HEPARIN SODIUM 5000 UNITS: 5000 INJECTION, SOLUTION INTRAVENOUS; SUBCUTANEOUS at 09:12

## 2018-06-12 RX ADMIN — PHENOBARBITAL 32.4 MG: 32.4 TABLET ORAL at 09:14

## 2018-06-12 RX ADMIN — Medication 1 TABLET: at 09:12

## 2018-06-12 RX ADMIN — PHENYTOIN SODIUM 100 MG: 100 CAPSULE, EXTENDED RELEASE ORAL at 09:12

## 2018-06-12 RX ADMIN — MEGESTROL ACETATE 400 MG: 40 SUSPENSION ORAL at 09:10

## 2018-06-12 RX ADMIN — POLYETHYLENE GLYCOL (3350) 17 G: 17 POWDER, FOR SOLUTION ORAL at 09:12

## 2018-06-19 DIAGNOSIS — M25.511 RIGHT SHOULDER PAIN, UNSPECIFIED CHRONICITY: Primary | ICD-10-CM

## 2018-06-21 ENCOUNTER — APPOINTMENT (OUTPATIENT)
Dept: GENERAL RADIOLOGY | Facility: HOSPITAL | Age: 73
End: 2018-06-21
Attending: ORTHOPAEDIC SURGERY

## 2018-06-27 ENCOUNTER — APPOINTMENT (OUTPATIENT)
Dept: CT IMAGING | Facility: HOSPITAL | Age: 73
End: 2018-06-27

## 2018-06-27 ENCOUNTER — APPOINTMENT (OUTPATIENT)
Dept: GENERAL RADIOLOGY | Facility: HOSPITAL | Age: 73
End: 2018-06-27

## 2018-06-27 ENCOUNTER — HOSPITAL ENCOUNTER (EMERGENCY)
Facility: HOSPITAL | Age: 73
Discharge: HOME | End: 2018-06-27
Attending: EMERGENCY MEDICINE | Admitting: EMERGENCY MEDICINE

## 2018-06-27 VITALS
BODY MASS INDEX: 19.59 KG/M2 | TEMPERATURE: 97.9 F | HEART RATE: 76 BPM | RESPIRATION RATE: 18 BRPM | HEIGHT: 60 IN | OXYGEN SATURATION: 99 % | SYSTOLIC BLOOD PRESSURE: 124 MMHG | DIASTOLIC BLOOD PRESSURE: 63 MMHG | WEIGHT: 99.8 LBS

## 2018-06-27 DIAGNOSIS — R56.9 SEIZURE (HCC): Primary | ICD-10-CM

## 2018-06-27 LAB
ALBUMIN SERPL-MCNC: 3.9 G/DL (ref 3.4–4.8)
ALBUMIN/GLOB SERPL: 1.1 G/DL (ref 1.5–2.5)
ALP SERPL-CCNC: 127 U/L (ref 35–104)
ALT SERPL W P-5'-P-CCNC: 57 U/L (ref 10–36)
ANION GAP SERPL CALCULATED.3IONS-SCNC: 5.5 MMOL/L (ref 3.6–11.2)
AST SERPL-CCNC: 49 U/L (ref 10–30)
BACTERIA UR QL AUTO: ABNORMAL /HPF
BASOPHILS # BLD AUTO: 0.01 10*3/MM3 (ref 0–0.3)
BASOPHILS NFR BLD AUTO: 0.2 % (ref 0–2)
BILIRUB SERPL-MCNC: 0.2 MG/DL (ref 0.2–1.8)
BILIRUB UR QL STRIP: NEGATIVE
BNP SERPL-MCNC: 128 PG/ML (ref 0–100)
BUN BLD-MCNC: 13 MG/DL (ref 7–21)
BUN/CREAT SERPL: 23.2 (ref 7–25)
CALCIUM SPEC-SCNC: 9 MG/DL (ref 7.7–10)
CHLORIDE SERPL-SCNC: 101 MMOL/L (ref 99–112)
CLARITY UR: CLEAR
CO2 SERPL-SCNC: 28.5 MMOL/L (ref 24.3–31.9)
COLOR UR: YELLOW
CREAT BLD-MCNC: 0.56 MG/DL (ref 0.43–1.29)
DEPRECATED RDW RBC AUTO: 49.6 FL (ref 37–54)
EOSINOPHIL # BLD AUTO: 0.07 10*3/MM3 (ref 0–0.7)
EOSINOPHIL NFR BLD AUTO: 1.6 % (ref 0–7)
ERYTHROCYTE [DISTWIDTH] IN BLOOD BY AUTOMATED COUNT: 14 % (ref 11.5–14.5)
GFR SERPL CREATININE-BSD FRML MDRD: 107 ML/MIN/1.73
GLOBULIN UR ELPH-MCNC: 3.4 GM/DL
GLUCOSE BLD-MCNC: 86 MG/DL (ref 70–110)
GLUCOSE UR STRIP-MCNC: NEGATIVE MG/DL
HCT VFR BLD AUTO: 38.1 % (ref 37–47)
HGB BLD-MCNC: 12.7 G/DL (ref 12–16)
HGB UR QL STRIP.AUTO: ABNORMAL
HYALINE CASTS UR QL AUTO: ABNORMAL /LPF
IMM GRANULOCYTES # BLD: 0.01 10*3/MM3 (ref 0–0.03)
IMM GRANULOCYTES NFR BLD: 0.2 % (ref 0–0.5)
KETONES UR QL STRIP: NEGATIVE
LEUKOCYTE ESTERASE UR QL STRIP.AUTO: NEGATIVE
LYMPHOCYTES # BLD AUTO: 1.2 10*3/MM3 (ref 1–3)
LYMPHOCYTES NFR BLD AUTO: 27.6 % (ref 16–46)
MCH RBC QN AUTO: 33.2 PG (ref 27–33)
MCHC RBC AUTO-ENTMCNC: 33.3 G/DL (ref 33–37)
MCV RBC AUTO: 99.5 FL (ref 80–94)
MONOCYTES # BLD AUTO: 0.32 10*3/MM3 (ref 0.1–0.9)
MONOCYTES NFR BLD AUTO: 7.4 % (ref 0–12)
NEUTROPHILS # BLD AUTO: 2.74 10*3/MM3 (ref 1.4–6.5)
NEUTROPHILS NFR BLD AUTO: 63 % (ref 40–75)
NITRITE UR QL STRIP: NEGATIVE
OSMOLALITY SERPL CALC.SUM OF ELEC: 269.5 MOSM/KG (ref 273–305)
PH UR STRIP.AUTO: 7 [PH] (ref 5–8)
PHENOBARB SERPL-MCNC: 18.8 MCG/ML (ref 10–30)
PHENYTOIN SERPL-MCNC: 5.8 MCG/ML (ref 10–20)
PLATELET # BLD AUTO: 214 10*3/MM3 (ref 130–400)
PMV BLD AUTO: 9 FL (ref 6–10)
POTASSIUM BLD-SCNC: 4 MMOL/L (ref 3.5–5.3)
PROT SERPL-MCNC: 7.3 G/DL (ref 6–8)
PROT UR QL STRIP: NEGATIVE
RBC # BLD AUTO: 3.83 10*6/MM3 (ref 4.2–5.4)
RBC # UR: ABNORMAL /HPF
REF LAB TEST METHOD: ABNORMAL
SODIUM BLD-SCNC: 135 MMOL/L (ref 135–153)
SP GR UR STRIP: 1.01 (ref 1–1.03)
SQUAMOUS #/AREA URNS HPF: ABNORMAL /HPF
TROPONIN I SERPL-MCNC: <0.006 NG/ML
UROBILINOGEN UR QL STRIP: ABNORMAL
WBC NRBC COR # BLD: 4.35 10*3/MM3 (ref 4.5–12.5)
WBC UR QL AUTO: ABNORMAL /HPF

## 2018-06-27 PROCEDURE — 80053 COMPREHEN METABOLIC PANEL: CPT | Performed by: PHYSICIAN ASSISTANT

## 2018-06-27 PROCEDURE — 71045 X-RAY EXAM CHEST 1 VIEW: CPT

## 2018-06-27 PROCEDURE — 99285 EMERGENCY DEPT VISIT HI MDM: CPT

## 2018-06-27 PROCEDURE — 85025 COMPLETE CBC W/AUTO DIFF WBC: CPT | Performed by: PHYSICIAN ASSISTANT

## 2018-06-27 PROCEDURE — 93010 ELECTROCARDIOGRAM REPORT: CPT | Performed by: INTERNAL MEDICINE

## 2018-06-27 PROCEDURE — 25010000003 PHENYTOIN PER 50 MG: Performed by: PHYSICIAN ASSISTANT

## 2018-06-27 PROCEDURE — 70450 CT HEAD/BRAIN W/O DYE: CPT | Performed by: RADIOLOGY

## 2018-06-27 PROCEDURE — 71045 X-RAY EXAM CHEST 1 VIEW: CPT | Performed by: RADIOLOGY

## 2018-06-27 PROCEDURE — 93005 ELECTROCARDIOGRAM TRACING: CPT | Performed by: PHYSICIAN ASSISTANT

## 2018-06-27 PROCEDURE — 83880 ASSAY OF NATRIURETIC PEPTIDE: CPT | Performed by: PHYSICIAN ASSISTANT

## 2018-06-27 PROCEDURE — 80185 ASSAY OF PHENYTOIN TOTAL: CPT | Performed by: PHYSICIAN ASSISTANT

## 2018-06-27 PROCEDURE — 70450 CT HEAD/BRAIN W/O DYE: CPT

## 2018-06-27 PROCEDURE — 81001 URINALYSIS AUTO W/SCOPE: CPT | Performed by: PHYSICIAN ASSISTANT

## 2018-06-27 PROCEDURE — 80184 ASSAY OF PHENOBARBITAL: CPT | Performed by: PHYSICIAN ASSISTANT

## 2018-06-27 PROCEDURE — 96365 THER/PROPH/DIAG IV INF INIT: CPT

## 2018-06-27 PROCEDURE — 84484 ASSAY OF TROPONIN QUANT: CPT | Performed by: PHYSICIAN ASSISTANT

## 2018-06-27 RX ORDER — PHENYTOIN SODIUM 50 MG/ML
10 INJECTION, SOLUTION INTRAMUSCULAR; INTRAVENOUS ONCE
Status: DISCONTINUED | OUTPATIENT
Start: 2018-06-27 | End: 2018-06-27 | Stop reason: SDUPTHER

## 2018-06-27 RX ORDER — SODIUM CHLORIDE 0.9 % (FLUSH) 0.9 %
10 SYRINGE (ML) INJECTION AS NEEDED
Status: DISCONTINUED | OUTPATIENT
Start: 2018-06-27 | End: 2018-06-27 | Stop reason: HOSPADM

## 2018-06-27 RX ADMIN — PHENYTOIN SODIUM 453 MG: 50 INJECTION INTRAMUSCULAR; INTRAVENOUS at 07:20

## 2018-06-28 ENCOUNTER — HOSPITAL ENCOUNTER (EMERGENCY)
Facility: HOSPITAL | Age: 73
Discharge: ANOTHER HOSPITAL | End: 2018-06-29
Attending: FAMILY MEDICINE | Admitting: FAMILY MEDICINE

## 2018-06-28 ENCOUNTER — APPOINTMENT (OUTPATIENT)
Dept: CT IMAGING | Facility: HOSPITAL | Age: 73
End: 2018-06-28

## 2018-06-28 DIAGNOSIS — S12.600A CLOSED DISPLACED FRACTURE OF SEVENTH CERVICAL VERTEBRA, UNSPECIFIED FRACTURE MORPHOLOGY, INITIAL ENCOUNTER (HCC): ICD-10-CM

## 2018-06-28 DIAGNOSIS — S02.85XA ORBITAL FRACTURE, CLOSED, INITIAL ENCOUNTER (HCC): Primary | ICD-10-CM

## 2018-06-28 DIAGNOSIS — M48.54XA COMPRESSION FRACTURE OF THORACIC SPINE, NON-TRAUMATIC, INITIAL ENCOUNTER (HCC): ICD-10-CM

## 2018-06-28 LAB
ALBUMIN SERPL-MCNC: 4.1 G/DL (ref 3.4–4.8)
ALBUMIN/GLOB SERPL: 1.1 G/DL (ref 1.5–2.5)
ALP SERPL-CCNC: 133 U/L (ref 35–104)
ALT SERPL W P-5'-P-CCNC: 77 U/L (ref 10–36)
ANION GAP SERPL CALCULATED.3IONS-SCNC: 6.3 MMOL/L (ref 3.6–11.2)
APTT PPP: 22.3 SECONDS (ref 23.8–36.1)
AST SERPL-CCNC: 63 U/L (ref 10–30)
BASOPHILS # BLD AUTO: 0.02 10*3/MM3 (ref 0–0.3)
BASOPHILS NFR BLD AUTO: 0.4 % (ref 0–2)
BILIRUB SERPL-MCNC: 0.3 MG/DL (ref 0.2–1.8)
BUN BLD-MCNC: 13 MG/DL (ref 7–21)
BUN/CREAT SERPL: 21.3 (ref 7–25)
CALCIUM SPEC-SCNC: 8.9 MG/DL (ref 7.7–10)
CHLORIDE SERPL-SCNC: 101 MMOL/L (ref 99–112)
CK SERPL-CCNC: 70 U/L (ref 24–173)
CO2 SERPL-SCNC: 27.7 MMOL/L (ref 24.3–31.9)
CREAT BLD-MCNC: 0.61 MG/DL (ref 0.43–1.29)
DEPRECATED RDW RBC AUTO: 49.6 FL (ref 37–54)
EOSINOPHIL # BLD AUTO: 0.05 10*3/MM3 (ref 0–0.7)
EOSINOPHIL NFR BLD AUTO: 0.9 % (ref 0–7)
ERYTHROCYTE [DISTWIDTH] IN BLOOD BY AUTOMATED COUNT: 13.8 % (ref 11.5–14.5)
GFR SERPL CREATININE-BSD FRML MDRD: 97 ML/MIN/1.73
GLOBULIN UR ELPH-MCNC: 3.6 GM/DL
GLUCOSE BLD-MCNC: 122 MG/DL (ref 70–110)
HCT VFR BLD AUTO: 36.1 % (ref 37–47)
HGB BLD-MCNC: 12.4 G/DL (ref 12–16)
IMM GRANULOCYTES # BLD: 0.02 10*3/MM3 (ref 0–0.03)
IMM GRANULOCYTES NFR BLD: 0.4 % (ref 0–0.5)
INR PPP: 1.02 (ref 0.9–1.1)
LYMPHOCYTES # BLD AUTO: 0.9 10*3/MM3 (ref 1–3)
LYMPHOCYTES NFR BLD AUTO: 16.8 % (ref 16–46)
MAGNESIUM SERPL-MCNC: 1.8 MG/DL (ref 1.7–2.6)
MCH RBC QN AUTO: 33.3 PG (ref 27–33)
MCHC RBC AUTO-ENTMCNC: 34.3 G/DL (ref 33–37)
MCV RBC AUTO: 97 FL (ref 80–94)
MONOCYTES # BLD AUTO: 0.47 10*3/MM3 (ref 0.1–0.9)
MONOCYTES NFR BLD AUTO: 8.8 % (ref 0–12)
MYOGLOBIN SERPL-MCNC: 42 NG/ML (ref 0–109)
NEUTROPHILS # BLD AUTO: 3.89 10*3/MM3 (ref 1.4–6.5)
NEUTROPHILS NFR BLD AUTO: 72.7 % (ref 40–75)
OSMOLALITY SERPL CALC.SUM OF ELEC: 271.5 MOSM/KG (ref 273–305)
PHOSPHATE SERPL-MCNC: 3.4 MG/DL (ref 2.7–4.5)
PLATELET # BLD AUTO: 189 10*3/MM3 (ref 130–400)
PMV BLD AUTO: 8.8 FL (ref 6–10)
POTASSIUM BLD-SCNC: 3.9 MMOL/L (ref 3.5–5.3)
PROT SERPL-MCNC: 7.7 G/DL (ref 6–8)
PROTHROMBIN TIME: 13.7 SECONDS (ref 11–15.4)
RBC # BLD AUTO: 3.72 10*6/MM3 (ref 4.2–5.4)
SODIUM BLD-SCNC: 135 MMOL/L (ref 135–153)
TROPONIN I SERPL-MCNC: <0.006 NG/ML
WBC NRBC COR # BLD: 5.35 10*3/MM3 (ref 4.5–12.5)

## 2018-06-28 PROCEDURE — 85730 THROMBOPLASTIN TIME PARTIAL: CPT | Performed by: FAMILY MEDICINE

## 2018-06-28 PROCEDURE — 72192 CT PELVIS W/O DYE: CPT | Performed by: RADIOLOGY

## 2018-06-28 PROCEDURE — 72131 CT LUMBAR SPINE W/O DYE: CPT

## 2018-06-28 PROCEDURE — 72128 CT CHEST SPINE W/O DYE: CPT

## 2018-06-28 PROCEDURE — 72128 CT CHEST SPINE W/O DYE: CPT | Performed by: RADIOLOGY

## 2018-06-28 PROCEDURE — 93005 ELECTROCARDIOGRAM TRACING: CPT | Performed by: FAMILY MEDICINE

## 2018-06-28 PROCEDURE — 70450 CT HEAD/BRAIN W/O DYE: CPT

## 2018-06-28 PROCEDURE — 83735 ASSAY OF MAGNESIUM: CPT | Performed by: FAMILY MEDICINE

## 2018-06-28 PROCEDURE — 72125 CT NECK SPINE W/O DYE: CPT

## 2018-06-28 PROCEDURE — 72192 CT PELVIS W/O DYE: CPT

## 2018-06-28 PROCEDURE — 84484 ASSAY OF TROPONIN QUANT: CPT | Performed by: FAMILY MEDICINE

## 2018-06-28 PROCEDURE — 72131 CT LUMBAR SPINE W/O DYE: CPT | Performed by: RADIOLOGY

## 2018-06-28 PROCEDURE — 85610 PROTHROMBIN TIME: CPT | Performed by: FAMILY MEDICINE

## 2018-06-28 PROCEDURE — 70486 CT MAXILLOFACIAL W/O DYE: CPT | Performed by: RADIOLOGY

## 2018-06-28 PROCEDURE — 85025 COMPLETE CBC W/AUTO DIFF WBC: CPT | Performed by: FAMILY MEDICINE

## 2018-06-28 PROCEDURE — 83874 ASSAY OF MYOGLOBIN: CPT | Performed by: FAMILY MEDICINE

## 2018-06-28 PROCEDURE — 84100 ASSAY OF PHOSPHORUS: CPT | Performed by: FAMILY MEDICINE

## 2018-06-28 PROCEDURE — 80053 COMPREHEN METABOLIC PANEL: CPT | Performed by: FAMILY MEDICINE

## 2018-06-28 PROCEDURE — 93010 ELECTROCARDIOGRAM REPORT: CPT | Performed by: INTERNAL MEDICINE

## 2018-06-28 PROCEDURE — 70486 CT MAXILLOFACIAL W/O DYE: CPT

## 2018-06-28 PROCEDURE — 82550 ASSAY OF CK (CPK): CPT | Performed by: FAMILY MEDICINE

## 2018-06-28 PROCEDURE — 70450 CT HEAD/BRAIN W/O DYE: CPT | Performed by: RADIOLOGY

## 2018-06-28 PROCEDURE — 72125 CT NECK SPINE W/O DYE: CPT | Performed by: RADIOLOGY

## 2018-06-28 PROCEDURE — 99285 EMERGENCY DEPT VISIT HI MDM: CPT

## 2018-06-28 RX ORDER — SODIUM CHLORIDE 0.9 % (FLUSH) 0.9 %
10 SYRINGE (ML) INJECTION AS NEEDED
Status: DISCONTINUED | OUTPATIENT
Start: 2018-06-28 | End: 2018-06-29 | Stop reason: HOSPADM

## 2018-06-29 VITALS
DIASTOLIC BLOOD PRESSURE: 88 MMHG | HEIGHT: 60 IN | HEART RATE: 78 BPM | OXYGEN SATURATION: 100 % | BODY MASS INDEX: 19.44 KG/M2 | SYSTOLIC BLOOD PRESSURE: 158 MMHG | WEIGHT: 99 LBS | TEMPERATURE: 97.5 F | RESPIRATION RATE: 18 BRPM

## 2018-06-29 LAB
BACTERIA UR QL AUTO: ABNORMAL /HPF
BILIRUB UR QL STRIP: NEGATIVE
CLARITY UR: CLEAR
COLOR UR: YELLOW
GLUCOSE UR STRIP-MCNC: NEGATIVE MG/DL
HGB UR QL STRIP.AUTO: ABNORMAL
HYALINE CASTS UR QL AUTO: ABNORMAL /LPF
KETONES UR QL STRIP: NEGATIVE
LEUKOCYTE ESTERASE UR QL STRIP.AUTO: NEGATIVE
NITRITE UR QL STRIP: NEGATIVE
PH UR STRIP.AUTO: 8 [PH] (ref 5–8)
PROT UR QL STRIP: NEGATIVE
RBC # UR: ABNORMAL /HPF
REF LAB TEST METHOD: ABNORMAL
SP GR UR STRIP: 1.01 (ref 1–1.03)
SQUAMOUS #/AREA URNS HPF: ABNORMAL /HPF
UROBILINOGEN UR QL STRIP: ABNORMAL
WBC UR QL AUTO: ABNORMAL /HPF

## 2018-06-29 PROCEDURE — P9612 CATHETERIZE FOR URINE SPEC: HCPCS

## 2018-06-29 PROCEDURE — 81001 URINALYSIS AUTO W/SCOPE: CPT | Performed by: FAMILY MEDICINE

## 2018-06-29 RX ORDER — HYDROCODONE BITARTRATE AND ACETAMINOPHEN 10; 325 MG/1; MG/1
1 TABLET ORAL ONCE
Status: COMPLETED | OUTPATIENT
Start: 2018-06-29 | End: 2018-06-29

## 2018-06-29 RX ORDER — SODIUM CHLORIDE 9 MG/ML
75 INJECTION, SOLUTION INTRAVENOUS CONTINUOUS
Status: DISCONTINUED | OUTPATIENT
Start: 2018-06-29 | End: 2018-06-29

## 2018-06-29 RX ADMIN — HYDROCODONE BITARTRATE AND ACETAMINOPHEN 1 TABLET: 10; 325 TABLET ORAL at 00:44

## 2018-07-03 DIAGNOSIS — S42.331D CLOSED DISPLACED OBLIQUE FRACTURE OF SHAFT OF RIGHT HUMERUS WITH ROUTINE HEALING, SUBSEQUENT ENCOUNTER: Primary | ICD-10-CM

## 2018-07-05 ENCOUNTER — HOSPITAL ENCOUNTER (OUTPATIENT)
Dept: GENERAL RADIOLOGY | Facility: HOSPITAL | Age: 73
Discharge: HOME | End: 2018-07-05
Attending: ORTHOPAEDIC SURGERY | Admitting: ORTHOPAEDIC SURGERY

## 2018-07-05 ENCOUNTER — OFFICE VISIT (OUTPATIENT)
Dept: ORTHOPEDIC SURGERY | Facility: CLINIC | Age: 73
End: 2018-07-05

## 2018-07-05 VITALS — HEIGHT: 60 IN | BODY MASS INDEX: 19.44 KG/M2 | WEIGHT: 99 LBS

## 2018-07-05 DIAGNOSIS — S42.331D CLOSED DISPLACED OBLIQUE FRACTURE OF SHAFT OF RIGHT HUMERUS WITH ROUTINE HEALING, SUBSEQUENT ENCOUNTER: ICD-10-CM

## 2018-07-05 DIAGNOSIS — S42.331D CLOSED DISPLACED OBLIQUE FRACTURE OF SHAFT OF RIGHT HUMERUS WITH ROUTINE HEALING, SUBSEQUENT ENCOUNTER: Primary | ICD-10-CM

## 2018-07-05 PROCEDURE — 99024 POSTOP FOLLOW-UP VISIT: CPT | Performed by: ORTHOPAEDIC SURGERY

## 2018-07-05 PROCEDURE — 73060 X-RAY EXAM OF HUMERUS: CPT | Performed by: RADIOLOGY

## 2018-07-05 PROCEDURE — 73060 X-RAY EXAM OF HUMERUS: CPT

## 2018-07-05 NOTE — PROGRESS NOTES
"Patient: Maribel Staton    YOB: 1945        History of Present Illness: patient presents for follow-up of her right proximal humerus fracture.  I initially saw her on May 11 and subsequently on May 17 x-rays that showed oblique proximal humerus fracture with some displacement no significant angulationand because of her medical status we decided to treat this nonoperatively with a sling and swath.  She was scheduled to come back on June 19.  Apparently she said several falls since I last seen her and is been to the emergency room several times.  X-rays of her shoulder on June 4 from the emergency room showed really no change in alignment of the fracture and some early callus forming.does complain of some pain todayin her right shoulder.  Past Medical History:   Diagnosis Date   • Anxiety    • Arthritis    • Asthma    • Chronic low back pain    • COPD (chronic obstructive pulmonary disease) (CMS/[facility])    • Depression    • Fall 01/03/2018   • GERD (gastroesophageal reflux disease)    • History of transfusion    • Hypertension    • Seizures (CMS/[facility])    • Tobacco abuse         Social History     Social History   • Marital status:      Spouse name: N/A   • Number of children: N/A   • Years of education: N/A     Occupational History   • Not on file.     Social History Main Topics   • Smoking status: Current Every Day Smoker     Packs/day: 1.50     Years: 53.00     Types: Cigarettes     Start date: 6/6/1965   • Smokeless tobacco: Never Used   • Alcohol use No   • Drug use: No   • Sexual activity: Defer     Other Topics Concern   • Not on file     Social History Narrative   • No narrative on file           Physical Exam: 72 y.o. female  General Appearance:    Alert and oriented x 3, cooperative, in no acute distress                   Vitals:    07/05/18 1454   Weight: 44.9 kg (99 lb)   Height: 152.4 cm (60\")          Patient is noted to have bruising on her face and is wearing a cervical collar.  She " does not have a sling on.  Her right shoulder shows some mild swelling but there is no obvious bruising or deformity.  While gentle range of motion causes her some mild discomfort of her shoulder does not cause.  A cause acute pain or spasm.  Her right hand is grossly neurovascularly intact without swelling she has a ring on.      Radiology:   X-rays taken today show that she has significant callus developing.  But she is also developed significant angulation compared to previous x-rays as of June 4.          Assessment/Plan:right proximal humerus fracture.  It appears that she obviously really hurt it somewhere between June 4and July 5.  There is no obvious bruising and ecchymosis today suggestive of a recent injury when she fell.  Before I can tell she's been in emergency room 3 times and had 3 head CT scans since I last saw her. Again because of her mental status and health condition and the fact we have abundant callus I would just probably follow this as it is right now.  We will place her back in a sling for comfort as needed.  I will see her back in 4-5 weeksfor a follow-up x-ray and see how she is coming along.            Discussion/Summary:                This chart was completed utilizing the dragon speech recognition software.  Grammatical errors, random word insertions, pronoun errors, and incomplete sentences or occasional consequences of the system due to software limitations, ambient noise, and hardware issues.  Any questions or concerns about the content, text, or information contained within the body of this dictation should be directly addressed to the physician for clarification        This document was signed by Elliot Costello M.D. July 5, 2018 3:10 PM

## 2018-07-31 DIAGNOSIS — S42.331D CLOSED DISPLACED OBLIQUE FRACTURE OF SHAFT OF RIGHT HUMERUS WITH ROUTINE HEALING, SUBSEQUENT ENCOUNTER: Primary | ICD-10-CM

## 2018-08-02 ENCOUNTER — OFFICE VISIT (OUTPATIENT)
Dept: ORTHOPEDIC SURGERY | Facility: CLINIC | Age: 73
End: 2018-08-02

## 2018-08-02 ENCOUNTER — HOSPITAL ENCOUNTER (OUTPATIENT)
Dept: GENERAL RADIOLOGY | Facility: HOSPITAL | Age: 73
Discharge: HOME | End: 2018-08-02
Attending: ORTHOPAEDIC SURGERY | Admitting: ORTHOPAEDIC SURGERY

## 2018-08-02 ENCOUNTER — APPOINTMENT (OUTPATIENT)
Dept: GENERAL RADIOLOGY | Facility: HOSPITAL | Age: 73
End: 2018-08-02
Attending: ORTHOPAEDIC SURGERY

## 2018-08-02 VITALS — HEIGHT: 60 IN | WEIGHT: 99 LBS | BODY MASS INDEX: 19.44 KG/M2

## 2018-08-02 DIAGNOSIS — S42.331D CLOSED DISPLACED OBLIQUE FRACTURE OF SHAFT OF RIGHT HUMERUS WITH ROUTINE HEALING, SUBSEQUENT ENCOUNTER: ICD-10-CM

## 2018-08-02 DIAGNOSIS — S42.331D CLOSED DISPLACED OBLIQUE FRACTURE OF SHAFT OF RIGHT HUMERUS WITH ROUTINE HEALING, SUBSEQUENT ENCOUNTER: Primary | ICD-10-CM

## 2018-08-02 PROCEDURE — 73060 X-RAY EXAM OF HUMERUS: CPT | Performed by: RADIOLOGY

## 2018-08-02 PROCEDURE — 73060 X-RAY EXAM OF HUMERUS: CPT

## 2018-08-02 PROCEDURE — 99024 POSTOP FOLLOW-UP VISIT: CPT | Performed by: PHYSICIAN ASSISTANT

## 2018-08-02 RX ORDER — PHENOBARBITAL 30 MG/1
32.4 TABLET ORAL 3 TIMES DAILY
Status: ON HOLD | COMMUNITY
Start: 2018-07-30 | End: 2019-05-23

## 2018-08-02 NOTE — PROGRESS NOTES
"Maribel Staton   :1945    Date of encounter:2018        HPI:  Maribel Staton is a 72 y.o. female who returns back today for follow-up of a right proximal humerus fracture.  Her initial injury was on May 11, 2018 with reinjury some time in the end of .  She states she's been continuing to wear her sling since her last office visit.  She's had no new injury to the shoulder.  She still complains of mild pain especially with movement.  She denies paresthesias.    PMH:   Patient Active Problem List   Diagnosis   • Closed right hip fracture (CMS/HCC)   • Osteoporosis, unspecified   • Frequent falls       Exam:  General Appearance:    72 y.o. female  cooperative, in no acute distress.  Alert and oriented x 3,                   Vitals:    18 1403   Weight: 44.9 kg (99 lb)   Height: 152.4 cm (60\")          Body mass index is 19.33 kg/m².   examination the right shoulder reveals no further swelling or ecchymosis.  With gentle range of motion to still some mild discomfort.  Her neurovascular status is grossly intact.    Radiology:   AP and lateral views of the right humerus were reviewed revealing the oblique fracture through the proximal humerus unchanged alignment and evidence of healing.    Assessment    ICD-10-CM ICD-9-CM   1. Closed displaced oblique fracture of shaft of right humerus with routine healing, subsequent encounter S42.331D V54.11       Plan:   72-year-old female with a right proximal humerus fracture.  She is now 2 and half months post injury.  Repeat x-rays today reveal no change in alignment with continuous callus formation.  We will like to get her started in formal physical therapy with gentle range of motion exercises.  She can also begin to work out of her sling.  She will return back in 5 weeks for repeat x-rays and evaluation with Dr. Alvarez and is.    Lucia Toscano PAC                  "

## 2018-08-03 ENCOUNTER — OFFICE VISIT (OUTPATIENT)
Dept: SURGERY | Facility: CLINIC | Age: 73
End: 2018-08-03

## 2018-08-03 DIAGNOSIS — G40.219 PARTIAL SYMPTOMATIC EPILEPSY WITH COMPLEX PARTIAL SEIZURES, INTRACTABLE, WITHOUT STATUS EPILEPTICUS (HCC): Primary | ICD-10-CM

## 2018-08-03 PROCEDURE — 99203 OFFICE O/P NEW LOW 30 MIN: CPT | Performed by: SURGERY

## 2018-08-03 NOTE — PROGRESS NOTES
Subjective   Maribel Staton is a 72 y.o. female here today for possible VNS placement referred by Dr. Hamm.    History of Present Illness  Ms.. Staton was seen in the office today to discuss placement of a vagus nerve stimulator for chronic seizure disorder.  The patient has been evaluated by Dr. Hamm and is felt to be a good candidate for the stimulator.  In speaking with the patient she does have an understanding of the purpose of the stimulator  Allergies   Allergen Reactions   • Dilaudid [Hydromorphone Hcl] Delirium   • Morphine And Related      Current Outpatient Prescriptions   Medication Sig Dispense Refill   • atorvastatin (LIPITOR) 40 MG tablet Take 1 tablet by mouth Every Night. 30 tablet 0   • baclofen (LIORESAL) 20 MG tablet Take 20 mg by mouth 2 (Two) Times a Day.     • busPIRone (BUSPAR) 15 MG tablet Take 7.5 mg by mouth 3 (Three) Times a Day As Needed.     • clonazePAM (KlonoPIN) 2 MG tablet Take 1 mg by mouth Every 12 (Twelve) Hours As Needed for Seizures.     • HYDROcodone-acetaminophen (NORCO)  MG per tablet Take 1 tablet by mouth Every 12 (Twelve) Hours As Needed for Moderate Pain .     • ipratropium-albuterol (COMBIVENT RESPIMAT)  MCG/ACT inhaler Inhale 1 puff 4 (Four) Times a Day As Needed for Wheezing.     • levETIRAcetam (KEPPRA) 500 MG tablet Take 500 mg by mouth Every 12 (Twelve) Hours.     • montelukast (SINGULAIR) 10 MG tablet Take 10 mg by mouth Every Night.     • pantoprazole (PROTONIX) 40 MG EC tablet Take 40 mg by mouth Daily.     • PHENobarbital (LUMINAL) 30 MG tablet      • phenytoin (DILANTIN) 100 MG ER capsule Take 100 mg by mouth Every Morning.     • polyethylene glycol (MIRALAX) powder Take 17 g by mouth Daily. 225 g 0   • PROLIA 60 MG/ML solution syringe      • venlafaxine XR (EFFEXOR-XR) 150 MG 24 hr capsule Take 150 mg by mouth Daily.       No current facility-administered medications for this visit.      Past Medical History:   Diagnosis Date   • Anxiety    •  "Arthritis    • Asthma    • Chronic low back pain    • COPD (chronic obstructive pulmonary disease) (CMS/[facility])    • Depression    • Fall 01/03/2018   • GERD (gastroesophageal reflux disease)    • History of transfusion    • Hypertension    • Seizures (CMS/HCC)    • Tobacco abuse      Past Surgical History:   Procedure Laterality Date   • APPENDECTOMY      about 15 years ago   • CARDIAC CATHETERIZATION  2012   • CATARACT EXTRACTION Bilateral    • CHOLECYSTECTOMY  about 4 years ago   • CHOLECYSTECTOMY     • HIP ARTHROPLASTY Bilateral    • HYSTERECTOMY     • ORIF HIP FRACTURE Right 05/2017   • SKIN GRAFT      1970's     Review of Systems  General: negative  Integumentary: negative  Eyes: glasses  ENT: recurrent sore throat and hoarseness  Respiratory: shortness of breath, chronic cough and wheezing  Gastrointestinal: negative  Cardiovascular: negative  Neurological: headaches  Psychiatric: anxiety, depression and insomnia  Hematologic/Lymphatic: negative  Genitourinary: incontinence  Musculoskeletal: painful joints, sore muscles, joint swelling, back pain and joint stiffness  Endocrine: negative  Breasts: negative        Objective   Ht 152.4 cm (60\")   Wt 48.5 kg (107 lb)   BMI 20.90 kg/m²   Physical Exam  General:  This is a WD WN white female in no acute distress  HEENT exam:  WNL. Sclera are anicteric.  EOMI  Neck:  supple, FROM, without thyromegaly, cervical or supraclavicular adenopathy  Lungs:  Respiratory effort normal. Auscultation: Clear, without wheezes, rhonchi, rales  Heart:  Regular rate and rhythm, without murmur, gallop, rub.  No pedal edema  Abdomen: Nontender, nondistended  Musculoskeletal:  muscle strength/tone is normal.  Gait and station: normal. No digital cyanosis  Psyc:  alert, oriented x 3.  Mood and affect are appropriate  skin:  Warm with good turgor.  Without rash or lesion  extremities:  Examination of the extremities revealed no cyanosis, clubbing or edema.  Results/Data  Records from " neurology were reviewed  Procedures       Assessment/Plan   Partial complex seizure disorder, intractable, without status epilepticus    Schedule placement of vagus nerve stimulator       Discussion/Summary    Patient's Body mass index is 20.9 kg/m². BMI is below normal parameters. Recommendations include: no follow-up required.       Errors in dictation may reflect use of voice recognition software and not all errors in transcription may have been detected prior to signing.    No future appointments.

## 2018-08-05 PROBLEM — G40.219 PARTIAL SYMPTOMATIC EPILEPSY WITH COMPLEX PARTIAL SEIZURES, INTRACTABLE, WITHOUT STATUS EPILEPTICUS: Status: ACTIVE | Noted: 2018-08-05

## 2018-08-05 NOTE — H&P
Subjective   Maribel Staton is a 72 y.o. female here today for possible VNS placement referred by Dr. Hamm.    History of Present Illness  Ms.. Staton was seen in the office today to discuss placement of a vagus nerve stimulator for chronic seizure disorder.  The patient has been evaluated by Dr. Hamm and is felt to be a good candidate for the stimulator.  In speaking with the patient she does have an understanding of the purpose of the stimulator  Allergies   Allergen Reactions   • Dilaudid [Hydromorphone Hcl] Delirium   • Morphine And Related      Current Outpatient Prescriptions   Medication Sig Dispense Refill   • atorvastatin (LIPITOR) 40 MG tablet Take 1 tablet by mouth Every Night. 30 tablet 0   • baclofen (LIORESAL) 20 MG tablet Take 20 mg by mouth 2 (Two) Times a Day.     • busPIRone (BUSPAR) 15 MG tablet Take 7.5 mg by mouth 3 (Three) Times a Day As Needed.     • clonazePAM (KlonoPIN) 2 MG tablet Take 1 mg by mouth Every 12 (Twelve) Hours As Needed for Seizures.     • HYDROcodone-acetaminophen (NORCO)  MG per tablet Take 1 tablet by mouth Every 12 (Twelve) Hours As Needed for Moderate Pain .     • ipratropium-albuterol (COMBIVENT RESPIMAT)  MCG/ACT inhaler Inhale 1 puff 4 (Four) Times a Day As Needed for Wheezing.     • levETIRAcetam (KEPPRA) 500 MG tablet Take 500 mg by mouth Every 12 (Twelve) Hours.     • montelukast (SINGULAIR) 10 MG tablet Take 10 mg by mouth Every Night.     • pantoprazole (PROTONIX) 40 MG EC tablet Take 40 mg by mouth Daily.     • PHENobarbital (LUMINAL) 30 MG tablet      • phenytoin (DILANTIN) 100 MG ER capsule Take 100 mg by mouth Every Morning.     • polyethylene glycol (MIRALAX) powder Take 17 g by mouth Daily. 225 g 0   • PROLIA 60 MG/ML solution syringe      • venlafaxine XR (EFFEXOR-XR) 150 MG 24 hr capsule Take 150 mg by mouth Daily.       No current facility-administered medications for this visit.      Past Medical History:   Diagnosis Date   • Anxiety    •  "Arthritis    • Asthma    • Chronic low back pain    • COPD (chronic obstructive pulmonary disease) (CMS/[facility])    • Depression    • Fall 01/03/2018   • GERD (gastroesophageal reflux disease)    • History of transfusion    • Hypertension    • Seizures (CMS/HCC)    • Tobacco abuse      Past Surgical History:   Procedure Laterality Date   • APPENDECTOMY      about 15 years ago   • CARDIAC CATHETERIZATION  2012   • CATARACT EXTRACTION Bilateral    • CHOLECYSTECTOMY  about 4 years ago   • CHOLECYSTECTOMY     • HIP ARTHROPLASTY Bilateral    • HYSTERECTOMY     • ORIF HIP FRACTURE Right 05/2017   • SKIN GRAFT      1970's     Review of Systems  General: negative  Integumentary: negative  Eyes: glasses  ENT: recurrent sore throat and hoarseness  Respiratory: shortness of breath, chronic cough and wheezing  Gastrointestinal: negative  Cardiovascular: negative  Neurological: headaches  Psychiatric: anxiety, depression and insomnia  Hematologic/Lymphatic: negative  Genitourinary: incontinence  Musculoskeletal: painful joints, sore muscles, joint swelling, back pain and joint stiffness  Endocrine: negative  Breasts: negative        Objective   Ht 152.4 cm (60\")   Wt 48.5 kg (107 lb)   BMI 20.90 kg/m²    Physical Exam  General:  This is a WD WN white female in no acute distress  HEENT exam:  WNL. Sclera are anicteric.  EOMI  Neck:  supple, FROM, without thyromegaly, cervical or supraclavicular adenopathy  Lungs:  Respiratory effort normal. Auscultation: Clear, without wheezes, rhonchi, rales  Heart:  Regular rate and rhythm, without murmur, gallop, rub.  No pedal edema  Abdomen: Nontender, nondistended  Musculoskeletal:  muscle strength/tone is normal.  Gait and station: normal. No digital cyanosis  Psyc:  alert, oriented x 3.  Mood and affect are appropriate  skin:  Warm with good turgor.  Without rash or lesion  extremities:  Examination of the extremities revealed no cyanosis, clubbing or edema.  Results/Data  Records from " neurology were reviewed  Procedures       Assessment/Plan   Partial complex seizure disorder, intractable, without status epilepticus    Schedule placement of vagus nerve stimulator       Discussion/Summary    Patient's Body mass index is 20.9 kg/m². BMI is below normal parameters. Recommendations include: no follow-up required.       Errors in dictation may reflect use of voice recognition software and not all errors in transcription may have been detected prior to signing.    No future appointments.  This document has been electronically signed by Ursula HARDING MD on August 5, 2018 1:34 PM

## 2018-08-06 VITALS
SYSTOLIC BLOOD PRESSURE: 148 MMHG | HEIGHT: 60 IN | BODY MASS INDEX: 21.01 KG/M2 | DIASTOLIC BLOOD PRESSURE: 88 MMHG | HEART RATE: 86 BPM | WEIGHT: 107 LBS

## 2018-08-13 ENCOUNTER — TELEPHONE (OUTPATIENT)
Dept: SURGERY | Facility: CLINIC | Age: 73
End: 2018-08-13

## 2018-08-13 NOTE — TELEPHONE ENCOUNTER
Called to let patient know that her PAT is scheduled for 8/17 @ 10:45    No answer and wasn't able to leave a message

## 2018-08-17 ENCOUNTER — TELEPHONE (OUTPATIENT)
Dept: SURGERY | Facility: CLINIC | Age: 73
End: 2018-08-17

## 2018-08-17 ENCOUNTER — APPOINTMENT (OUTPATIENT)
Dept: PREADMISSION TESTING | Facility: HOSPITAL | Age: 73
End: 2018-08-17

## 2018-08-17 DIAGNOSIS — G40.219 PARTIAL SYMPTOMATIC EPILEPSY WITH COMPLEX PARTIAL SEIZURES, INTRACTABLE, WITHOUT STATUS EPILEPTICUS (HCC): ICD-10-CM

## 2018-08-17 LAB
ANION GAP SERPL CALCULATED.3IONS-SCNC: 8.9 MMOL/L (ref 3.6–11.2)
BUN BLD-MCNC: 11 MG/DL (ref 7–21)
BUN/CREAT SERPL: 21.2 (ref 7–25)
CALCIUM SPEC-SCNC: 10 MG/DL (ref 7.7–10)
CHLORIDE SERPL-SCNC: 98 MMOL/L (ref 99–112)
CO2 SERPL-SCNC: 26.1 MMOL/L (ref 24.3–31.9)
CREAT BLD-MCNC: 0.52 MG/DL (ref 0.43–1.29)
DEPRECATED RDW RBC AUTO: 43 FL (ref 37–54)
ERYTHROCYTE [DISTWIDTH] IN BLOOD BY AUTOMATED COUNT: 12.9 % (ref 11.5–14.5)
GFR SERPL CREATININE-BSD FRML MDRD: 117 ML/MIN/1.73
GLUCOSE BLD-MCNC: 112 MG/DL (ref 70–110)
HCT VFR BLD AUTO: 38 % (ref 37–47)
HGB BLD-MCNC: 13.2 G/DL (ref 12–16)
MCH RBC QN AUTO: 32.6 PG (ref 27–33)
MCHC RBC AUTO-ENTMCNC: 34.7 G/DL (ref 33–37)
MCV RBC AUTO: 93.8 FL (ref 80–94)
OSMOLALITY SERPL CALC.SUM OF ELEC: 266.5 MOSM/KG (ref 273–305)
PLATELET # BLD AUTO: 295 10*3/MM3 (ref 130–400)
PMV BLD AUTO: 8.4 FL (ref 6–10)
POTASSIUM BLD-SCNC: 3.6 MMOL/L (ref 3.5–5.3)
RBC # BLD AUTO: 4.05 10*6/MM3 (ref 4.2–5.4)
SODIUM BLD-SCNC: 133 MMOL/L (ref 135–153)
WBC NRBC COR # BLD: 5.66 10*3/MM3 (ref 4.5–12.5)

## 2018-08-17 PROCEDURE — 80048 BASIC METABOLIC PNL TOTAL CA: CPT | Performed by: SURGERY

## 2018-08-17 PROCEDURE — 85027 COMPLETE CBC AUTOMATED: CPT | Performed by: SURGERY

## 2018-08-17 PROCEDURE — 36415 COLL VENOUS BLD VENIPUNCTURE: CPT

## 2018-08-17 NOTE — DISCHARGE INSTRUCTIONS
TAKE the following medications the morning of surgery:  All heart or blood pressure medications    Please discontinue all blood thinners and anticoagulants (except aspirin) prior to surgery as per your surgeon and cardiologist instructions.  Aspirin may be continued up to the day prior to surgery.    HOLD all diabetic medications the morning of surgery as order by physician.    Please follow instructions on use of prep cloths provided by nurse. Return instruction sheet with stickers attached to pre-op nurse on day of surgery.    Arrival time for surgery on 8/20/2018 is 0630 am    General Instructions:  • Do NOT eat or drink after midnight 8/19/2018 which includes water, mints, or gum.  • You may brush your teeth. Dental appliances that are removable must be taken out day of surgery.  • Do NOT smoke, chew tobacco, or drink alcohol within 24 hours prior to surgery.  • Bring medications in original bottles, any inhalers and if applicable your C-PAP/BI-PAP machine  • Bring any papers given to you in the doctor’s office  • Wear clean, comfortable clothes and socks  • Do NOT wear contact lenses or make-up or dark nail polish.  Bring a case for your glasses if applicable.  • Bring crutches or walker if applicable  • Leave all other valuables and jewelry at home  • If you were given a blood bank armband, continue to wear it until discharged.    Preventing a Surgical Site Infection:  • Shower the night before surgery (unless instructed otherwise) using a fresh bar of anti-bacterial soap (such as Dial) and clean washcloth.  Dry with a clean towel and dress in clean clothing.  • For 2 to 3 days before surgery, avoid shaving with a razor near where you will have surgery because the razor can irritate skin and make it easier to develop an infection.  Ask your surgeon if you will be receiving antibiotics prior to surgery.  • Make sure you, your family, and all healthcare providers clean their hands with soap and water or an  alcohol-based hand  before caring for you or your wound.  • If at all possible, quit smoking as many days before surgery as you can.    Day of Surgery:  Upon arrival, a pre-op nurse and anesthesiologist will review your health history, obtain vital signs, and answer questions you may have.  The only belongings needed at this time will be your home medications and if applicable you C-PAP/BI-PAP machine.  If you are staying overnight, your family can leave the rest of your belongings in the car and bring them to your room later.  A pre-op nurse will start an IV and you may receive medication in preparation for surgery.  Due to patient privacy and limited space, only one member of your family will be able to accompany you in the pre-op area.  While you are in surgery your family should notify the waiting room  if they leave the waiting room area and provide a contact number.  Please be aware that surgery does come with discomfort.  We want to make every effort to control your discomfort so please discuss any uncontrolled symptoms with your nurse.  Your doctor will most likely have prescribed pain medications.  If you are going home after surgery you will receive individualized written care instructions before being discharged.  A responsible adult must drive you to and from the hospital on the day of surgery and stay with you for 24 hours.  If you are staying overnight following surgery, you will be transported to your hospital room following the recovery period.

## 2018-08-17 NOTE — TELEPHONE ENCOUNTER
Called and spoke with patients  and let him know that her arrival time for Monday is 6:30, to be NPO after midnight and to have someone with her

## 2018-08-20 ENCOUNTER — HOSPITAL ENCOUNTER (OUTPATIENT)
Facility: HOSPITAL | Age: 73
Setting detail: HOSPITAL OUTPATIENT SURGERY
Discharge: HOME | End: 2018-08-20
Attending: SURGERY | Admitting: SURGERY

## 2018-08-20 ENCOUNTER — ANESTHESIA EVENT (OUTPATIENT)
Dept: PERIOP | Facility: HOSPITAL | Age: 73
End: 2018-08-20

## 2018-08-20 ENCOUNTER — ANESTHESIA (OUTPATIENT)
Dept: PERIOP | Facility: HOSPITAL | Age: 73
End: 2018-08-20

## 2018-08-20 VITALS
OXYGEN SATURATION: 94 % | WEIGHT: 106 LBS | RESPIRATION RATE: 15 BRPM | BODY MASS INDEX: 20.81 KG/M2 | SYSTOLIC BLOOD PRESSURE: 148 MMHG | HEART RATE: 90 BPM | HEIGHT: 60 IN | TEMPERATURE: 97.4 F | DIASTOLIC BLOOD PRESSURE: 66 MMHG

## 2018-08-20 DIAGNOSIS — G40.219 PARTIAL SYMPTOMATIC EPILEPSY WITH COMPLEX PARTIAL SEIZURES, INTRACTABLE, WITHOUT STATUS EPILEPTICUS (HCC): ICD-10-CM

## 2018-08-20 PROCEDURE — C1767 GENERATOR, NEURO NON-RECHARG: HCPCS | Performed by: SURGERY

## 2018-08-20 PROCEDURE — C1778 LEAD, NEUROSTIMULATOR: HCPCS | Performed by: SURGERY

## 2018-08-20 PROCEDURE — 25010000002 ONDANSETRON PER 1 MG: Performed by: NURSE ANESTHETIST, CERTIFIED REGISTERED

## 2018-08-20 PROCEDURE — 25010000003 CEFAZOLIN PER 500 MG: Performed by: SURGERY

## 2018-08-20 PROCEDURE — 64568 OPN IMPLTJ CRNL NRV NEA&PG: CPT | Performed by: SURGERY

## 2018-08-20 PROCEDURE — 25010000002 FENTANYL CITRATE (PF) 100 MCG/2ML SOLUTION: Performed by: NURSE ANESTHETIST, CERTIFIED REGISTERED

## 2018-08-20 PROCEDURE — 25010000002 PROPOFOL 10 MG/ML EMULSION: Performed by: NURSE ANESTHETIST, CERTIFIED REGISTERED

## 2018-08-20 DEVICE — IMPLANTABLE PULSE GENERATOR
Type: IMPLANTABLE DEVICE | Status: FUNCTIONAL
Brand: VNS THERAPY® ASPIRESR® MODEL 106 GENERATOR

## 2018-08-20 DEVICE — IMPLANTABLE LEAD
Type: IMPLANTABLE DEVICE | Status: FUNCTIONAL
Brand: VNS THERAPY® PERENNIADURA® MODEL 303 LEAD

## 2018-08-20 RX ORDER — HYDROCODONE BITARTRATE AND ACETAMINOPHEN 5; 325 MG/1; MG/1
1 TABLET ORAL EVERY 6 HOURS PRN
Qty: 20 TABLET | Refills: 0 | Status: SHIPPED | OUTPATIENT
Start: 2018-08-20 | End: 2019-02-15

## 2018-08-20 RX ORDER — ONDANSETRON 2 MG/ML
INJECTION INTRAMUSCULAR; INTRAVENOUS AS NEEDED
Status: DISCONTINUED | OUTPATIENT
Start: 2018-08-20 | End: 2018-08-20 | Stop reason: SURG

## 2018-08-20 RX ORDER — FENTANYL CITRATE 50 UG/ML
INJECTION, SOLUTION INTRAMUSCULAR; INTRAVENOUS AS NEEDED
Status: DISCONTINUED | OUTPATIENT
Start: 2018-08-20 | End: 2018-08-20 | Stop reason: SURG

## 2018-08-20 RX ORDER — MAGNESIUM HYDROXIDE 1200 MG/15ML
LIQUID ORAL AS NEEDED
Status: DISCONTINUED | OUTPATIENT
Start: 2018-08-20 | End: 2018-08-20 | Stop reason: HOSPADM

## 2018-08-20 RX ORDER — IPRATROPIUM BROMIDE AND ALBUTEROL SULFATE 2.5; .5 MG/3ML; MG/3ML
3 SOLUTION RESPIRATORY (INHALATION) ONCE AS NEEDED
Status: DISCONTINUED | OUTPATIENT
Start: 2018-08-20 | End: 2018-08-20 | Stop reason: HOSPADM

## 2018-08-20 RX ORDER — ROCURONIUM BROMIDE 10 MG/ML
INJECTION, SOLUTION INTRAVENOUS AS NEEDED
Status: DISCONTINUED | OUTPATIENT
Start: 2018-08-20 | End: 2018-08-20 | Stop reason: SURG

## 2018-08-20 RX ORDER — LIDOCAINE HYDROCHLORIDE 20 MG/ML
INJECTION, SOLUTION EPIDURAL; INFILTRATION; INTRACAUDAL; PERINEURAL AS NEEDED
Status: DISCONTINUED | OUTPATIENT
Start: 2018-08-20 | End: 2018-08-20 | Stop reason: SURG

## 2018-08-20 RX ORDER — MEPERIDINE HYDROCHLORIDE 50 MG/ML
12.5 INJECTION INTRAMUSCULAR; INTRAVENOUS; SUBCUTANEOUS
Status: DISCONTINUED | OUTPATIENT
Start: 2018-08-20 | End: 2018-08-20 | Stop reason: HOSPADM

## 2018-08-20 RX ORDER — FENTANYL CITRATE 50 UG/ML
50 INJECTION, SOLUTION INTRAMUSCULAR; INTRAVENOUS
Status: DISCONTINUED | OUTPATIENT
Start: 2018-08-20 | End: 2018-08-20 | Stop reason: HOSPADM

## 2018-08-20 RX ORDER — FAMOTIDINE 10 MG/ML
INJECTION, SOLUTION INTRAVENOUS AS NEEDED
Status: DISCONTINUED | OUTPATIENT
Start: 2018-08-20 | End: 2018-08-20 | Stop reason: SURG

## 2018-08-20 RX ORDER — ONDANSETRON 2 MG/ML
4 INJECTION INTRAMUSCULAR; INTRAVENOUS ONCE AS NEEDED
Status: DISCONTINUED | OUTPATIENT
Start: 2018-08-20 | End: 2018-08-20 | Stop reason: HOSPADM

## 2018-08-20 RX ORDER — PROPOFOL 10 MG/ML
VIAL (ML) INTRAVENOUS AS NEEDED
Status: DISCONTINUED | OUTPATIENT
Start: 2018-08-20 | End: 2018-08-20 | Stop reason: SURG

## 2018-08-20 RX ORDER — SODIUM CHLORIDE, SODIUM LACTATE, POTASSIUM CHLORIDE, CALCIUM CHLORIDE 600; 310; 30; 20 MG/100ML; MG/100ML; MG/100ML; MG/100ML
125 INJECTION, SOLUTION INTRAVENOUS CONTINUOUS
Status: DISCONTINUED | OUTPATIENT
Start: 2018-08-20 | End: 2018-08-20 | Stop reason: HOSPADM

## 2018-08-20 RX ORDER — SODIUM CHLORIDE 0.9 % (FLUSH) 0.9 %
1-10 SYRINGE (ML) INJECTION AS NEEDED
Status: DISCONTINUED | OUTPATIENT
Start: 2018-08-20 | End: 2018-08-20 | Stop reason: HOSPADM

## 2018-08-20 RX ADMIN — CEFAZOLIN 2 G: 1 INJECTION, POWDER, FOR SOLUTION INTRAMUSCULAR; INTRAVENOUS; PARENTERAL at 07:28

## 2018-08-20 RX ADMIN — FAMOTIDINE 20 MG: 10 INJECTION, SOLUTION INTRAVENOUS at 07:38

## 2018-08-20 RX ADMIN — LIDOCAINE HYDROCHLORIDE 40 MG: 20 INJECTION, SOLUTION EPIDURAL; INFILTRATION; INTRACAUDAL; PERINEURAL at 07:36

## 2018-08-20 RX ADMIN — FENTANYL CITRATE 50 MCG: 50 INJECTION INTRAMUSCULAR; INTRAVENOUS at 07:36

## 2018-08-20 RX ADMIN — PROPOFOL 50 MG: 10 INJECTION, EMULSION INTRAVENOUS at 07:36

## 2018-08-20 RX ADMIN — FENTANYL CITRATE 50 MCG: 50 INJECTION INTRAMUSCULAR; INTRAVENOUS at 07:31

## 2018-08-20 RX ADMIN — PROPOFOL 30 MG: 10 INJECTION, EMULSION INTRAVENOUS at 07:50

## 2018-08-20 RX ADMIN — SODIUM CHLORIDE, POTASSIUM CHLORIDE, SODIUM LACTATE AND CALCIUM CHLORIDE 125 ML/HR: 600; 310; 30; 20 INJECTION, SOLUTION INTRAVENOUS at 07:26

## 2018-08-20 RX ADMIN — FENTANYL CITRATE 50 MCG: 50 INJECTION INTRAMUSCULAR; INTRAVENOUS at 09:05

## 2018-08-20 RX ADMIN — ONDANSETRON 4 MG: 2 INJECTION, SOLUTION INTRAMUSCULAR; INTRAVENOUS at 07:28

## 2018-08-20 RX ADMIN — FENTANYL CITRATE 50 MCG: 50 INJECTION INTRAMUSCULAR; INTRAVENOUS at 08:56

## 2018-08-20 RX ADMIN — ROCURONIUM BROMIDE 30 MG: 10 INJECTION INTRAVENOUS at 07:36

## 2018-08-20 NOTE — OP NOTE
Vagus Nerve Stimulator Placement    Surgeon:  Ursula Celestin. M.D., F.A.CKeeS.    Assistant:  Slava Raya    Pre-op:  Seizure Disorder G40.219    Post-op:  Seizure Disorder G40.219    Anesthesia:  general     Indications: Improved control of chronic seizure disorder       Procedure Details   After obtaining informed consent and receiving preoperative antibiotics and with venous compression boots in place, the patient was taken to the operating room and placed under anesthesia. The left neck and chest were prepped and draped in a sterile fashion.  A transverse incision was made in the left neck midway between the clavicle and the mandible and carried down deep to the platysma.  The anterior border of the sternocleidomastoid was identified and dissection was carried out deep to this until the carotid sheath was identified.  The carotid sheath was opened and the vagus nerve was identified deep to the artery and vein.  This was dissected out and a vessel loop was placed.  The chest incision was then made and carried down to the pectoralis fascia where a pocket was created large enough to admit the generator.  The tunneling device was brought from the chest to the neck and the stimulator was passed through the tunneling device.  The 3 coils were then attached to the vagus nerve.  The wire was then looped and attached with pledgets to avoid any tension on the stimulator.  The carotid sheath was closed with interrupted 3.0 Vicryls sutures.  The platysma was closed with a running 3.0 Vicryls suture.  The stimulator was attached to the generator and was tested ex vivo and in vivo and was found to be working perfectly with good capture of heart rate.  The generator was sutured to the pectoralis fascia.  A running 3.0 Vicryls suture to Gopi's fascia, followed by a 4.0 subcuticular Vicryls skin stitch.  Dressings were placed.  Patient tolerated the procedure well and was taken to the recovery room in stable  condition    Findings:      Estimated Blood Loss:  minimal    Blood administered:  none           Drains: none           Specimens: none     Grafts and Implants:  Vagus Nerve Stimulator      Complications:  none           Disposition: PACU - hemodynamically stable.           Condition: stable

## 2018-08-20 NOTE — ANESTHESIA POSTPROCEDURE EVALUATION
Patient: Maribel Staton    Procedure Summary     Date:  08/20/18 Room / Location:  [facility] OR 02 /  [facility] OR    Anesthesia Start:  0728 Anesthesia Stop:  0908    Procedure:  VAGUS NERVE STIMULATOR IMPLANTATION (N/A Chest) Diagnosis:       Partial symptomatic epilepsy with complex partial seizures, intractable, without status epilepticus (CMS/HCC)      (Partial symptomatic epilepsy with complex partial seizures, intractable, without status epilepticus (CMS/HCC) [G40.219])    Surgeon:  Urslua Celestin MD Provider:      Anesthesia Type:  general ASA Status:  3          Anesthesia Type: general  Last vitals  BP   147/65 (08/20/18 0948)   Temp   97.4 °F (36.3 °C) (08/20/18 0948)   Pulse   91 (08/20/18 0948)   Resp   14 (08/20/18 0948)     SpO2   95 % (08/20/18 0948)     Post Anesthesia Care and Evaluation    Patient location during evaluation: bedside  Patient participation: complete - patient participated  Level of consciousness: awake and alert  Pain score: 1  Pain management: adequate  Airway patency: patent  Anesthetic complications: No anesthetic complications  PONV Status: none  Cardiovascular status: acceptable  Respiratory status: acceptable  Hydration status: acceptable

## 2018-08-20 NOTE — H&P (VIEW-ONLY)
Subjective   Maribel Staton is a 72 y.o. female here today for possible VNS placement referred by Dr. Hamm.    History of Present Illness  Ms.. Staton was seen in the office today to discuss placement of a vagus nerve stimulator for chronic seizure disorder.  The patient has been evaluated by Dr. Hamm and is felt to be a good candidate for the stimulator.  In speaking with the patient she does have an understanding of the purpose of the stimulator  Allergies   Allergen Reactions   • Dilaudid [Hydromorphone Hcl] Delirium   • Morphine And Related      Current Outpatient Prescriptions   Medication Sig Dispense Refill   • atorvastatin (LIPITOR) 40 MG tablet Take 1 tablet by mouth Every Night. 30 tablet 0   • baclofen (LIORESAL) 20 MG tablet Take 20 mg by mouth 2 (Two) Times a Day.     • busPIRone (BUSPAR) 15 MG tablet Take 7.5 mg by mouth 3 (Three) Times a Day As Needed.     • clonazePAM (KlonoPIN) 2 MG tablet Take 1 mg by mouth Every 12 (Twelve) Hours As Needed for Seizures.     • HYDROcodone-acetaminophen (NORCO)  MG per tablet Take 1 tablet by mouth Every 12 (Twelve) Hours As Needed for Moderate Pain .     • ipratropium-albuterol (COMBIVENT RESPIMAT)  MCG/ACT inhaler Inhale 1 puff 4 (Four) Times a Day As Needed for Wheezing.     • levETIRAcetam (KEPPRA) 500 MG tablet Take 500 mg by mouth Every 12 (Twelve) Hours.     • montelukast (SINGULAIR) 10 MG tablet Take 10 mg by mouth Every Night.     • pantoprazole (PROTONIX) 40 MG EC tablet Take 40 mg by mouth Daily.     • PHENobarbital (LUMINAL) 30 MG tablet      • phenytoin (DILANTIN) 100 MG ER capsule Take 100 mg by mouth Every Morning.     • polyethylene glycol (MIRALAX) powder Take 17 g by mouth Daily. 225 g 0   • PROLIA 60 MG/ML solution syringe      • venlafaxine XR (EFFEXOR-XR) 150 MG 24 hr capsule Take 150 mg by mouth Daily.       No current facility-administered medications for this visit.      Past Medical History:   Diagnosis Date   • Anxiety    •  "Arthritis    • Asthma    • Chronic low back pain    • COPD (chronic obstructive pulmonary disease) (CMS/[facility])    • Depression    • Fall 01/03/2018   • GERD (gastroesophageal reflux disease)    • History of transfusion    • Hypertension    • Seizures (CMS/HCC)    • Tobacco abuse      Past Surgical History:   Procedure Laterality Date   • APPENDECTOMY      about 15 years ago   • CARDIAC CATHETERIZATION  2012   • CATARACT EXTRACTION Bilateral    • CHOLECYSTECTOMY  about 4 years ago   • CHOLECYSTECTOMY     • HIP ARTHROPLASTY Bilateral    • HYSTERECTOMY     • ORIF HIP FRACTURE Right 05/2017   • SKIN GRAFT      1970's     Review of Systems  General: negative  Integumentary: negative  Eyes: glasses  ENT: recurrent sore throat and hoarseness  Respiratory: shortness of breath, chronic cough and wheezing  Gastrointestinal: negative  Cardiovascular: negative  Neurological: headaches  Psychiatric: anxiety, depression and insomnia  Hematologic/Lymphatic: negative  Genitourinary: incontinence  Musculoskeletal: painful joints, sore muscles, joint swelling, back pain and joint stiffness  Endocrine: negative  Breasts: negative        Objective   Ht 152.4 cm (60\")   Wt 48.5 kg (107 lb)   BMI 20.90 kg/m²    Physical Exam  General:  This is a WD WN white female in no acute distress  HEENT exam:  WNL. Sclera are anicteric.  EOMI  Neck:  supple, FROM, without thyromegaly, cervical or supraclavicular adenopathy  Lungs:  Respiratory effort normal. Auscultation: Clear, without wheezes, rhonchi, rales  Heart:  Regular rate and rhythm, without murmur, gallop, rub.  No pedal edema  Abdomen: Nontender, nondistended  Musculoskeletal:  muscle strength/tone is normal.  Gait and station: normal. No digital cyanosis  Psyc:  alert, oriented x 3.  Mood and affect are appropriate  skin:  Warm with good turgor.  Without rash or lesion  extremities:  Examination of the extremities revealed no cyanosis, clubbing or edema.  Results/Data  Records from " neurology were reviewed  Procedures       Assessment/Plan   Partial complex seizure disorder, intractable, without status epilepticus    Schedule placement of vagus nerve stimulator       Discussion/Summary    Patient's Body mass index is 20.9 kg/m². BMI is below normal parameters. Recommendations include: no follow-up required.       Errors in dictation may reflect use of voice recognition software and not all errors in transcription may have been detected prior to signing.    No future appointments.  This document has been electronically signed by Ursula HARDING MD on August 5, 2018 1:34 PM

## 2018-08-20 NOTE — ANESTHESIA PROCEDURE NOTES
Airway  Urgency: elective    Airway not difficult    General Information and Staff    Patient location during procedure: OR    Indications and Patient Condition  Indications for airway management: airway protection    Preoxygenated: yes  MILS maintained throughout  Mask difficulty assessment: 1 - vent by mask    Final Airway Details  Final airway type: endotracheal airway      Successful airway: ETT  Cuffed: yes   Successful intubation technique: direct laryngoscopy  Facilitating devices/methods: intubating stylet  Endotracheal tube insertion site: oral  Blade: Rosa  Blade size: #3  Cormack-Lehane Classification: grade I - full view of glottis  Placement verified by: chest auscultation and capnometry   Measured from: lips  ETT to lips (cm): 21  Number of attempts at approach: 1

## 2018-08-20 NOTE — ANESTHESIA PREPROCEDURE EVALUATION
Anesthesia Evaluation                  Airway   Mallampati: II  TM distance: >3 FB  Neck ROM: limited  Possible difficult intubation  Dental - normal exam   (+) poor dentition    Pulmonary - normal exam   (+) COPD, asthma,   Cardiovascular - normal exam    (+) hypertension,       Neuro/Psych  (+) seizures, psychiatric history,     GI/Hepatic/Renal/Endo    (+)  GERD,      Musculoskeletal     Abdominal  - normal exam    Bowel sounds: normal.   Substance History      OB/GYN          Other   (+) arthritis                     Anesthesia Plan    ASA 3     general     intravenous induction   Anesthetic plan and risks discussed with patient.    Plan discussed with CRNA.

## 2018-08-27 ENCOUNTER — TRANSCRIBE ORDERS (OUTPATIENT)
Dept: CT IMAGING | Facility: HOSPITAL | Age: 73
End: 2018-08-27

## 2018-08-27 DIAGNOSIS — Z87.891 PERSONAL HISTORY OF TOBACCO USE, PRESENTING HAZARDS TO HEALTH: Primary | ICD-10-CM

## 2018-08-30 ENCOUNTER — OFFICE VISIT (OUTPATIENT)
Dept: SURGERY | Facility: CLINIC | Age: 73
End: 2018-08-30

## 2018-08-30 VITALS
WEIGHT: 106 LBS | DIASTOLIC BLOOD PRESSURE: 88 MMHG | SYSTOLIC BLOOD PRESSURE: 116 MMHG | BODY MASS INDEX: 20.81 KG/M2 | HEIGHT: 60 IN | HEART RATE: 80 BPM

## 2018-08-30 DIAGNOSIS — Z09 POSTOP CHECK: ICD-10-CM

## 2018-08-30 DIAGNOSIS — G40.219 PARTIAL SYMPTOMATIC EPILEPSY WITH COMPLEX PARTIAL SEIZURES, INTRACTABLE, WITHOUT STATUS EPILEPTICUS (HCC): Primary | ICD-10-CM

## 2018-08-30 PROCEDURE — 99024 POSTOP FOLLOW-UP VISIT: CPT | Performed by: SURGERY

## 2018-08-31 ENCOUNTER — HOSPITAL ENCOUNTER (OUTPATIENT)
Dept: CT IMAGING | Facility: HOSPITAL | Age: 73
Discharge: HOME | End: 2018-08-31
Attending: INTERNAL MEDICINE | Admitting: INTERNAL MEDICINE

## 2018-08-31 DIAGNOSIS — Z87.891 PERSONAL HISTORY OF TOBACCO USE, PRESENTING HAZARDS TO HEALTH: ICD-10-CM

## 2018-08-31 PROCEDURE — G0297 LDCT FOR LUNG CA SCREEN: HCPCS

## 2018-08-31 PROCEDURE — G0297 LDCT FOR LUNG CA SCREEN: HCPCS | Performed by: RADIOLOGY

## 2018-10-19 ENCOUNTER — OFFICE VISIT (OUTPATIENT)
Dept: ORTHOPEDIC SURGERY | Facility: CLINIC | Age: 73
End: 2018-10-19

## 2018-10-19 ENCOUNTER — HOSPITAL ENCOUNTER (OUTPATIENT)
Dept: GENERAL RADIOLOGY | Facility: HOSPITAL | Age: 73
Discharge: HOME | End: 2018-10-19
Attending: ORTHOPAEDIC SURGERY | Admitting: ORTHOPAEDIC SURGERY

## 2018-10-19 VITALS — HEIGHT: 60 IN

## 2018-10-19 DIAGNOSIS — S62.366A CLOSED NONDISPLACED FRACTURE OF NECK OF FIFTH METACARPAL BONE OF RIGHT HAND, INITIAL ENCOUNTER: Primary | ICD-10-CM

## 2018-10-19 DIAGNOSIS — S62.91XA CLOSED FRACTURE OF RIGHT HAND, INITIAL ENCOUNTER: Primary | ICD-10-CM

## 2018-10-19 PROCEDURE — 26600 TREAT METACARPAL FRACTURE: CPT | Performed by: ORTHOPAEDIC SURGERY

## 2018-10-19 PROCEDURE — 73130 X-RAY EXAM OF HAND: CPT

## 2018-10-19 PROCEDURE — 73130 X-RAY EXAM OF HAND: CPT | Performed by: RADIOLOGY

## 2018-10-19 NOTE — PROGRESS NOTES
"Patient: Maribel Staton    YOB: 1945    Chief Complaint   Patient presents with   • Right Wrist - Pain         History of Present Illness: 72-year-old white female nursing home resident with significant past medical history who apparently fell injuring her right hand a week or 2 ago.  Had x-rays was placed in a splint and presents here for follow-up    Past Medical History:   Diagnosis Date   • Anxiety    • Arthritis    • Asthma    • Back pain    • Chronic low back pain    • COPD (chronic obstructive pulmonary disease) (CMS/[facility])    • Depression    • Elevated cholesterol    • Fall 01/03/2018   • GERD (gastroesophageal reflux disease)    • Headache    • History of transfusion    • Hypertension    • Seizures (CMS/[facility])    • Sleep apnea    • Tobacco abuse         Social History     Social History   • Marital status:      Spouse name: N/A   • Number of children: N/A   • Years of education: N/A     Occupational History   • Not on file.     Social History Main Topics   • Smoking status: Current Some Day Smoker     Packs/day: 1.00     Years: 53.00     Types: Cigarettes     Start date: 6/6/1965   • Smokeless tobacco: Never Used   • Alcohol use No   • Drug use: No   • Sexual activity: Defer     Other Topics Concern   • Not on file     Social History Narrative   • No narrative on file           Physical Exam: 72 y.o. female  General Appearance:    Alert and oriented x 3, cooperative, in no acute distress                   Vitals:    10/19/18 0954   Height: 152.4 cm (60\")          Exam the right shoulder hand shows she is ecchymosis with mild swelling and minimal deformity along the l dorsal ulnar aspect of her right hand.  There is some tenderness over the fifth metacarpal head region.  Grossly neurovascularly intact      Radiology:     X-rays show a somewhat impacted proximally 40° angle right fifth metacarpal neck fracture.        Assessment/Plan: Right fifth metacarpal neck fracture with angulation.  " In this patient's situation I believe this is acceptable I will put her in a short arm synthetic boxer's cast.  We'll see her back in approximately 3 weeks for x-rays out of cast      I a        Patient's There is no height or weight on file to calculate BMI. BMI is within normal parameters. No follow-up required.      Discussion/Summary:                This chart was completed utilizing the dragon speech recognition software.  Grammatical errors, random word insertions, pronoun errors, and incomplete sentences or occasional consequences of the system due to software limitations, ambient noise, and hardware issues.  Any questions or concerns about the content, text, or information contained within the body of this dictation should be directly addressed to the physician for clarification        This document was signed by Elliot Costello M.D. October 19, 2018 10:05 AM

## 2018-10-30 ENCOUNTER — HOSPITAL ENCOUNTER (OUTPATIENT)
Dept: GENERAL RADIOLOGY | Facility: HOSPITAL | Age: 73
Discharge: HOME | End: 2018-10-30
Attending: ORTHOPAEDIC SURGERY | Admitting: ORTHOPAEDIC SURGERY

## 2018-10-30 ENCOUNTER — OFFICE VISIT (OUTPATIENT)
Dept: ORTHOPEDIC SURGERY | Facility: CLINIC | Age: 73
End: 2018-10-30

## 2018-10-30 VITALS — BODY MASS INDEX: 21.2 KG/M2 | WEIGHT: 108 LBS | HEIGHT: 60 IN

## 2018-10-30 DIAGNOSIS — S62.394D CLOSED NONDISPLACED FRACTURE OF OTHER PART OF FOURTH METACARPAL BONE OF RIGHT HAND WITH ROUTINE HEALING, SUBSEQUENT ENCOUNTER: Primary | ICD-10-CM

## 2018-10-30 DIAGNOSIS — S62.366D CLOSED NONDISPLACED FRACTURE OF NECK OF FIFTH METACARPAL BONE OF RIGHT HAND WITH ROUTINE HEALING, SUBSEQUENT ENCOUNTER: Primary | ICD-10-CM

## 2018-10-30 PROCEDURE — 29125 APPL SHORT ARM SPLINT STATIC: CPT | Performed by: ORTHOPAEDIC SURGERY

## 2018-10-30 PROCEDURE — 99024 POSTOP FOLLOW-UP VISIT: CPT | Performed by: ORTHOPAEDIC SURGERY

## 2018-10-30 PROCEDURE — 73130 X-RAY EXAM OF HAND: CPT | Performed by: RADIOLOGY

## 2018-10-30 PROCEDURE — 73130 X-RAY EXAM OF HAND: CPT

## 2018-10-30 NOTE — PROGRESS NOTES
"Patient: Maribel Staton    YOB: 1945    Chief Complaint   Patient presents with   • Right Hand - Fracture, Follow-up         History of Present Illness: Patient is brought today from nursing home due to rubbing and getting a blister at the base of her third finger.  She's been in a short arm cast for a fifth metacarpal neck fracture.  Patient is really not complaining that much about it in the office today.    Past Medical History:   Diagnosis Date   • Anxiety    • Arthritis    • Asthma    • Back pain    • Chronic low back pain    • COPD (chronic obstructive pulmonary disease) (CMS/HCC)    • Depression    • Elevated cholesterol    • Fall 01/03/2018   • GERD (gastroesophageal reflux disease)    • Headache    • History of transfusion    • Hypertension    • Seizures (CMS/[facility])    • Sleep apnea    • Tobacco abuse         Social History     Social History   • Marital status:      Spouse name: N/A   • Number of children: N/A   • Years of education: N/A     Occupational History   • Not on file.     Social History Main Topics   • Smoking status: Current Some Day Smoker     Packs/day: 1.00     Years: 53.00     Types: Cigarettes     Start date: 6/6/1965   • Smokeless tobacco: Never Used   • Alcohol use No   • Drug use: No   • Sexual activity: Defer     Other Topics Concern   • Not on file     Social History Narrative   • No narrative on file           Physical Exam: 72 y.o. female  General Appearance:    Alert and oriented x 3, cooperative, in no acute distress                   Vitals:    10/30/18 1348   Weight: 49 kg (108 lb)   Height: 152.4 cm (60\")          Cast was removed.  There is some mild deformity at the fifth metacarpal head.  There is no significant swelling.  She does have some blistering surrounding erythema in the webspace between her third and fourth finger.  Its of several millimeters by several millimeters at the base of the third finger      Radiology:       X-rays shows really no " change in alignment of the fracture she does have some angulation noted.      Assessment/Plan: We'll place her in a fabricated fiberglass ulnar gutter splint.  Just local wound care with some Neosporin and cleaning of the blister area.  We'll see her back in 9 days for follow-up at her routinely scheduled appointment.          Discussion/Summary:                This chart was completed utilizing the dragon speech recognition software.  Grammatical errors, random word insertions, pronoun errors, and incomplete sentences or occasional consequences of the system due to software limitations, ambient noise, and hardware issues.  Any questions or concerns about the content, text, or information contained within the body of this dictation should be directly addressed to the physician for clarification        This document was signed by Elliot Costello M.D. October 30, 2018 2:31 PM

## 2018-11-05 ENCOUNTER — TRANSCRIBE ORDERS (OUTPATIENT)
Dept: ADMINISTRATIVE | Facility: HOSPITAL | Age: 73
End: 2018-11-05

## 2018-11-05 DIAGNOSIS — R13.10 DYSPHAGIA, UNSPECIFIED TYPE: Primary | ICD-10-CM

## 2018-11-07 DIAGNOSIS — S62.366D CLOSED NONDISPLACED FRACTURE OF NECK OF FIFTH METACARPAL BONE OF RIGHT HAND WITH ROUTINE HEALING, SUBSEQUENT ENCOUNTER: Primary | ICD-10-CM

## 2018-11-08 ENCOUNTER — HOSPITAL ENCOUNTER (OUTPATIENT)
Dept: GENERAL RADIOLOGY | Facility: HOSPITAL | Age: 73
Discharge: HOME | End: 2018-11-08
Attending: ORTHOPAEDIC SURGERY | Admitting: ORTHOPAEDIC SURGERY

## 2018-11-08 ENCOUNTER — OFFICE VISIT (OUTPATIENT)
Dept: ORTHOPEDIC SURGERY | Facility: CLINIC | Age: 73
End: 2018-11-08

## 2018-11-08 VITALS — HEIGHT: 60 IN | WEIGHT: 108.03 LBS | BODY MASS INDEX: 21.21 KG/M2

## 2018-11-08 DIAGNOSIS — S62.366D CLOSED NONDISPLACED FRACTURE OF NECK OF FIFTH METACARPAL BONE OF RIGHT HAND WITH ROUTINE HEALING, SUBSEQUENT ENCOUNTER: ICD-10-CM

## 2018-11-08 DIAGNOSIS — S62.366D CLOSED NONDISPLACED FRACTURE OF NECK OF FIFTH METACARPAL BONE OF RIGHT HAND WITH ROUTINE HEALING, SUBSEQUENT ENCOUNTER: Primary | ICD-10-CM

## 2018-11-08 PROCEDURE — 73130 X-RAY EXAM OF HAND: CPT | Performed by: RADIOLOGY

## 2018-11-08 PROCEDURE — 99024 POSTOP FOLLOW-UP VISIT: CPT | Performed by: ORTHOPAEDIC SURGERY

## 2018-11-08 PROCEDURE — 73130 X-RAY EXAM OF HAND: CPT

## 2018-11-08 NOTE — PROGRESS NOTES
"Patient: Maribel Staton    YOB: 1945    Chief Complaint   Patient presents with   • Right Hand - Fracture, Follow-up         History of Present Illness: Patient is proximally 5 a half weeks status post a right fifth metacarpal neck fracture.  We took the cast last week because developing a sore on the ulnar aspect of her base of her third finger.  Some mild soreness but no major complaints today in the office    Past Medical History:   Diagnosis Date   • Anxiety    • Arthritis    • Asthma    • Back pain    • Chronic low back pain    • COPD (chronic obstructive pulmonary disease) (CMS/[facility])    • Depression    • Elevated cholesterol    • Fall 01/03/2018   • GERD (gastroesophageal reflux disease)    • Headache    • History of transfusion    • Hypertension    • Seizures (CMS/[facility])    • Sleep apnea    • Tobacco abuse         Social History     Social History   • Marital status:      Spouse name: N/A   • Number of children: N/A   • Years of education: N/A     Occupational History   • Not on file.     Social History Main Topics   • Smoking status: Current Some Day Smoker     Packs/day: 1.00     Years: 53.00     Types: Cigarettes     Start date: 6/6/1965   • Smokeless tobacco: Never Used   • Alcohol use No   • Drug use: No   • Sexual activity: Defer     Other Topics Concern   • Not on file     Social History Narrative   • No narrative on file           Physical Exam: 73 y.o. female  General Appearance:    Alert and oriented x 3, cooperative, in no acute distress                   Vitals:    11/08/18 0927   Weight: 49 kg (108 lb 0.4 oz)   Height: 152.4 cm (60\")          Disorder finger is healing up nicely and is a small scab over it is no surrounding erythema or swelling.  There is minimal swelling over the fifth metacarpal neck head region.  Not particular painful she has almost full extension of the fifth finger lacking about a centimeter flexion into the palm of the fifth finger      Radiology:   "     X-rays taken today show no change in alignment of the fracture some early callus is noted      Assessment/Plan: Healing right fifth metacarpal neck fracture.  I have DC'd her out of the splint now.  She can begin gentle use of the hand as tolerates.  Be careful reinjuring.  We'll see her back in 5 weeks for follow-up x-ray and check              Patient's Body mass index is 21.1 kg/m². BMI is within normal parameters. No follow-up required.      Discussion/Summary:                This chart was completed utilizing the dragon speech recognition software.  Grammatical errors, random word insertions, pronoun errors, and incomplete sentences or occasional consequences of the system due to software limitations, ambient noise, and hardware issues.  Any questions or concerns about the content, text, or information contained within the body of this dictation should be directly addressed to the physician for clarification        This document was signed by Elliot Costello M.D. November 8, 2018 9:58 AM

## 2018-11-12 ENCOUNTER — HOSPITAL ENCOUNTER (OUTPATIENT)
Dept: GENERAL RADIOLOGY | Facility: HOSPITAL | Age: 73
Discharge: HOME | End: 2018-11-12
Admitting: FAMILY MEDICINE

## 2018-11-12 DIAGNOSIS — R13.10 DYSPHAGIA, UNSPECIFIED TYPE: ICD-10-CM

## 2018-11-12 PROCEDURE — 74230 X-RAY XM SWLNG FUNCJ C+: CPT

## 2018-11-12 PROCEDURE — 92611 MOTION FLUOROSCOPY/SWALLOW: CPT

## 2018-11-12 PROCEDURE — G8998 SWALLOW D/C STATUS: HCPCS

## 2018-11-12 PROCEDURE — 74230 X-RAY XM SWLNG FUNCJ C+: CPT | Performed by: RADIOLOGY

## 2018-11-12 PROCEDURE — G8997 SWALLOW GOAL STATUS: HCPCS

## 2018-11-12 PROCEDURE — G8996 SWALLOW CURRENT STATUS: HCPCS

## 2018-11-12 NOTE — MBS/VFSS/FEES
Outpatient Speech Language Pathology   Adult Swallow Initial Evaluation  [facility]   OUTPATIENT MODIFIED BARIUM SWALLOW STUDY     Patient Name: Maribel Staton  : 1945  MRN: [id number]  Today's Date: 2018       Maribel Staton  presents to the radiology suite this am from [facility] and [facility] to participate in an instrumental MBS to evaluate safety/efficacy of swallowing fnx, determine safest/least restrictive diet. She is accompanied by a [facility] staff member. Pt and staff report current modified po diet of mechanical soft, nectar thick liquids. Ms. Staton is eager for dietary upgrade.      Chest xray is not available for review.     She is observed on ra w/o complications.     Risks and benefits of the procedure are explained w/ verbalizing understanding/agreement to participate. Proceed per protocol.     Ms. Staton is positioned upright and centered in her wheelchair to accept multiple po presentations of solid cracker, puree, honey thick, nectar thick, and thin liquids via spoon, cup and straw, along w/ whole placebo pill in puree. She does not self feed.     All views are from the lateral plane.     Facial/oral structures are symmetrical upon observation w/o lingual deviation upon protrusion. Oral mucosa are moist, pink and clean. Secretions are clear, thin and controlled. OROM/TRENTON is generally weak to imitate oral postures. Gag is not assessed. Volitional cough is weak in intensity, congestive in quality, nonproductive. Vocal quality is moderately dysphonic in intensity, clear in quality w/ intelligible speech. She is a/a and cooperative to participate, oriented to person, place, and time, follows simple directives, and participates in simple conversational exchanges.     Upon po presentations, adequate bolus anticipation w/ good labial seal for bolus clearance via spoon bowl, cup rim stability and suction via straw.  Bolus formation, manipulation,  and control are generally weak  w/ increased oral prep time w/ solids w/ piecemeal deglutition w/ rotary mastication pattern. A-p transit is slightly delayed w/ solids and placebo pill, but w/o oral residue. Tongue base retraction and linguavelar seal are weak w/ premature loss of nectar thick and thin liquids. No laryngeal penetration or aspiration is evidenced before the swallow.     Pharyngeal swallow is slightly delayed w/ weak  hyolaryngeal elevation and delayed epiglottic inversion. Pharyngeal contraction is weak w/ diffuse mild residue w/all consistencies, diminished w/ cued consecutive swallow. Mild laryngeal penetration of all consistencies during the swallow, clearing w/o significant residue w/ all except thin liquids. Silent aspiration of thin liquids via all presentation styles, occurring during the swallow. Cued cough is effective to diminish, but not clear aspiration. No laryngeal penetration or aspiration evidenced during or after the swallow.     Partial esophageal sweep reveals no mucosal abnormalities. Motility is wfl w/o retrograde flow noted.      Impression: Ms. Staton presents w/a mild-moderate oropharyngeal dysphagia w/ silent aspiration of thin liquids via all presentation styles. She is felt to most benefit from continuation of current modified po diet of mechanical soft, chopped meats, NECTAR thick liquids only. She will benefit from formal dysphagia tx.      1. Mechanical soft diet, chopped meats, NECTAR thick liquids only.    2. Medications whole in puree/nectar. Single pill presentations, please.   3. Universal aspiration precautions.   4. JENNY precautions.   5. Formal dysphagia therapy w/ re-evaluation pending facility SLP recommendations.      D/w pt and present [facility] staff member results and recommendations w/ verbal understanding and agreement.     Thank you for allowing me to participate in the care of your patient-  Arina Ohara M.S., CCC/SLP    Visit Date: 11/12/2018   Patient Active Problem List   Diagnosis   •  Closed right hip fracture (CMS/[facility])   • Osteoporosis, unspecified   • Frequent falls   • Partial symptomatic epilepsy with complex partial seizures, intractable, without status epilepticus (CMS/[facility])   • Nondisplaced fracture of neck of fifth metacarpal bone of right hand with routine healing      Past Medical History:   Diagnosis Date   • Anxiety    • Arthritis    • Asthma    • Back pain    • Chronic low back pain    • COPD (chronic obstructive pulmonary disease) (CMS/[facility])    • Depression    • Elevated cholesterol    • Fall 01/03/2018   • GERD (gastroesophageal reflux disease)    • Headache    • History of transfusion    • Hypertension    • Seizures (CMS/[facility])    • Sleep apnea    • Tobacco abuse         Past Surgical History:   Procedure Laterality Date   • APPENDECTOMY      about 15 years ago   • CARDIAC CATHETERIZATION  2012   • CATARACT EXTRACTION Bilateral    • CHOLECYSTECTOMY  about 4 years ago   • CHOLECYSTECTOMY     • COLONOSCOPY     • ENDOSCOPY     • FRACTURE SURGERY Right     arm and leg   • HIP ARTHROPLASTY Bilateral    • HYSTERECTOMY     • JOINT REPLACEMENT Right    • ORIF HIP FRACTURE Right 05/2017   • SKIN GRAFT      1970's   Visit Dx:     ICD-10-CM ICD-9-CM   1. Dysphagia, unspecified type R13.10 787.20     Time Calculation:        Therapy Charges for Today     Code Description Service Date Service Provider Modifiers Qty    35047839542 HC ST SWALLOWING CURRENT STATUS 11/12/2018 Arina Ohara, MS CCC-SLP GN, CJ 1    51330572556 HC ST SWALLOWING PROJECTED 11/12/2018 Arina Ohara, MS CCC-SLP GN, CJ 1    [id number] HC ST SWALLOWING DISCHARGE 11/12/2018 Arina Ohara, MS CCC-SLP GN, CJ 1        SLP G-Codes  SLP NOMS Used?: Yes  Functional Limitations: Swallowing  Swallow Current Status (): At least 20 percent but less than 40 percent impaired, limited or restricted  Swallow Goal Status (): At least 20 percent but less than 40 percent impaired, limited or restricted  Swallow  Discharge Status (): At least 20 percent but less than 40 percent impaired, limited or restricted        Arina Ohara, MS CCC-SLP  11/12/2018

## 2018-11-13 ENCOUNTER — TELEPHONE (OUTPATIENT)
Dept: ORTHOPEDIC SURGERY | Facility: CLINIC | Age: 73
End: 2018-11-13

## 2018-11-13 NOTE — TELEPHONE ENCOUNTER
Spoke with Michelle @ [facility] in regards to patient.  Michelle stated patient was in much more pain since last OV and her brace was D/C.  Michelle was informed that patient needs to come in and make sure that her hand was okay.  Appointment was offered for this week and declined.  Michelle sated that the only available day they had was 11/23.  We are not working that day.  Michelle stated she would get a mobile xray and get us the disc ASAP.  Michelle informed we do three view of hand here at the office.  Michelle will call back with an update.

## 2018-12-03 ENCOUNTER — HOSPITAL ENCOUNTER (OUTPATIENT)
Dept: BONE DENSITY | Facility: HOSPITAL | Age: 73
Discharge: HOME | End: 2018-12-03

## 2018-12-03 ENCOUNTER — HOSPITAL ENCOUNTER (OUTPATIENT)
Dept: MAMMOGRAPHY | Facility: HOSPITAL | Age: 73
Discharge: HOME | End: 2018-12-03
Admitting: INTERNAL MEDICINE

## 2018-12-03 DIAGNOSIS — Z12.39 SCREENING BREAST EXAMINATION: ICD-10-CM

## 2018-12-03 DIAGNOSIS — M81.0 OSTEOPOROSIS, POST-MENOPAUSAL: ICD-10-CM

## 2018-12-03 PROCEDURE — 77063 BREAST TOMOSYNTHESIS BI: CPT

## 2018-12-03 PROCEDURE — 77080 DXA BONE DENSITY AXIAL: CPT

## 2018-12-03 PROCEDURE — 77067 SCR MAMMO BI INCL CAD: CPT

## 2018-12-03 PROCEDURE — 77063 BREAST TOMOSYNTHESIS BI: CPT | Performed by: RADIOLOGY

## 2018-12-03 PROCEDURE — 77067 SCR MAMMO BI INCL CAD: CPT | Performed by: RADIOLOGY

## 2018-12-03 PROCEDURE — 77080 DXA BONE DENSITY AXIAL: CPT | Performed by: RADIOLOGY

## 2018-12-12 DIAGNOSIS — S62.366D CLOSED NONDISPLACED FRACTURE OF NECK OF FIFTH METACARPAL BONE OF RIGHT HAND WITH ROUTINE HEALING, SUBSEQUENT ENCOUNTER: Primary | ICD-10-CM

## 2018-12-13 ENCOUNTER — APPOINTMENT (OUTPATIENT)
Dept: GENERAL RADIOLOGY | Facility: HOSPITAL | Age: 73
End: 2018-12-13
Attending: ORTHOPAEDIC SURGERY

## 2018-12-18 DIAGNOSIS — S62.366D CLOSED NONDISPLACED FRACTURE OF NECK OF FIFTH METACARPAL BONE OF RIGHT HAND WITH ROUTINE HEALING, SUBSEQUENT ENCOUNTER: Primary | ICD-10-CM

## 2018-12-20 ENCOUNTER — HOSPITAL ENCOUNTER (OUTPATIENT)
Dept: GENERAL RADIOLOGY | Facility: HOSPITAL | Age: 73
Discharge: HOME | End: 2018-12-20
Attending: ORTHOPAEDIC SURGERY | Admitting: ORTHOPAEDIC SURGERY

## 2018-12-20 ENCOUNTER — OFFICE VISIT (OUTPATIENT)
Dept: ORTHOPEDIC SURGERY | Facility: CLINIC | Age: 73
End: 2018-12-20

## 2018-12-20 VITALS — BODY MASS INDEX: 21.2 KG/M2 | HEIGHT: 60 IN | WEIGHT: 108 LBS

## 2018-12-20 DIAGNOSIS — S62.366D CLOSED NONDISPLACED FRACTURE OF NECK OF FIFTH METACARPAL BONE OF RIGHT HAND WITH ROUTINE HEALING, SUBSEQUENT ENCOUNTER: Primary | ICD-10-CM

## 2018-12-20 DIAGNOSIS — S62.366D CLOSED NONDISPLACED FRACTURE OF NECK OF FIFTH METACARPAL BONE OF RIGHT HAND WITH ROUTINE HEALING, SUBSEQUENT ENCOUNTER: ICD-10-CM

## 2018-12-20 PROCEDURE — 73130 X-RAY EXAM OF HAND: CPT | Performed by: RADIOLOGY

## 2018-12-20 PROCEDURE — 73130 X-RAY EXAM OF HAND: CPT

## 2018-12-20 PROCEDURE — 99024 POSTOP FOLLOW-UP VISIT: CPT | Performed by: ORTHOPAEDIC SURGERY

## 2018-12-20 NOTE — PROGRESS NOTES
"Patient: Maribel Staton    YOB: 1945    Chief Complaint   Patient presents with   • Right Hand - Fracture, Follow-up         History of Present Illness: Patient presents for follow-up of her right hand fifth metacarpal neck fracture.  Nursing home resident.  No specific complaints today.    Past Medical History:   Diagnosis Date   • Anxiety    • Arthritis    • Asthma    • Back pain    • Chronic low back pain    • COPD (chronic obstructive pulmonary disease) (CMS/[facility])    • Depression    • Elevated cholesterol    • Fall 01/03/2018   • GERD (gastroesophageal reflux disease)    • Headache    • History of transfusion    • Hypertension    • Seizures (CMS/[facility])    • Sleep apnea    • Tobacco abuse         Social History     Socioeconomic History   • Marital status:      Spouse name: Not on file   • Number of children: Not on file   • Years of education: Not on file   • Highest education level: Not on file   Social Needs   • Financial resource strain: Not on file   • Food insecurity - worry: Not on file   • Food insecurity - inability: Not on file   • Transportation needs - medical: Not on file   • Transportation needs - non-medical: Not on file   Occupational History   • Not on file   Tobacco Use   • Smoking status: Current Some Day Smoker     Packs/day: 1.00     Years: 53.00     Pack years: 53.00     Types: Cigarettes     Start date: 6/6/1965   • Smokeless tobacco: Never Used   Substance and Sexual Activity   • Alcohol use: No   • Drug use: No   • Sexual activity: Defer   Other Topics Concern   • Not on file   Social History Narrative   • Not on file           Physical Exam: 73 y.o. female  General Appearance:    Alert and oriented x 3, cooperative, in no acute distress                   Vitals:    12/20/18 1041   Weight: 49 kg (108 lb)   Height: 152.4 cm (60\")          Some mild localized swelling of her fifth distal metacarpal.  Full flexion-extension of the fingers.  No point tenderness at the " fracture site.  Grossly neurovascular intact      Radiology:       X-rays show some callus forming area no change in alignment of the fracture      Assessment/Plan: Healing right fifth metacarpal fracture.  At this point she is essentially activities he tolerates.  Be careful of reinjuring from a fall or striking something.  We'll see her back as needed          Discussion/Summary:                This chart was completed utilizing the dragon speech recognition software.  Grammatical errors, random word insertions, pronoun errors, and incomplete sentences or occasional consequences of the system due to software limitations, ambient noise, and hardware issues.  Any questions or concerns about the content, text, or information contained within the body of this dictation should be directly addressed to the physician for clarification        This document was signed by Elliot Costello M.D. December 20, 2018 10:50 AM

## 2019-02-08 ENCOUNTER — TRANSCRIBE ORDERS (OUTPATIENT)
Dept: ADMINISTRATIVE | Facility: HOSPITAL | Age: 74
End: 2019-02-08

## 2019-02-08 ENCOUNTER — HOSPITAL ENCOUNTER (OUTPATIENT)
Dept: GENERAL RADIOLOGY | Facility: HOSPITAL | Age: 74
Discharge: HOME | End: 2019-02-08
Admitting: INTERNAL MEDICINE

## 2019-02-08 DIAGNOSIS — Z91.89 PERSONAL HISTORY PRESENTING HAZARDS TO HEALTH: ICD-10-CM

## 2019-02-08 DIAGNOSIS — Z91.89 PERSONAL HISTORY PRESENTING HAZARDS TO HEALTH: Primary | ICD-10-CM

## 2019-02-08 PROCEDURE — 73060 X-RAY EXAM OF HUMERUS: CPT | Performed by: RADIOLOGY

## 2019-02-08 PROCEDURE — 73060 X-RAY EXAM OF HUMERUS: CPT

## 2019-02-08 PROCEDURE — 73030 X-RAY EXAM OF SHOULDER: CPT

## 2019-02-08 PROCEDURE — 73030 X-RAY EXAM OF SHOULDER: CPT | Performed by: RADIOLOGY

## 2019-02-11 ENCOUNTER — HOSPITAL ENCOUNTER (EMERGENCY)
Facility: HOSPITAL | Age: 74
Discharge: HOME | End: 2019-02-11
Attending: FAMILY MEDICINE | Admitting: FAMILY MEDICINE

## 2019-02-11 VITALS
TEMPERATURE: 98 F | WEIGHT: 113 LBS | BODY MASS INDEX: 22.78 KG/M2 | HEART RATE: 90 BPM | OXYGEN SATURATION: 97 % | SYSTOLIC BLOOD PRESSURE: 137 MMHG | DIASTOLIC BLOOD PRESSURE: 84 MMHG | HEIGHT: 59 IN | RESPIRATION RATE: 18 BRPM

## 2019-02-11 DIAGNOSIS — S42.201K CLOSED FRACTURE OF PROXIMAL END OF RIGHT HUMERUS WITH NONUNION, UNSPECIFIED FRACTURE MORPHOLOGY, SUBSEQUENT ENCOUNTER: Primary | ICD-10-CM

## 2019-02-11 PROCEDURE — 99283 EMERGENCY DEPT VISIT LOW MDM: CPT

## 2019-02-11 NOTE — ED PROVIDER NOTES
Subjective     History provided by:  Patient  Upper Extremity Issue   Location:  Shoulder  Shoulder location:  R shoulder  Injury: no (Previous injury from 8/18)    Pain details:     Quality:  Aching and throbbing    Radiates to:  Does not radiate    Severity:  Moderate    Duration:  6 months    Timing:  Intermittent    Progression:  Worsening  Handedness:  Right-handed  Prior injury to area:  Yes  Relieved by:  Nothing  Associated symptoms: decreased range of motion    Associated symptoms: no fever        Review of Systems   Constitutional: Negative.  Negative for fever.   HENT: Negative.    Respiratory: Negative.    Cardiovascular: Negative.  Negative for chest pain.   Gastrointestinal: Negative.  Negative for abdominal pain.   Endocrine: Negative.    Genitourinary: Negative.  Negative for dysuria.   Musculoskeletal: Positive for arthralgias.   Skin: Negative.    Neurological: Negative.    Psychiatric/Behavioral: Negative.    All other systems reviewed and are negative.      Past Medical History:   Diagnosis Date   • Anxiety    • Arthritis    • Asthma    • Back pain    • Chronic low back pain    • COPD (chronic obstructive pulmonary disease) (CMS/[facility])    • Depression    • Elevated cholesterol    • Fall 01/03/2018   • GERD (gastroesophageal reflux disease)    • Headache    • History of transfusion    • Hypertension    • Seizures (CMS/[facility])    • Sleep apnea    • Tobacco abuse        Allergies   Allergen Reactions   • Dilaudid [Hydromorphone Hcl] Delirium   • Morphine And Related Confusion       Past Surgical History:   Procedure Laterality Date   • APPENDECTOMY      about 15 years ago   • CARDIAC CATHETERIZATION  2012   • CATARACT EXTRACTION Bilateral    • CHOLECYSTECTOMY  about 4 years ago   • CHOLECYSTECTOMY     • COLONOSCOPY     • ENDOSCOPY     • FRACTURE SURGERY Right     arm and leg   • HIP ARTHROPLASTY Bilateral    • HYSTERECTOMY     • JOINT REPLACEMENT Right    • ORIF HIP FRACTURE Right 05/2017   • SKIN GRAFT       1970's   • VAGUS NERVE STIMULATOR IMPLANTATION N/A 8/20/2018    Procedure: VAGUS NERVE STIMULATOR IMPLANTATION;  Surgeon: Ursula Celestin MD;  Location: [facility];  Service: General       Family History   Problem Relation Age of Onset   • Arthritis Mother    • Arthritis Father    • Cancer Father    • Alcohol abuse Sister    • Arthritis Sister    • Cancer Sister    • Alcohol abuse Brother    • Arthritis Brother    • Cancer Brother    • Early death Brother    • Breast cancer Neg Hx        Social History     Socioeconomic History   • Marital status:      Spouse name: Not on file   • Number of children: Not on file   • Years of education: Not on file   • Highest education level: Not on file   Tobacco Use   • Smoking status: Current Some Day Smoker     Packs/day: 1.00     Years: 53.00     Pack years: 53.00     Types: Cigarettes     Start date: 6/6/1965   • Smokeless tobacco: Never Used   Substance and Sexual Activity   • Alcohol use: No   • Drug use: No   • Sexual activity: Defer           Objective   Physical Exam   Constitutional: She is oriented to person, place, and time. She appears well-developed and well-nourished. No distress.   HENT:   Head: Normocephalic and atraumatic.   Right Ear: External ear normal.   Left Ear: External ear normal.   Nose: Nose normal.   Eyes: Conjunctivae are normal.   Neck: Normal range of motion. Neck supple. No JVD present. No tracheal deviation present.   Cardiovascular: Normal rate.   No murmur heard.  Pulmonary/Chest: Effort normal. No respiratory distress. She has no wheezes.   Abdominal: Soft. There is no tenderness.   Musculoskeletal: She exhibits edema, tenderness and deformity.   Intact pulses to the right upper extremity.  Pt has very limited ROM, moderate pain w/ elevation.     Neurological: She is alert and oriented to person, place, and time. No cranial nerve deficit.   Skin: Skin is warm and dry. No rash noted. She is not diaphoretic. No erythema. No pallor.    Psychiatric: She has a normal mood and affect. Her behavior is normal. Thought content normal.   Nursing note and vitals reviewed.      Procedures           ED Course  ED Course as of Feb 11 1749   Mon Feb 11, 2019 1743 Originally discussed case with Dr. Laird who was the on-call ortho.  Was told refer case back to Dr. Costello secondary to previously being evaluated by Dr. Costello  [RB]   1744 Dr. Costello contacted, apply sling and have pt contact office tomorrow.   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

## 2019-02-12 ENCOUNTER — OFFICE VISIT (OUTPATIENT)
Dept: ORTHOPEDIC SURGERY | Facility: CLINIC | Age: 74
End: 2019-02-12

## 2019-02-12 VITALS
BODY MASS INDEX: 22.78 KG/M2 | HEART RATE: 89 BPM | WEIGHT: 113 LBS | DIASTOLIC BLOOD PRESSURE: 74 MMHG | SYSTOLIC BLOOD PRESSURE: 126 MMHG | HEIGHT: 59 IN

## 2019-02-12 DIAGNOSIS — S42.291S OTHER CLOSED DISPLACED FRACTURE OF PROXIMAL END OF RIGHT HUMERUS, SEQUELA: Primary | ICD-10-CM

## 2019-02-12 PROBLEM — S42.201A CLOSED FRACTURE OF RIGHT PROXIMAL HUMERUS: Status: ACTIVE | Noted: 2019-02-12

## 2019-02-12 PROCEDURE — 99214 OFFICE O/P EST MOD 30 MIN: CPT | Performed by: ORTHOPAEDIC SURGERY

## 2019-02-12 RX ORDER — LORATADINE 10 MG/1
10 CAPSULE, LIQUID FILLED ORAL DAILY
Status: ON HOLD | COMMUNITY
End: 2019-02-20

## 2019-02-12 NOTE — H&P (VIEW-ONLY)
History and Physical    Patient: Maribel Staton  YOB: 1945  Date of encounter: 02/12/2019    Chief Complaint   Patient presents with   • Right Shoulder - Pain         History of Present Illness: Patient presents for evaluation of her right shoulder.  We originally seen her in the summer for proximal humerus fracture.  While she had some angulation to it she was showing good healing.  She's had increasing pain of her shoulder for the last couple months.  She did have an injury where she fell and injured her right hand and fractured it and she did have a seizure or she fell out of her chair.  Complains of pain and difficulty moving her shoulder.  No paresthesias or swelling of the hand itself.         Maribel Staton is a 73 y.o.     PMH:   Patient Active Problem List   Diagnosis   • Closed right hip fracture (CMS/HCC)   • Osteoporosis, unspecified   • Frequent falls   • Partial symptomatic epilepsy with complex partial seizures, intractable, without status epilepticus (CMS/HCC)   • Nondisplaced fracture of neck of fifth metacarpal bone of right hand with routine healing   • Closed fracture of right proximal humerus       PSH:  Past Surgical History:   Procedure Laterality Date   • APPENDECTOMY      about 15 years ago   • CARDIAC CATHETERIZATION  2012   • CATARACT EXTRACTION Bilateral    • CHOLECYSTECTOMY  about 4 years ago   • CHOLECYSTECTOMY     • COLONOSCOPY     • ENDOSCOPY     • FRACTURE SURGERY Right     arm and leg   • HIP ARTHROPLASTY Bilateral    • HYSTERECTOMY     • JOINT REPLACEMENT Right    • ORIF HIP FRACTURE Right 05/2017   • SKIN GRAFT      1970's   • VAGUS NERVE STIMULATOR IMPLANTATION N/A 8/20/2018    Procedure: VAGUS NERVE STIMULATOR IMPLANTATION;  Surgeon: Ursula Celestin MD;  Location: [facility];  Service: General       Allergies:     Allergies   Allergen Reactions   • Dilaudid [Hydromorphone Hcl] Delirium   • Morphine And Related Confusion       Medications:     Current  Outpatient Medications:   •  atorvastatin (LIPITOR) 40 MG tablet, Take 1 tablet by mouth Every Night., Disp: 30 tablet, Rfl: 0  •  baclofen (LIORESAL) 20 MG tablet, Take 20 mg by mouth 2 (Two) Times a Day., Disp: , Rfl:   •  Calcium Carbonate-Vitamin D (CALCARB 600/D PO), Take 600 mg by mouth 2 (Two) Times a Day., Disp: , Rfl:   •  HYDROcodone-acetaminophen (NORCO)  MG per tablet, Take 1 tablet by mouth Every 12 (Twelve) Hours As Needed for Moderate Pain ., Disp: , Rfl:   •  ipratropium-albuterol (COMBIVENT RESPIMAT)  MCG/ACT inhaler, Inhale 1 puff 4 (Four) Times a Day As Needed for Wheezing., Disp: , Rfl:   •  levETIRAcetam (KEPPRA) 500 MG tablet, Take 500 mg by mouth Every 12 (Twelve) Hours., Disp: , Rfl:   •  Loratadine 10 MG capsule, Take  by mouth., Disp: , Rfl:   •  O2 (OXYGEN), Inhale 2 L/min 1 (One) Time., Disp: , Rfl:   •  pantoprazole (PROTONIX) 40 MG EC tablet, Take 40 mg by mouth Daily., Disp: , Rfl:   •  PHENobarbital (LUMINAL) 30 MG tablet, , Disp: , Rfl:   •  phenytoin (DILANTIN) 100 MG ER capsule, Take 100 mg by mouth Every Morning., Disp: , Rfl:   •  polyethylene glycol (MIRALAX) powder, Take 17 g by mouth Daily., Disp: 225 g, Rfl: 0  •  venlafaxine XR (EFFEXOR-XR) 150 MG 24 hr capsule, Take 150 mg by mouth Daily., Disp: , Rfl:   •  clonazePAM (KlonoPIN) 2 MG tablet, Take 1 mg by mouth Every 12 (Twelve) Hours As Needed for Seizures., Disp: , Rfl:   •  HYDROcodone-acetaminophen (NORCO) 5-325 MG per tablet, Take 1 tablet by mouth Every 6 (Six) Hours As Needed for Moderate Pain  for up to 20 doses., Disp: 20 tablet, Rfl: 0    Social History:     Social History     Occupational History   • Not on file   Tobacco Use   • Smoking status: Current Some Day Smoker     Packs/day: 1.00     Years: 53.00     Pack years: 53.00     Types: Cigarettes     Start date: 6/6/1965   • Smokeless tobacco: Never Used   Substance and Sexual Activity   • Alcohol use: No   • Drug use: No   • Sexual activity: Defer       Social History     Social History Narrative   • Not on file       Family History:     Family History   Problem Relation Age of Onset   • Arthritis Mother    • Arthritis Father    • Cancer Father    • Alcohol abuse Sister    • Arthritis Sister    • Cancer Sister    • Alcohol abuse Brother    • Arthritis Brother    • Cancer Brother    • Early death Brother    • Breast cancer Neg Hx        Review of Systems:   Review of Systems   Constitutional: Negative.    HENT: Negative.    Eyes: Negative.    Respiratory: Positive for apnea, cough and wheezing.    Cardiovascular: Negative.    Gastrointestinal: Positive for constipation.   Endocrine: Negative.    Genitourinary: Negative.    Musculoskeletal: Positive for arthralgias, back pain and joint swelling.   Skin: Negative.    Neurological: Positive for seizures.   Hematological: Negative.    Psychiatric/Behavioral: Positive for confusion and dysphoric mood. The patient is nervous/anxious.        Physical Exam:   Constitutional: Patient is oriented to person, place, and time. Patient appears thin and in mild distress.   Vitals:    02/12/19 1016   BP: 126/74   Pulse: 89       HENT:   Head: Normocephalic and atraumatic.   Right Ear: External ear normal.   Left Ear: External ear normal.   Eyes: EOM are normal. Right eye exhibits no discharge. Left eye exhibits no discharge.   Neck: Normal range of motion. Neck supple.   Cardiovascular: Regular rhythm and normal heart sounds.    No murmur heard.  Pulmonary/Chest: Effort normal. No respiratory distress.Clear to ascultation bilaterally.  Abdominal: Soft.   Musculoskeletal:Thin elderly white female in mild distress.  She holds her right arm to her side.  Pain with attempted motion of the shoulder.  Is not particularly bruised or erythematous.  There is some mild deformity and swelling noted.  The right hand is grossly neurovascularly intact without any swelling.  She has good gentle range of motion of her  elbow.        Neurological: Patient is alert and oriented to person, place, and time.   Skin: Skin is warm and dry. No rash noted. Patient is not diaphoretic.   Psychiatric: Patinet has a normal mood and affect. Patients behavior is normal. Thought content normal.     Radiology:     X-rays shows a proximal humerus fracture just below the surgical neck with complete displacement and high riding of the humerus itself.  The humeral head is still within the glenoid.  This is definitely a significant change from the last x-ray we had a of her in August.        Assessment    ICD-10-CM ICD-9-CM   1. Other closed displaced fracture of proximal end of right humerus, sequela S42.291S 905.2       Plan:  Displaced right proximal humerus fracture probable reinjury at some point from an old fracture.  Have discussed situation with the patient and family.  Recommended open reduction internal fixation of this.  We discussed the risks including infection, bleeding, stiffness, failure healing her fixation as she's quite osteoporotic, need for future surgery.  Risk of anesthesia etc.  Patient would like to wait the next week to do this we'll try to get a note from her family physician.    Written by, Lucia ZAMBRANO, acting as a scribe for Dr. Costello

## 2019-02-12 NOTE — H&P
History and Physical    Patient: Maribel Staton  YOB: 1945  Date of encounter: 02/12/2019    Chief Complaint   Patient presents with   • Right Shoulder - Pain         History of Present Illness: Patient presents for evaluation of her right shoulder.  We originally seen her in the summer for proximal humerus fracture.  While she had some angulation to it she was showing good healing.  She's had increasing pain of her shoulder for the last couple months.  She did have an injury where she fell and injured her right hand and fractured it and she did have a seizure or she fell out of her chair.  Complains of pain and difficulty moving her shoulder.  No paresthesias or swelling of the hand itself.         Maribel Staton is a 73 y.o.     PMH:   Patient Active Problem List   Diagnosis   • Closed right hip fracture (CMS/HCC)   • Osteoporosis, unspecified   • Frequent falls   • Partial symptomatic epilepsy with complex partial seizures, intractable, without status epilepticus (CMS/HCC)   • Nondisplaced fracture of neck of fifth metacarpal bone of right hand with routine healing   • Closed fracture of right proximal humerus       PSH:  Past Surgical History:   Procedure Laterality Date   • APPENDECTOMY      about 15 years ago   • CARDIAC CATHETERIZATION  2012   • CATARACT EXTRACTION Bilateral    • CHOLECYSTECTOMY  about 4 years ago   • CHOLECYSTECTOMY     • COLONOSCOPY     • ENDOSCOPY     • FRACTURE SURGERY Right     arm and leg   • HIP ARTHROPLASTY Bilateral    • HYSTERECTOMY     • JOINT REPLACEMENT Right    • ORIF HIP FRACTURE Right 05/2017   • SKIN GRAFT      1970's   • VAGUS NERVE STIMULATOR IMPLANTATION N/A 8/20/2018    Procedure: VAGUS NERVE STIMULATOR IMPLANTATION;  Surgeon: Ursula Celestin MD;  Location: [facility];  Service: General       Allergies:     Allergies   Allergen Reactions   • Dilaudid [Hydromorphone Hcl] Delirium   • Morphine And Related Confusion       Medications:     Current  Outpatient Medications:   •  atorvastatin (LIPITOR) 40 MG tablet, Take 1 tablet by mouth Every Night., Disp: 30 tablet, Rfl: 0  •  baclofen (LIORESAL) 20 MG tablet, Take 20 mg by mouth 2 (Two) Times a Day., Disp: , Rfl:   •  Calcium Carbonate-Vitamin D (CALCARB 600/D PO), Take 600 mg by mouth 2 (Two) Times a Day., Disp: , Rfl:   •  HYDROcodone-acetaminophen (NORCO)  MG per tablet, Take 1 tablet by mouth Every 12 (Twelve) Hours As Needed for Moderate Pain ., Disp: , Rfl:   •  ipratropium-albuterol (COMBIVENT RESPIMAT)  MCG/ACT inhaler, Inhale 1 puff 4 (Four) Times a Day As Needed for Wheezing., Disp: , Rfl:   •  levETIRAcetam (KEPPRA) 500 MG tablet, Take 500 mg by mouth Every 12 (Twelve) Hours., Disp: , Rfl:   •  Loratadine 10 MG capsule, Take  by mouth., Disp: , Rfl:   •  O2 (OXYGEN), Inhale 2 L/min 1 (One) Time., Disp: , Rfl:   •  pantoprazole (PROTONIX) 40 MG EC tablet, Take 40 mg by mouth Daily., Disp: , Rfl:   •  PHENobarbital (LUMINAL) 30 MG tablet, , Disp: , Rfl:   •  phenytoin (DILANTIN) 100 MG ER capsule, Take 100 mg by mouth Every Morning., Disp: , Rfl:   •  polyethylene glycol (MIRALAX) powder, Take 17 g by mouth Daily., Disp: 225 g, Rfl: 0  •  venlafaxine XR (EFFEXOR-XR) 150 MG 24 hr capsule, Take 150 mg by mouth Daily., Disp: , Rfl:   •  clonazePAM (KlonoPIN) 2 MG tablet, Take 1 mg by mouth Every 12 (Twelve) Hours As Needed for Seizures., Disp: , Rfl:   •  HYDROcodone-acetaminophen (NORCO) 5-325 MG per tablet, Take 1 tablet by mouth Every 6 (Six) Hours As Needed for Moderate Pain  for up to 20 doses., Disp: 20 tablet, Rfl: 0    Social History:     Social History     Occupational History   • Not on file   Tobacco Use   • Smoking status: Current Some Day Smoker     Packs/day: 1.00     Years: 53.00     Pack years: 53.00     Types: Cigarettes     Start date: 6/6/1965   • Smokeless tobacco: Never Used   Substance and Sexual Activity   • Alcohol use: No   • Drug use: No   • Sexual activity: Defer       Social History     Social History Narrative   • Not on file       Family History:     Family History   Problem Relation Age of Onset   • Arthritis Mother    • Arthritis Father    • Cancer Father    • Alcohol abuse Sister    • Arthritis Sister    • Cancer Sister    • Alcohol abuse Brother    • Arthritis Brother    • Cancer Brother    • Early death Brother    • Breast cancer Neg Hx        Review of Systems:   Review of Systems   Constitutional: Negative.    HENT: Negative.    Eyes: Negative.    Respiratory: Positive for apnea, cough and wheezing.    Cardiovascular: Negative.    Gastrointestinal: Positive for constipation.   Endocrine: Negative.    Genitourinary: Negative.    Musculoskeletal: Positive for arthralgias, back pain and joint swelling.   Skin: Negative.    Neurological: Positive for seizures.   Hematological: Negative.    Psychiatric/Behavioral: Positive for confusion and dysphoric mood. The patient is nervous/anxious.        Physical Exam:   Constitutional: Patient is oriented to person, place, and time. Patient appears thin and in mild distress.   Vitals:    02/12/19 1016   BP: 126/74   Pulse: 89       HENT:   Head: Normocephalic and atraumatic.   Right Ear: External ear normal.   Left Ear: External ear normal.   Eyes: EOM are normal. Right eye exhibits no discharge. Left eye exhibits no discharge.   Neck: Normal range of motion. Neck supple.   Cardiovascular: Regular rhythm and normal heart sounds.    No murmur heard.  Pulmonary/Chest: Effort normal. No respiratory distress.Clear to ascultation bilaterally.  Abdominal: Soft.   Musculoskeletal:Thin elderly white female in mild distress.  She holds her right arm to her side.  Pain with attempted motion of the shoulder.  Is not particularly bruised or erythematous.  There is some mild deformity and swelling noted.  The right hand is grossly neurovascularly intact without any swelling.  She has good gentle range of motion of her  elbow.        Neurological: Patient is alert and oriented to person, place, and time.   Skin: Skin is warm and dry. No rash noted. Patient is not diaphoretic.   Psychiatric: Patinet has a normal mood and affect. Patients behavior is normal. Thought content normal.     Radiology:     X-rays shows a proximal humerus fracture just below the surgical neck with complete displacement and high riding of the humerus itself.  The humeral head is still within the glenoid.  This is definitely a significant change from the last x-ray we had a of her in August.        Assessment    ICD-10-CM ICD-9-CM   1. Other closed displaced fracture of proximal end of right humerus, sequela S42.291S 905.2       Plan:  Displaced right proximal humerus fracture probable reinjury at some point from an old fracture.  Have discussed situation with the patient and family.  Recommended open reduction internal fixation of this.  We discussed the risks including infection, bleeding, stiffness, failure healing her fixation as she's quite osteoporotic, need for future surgery.  Risk of anesthesia etc.  Patient would like to wait the next week to do this we'll try to get a note from her family physician.    Written by, Lucia ZAMBRANO, acting as a scribe for Dr. Costello

## 2019-02-12 NOTE — PROGRESS NOTES
Patient: Maribel Staton    YOB: 1945    Chief Complaint   Patient presents with   • Right Shoulder - Pain         History of Present Illness: Patient presents for evaluation of her right shoulder.  We originally seen her in the summer for proximal humerus fracture.  While she had some angulation to it she was showing good healing.  She's had increasing pain of her shoulder for the last couple months.  She did have an injury where she fell and injured her right hand and fractured it and she did have a seizure or she fell out of her chair.  Complains of pain and difficulty moving her shoulder.  No paresthesias or swelling of the hand itself.    Past Medical History:   Diagnosis Date   • Anxiety    • Arthritis    • Asthma    • Back pain    • Chronic low back pain    • COPD (chronic obstructive pulmonary disease) (CMS/[facility])    • Depression    • Elevated cholesterol    • Fall 01/03/2018   • GERD (gastroesophageal reflux disease)    • Headache    • History of transfusion    • Hypertension    • Seizures (CMS/[facility])    • Sleep apnea    • Tobacco abuse         Social History     Socioeconomic History   • Marital status:      Spouse name: Not on file   • Number of children: Not on file   • Years of education: Not on file   • Highest education level: Not on file   Social Needs   • Financial resource strain: Not on file   • Food insecurity - worry: Not on file   • Food insecurity - inability: Not on file   • Transportation needs - medical: Not on file   • Transportation needs - non-medical: Not on file   Occupational History   • Not on file   Tobacco Use   • Smoking status: Current Some Day Smoker     Packs/day: 1.00     Years: 53.00     Pack years: 53.00     Types: Cigarettes     Start date: 6/6/1965   • Smokeless tobacco: Never Used   Substance and Sexual Activity   • Alcohol use: No   • Drug use: No   • Sexual activity: Defer   Other Topics Concern   • Not on file   Social History Narrative   • Not on  "file           Physical Exam: 73 y.o. female  General Appearance:    Alert and oriented x 3, cooperative, in no acute distress                   Vitals:    02/12/19 1016   BP: 126/74   Pulse: 89   Weight: 51.3 kg (113 lb)   Height: 149.9 cm (59.02\")          Thin elderly white female in mild distress.  She holds her right arm to her side.  Pain with attempted motion of the shoulder.  Is not particularly bruised or erythematous.  There is some mild deformity and swelling noted.  The right hand is grossly neurovascularly intact without any swelling.  She has good gentle range of motion of her elbow.      Radiology:       X-rays shows a proximal humerus fracture just below the surgical neck with complete displacement and high riding of the humerus itself.  The humeral head is still within the glenoid.  This is definitely a significant change from the last x-ray we had a of her in August.      Assessment/Plan: Displaced right proximal humerus fracture probable reinjury at some point from an old fracture.  Have discussed situation with the patient and family.  Recommended open reduction internal fixation of this.  We discussed the risks including infection, bleeding, stiffness, failure healing her fixation as she's quite osteoporotic, need for future surgery.  Risk of anesthesia etc.  Patient would like to wait the next week to do this we'll try to get a note from her family physician.              Patient's Body mass index is 22.81 kg/m². BMI is within normal parameters. No follow-up required..      Discussion/Summary:                This chart was completed utilizing the dragon speech recognition software.  Grammatical errors, random word insertions, pronoun errors, and incomplete sentences or occasional consequences of the system due to software limitations, ambient noise, and hardware issues.  Any questions or concerns about the content, text, or information contained within the body of this dictation should be " directly addressed to the physician for clarification        This document was signed by Elliot Costello M.D. February 12, 2019 10:43 AM

## 2019-02-14 ENCOUNTER — TELEPHONE (OUTPATIENT)
Dept: ORTHOPEDIC SURGERY | Facility: CLINIC | Age: 74
End: 2019-02-14

## 2019-02-14 NOTE — TELEPHONE ENCOUNTER
Patient is aware of PAT date/time 2-15-19@1:45.  Clearance appointment with Dr. Boudreaux 2-15-19@11:00.  Patient verbalized understanding.

## 2019-02-15 ENCOUNTER — APPOINTMENT (OUTPATIENT)
Dept: PREADMISSION TESTING | Facility: HOSPITAL | Age: 74
End: 2019-02-15

## 2019-02-15 LAB
ANION GAP SERPL CALCULATED.3IONS-SCNC: 6.1 MMOL/L (ref 3.6–11.2)
BUN BLD-MCNC: 6 MG/DL (ref 7–21)
BUN/CREAT SERPL: 12 (ref 7–25)
CALCIUM SPEC-SCNC: 10.1 MG/DL (ref 7.7–10)
CHLORIDE SERPL-SCNC: 97 MMOL/L (ref 99–112)
CO2 SERPL-SCNC: 25.9 MMOL/L (ref 24.3–31.9)
CREAT BLD-MCNC: 0.5 MG/DL (ref 0.43–1.29)
DEPRECATED RDW RBC AUTO: 42 FL (ref 37–54)
ERYTHROCYTE [DISTWIDTH] IN BLOOD BY AUTOMATED COUNT: 13 % (ref 11.5–14.5)
GFR SERPL CREATININE-BSD FRML MDRD: 122 ML/MIN/1.73
GLUCOSE BLD-MCNC: 101 MG/DL (ref 70–110)
HCT VFR BLD AUTO: 39 % (ref 37–47)
HGB BLD-MCNC: 13.3 G/DL (ref 12–16)
MCH RBC QN AUTO: 30.7 PG (ref 27–33)
MCHC RBC AUTO-ENTMCNC: 34.1 G/DL (ref 33–37)
MCV RBC AUTO: 90.1 FL (ref 80–94)
OSMOLALITY SERPL CALC.SUM OF ELEC: 256.7 MOSM/KG (ref 273–305)
PLATELET # BLD AUTO: 284 10*3/MM3 (ref 130–400)
PMV BLD AUTO: 8.5 FL (ref 6–10)
POTASSIUM BLD-SCNC: 4.2 MMOL/L (ref 3.5–5.3)
RBC # BLD AUTO: 4.33 10*6/MM3 (ref 4.2–5.4)
SODIUM BLD-SCNC: 129 MMOL/L (ref 135–153)
WBC NRBC COR # BLD: 5.69 10*3/MM3 (ref 4.5–12.5)

## 2019-02-15 PROCEDURE — 80048 BASIC METABOLIC PNL TOTAL CA: CPT | Performed by: ORTHOPAEDIC SURGERY

## 2019-02-15 PROCEDURE — 85027 COMPLETE CBC AUTOMATED: CPT | Performed by: ORTHOPAEDIC SURGERY

## 2019-02-15 PROCEDURE — 93005 ELECTROCARDIOGRAM TRACING: CPT

## 2019-02-15 PROCEDURE — 36415 COLL VENOUS BLD VENIPUNCTURE: CPT

## 2019-02-15 PROCEDURE — 93010 ELECTROCARDIOGRAM REPORT: CPT | Performed by: INTERNAL MEDICINE

## 2019-02-15 NOTE — DISCHARGE INSTRUCTIONS
Educational brochure given to patient regarding pain control and mindful breathing.    TAKE the following medications the morning of surgery:  All heart or blood pressure medications    Please discontinue all blood thinners and anticoagulants (except aspirin) prior to surgery as per your surgeon and cardiologist instructions.  Aspirin may be continued up to the day prior to surgery.    HOLD all diabetic medications the morning of surgery as order by physician.    Please follow instructions on use of prep cloths provided by nurse. Return instruction sheet with stickers attached to pre-op nurse on day of surgery.    Arrival time for surgery on 2/20/2019 will be called to you by Dr. Costello office.    General Instructions:  • Do NOT eat or drink after midnight 2/19/2019 which includes water, mints, or gum.  • You may brush your teeth. Dental appliances that are removable must be taken out day of surgery.  • Do NOT smoke, chew tobacco, or drink alcohol within 24 hours prior to surgery.  • Bring medications in original bottles, any inhalers and if applicable your C-PAP/BI-PAP machine  • Bring any papers given to you in the doctor’s office  • Wear clean, comfortable clothes and socks  • Do NOT wear contact lenses or make-up or dark nail polish.  Bring a case for your glasses if applicable.  • Bring crutches or walker if applicable  • Leave all other valuables and jewelry at home  • If you were given a blood bank armband, continue to wear it until discharged.    Preventing a Surgical Site Infection:  • Shower the night before surgery (unless instructed otherwise) using a fresh bar of anti-bacterial soap (such as Dial) and clean washcloth.  Dry with a clean towel and dress in clean clothing.  • For 2 to 3 days before surgery, avoid shaving with a razor near where you will have surgery because the razor can irritate skin and make it easier to develop an infection.  Ask your surgeon if you will be receiving antibiotics  prior to surgery.  • Make sure you, your family, and all healthcare providers clean their hands with soap and water or an alcohol-based hand  before caring for you or your wound.  • If at all possible, quit smoking as many days before surgery as you can.    Day of Surgery:  Upon arrival, a pre-op nurse and anesthesiologist will review your health history, obtain vital signs, and answer questions you may have.  The only belongings needed at this time will be your home medications and if applicable you C-PAP/BI-PAP machine.  If you are staying overnight, your family can leave the rest of your belongings in the car and bring them to your room later.  A pre-op nurse will start an IV and you may receive medication in preparation for surgery.  Due to patient privacy and limited space, only one member of your family will be able to accompany you in the pre-op area.  While you are in surgery your family should notify the waiting room  if they leave the waiting room area and provide a contact number.  Please be aware that surgery does come with discomfort.  We want to make every effort to control your discomfort so please discuss any uncontrolled symptoms with your nurse.  Your doctor will most likely have prescribed pain medications.  If you are going home after surgery you will receive individualized written care instructions before being discharged.  A responsible adult must drive you to and from the hospital on the day of surgery and stay with you for 24 hours.  If you are staying overnight following surgery, you will be transported to your hospital room following the recovery period.

## 2019-02-19 ENCOUNTER — TELEPHONE (OUTPATIENT)
Dept: ORTHOPEDIC SURGERY | Facility: CLINIC | Age: 74
End: 2019-02-19

## 2019-02-20 ENCOUNTER — HOSPITAL ENCOUNTER (OUTPATIENT)
Facility: HOSPITAL | Age: 74
Discharge: HOME | End: 2019-02-21
Attending: ORTHOPAEDIC SURGERY | Admitting: ORTHOPAEDIC SURGERY

## 2019-02-20 ENCOUNTER — ANESTHESIA (OUTPATIENT)
Dept: PERIOP | Facility: HOSPITAL | Age: 74
End: 2019-02-20

## 2019-02-20 ENCOUNTER — APPOINTMENT (OUTPATIENT)
Dept: GENERAL RADIOLOGY | Facility: HOSPITAL | Age: 74
End: 2019-02-20

## 2019-02-20 ENCOUNTER — ANESTHESIA EVENT (OUTPATIENT)
Dept: PERIOP | Facility: HOSPITAL | Age: 74
End: 2019-02-20

## 2019-02-20 DIAGNOSIS — S42.291S OTHER CLOSED DISPLACED FRACTURE OF PROXIMAL END OF RIGHT HUMERUS, SEQUELA: ICD-10-CM

## 2019-02-20 PROCEDURE — 76000 FLUOROSCOPY <1 HR PHYS/QHP: CPT

## 2019-02-20 PROCEDURE — A9270 NON-COVERED ITEM OR SERVICE: HCPCS | Performed by: ORTHOPAEDIC SURGERY

## 2019-02-20 PROCEDURE — C1713 ANCHOR/SCREW BN/BN,TIS/BN: HCPCS | Performed by: ORTHOPAEDIC SURGERY

## 2019-02-20 PROCEDURE — 63710000001 LEVETIRACETAM 500 MG TABLET: Performed by: ORTHOPAEDIC SURGERY

## 2019-02-20 PROCEDURE — 63710000001 ATORVASTATIN 40 MG TABLET: Performed by: ORTHOPAEDIC SURGERY

## 2019-02-20 PROCEDURE — 25010000002 FENTANYL CITRATE (PF) 100 MCG/2ML SOLUTION: Performed by: NURSE ANESTHETIST, CERTIFIED REGISTERED

## 2019-02-20 PROCEDURE — 76000 FLUOROSCOPY <1 HR PHYS/QHP: CPT | Performed by: RADIOLOGY

## 2019-02-20 PROCEDURE — 63710000001 LEVETIRACETAM 250 MG TABLET: Performed by: ORTHOPAEDIC SURGERY

## 2019-02-20 PROCEDURE — 25010000003 CEFAZOLIN PER 500 MG: Performed by: ORTHOPAEDIC SURGERY

## 2019-02-20 PROCEDURE — 25010000002 ONDANSETRON PER 1 MG: Performed by: NURSE ANESTHETIST, CERTIFIED REGISTERED

## 2019-02-20 PROCEDURE — G0378 HOSPITAL OBSERVATION PER HR: HCPCS

## 2019-02-20 PROCEDURE — 25010000002 PROPOFOL 10 MG/ML EMULSION: Performed by: NURSE ANESTHETIST, CERTIFIED REGISTERED

## 2019-02-20 PROCEDURE — 63710000001 POTASSIUM CHLORIDE 20 MEQ TABLET CONTROLLED-RELEASE: Performed by: ORTHOPAEDIC SURGERY

## 2019-02-20 PROCEDURE — 23615 OPTX PROX HUMRL FX W/INT FIX: CPT | Performed by: ORTHOPAEDIC SURGERY

## 2019-02-20 PROCEDURE — 63710000001 CLONAZEPAM 0.5 MG TABLET: Performed by: ORTHOPAEDIC SURGERY

## 2019-02-20 PROCEDURE — 63710000001 PHENOBARBITAL 32.4 MG TABLET: Performed by: ORTHOPAEDIC SURGERY

## 2019-02-20 DEVICE — SCRW CORT S/TAP 3.5X24MM: Type: IMPLANTABLE DEVICE | Site: HUMERUS | Status: FUNCTIONAL

## 2019-02-20 DEVICE — SCRW LK S/TAP STRDRV 3.5X26MM: Type: IMPLANTABLE DEVICE | Site: HUMERUS | Status: FUNCTIONAL

## 2019-02-20 DEVICE — IMPLANTABLE DEVICE: Type: IMPLANTABLE DEVICE | Site: HUMERUS | Status: FUNCTIONAL

## 2019-02-20 DEVICE — SCRW LK S/TAP STRDRV 3.5X50MM: Type: IMPLANTABLE DEVICE | Site: HUMERUS | Status: FUNCTIONAL

## 2019-02-20 DEVICE — SCRW LK S/TAP STRDRV 3.5X46MM: Type: IMPLANTABLE DEVICE | Site: HUMERUS | Status: FUNCTIONAL

## 2019-02-20 DEVICE — SCRW LK S/TAP STRDRV 3.5X52MM: Type: IMPLANTABLE DEVICE | Site: HUMERUS | Status: FUNCTIONAL

## 2019-02-20 DEVICE — SCRW LK S/TAP STRDRV 3.5X40MM: Type: IMPLANTABLE DEVICE | Site: HUMERUS | Status: FUNCTIONAL

## 2019-02-20 DEVICE — SCRW CORT S/TAP 3.5X28MM: Type: IMPLANTABLE DEVICE | Site: HUMERUS | Status: FUNCTIONAL

## 2019-02-20 DEVICE — SCRW CORT S/TAP 3.5X26MM: Type: IMPLANTABLE DEVICE | Site: HUMERUS | Status: FUNCTIONAL

## 2019-02-20 DEVICE — SCRW LK S/TAP STRDRV 3.5X54MM: Type: IMPLANTABLE DEVICE | Site: HUMERUS | Status: FUNCTIONAL

## 2019-02-20 RX ORDER — SODIUM CHLORIDE 0.9 % (FLUSH) 0.9 %
3 SYRINGE (ML) INJECTION EVERY 12 HOURS SCHEDULED
Status: DISCONTINUED | OUTPATIENT
Start: 2019-02-20 | End: 2019-02-21 | Stop reason: HOSPADM

## 2019-02-20 RX ORDER — FAMOTIDINE 10 MG/ML
INJECTION, SOLUTION INTRAVENOUS AS NEEDED
Status: DISCONTINUED | OUTPATIENT
Start: 2019-02-20 | End: 2019-02-20 | Stop reason: SURG

## 2019-02-20 RX ORDER — PANTOPRAZOLE SODIUM 40 MG/1
40 TABLET, DELAYED RELEASE ORAL DAILY
Status: DISCONTINUED | OUTPATIENT
Start: 2019-02-21 | End: 2019-02-21 | Stop reason: HOSPADM

## 2019-02-20 RX ORDER — SENNOSIDES 8.6 MG
1 TABLET ORAL 2 TIMES DAILY
COMMUNITY

## 2019-02-20 RX ORDER — ONDANSETRON 2 MG/ML
4 INJECTION INTRAMUSCULAR; INTRAVENOUS EVERY 6 HOURS PRN
Status: DISCONTINUED | OUTPATIENT
Start: 2019-02-20 | End: 2019-02-21 | Stop reason: HOSPADM

## 2019-02-20 RX ORDER — POTASSIUM CHLORIDE 20 MEQ/1
20 TABLET, EXTENDED RELEASE ORAL 3 TIMES DAILY
COMMUNITY
End: 2019-05-23

## 2019-02-20 RX ORDER — HYDROCODONE BITARTRATE AND ACETAMINOPHEN 10; 325 MG/1; MG/1
1 TABLET ORAL EVERY 12 HOURS
Status: CANCELLED | OUTPATIENT
Start: 2019-02-20

## 2019-02-20 RX ORDER — ONDANSETRON 4 MG/1
4 TABLET, FILM COATED ORAL EVERY 6 HOURS PRN
Status: DISCONTINUED | OUTPATIENT
Start: 2019-02-20 | End: 2019-02-21 | Stop reason: HOSPADM

## 2019-02-20 RX ORDER — KETOROLAC TROMETHAMINE 30 MG/ML
15 INJECTION, SOLUTION INTRAMUSCULAR; INTRAVENOUS EVERY 6 HOURS PRN
Status: ACTIVE | OUTPATIENT
Start: 2019-02-20 | End: 2019-02-20

## 2019-02-20 RX ORDER — SODIUM CHLORIDE 0.9 % (FLUSH) 0.9 %
3 SYRINGE (ML) INJECTION EVERY 12 HOURS SCHEDULED
Status: DISCONTINUED | OUTPATIENT
Start: 2019-02-20 | End: 2019-02-20 | Stop reason: HOSPADM

## 2019-02-20 RX ORDER — SODIUM CHLORIDE 9 MG/ML
100 INJECTION, SOLUTION INTRAVENOUS CONTINUOUS
Status: DISCONTINUED | OUTPATIENT
Start: 2019-02-20 | End: 2019-02-21 | Stop reason: HOSPADM

## 2019-02-20 RX ORDER — FENTANYL CITRATE 50 UG/ML
INJECTION, SOLUTION INTRAMUSCULAR; INTRAVENOUS AS NEEDED
Status: DISCONTINUED | OUTPATIENT
Start: 2019-02-20 | End: 2019-02-20 | Stop reason: SURG

## 2019-02-20 RX ORDER — SODIUM CHLORIDE 0.9 % (FLUSH) 0.9 %
3-10 SYRINGE (ML) INJECTION AS NEEDED
Status: DISCONTINUED | OUTPATIENT
Start: 2019-02-20 | End: 2019-02-20 | Stop reason: HOSPADM

## 2019-02-20 RX ORDER — SODIUM CHLORIDE, SODIUM LACTATE, POTASSIUM CHLORIDE, CALCIUM CHLORIDE 600; 310; 30; 20 MG/100ML; MG/100ML; MG/100ML; MG/100ML
125 INJECTION, SOLUTION INTRAVENOUS CONTINUOUS
Status: DISCONTINUED | OUTPATIENT
Start: 2019-02-20 | End: 2019-02-21 | Stop reason: HOSPADM

## 2019-02-20 RX ORDER — ONDANSETRON 2 MG/ML
INJECTION INTRAMUSCULAR; INTRAVENOUS AS NEEDED
Status: DISCONTINUED | OUTPATIENT
Start: 2019-02-20 | End: 2019-02-20 | Stop reason: SURG

## 2019-02-20 RX ORDER — BUPIVACAINE HYDROCHLORIDE AND EPINEPHRINE 5; 5 MG/ML; UG/ML
INJECTION, SOLUTION PERINEURAL AS NEEDED
Status: DISCONTINUED | OUTPATIENT
Start: 2019-02-20 | End: 2019-02-20 | Stop reason: HOSPADM

## 2019-02-20 RX ORDER — MEPERIDINE HYDROCHLORIDE 25 MG/ML
12.5 INJECTION INTRAMUSCULAR; INTRAVENOUS; SUBCUTANEOUS
Status: DISCONTINUED | OUTPATIENT
Start: 2019-02-20 | End: 2019-02-20 | Stop reason: HOSPADM

## 2019-02-20 RX ORDER — CLONAZEPAM 0.5 MG/1
0.5 TABLET ORAL 2 TIMES DAILY
Status: DISCONTINUED | OUTPATIENT
Start: 2019-02-20 | End: 2019-02-21 | Stop reason: HOSPADM

## 2019-02-20 RX ORDER — SODIUM CHLORIDE 0.9 % (FLUSH) 0.9 %
3-10 SYRINGE (ML) INJECTION AS NEEDED
Status: DISCONTINUED | OUTPATIENT
Start: 2019-02-20 | End: 2019-02-21 | Stop reason: HOSPADM

## 2019-02-20 RX ORDER — CETIRIZINE HYDROCHLORIDE 10 MG/1
10 TABLET ORAL DAILY
Status: DISCONTINUED | OUTPATIENT
Start: 2019-02-21 | End: 2019-02-21 | Stop reason: HOSPADM

## 2019-02-20 RX ORDER — FENTANYL CITRATE 50 UG/ML
50 INJECTION, SOLUTION INTRAMUSCULAR; INTRAVENOUS
Status: DISCONTINUED | OUTPATIENT
Start: 2019-02-20 | End: 2019-02-20 | Stop reason: HOSPADM

## 2019-02-20 RX ORDER — LIDOCAINE HYDROCHLORIDE 20 MG/ML
INJECTION, SOLUTION INFILTRATION; PERINEURAL AS NEEDED
Status: DISCONTINUED | OUTPATIENT
Start: 2019-02-20 | End: 2019-02-20 | Stop reason: SURG

## 2019-02-20 RX ORDER — POTASSIUM CHLORIDE 20 MEQ/1
20 TABLET, EXTENDED RELEASE ORAL 3 TIMES DAILY
Status: DISCONTINUED | OUTPATIENT
Start: 2019-02-20 | End: 2019-02-21 | Stop reason: HOSPADM

## 2019-02-20 RX ORDER — HYDROCODONE BITARTRATE AND ACETAMINOPHEN 10; 325 MG/1; MG/1
1 TABLET ORAL EVERY 6 HOURS PRN
Status: DISCONTINUED | OUTPATIENT
Start: 2019-02-20 | End: 2019-02-21 | Stop reason: HOSPADM

## 2019-02-20 RX ORDER — IPRATROPIUM BROMIDE AND ALBUTEROL SULFATE 2.5; .5 MG/3ML; MG/3ML
3 SOLUTION RESPIRATORY (INHALATION) ONCE AS NEEDED
Status: DISCONTINUED | OUTPATIENT
Start: 2019-02-20 | End: 2019-02-20 | Stop reason: HOSPADM

## 2019-02-20 RX ORDER — ATORVASTATIN CALCIUM 40 MG/1
40 TABLET, FILM COATED ORAL NIGHTLY
Status: DISCONTINUED | OUTPATIENT
Start: 2019-02-20 | End: 2019-02-21 | Stop reason: HOSPADM

## 2019-02-20 RX ORDER — SENNA PLUS 8.6 MG/1
1 TABLET ORAL DAILY
Status: DISCONTINUED | OUTPATIENT
Start: 2019-02-21 | End: 2019-02-21 | Stop reason: HOSPADM

## 2019-02-20 RX ORDER — PHENOBARBITAL 32.4 MG/1
32.4 TABLET ORAL EVERY 8 HOURS SCHEDULED
Status: DISCONTINUED | OUTPATIENT
Start: 2019-02-20 | End: 2019-02-21 | Stop reason: HOSPADM

## 2019-02-20 RX ORDER — FOLIC ACID 1 MG/1
1 TABLET ORAL DAILY
Status: DISCONTINUED | OUTPATIENT
Start: 2019-02-21 | End: 2019-02-21 | Stop reason: HOSPADM

## 2019-02-20 RX ORDER — FOLIC ACID 1 MG/1
1 TABLET ORAL DAILY
COMMUNITY

## 2019-02-20 RX ORDER — PROPOFOL 10 MG/ML
VIAL (ML) INTRAVENOUS AS NEEDED
Status: DISCONTINUED | OUTPATIENT
Start: 2019-02-20 | End: 2019-02-20 | Stop reason: SURG

## 2019-02-20 RX ORDER — ONDANSETRON 2 MG/ML
4 INJECTION INTRAMUSCULAR; INTRAVENOUS ONCE AS NEEDED
Status: DISCONTINUED | OUTPATIENT
Start: 2019-02-20 | End: 2019-02-20 | Stop reason: HOSPADM

## 2019-02-20 RX ORDER — ONDANSETRON 4 MG/1
4 TABLET, ORALLY DISINTEGRATING ORAL EVERY 6 HOURS PRN
Status: DISCONTINUED | OUTPATIENT
Start: 2019-02-20 | End: 2019-02-21 | Stop reason: HOSPADM

## 2019-02-20 RX ORDER — CALCIUM CARBONATE 500(1250)
500 TABLET ORAL DAILY
Status: DISCONTINUED | OUTPATIENT
Start: 2019-02-21 | End: 2019-02-21 | Stop reason: HOSPADM

## 2019-02-20 RX ORDER — ROCURONIUM BROMIDE 10 MG/ML
INJECTION, SOLUTION INTRAVENOUS AS NEEDED
Status: DISCONTINUED | OUTPATIENT
Start: 2019-02-20 | End: 2019-02-20 | Stop reason: SURG

## 2019-02-20 RX ORDER — LORATADINE 10 MG/1
10 TABLET ORAL DAILY
COMMUNITY

## 2019-02-20 RX ORDER — CLONAZEPAM 0.5 MG/1
0.5 TABLET ORAL 2 TIMES DAILY
Status: ON HOLD | COMMUNITY
End: 2019-10-31

## 2019-02-20 RX ORDER — LEVETIRACETAM 750 MG/1
750 TABLET ORAL 2 TIMES DAILY
COMMUNITY
End: 2019-05-26 | Stop reason: HOSPADM

## 2019-02-20 RX ADMIN — POTASSIUM CHLORIDE 20 MEQ: 1500 TABLET, EXTENDED RELEASE ORAL at 20:56

## 2019-02-20 RX ADMIN — EPHEDRINE SULFATE 10 MG: 50 INJECTION INTRAMUSCULAR; INTRAVENOUS; SUBCUTANEOUS at 11:19

## 2019-02-20 RX ADMIN — FENTANYL CITRATE 50 MCG: 50 INJECTION INTRAMUSCULAR; INTRAVENOUS at 14:36

## 2019-02-20 RX ADMIN — PROPOFOL 100 MG: 10 INJECTION, EMULSION INTRAVENOUS at 11:13

## 2019-02-20 RX ADMIN — FAMOTIDINE 20 MG: 10 INJECTION, SOLUTION INTRAVENOUS at 11:10

## 2019-02-20 RX ADMIN — SODIUM CHLORIDE 100 ML/HR: 9 INJECTION, SOLUTION INTRAVENOUS at 17:02

## 2019-02-20 RX ADMIN — SODIUM CHLORIDE, POTASSIUM CHLORIDE, SODIUM LACTATE AND CALCIUM CHLORIDE 125 ML/HR: 600; 310; 30; 20 INJECTION, SOLUTION INTRAVENOUS at 10:06

## 2019-02-20 RX ADMIN — FENTANYL CITRATE 25 MCG: 50 INJECTION INTRAMUSCULAR; INTRAVENOUS at 14:04

## 2019-02-20 RX ADMIN — SODIUM CHLORIDE, PRESERVATIVE FREE 3 ML: 5 INJECTION INTRAVENOUS at 20:56

## 2019-02-20 RX ADMIN — FENTANYL CITRATE 25 MCG: 50 INJECTION INTRAMUSCULAR; INTRAVENOUS at 12:51

## 2019-02-20 RX ADMIN — PHENOBARBITAL 32.4 MG: 32.4 TABLET ORAL at 17:02

## 2019-02-20 RX ADMIN — ROCURONIUM BROMIDE 20 MG: 10 INJECTION INTRAVENOUS at 11:13

## 2019-02-20 RX ADMIN — ATORVASTATIN CALCIUM 40 MG: 40 TABLET, FILM COATED ORAL at 20:55

## 2019-02-20 RX ADMIN — EPHEDRINE SULFATE 10 MG: 50 INJECTION INTRAMUSCULAR; INTRAVENOUS; SUBCUTANEOUS at 11:17

## 2019-02-20 RX ADMIN — CLONAZEPAM 0.5 MG: 0.5 TABLET ORAL at 20:56

## 2019-02-20 RX ADMIN — FENTANYL CITRATE 50 MCG: 50 INJECTION INTRAMUSCULAR; INTRAVENOUS at 14:28

## 2019-02-20 RX ADMIN — PHENOBARBITAL 32.4 MG: 32.4 TABLET ORAL at 21:21

## 2019-02-20 RX ADMIN — EPHEDRINE SULFATE 10 MG: 50 INJECTION INTRAMUSCULAR; INTRAVENOUS; SUBCUTANEOUS at 11:15

## 2019-02-20 RX ADMIN — LIDOCAINE HYDROCHLORIDE 40 MG: 20 INJECTION, SOLUTION INFILTRATION; PERINEURAL at 11:10

## 2019-02-20 RX ADMIN — FENTANYL CITRATE 25 MCG: 50 INJECTION INTRAMUSCULAR; INTRAVENOUS at 14:00

## 2019-02-20 RX ADMIN — CEFAZOLIN 1 G: 1 INJECTION, POWDER, FOR SOLUTION INTRAMUSCULAR; INTRAVENOUS; PARENTERAL at 19:46

## 2019-02-20 RX ADMIN — FENTANYL CITRATE 25 MCG: 50 INJECTION INTRAMUSCULAR; INTRAVENOUS at 12:29

## 2019-02-20 RX ADMIN — POTASSIUM CHLORIDE 20 MEQ: 1500 TABLET, EXTENDED RELEASE ORAL at 17:02

## 2019-02-20 RX ADMIN — LEVETIRACETAM 750 MG: 500 TABLET, FILM COATED ORAL at 20:56

## 2019-02-20 RX ADMIN — CEFAZOLIN 2 G: 1 INJECTION, POWDER, FOR SOLUTION INTRAMUSCULAR; INTRAVENOUS; PARENTERAL at 11:10

## 2019-02-20 RX ADMIN — FENTANYL CITRATE 100 MCG: 50 INJECTION INTRAMUSCULAR; INTRAVENOUS at 11:10

## 2019-02-20 RX ADMIN — ONDANSETRON 4 MG: 2 INJECTION, SOLUTION INTRAMUSCULAR; INTRAVENOUS at 12:02

## 2019-02-20 NOTE — OP NOTE
HUMERUS PROXIMAL OPEN REDUCTION INTERNAL FIXATION  Procedure Report    Patient Name:  Maribel Staton  YOB: 1945    Date of Surgery:  2/20/2019     Indications:  Patient is 73-year-old white female who presented the office with complaint of right shoulder pain.  She had previously been treated for a nondisplaced mildly angulated proximal humerus fracture earlier in the summer which was healing well and she was doing okay with it but apparently she had a fall a couple months ago again or she broke her hand and she reinjured her shoulder.  At the time injury to the shoulder was missed.  When seen earlier this month with complaint shoulder pain she had a completely displaced oblique fracture proximal humerus.  Situation was discussed with the patient and her son and decision was made to proceed with open reduction internal fixation.  The risk and benefits were discussed including infection, bleeding, prolonged rehabilitation, stiffness, failure healing, local nerve damage, need for future surgery, risk of anesthesia etc.  Patient appears be well where the risk and benefits and consented to the procedure    Pre-op Diagnosis:   Other closed displaced fracture of proximal end of right humerus, sequela [S42.291S]       Post-Op Diagnosis Codes:     * Other closed displaced fracture of proximal end of right humerus, sequela [S42.291S]    Procedure/CPT® Codes:      Procedure(s):  HUMERUS PROXIMAL OPEN REDUCTION INTERNAL FIXATION    Staff:  Surgeon(s):  Elliot Costello MD    Assistant: Bennett Skelton    Anesthesia: Choice    Estimated Blood Loss: 250 cc    Implants:    Implant Name Type Inv. Item Serial No.  Lot No. LRB No. Used   PLT HUM LCP PROX STD SS 5H [id number] - [id number] - [id number] Implant PLT HUM LCP PROX STD SS 5H [id number] 241.903 DEPUY [email or web address]  Right 1   SCRW MERLIN S/TAP 3.5X24MM - S204.824 - [id number] Implant SCRW MERLIN S/TAP 3.5X24MM 204.824 DEPU[employer]  Right 1   SCRW  MERLIN S/TAP 3.5X26MM - S204.826 - [id number] Implant SCRW MERLIN S/TAP 3.5X26MM 204.826 DEPUY [employer]ES  Right 2   SCRW MERLIN S/TAP 3.5X28MM - S204.828 - [id number] Implant SCRW MERLIN S/TAP 3.5X28MM 204.828 DEPU[employer] SY[employer]  Right 1   SCRW LK S/TAP STRDRV 3.5X26MM - S212.109 - [id number] Implant SCRW LK S/TAP STRDRV 3.5X26MM 212.109 DEPU[employer]  Right 1   SCRW LK S/TAP STRDRV 3.5X40MM - S212.117 - [id number] Implant SCRW LK S/TAP STRDRV 3.5X40MM 212.117 DEPUY SY[employer]ES  Right 2   SCRW LK S/TAP STRDRV 3.5X46MM - S212.136 - [id number] Implant SCRW LK S/TAP STRDRV 3.5X46MM 212.136 DEPUY [employer]  Right 1   SCRW LK S/TAP STRDRV 3.5X50MM - S212.121 - [id number] Implant SCRW LK S/TAP STRDRV 3.5X50MM 212.121 DEPU[employer]  Right 1   SCRW LK S/TAP STRDRV 3.5X52MM - S212.122 - [id number] Implant SCRW LK S/TAP STRDRV 3.5X52MM 212.122 DEPUY [employer]  Right 1   SCRW LK S/TAP STRDRV 3.5X54MM - S02.212.054 - [id number] Implant SCRW LK S/TAP STRDRV 3.5X54MM 02.212.054 DEPUY [employer]ES  Right 1       Specimen:          None      Findings: See op note    Complications: None      Description of Procedure: Briefly the patient brought the operative room she underwent a general anesthetics per the anesthesia service.  She was in place with the beach chair attachment in beachchair position.  Her right arm was prepped with ChloraPrep and draped in sterile fashion from the hand and upper chest Rizzolo back and neck.  The incision site was infiltrated with 0.5% Marcaine with epi.  A deltopectoral incision was made.  Patient is quite thin and petite.  He identified this the vein was is quite small and we split the deltopectoral interval bluntly.  We continued our dissection down to the humerus.  The clavipectoral fascia was divided and dissection was made lateral to the biceps tendon.    The fracture here was quite unstable.  As we continue our dissection it was a slight amount of fluid within the fracture site and was serous.  There was significant  evidence of all bony formation and scar tissue.  Meticulous and careful dissection was made to try to remove all the abnormal scar scar start scar tissue and soft tissue between the fracture site.  We also did using rongeurs and drills removed some of the callus bone from the prior injury.  At the fracture site itself of we debrided both ends back to good bleeding bone.    The fracture was quite unstable.  We initially reapproximated it with towel clips.  We did put a lag screw from anterior to posterior along centrally anterior spike that this is unusual method of June 2 5 overdoing it 35 place 35 screw.  Intraoperative fluoroscopy showed.  Be a very nice and reasonable reduction.  We then laterally placed a Synthes 5 hole proximal humeral locking plate.  3 cortical screws and one locking screw were placed distally along the humeral shaft.  Proximally 6 locking screws were placed.  There was position was verified under fluoroscopy internal range of motion.  We did have to change to the screws, there were little bit long.  On the table the patient.  The fracture appeared be quite stable.  She had about 30° of external rotation and she was stiff.  The wound was irrigated out copiously.  Proximally we closed some of the split area were reviewed taken down from the rotator cuff with a #2 Ethibond figure-of-eight sutures.  We then closed the clavicle pectoral pectoral clavicle fascia with #1 Vicryl interrupted figure-of-eight sutures.  The deltopectoral fascia was then reapproximated one running #1 Vicryl running locking sutures.  Subcutaneous tissues were approximated with 1 Vicryl and 2-0 Monocryl and the skin was closed with staples.  A Betadine soaked Adaptic and sterile dressing was placed around the wound.  Patient was placed in a sling and ice pack was placed.  She tolerated procedure well chance recovery room in stable condition      Elliot Costello MD     Date: 2/20/2019  Time: 2:04 PM

## 2019-02-20 NOTE — PLAN OF CARE
Problem: Patient Care Overview  Goal: Plan of Care Review  Outcome: Ongoing (interventions implemented as appropriate)   02/20/19 1558   Coping/Psychosocial   Plan of Care Reviewed With patient   Plan of Care Review   Progress no change     Goal: Individualization and Mutuality  Outcome: Ongoing (interventions implemented as appropriate)    Goal: Discharge Needs Assessment  Outcome: Ongoing (interventions implemented as appropriate)    Goal: Interprofessional Rounds/Family Conf  Outcome: Ongoing (interventions implemented as appropriate)      Problem: Fall Risk (Adult)  Goal: Identify Related Risk Factors and Signs and Symptoms  Outcome: Ongoing (interventions implemented as appropriate)   02/20/19 1558   Fall Risk (Adult)   Related Risk Factors (Fall Risk) age-related changes;environment unfamiliar;history of falls;gait/mobility problems   Signs and Symptoms (Fall Risk) presence of risk factors     Goal: Absence of Fall  Outcome: Ongoing (interventions implemented as appropriate)   02/20/19 1558   Fall Risk (Adult)   Absence of Fall making progress toward outcome       Problem: Skin Injury Risk (Adult)  Goal: Identify Related Risk Factors and Signs and Symptoms  Outcome: Ongoing (interventions implemented as appropriate)   02/20/19 1558   Skin Injury Risk (Adult)   Related Risk Factors (Skin Injury Risk) mobility impaired;advanced age     Goal: Skin Health and Integrity  Outcome: Ongoing (interventions implemented as appropriate)   02/20/19 1558   Skin Injury Risk (Adult)   Skin Health and Integrity making progress toward outcome       Problem: Surgery Nonspecified (Adult)  Goal: Signs and Symptoms of Listed Potential Problems Will be Absent, Minimized or Managed (Surgery Nonspecified)  Outcome: Ongoing (interventions implemented as appropriate)   02/20/19 1558   Goal/Outcome Evaluation   Problems Assessed (Surgery) all   Problems Present (Surgery) pain     Goal: Anesthesia/Sedation Recovery  Outcome: Ongoing  (interventions implemented as appropriate)

## 2019-02-20 NOTE — ANESTHESIA PROCEDURE NOTES
Airway  Urgency: elective    Date/Time: 2/20/2019 11:13 AM  Airway not difficult    General Information and Staff    Patient location during procedure: OR  Anesthesiologist: Benja Alcala DO  CRNA: Alyssa Jones CRNA    Indications and Patient Condition  Indications for airway management: airway protection    Preoxygenated: yes  MILS maintained throughout  Mask difficulty assessment: 2 - vent by mask + OA or adjuvant +/- NMBA    Final Airway Details  Final airway type: endotracheal airway      Successful airway: ETT  Cuffed: yes   Successful intubation technique: direct laryngoscopy  Endotracheal tube insertion site: oral  Blade: Rosa  Blade size: 3  ETT size (mm): 7.0  Cormack-Lehane Classification: grade IIa - partial view of glottis  Placement verified by: chest auscultation, capnometry and palpation of cuff   Cuff volume (mL): 6  Measured from: lips  ETT to lips (cm): 22  Number of attempts at approach: 1    Additional Comments  Dentition as preop. No complications noted. Patient tolerated well.  ETT secured.

## 2019-02-20 NOTE — ANESTHESIA PREPROCEDURE EVALUATION
Anesthesia Evaluation                  Airway   Mallampati: II  TM distance: >3 FB  Neck ROM: limited  Possible difficult intubation  Dental - normal exam   (+) poor dentition    Pulmonary - normal exam   (+) a smoker Current Smoked day of surgery, COPD, asthma, sleep apnea,   Cardiovascular - normal exam    (+) hypertension, hyperlipidemia,       Neuro/Psych  (+) seizures, headaches, psychiatric history,     GI/Hepatic/Renal/Endo    (+)  GERD,      Musculoskeletal     (+) back pain,   Abdominal  - normal exam    Bowel sounds: normal.   Substance History      OB/GYN          Other   (+) arthritis                       Anesthesia Plan    ASA 3     general     intravenous induction   Anesthetic plan, all risks, benefits, and alternatives have been provided, discussed and informed consent has been obtained with: patient.    Plan discussed with CRNA.

## 2019-02-20 NOTE — ANESTHESIA POSTPROCEDURE EVALUATION
Patient: Maribel Staton    Procedure Summary     Date:  02/20/19 Room / Location:   [facility] /  [facility]    Anesthesia Start:  1107 Anesthesia Stop:  1415    Procedure:  HUMERUS PROXIMAL OPEN REDUCTION INTERNAL FIXATION (Right Shoulder) Diagnosis:       Other closed displaced fracture of proximal end of right humerus, sequela      (Other closed displaced fracture of proximal end of right humerus, sequela [S42.291S])    Surgeon:  Elliot Costello MD Provider:  Benja Alcala DO    Anesthesia Type:  general ASA Status:  3          Anesthesia Type: general  Last vitals  BP   127/70 (02/20/19 1645)   Temp   97.3 °F (36.3 °C) (02/20/19 1645)   Pulse   82 (02/20/19 1645)   Resp   18 (02/20/19 1645)     SpO2   97 % (02/20/19 1645)     Post Anesthesia Care and Evaluation    Patient location during evaluation: PACU  Patient participation: complete - patient participated  Level of consciousness: awake and alert  Pain score: 1  Pain management: adequate  Airway patency: patent  Anesthetic complications: No anesthetic complications  PONV Status: controlled  Cardiovascular status: acceptable  Respiratory status: acceptable  Hydration status: acceptable

## 2019-02-21 VITALS
HEART RATE: 99 BPM | RESPIRATION RATE: 18 BRPM | TEMPERATURE: 99.2 F | HEIGHT: 59 IN | SYSTOLIC BLOOD PRESSURE: 153 MMHG | BODY MASS INDEX: 22.78 KG/M2 | WEIGHT: 113 LBS | DIASTOLIC BLOOD PRESSURE: 75 MMHG | OXYGEN SATURATION: 98 %

## 2019-02-21 LAB
ANION GAP SERPL CALCULATED.3IONS-SCNC: 8 MMOL/L (ref 3.6–11.2)
BASOPHILS # BLD AUTO: 0.01 10*3/MM3 (ref 0–0.3)
BASOPHILS NFR BLD AUTO: 0.1 % (ref 0–2)
BUN BLD-MCNC: <5 MG/DL (ref 7–21)
BUN/CREAT SERPL: ABNORMAL (ref 7–25)
CALCIUM SPEC-SCNC: 8.5 MG/DL (ref 7.7–10)
CHLORIDE SERPL-SCNC: 105 MMOL/L (ref 99–112)
CO2 SERPL-SCNC: 23 MMOL/L (ref 24.3–31.9)
CREAT BLD-MCNC: 0.62 MG/DL (ref 0.43–1.29)
DEPRECATED RDW RBC AUTO: 43 FL (ref 37–54)
EOSINOPHIL # BLD AUTO: 0.07 10*3/MM3 (ref 0–0.7)
EOSINOPHIL NFR BLD AUTO: 1 % (ref 0–7)
ERYTHROCYTE [DISTWIDTH] IN BLOOD BY AUTOMATED COUNT: 13.2 % (ref 11.5–14.5)
GFR SERPL CREATININE-BSD FRML MDRD: 95 ML/MIN/1.73
GLUCOSE BLD-MCNC: 138 MG/DL (ref 70–110)
HCT VFR BLD AUTO: 30.8 % (ref 37–47)
HGB BLD-MCNC: 10.3 G/DL (ref 12–16)
IMM GRANULOCYTES # BLD AUTO: 0.02 10*3/MM3 (ref 0–0.03)
IMM GRANULOCYTES NFR BLD AUTO: 0.3 % (ref 0–0.5)
LYMPHOCYTES # BLD AUTO: 0.49 10*3/MM3 (ref 1–3)
LYMPHOCYTES NFR BLD AUTO: 6.8 % (ref 16–46)
MCH RBC QN AUTO: 30.9 PG (ref 27–33)
MCHC RBC AUTO-ENTMCNC: 33.4 G/DL (ref 33–37)
MCV RBC AUTO: 92.5 FL (ref 80–94)
MONOCYTES # BLD AUTO: 0.65 10*3/MM3 (ref 0.1–0.9)
MONOCYTES NFR BLD AUTO: 9 % (ref 0–12)
NEUTROPHILS # BLD AUTO: 6.01 10*3/MM3 (ref 1.4–6.5)
NEUTROPHILS NFR BLD AUTO: 82.8 % (ref 40–75)
OSMOLALITY SERPL CALC.SUM OF ELEC: NORMAL MOSM/KG (ref 273–305)
PLATELET # BLD AUTO: 219 10*3/MM3 (ref 130–400)
PMV BLD AUTO: 8.5 FL (ref 6–10)
POTASSIUM BLD-SCNC: 4 MMOL/L (ref 3.5–5.3)
RBC # BLD AUTO: 3.33 10*6/MM3 (ref 4.2–5.4)
SODIUM BLD-SCNC: 136 MMOL/L (ref 135–153)
WBC NRBC COR # BLD: 7.25 10*3/MM3 (ref 4.5–12.5)

## 2019-02-21 PROCEDURE — 63710000001 LEVETIRACETAM 500 MG TABLET: Performed by: ORTHOPAEDIC SURGERY

## 2019-02-21 PROCEDURE — 63710000001 POTASSIUM CHLORIDE 20 MEQ TABLET CONTROLLED-RELEASE: Performed by: ORTHOPAEDIC SURGERY

## 2019-02-21 PROCEDURE — A9270 NON-COVERED ITEM OR SERVICE: HCPCS | Performed by: ORTHOPAEDIC SURGERY

## 2019-02-21 PROCEDURE — 99024 POSTOP FOLLOW-UP VISIT: CPT | Performed by: PHYSICIAN ASSISTANT

## 2019-02-21 PROCEDURE — G0378 HOSPITAL OBSERVATION PER HR: HCPCS

## 2019-02-21 PROCEDURE — 85025 COMPLETE CBC W/AUTO DIFF WBC: CPT | Performed by: ORTHOPAEDIC SURGERY

## 2019-02-21 PROCEDURE — 63710000001 HYDROCODONE-ACETAMINOPHEN 10-325 MG TABLET: Performed by: ORTHOPAEDIC SURGERY

## 2019-02-21 PROCEDURE — 63710000001 PANTOPRAZOLE 40 MG TABLET DELAYED-RELEASE: Performed by: ORTHOPAEDIC SURGERY

## 2019-02-21 PROCEDURE — 80048 BASIC METABOLIC PNL TOTAL CA: CPT | Performed by: ORTHOPAEDIC SURGERY

## 2019-02-21 PROCEDURE — 94799 UNLISTED PULMONARY SVC/PX: CPT

## 2019-02-21 PROCEDURE — 63710000001 CETIRIZINE 10 MG TABLET: Performed by: ORTHOPAEDIC SURGERY

## 2019-02-21 PROCEDURE — 63710000001 SENNA 8.6 MG TABLET: Performed by: ORTHOPAEDIC SURGERY

## 2019-02-21 PROCEDURE — 63710000001 FOLIC ACID 1 MG TABLET: Performed by: ORTHOPAEDIC SURGERY

## 2019-02-21 PROCEDURE — 63710000001 PHENOBARBITAL 32.4 MG TABLET: Performed by: ORTHOPAEDIC SURGERY

## 2019-02-21 PROCEDURE — 63710000001 LEVETIRACETAM 250 MG TABLET: Performed by: ORTHOPAEDIC SURGERY

## 2019-02-21 PROCEDURE — 63710000001 CALCIUM CARBONATE (OYSTER SHELL) 500 MG TABLET: Performed by: ORTHOPAEDIC SURGERY

## 2019-02-21 RX ORDER — HYDROCODONE BITARTRATE AND ACETAMINOPHEN 10; 325 MG/1; MG/1
1 TABLET ORAL EVERY 6 HOURS PRN
Qty: 20 TABLET | Refills: 0 | Status: SHIPPED | OUTPATIENT
Start: 2019-02-21 | End: 2019-03-02

## 2019-02-21 RX ADMIN — CETIRIZINE HCL 10 MG: 10 TABLET ORAL at 08:44

## 2019-02-21 RX ADMIN — Medication 500 MG: at 08:44

## 2019-02-21 RX ADMIN — SODIUM CHLORIDE 100 ML/HR: 9 INJECTION, SOLUTION INTRAVENOUS at 06:10

## 2019-02-21 RX ADMIN — PANTOPRAZOLE SODIUM 40 MG: 40 TABLET, DELAYED RELEASE ORAL at 08:44

## 2019-02-21 RX ADMIN — SENNOSIDES 1 TABLET: 8.6 TABLET ORAL at 08:44

## 2019-02-21 RX ADMIN — LEVETIRACETAM 750 MG: 500 TABLET, FILM COATED ORAL at 08:44

## 2019-02-21 RX ADMIN — POTASSIUM CHLORIDE 20 MEQ: 1500 TABLET, EXTENDED RELEASE ORAL at 08:44

## 2019-02-21 RX ADMIN — CEFAZOLIN 1 G: 1 INJECTION, POWDER, FOR SOLUTION INTRAMUSCULAR; INTRAVENOUS; PARENTERAL at 03:23

## 2019-02-21 RX ADMIN — HYDROCODONE BITARTRATE AND ACETAMINOPHEN 1 TABLET: 10; 325 TABLET ORAL at 01:35

## 2019-02-21 RX ADMIN — PHENOBARBITAL 32.4 MG: 32.4 TABLET ORAL at 05:34

## 2019-02-21 RX ADMIN — PHENOBARBITAL 32.4 MG: 32.4 TABLET ORAL at 14:31

## 2019-02-21 RX ADMIN — FOLIC ACID 1 MG: 1 TABLET ORAL at 08:44

## 2019-02-21 RX ADMIN — SODIUM CHLORIDE, PRESERVATIVE FREE 3 ML: 5 INJECTION INTRAVENOUS at 08:46

## 2019-02-21 NOTE — PLAN OF CARE
Problem: Patient Care Overview  Goal: Plan of Care Review  Outcome: Ongoing (interventions implemented as appropriate)      Problem: Fall Risk (Adult)  Goal: Identify Related Risk Factors and Signs and Symptoms  Outcome: Outcome(s) achieved Date Met: 02/21/19    Goal: Absence of Fall  Outcome: Ongoing (interventions implemented as appropriate)      Problem: Skin Injury Risk (Adult)  Goal: Identify Related Risk Factors and Signs and Symptoms  Outcome: Ongoing (interventions implemented as appropriate)    Goal: Skin Health and Integrity  Outcome: Ongoing (interventions implemented as appropriate)      Problem: Surgery Nonspecified (Adult)  Goal: Signs and Symptoms of Listed Potential Problems Will be Absent, Minimized or Managed (Surgery Nonspecified)  Outcome: Ongoing (interventions implemented as appropriate)    Goal: Anesthesia/Sedation Recovery  Outcome: Ongoing (interventions implemented as appropriate)

## 2019-02-21 NOTE — PLAN OF CARE
Problem: Patient Care Overview  Goal: Plan of Care Review  Outcome: Ongoing (interventions implemented as appropriate)   02/21/19 0035   Coping/Psychosocial   Plan of Care Reviewed With patient   Plan of Care Review   Progress no change       Problem: Fall Risk (Adult)  Goal: Identify Related Risk Factors and Signs and Symptoms  Outcome: Ongoing (interventions implemented as appropriate)   02/21/19 0035   Fall Risk (Adult)   Related Risk Factors (Fall Risk) age-related changes;environment unfamiliar;history of falls;gait/mobility problems   Signs and Symptoms (Fall Risk) presence of risk factors     Goal: Absence of Fall  Outcome: Ongoing (interventions implemented as appropriate)   02/21/19 0035   Fall Risk (Adult)   Absence of Fall making progress toward outcome       Problem: Skin Injury Risk (Adult)  Goal: Identify Related Risk Factors and Signs and Symptoms  Outcome: Ongoing (interventions implemented as appropriate)   02/21/19 0035   Skin Injury Risk (Adult)   Related Risk Factors (Skin Injury Risk) advanced age;mobility impaired     Goal: Skin Health and Integrity  Outcome: Ongoing (interventions implemented as appropriate)   02/21/19 0035   Skin Injury Risk (Adult)   Skin Health and Integrity making progress toward outcome       Problem: Surgery Nonspecified (Adult)  Goal: Signs and Symptoms of Listed Potential Problems Will be Absent, Minimized or Managed (Surgery Nonspecified)  Outcome: Ongoing (interventions implemented as appropriate)   02/21/19 0035   Goal/Outcome Evaluation   Problems Assessed (Surgery) all   Problems Present (Surgery) pain

## 2019-02-21 NOTE — PROGRESS NOTES
Inpatient Progress Note  Maribel Staton  Date: 02/21/19  MRN: [id number]      Subjective:  Maribel Staton is a 73 y.o. female POD#1 ORIF proximal humerus. Sitting up in hospital bed. She complains of moderate pain. She denies paresthesias. No new complaints.     Objective:    Vitals:    02/20/19 2300 02/21/19 0145 02/21/19 0300 02/21/19 0600   BP: 117/69  121/69 137/70   BP Location: Left arm  Left arm Left arm   Patient Position: Lying  Lying Lying   Pulse: 93  95 91   Resp: 18  18 18   Temp: 98.8 °F (37.1 °C)  98.5 °F (36.9 °C) 98.7 °F (37.1 °C)   TempSrc: Oral  Oral Oral   SpO2:  95%  96%   Weight:       Height:         Sling and swath in place. Bulky dressing intact. Good mobility of digits and wrist. Neurovascular status is intact.     Labs:    Results from last 7 days   Lab Units 02/21/19  0430 02/15/19  1308   WBC 10*3/mm3 7.25 5.69   HEMOGLOBIN g/dL 10.3* 13.3   HEMATOCRIT % 30.8* 39.0   MCV fL 92.5 90.1   MCHC g/dL 33.4 34.1   PLATELETS 10*3/mm3 219 284         Results from last 7 days   Lab Units 02/21/19  0430 02/15/19  1308   SODIUM mmol/L 136 129*   POTASSIUM mmol/L 4.0 4.2   CHLORIDE mmol/L 105 97*   CO2 mmol/L 23.0* 25.9   BUN mg/dL <5* 6*   CREATININE mg/dL 0.62 0.50   EGFR IF NONAFRICN AM mL/min/1.73 95 122   CALCIUM mg/dL 8.5 10.1*   GLUCOSE mg/dL 138* 101   Estimated Creatinine Clearance: 52.2 mL/min (by C-G formula based on SCr of 0.62 mg/dL).  No results found for: AMMONIA          No results found for: HGBA1C  Lab Results   Component Value Date    TSH 2.016 09/09/2014         Pain Management Panel     There is no flowsheet data to display.                 Radiology:  Imaging Results (last 72 hours)     Procedure Component Value Units Date/Time    FL Surgery Fluoro [[id number]] Collected:  02/20/19 1405     Updated:  02/20/19 1407    Narrative:       EXAMINATION: FL SURGERY FLUORO-      CLINICAL INDICATION:     ORIF RT prox humerus; S42.291S-Other displaced  fracture of upper end of right  humerus, sequela     TECHNIQUE: Frontal view of the region of interest..  FLUOROSCOPY TIME: 42s     COMPARISON: NONE      FINDINGS: Fluoroscopy during surgery.       Impression:       As above     This report was finalized on 2/20/2019 2:05 PM by Dr. Trenton Jackson MD.               Assessment:      ICD-10-CM ICD-9-CM   1. Other closed displaced fracture of proximal end of right humerus, sequela S42.291S 905.2       Plan:  POD#1 ORIF proximal humerus. Doing well. Pain controlled. Continue sling. Gentle ROM hand and wrist. Possible discharge home today.       KENYA Jung

## 2019-02-21 NOTE — DISCHARGE PLACEMENT REQUEST
"Maribel Staton (73 y.o. Female)     Date of Birth Social Security Number Address Home Phone MRN    1945  PO   [facility] [address] [phone] [id number]    [employer] Marital Status          Mormon        Admission Date Admission Type Admitting Provider Attending Provider Department, Room/Bed    2/20/19 Elective Elliot Costello MD Pullekines, Joseph William, MD [address], [facility]4/2S    Discharge Date Discharge Disposition Discharge Destination         Home or Self Care              Attending Provider:  Elliot Costello MD    Allergies:  Dilaudid [Hydromorphone Hcl], Morphine And Related    Isolation:  None   Infection:  None   Code Status:  CPR    Ht:  149 cm (58.66\")   Wt:  51.3 kg (113 lb)    Admission Cmt:  None   Principal Problem:  Closed fracture of right proximal humerus [S42.201A]                 Active Insurance as of 2/20/2019     Primary Coverage     Payor Plan Insurance Group Employer/Plan Group    MEDICARE MEDICARE B ONLY      Payor Plan Address Payor Plan Phone Number Payor Plan Fax Number Effective Dates    [address] [address] [phone]  11/1/2012 - None Entered    [facility] [id number]       Subscriber Name Subscriber Birth Date Member ID       MARIBEL STATON 1945 [id number]           Secondary Coverage     Payor Plan Insurance Group Employer/Plan Group    PASSPORT PASSPORT MEDICAID     Payor Plan Address Payor Plan Phone Number Payor Plan Fax Number Effective Dates    [address] [phone]  10/1/2015 - None Entered    [facility] [address]       Subscriber Name Subscriber Birth Date Member ID       MARIBEL STATON 1945 [id number]                 Emergency Contacts      (Rel.) Home Phone Work Phone Mobile Phone    Steven Staton (Spouse) [phone] -- [phone]    Cyrus Gorman (Son) [phone] -- --               History & Physical      Elliot Costello MD at 2/20/2019 10:33 AM      "   H&P reviewed. The patient was examined and there are no changes to the H&P.    Electronically signed by Elliot Costello MD at 2/20/2019 10:34 AM   Source Note             History and Physical    Patient: Maribel Staton  YOB: 1945  Date of encounter: 02/12/2019    Chief Complaint   Patient presents with   • Right Shoulder - Pain         History of Present Illness: Patient presents for evaluation of her right shoulder.  We originally seen her in the summer for proximal humerus fracture.  While she had some angulation to it she was showing good healing.  She's had increasing pain of her shoulder for the last couple months.  She did have an injury where she fell and injured her right hand and fractured it and she did have a seizure or she fell out of her chair.  Complains of pain and difficulty moving her shoulder.  No paresthesias or swelling of the hand itself.         Maribel Staton is a 73 y.o.     PMH:   Patient Active Problem List   Diagnosis   • Closed right hip fracture (CMS/HCC)   • Osteoporosis, unspecified   • Frequent falls   • Partial symptomatic epilepsy with complex partial seizures, intractable, without status epilepticus (CMS/HCC)   • Nondisplaced fracture of neck of fifth metacarpal bone of right hand with routine healing   • Closed fracture of right proximal humerus       PSH:  Past Surgical History:   Procedure Laterality Date   • APPENDECTOMY      about 15 years ago   • CARDIAC CATHETERIZATION  2012   • CATARACT EXTRACTION Bilateral    • CHOLECYSTECTOMY  about 4 years ago   • CHOLECYSTECTOMY     • COLONOSCOPY     • ENDOSCOPY     • FRACTURE SURGERY Right     arm and leg   • HIP ARTHROPLASTY Bilateral    • HYSTERECTOMY     • JOINT REPLACEMENT Right    • ORIF HIP FRACTURE Right 05/2017   • SKIN GRAFT      1970's   • VAGUS NERVE STIMULATOR IMPLANTATION N/A 8/20/2018    Procedure: VAGUS NERVE STIMULATOR IMPLANTATION;  Surgeon: Ursula Celestin MD;  Location: [facility] OR;   Service: General       Allergies:     Allergies   Allergen Reactions   • Dilaudid [Hydromorphone Hcl] Delirium   • Morphine And Related Confusion       Medications:     Current Outpatient Medications:   •  atorvastatin (LIPITOR) 40 MG tablet, Take 1 tablet by mouth Every Night., Disp: 30 tablet, Rfl: 0  •  baclofen (LIORESAL) 20 MG tablet, Take 20 mg by mouth 2 (Two) Times a Day., Disp: , Rfl:   •  Calcium Carbonate-Vitamin D (CALCARB 600/D PO), Take 600 mg by mouth 2 (Two) Times a Day., Disp: , Rfl:   •  HYDROcodone-acetaminophen (NORCO)  MG per tablet, Take 1 tablet by mouth Every 12 (Twelve) Hours As Needed for Moderate Pain ., Disp: , Rfl:   •  ipratropium-albuterol (COMBIVENT RESPIMAT)  MCG/ACT inhaler, Inhale 1 puff 4 (Four) Times a Day As Needed for Wheezing., Disp: , Rfl:   •  levETIRAcetam (KEPPRA) 500 MG tablet, Take 500 mg by mouth Every 12 (Twelve) Hours., Disp: , Rfl:   •  Loratadine 10 MG capsule, Take  by mouth., Disp: , Rfl:   •  O2 (OXYGEN), Inhale 2 L/min 1 (One) Time., Disp: , Rfl:   •  pantoprazole (PROTONIX) 40 MG EC tablet, Take 40 mg by mouth Daily., Disp: , Rfl:   •  PHENobarbital (LUMINAL) 30 MG tablet, , Disp: , Rfl:   •  phenytoin (DILANTIN) 100 MG ER capsule, Take 100 mg by mouth Every Morning., Disp: , Rfl:   •  polyethylene glycol (MIRALAX) powder, Take 17 g by mouth Daily., Disp: 225 g, Rfl: 0  •  venlafaxine XR (EFFEXOR-XR) 150 MG 24 hr capsule, Take 150 mg by mouth Daily., Disp: , Rfl:   •  clonazePAM (KlonoPIN) 2 MG tablet, Take 1 mg by mouth Every 12 (Twelve) Hours As Needed for Seizures., Disp: , Rfl:   •  HYDROcodone-acetaminophen (NORCO) 5-325 MG per tablet, Take 1 tablet by mouth Every 6 (Six) Hours As Needed for Moderate Pain  for up to 20 doses., Disp: 20 tablet, Rfl: 0    Social History:     Social History     Occupational History   • Not on file   Tobacco Use   • Smoking status: Current Some Day Smoker     Packs/day: 1.00     Years: 53.00     Pack years: 53.00      Types: Cigarettes     Start date: 6/6/1965   • Smokeless tobacco: Never Used   Substance and Sexual Activity   • Alcohol use: No   • Drug use: No   • Sexual activity: Defer      Social History     Social History Narrative   • Not on file       Family History:     Family History   Problem Relation Age of Onset   • Arthritis Mother    • Arthritis Father    • Cancer Father    • Alcohol abuse Sister    • Arthritis Sister    • Cancer Sister    • Alcohol abuse Brother    • Arthritis Brother    • Cancer Brother    • Early death Brother    • Breast cancer Neg Hx        Review of Systems:   Review of Systems   Constitutional: Negative.    HENT: Negative.    Eyes: Negative.    Respiratory: Positive for apnea, cough and wheezing.    Cardiovascular: Negative.    Gastrointestinal: Positive for constipation.   Endocrine: Negative.    Genitourinary: Negative.    Musculoskeletal: Positive for arthralgias, back pain and joint swelling.   Skin: Negative.    Neurological: Positive for seizures.   Hematological: Negative.    Psychiatric/Behavioral: Positive for confusion and dysphoric mood. The patient is nervous/anxious.        Physical Exam:   Constitutional: Patient is oriented to person, place, and time. Patient appears thin and in mild distress.   Vitals:    02/12/19 1016   BP: 126/74   Pulse: 89       HENT:   Head: Normocephalic and atraumatic.   Right Ear: External ear normal.   Left Ear: External ear normal.   Eyes: EOM are normal. Right eye exhibits no discharge. Left eye exhibits no discharge.   Neck: Normal range of motion. Neck supple.   Cardiovascular: Regular rhythm and normal heart sounds.    No murmur heard.  Pulmonary/Chest: Effort normal. No respiratory distress.Clear to ascultation bilaterally.  Abdominal: Soft.   Musculoskeletal:Thin elderly white female in mild distress.  She holds her right arm to her side.  Pain with attempted motion of the shoulder.  Is not particularly bruised or erythematous.  There is  some mild deformity and swelling noted.  The right hand is grossly neurovascularly intact without any swelling.  She has good gentle range of motion of her elbow.        Neurological: Patient is alert and oriented to person, place, and time.   Skin: Skin is warm and dry. No rash noted. Patient is not diaphoretic.   Psychiatric: Patinet has a normal mood and affect. Patients behavior is normal. Thought content normal.     Radiology:     X-rays shows a proximal humerus fracture just below the surgical neck with complete displacement and high riding of the humerus itself.  The humeral head is still within the glenoid.  This is definitely a significant change from the last x-ray we had a of her in August.        Assessment    ICD-10-CM ICD-9-CM   1. Other closed displaced fracture of proximal end of right humerus, sequela S42.291S 905.2       Plan:  Displaced right proximal humerus fracture probable reinjury at some point from an old fracture.  Have discussed situation with the patient and family.  Recommended open reduction internal fixation of this.  We discussed the risks including infection, bleeding, stiffness, failure healing her fixation as she's quite osteoporotic, need for future surgery.  Risk of anesthesia etc.  Patient would like to wait the next week to do this we'll try to get a note from her family physician.    Written by, Lucia ZAMBRANO, acting as a scribe for Dr. Costello         Electronically signed by Lucia Toscano PA at 2/12/2019 11:28 AM             Lucia Toscano PA at 2/12/2019 10:40 AM          History and Physical    Patient: Maribel Staton  YOB: 1945  Date of encounter: 02/12/2019    Chief Complaint   Patient presents with   • Right Shoulder - Pain         History of Present Illness: Patient presents for evaluation of her right shoulder.  We originally seen her in the summer for proximal humerus fracture.  While she had some angulation to it she was showing  good healing.  She's had increasing pain of her shoulder for the last couple months.  She did have an injury where she fell and injured her right hand and fractured it and she did have a seizure or she fell out of her chair.  Complains of pain and difficulty moving her shoulder.  No paresthesias or swelling of the hand itself.         Maribel Staton is a 73 y.o.     PMH:   Patient Active Problem List   Diagnosis   • Closed right hip fracture (CMS/HCC)   • Osteoporosis, unspecified   • Frequent falls   • Partial symptomatic epilepsy with complex partial seizures, intractable, without status epilepticus (CMS/HCC)   • Nondisplaced fracture of neck of fifth metacarpal bone of right hand with routine healing   • Closed fracture of right proximal humerus       PSH:  Past Surgical History:   Procedure Laterality Date   • APPENDECTOMY      about 15 years ago   • CARDIAC CATHETERIZATION  2012   • CATARACT EXTRACTION Bilateral    • CHOLECYSTECTOMY  about 4 years ago   • CHOLECYSTECTOMY     • COLONOSCOPY     • ENDOSCOPY     • FRACTURE SURGERY Right     arm and leg   • HIP ARTHROPLASTY Bilateral    • HYSTERECTOMY     • JOINT REPLACEMENT Right    • ORIF HIP FRACTURE Right 05/2017   • SKIN GRAFT      1970's   • VAGUS NERVE STIMULATOR IMPLANTATION N/A 8/20/2018    Procedure: VAGUS NERVE STIMULATOR IMPLANTATION;  Surgeon: Ursula Celestin MD;  Location: [facility];  Service: General       Allergies:     Allergies   Allergen Reactions   • Dilaudid [Hydromorphone Hcl] Delirium   • Morphine And Related Confusion       Medications:     Current Outpatient Medications:   •  atorvastatin (LIPITOR) 40 MG tablet, Take 1 tablet by mouth Every Night., Disp: 30 tablet, Rfl: 0  •  baclofen (LIORESAL) 20 MG tablet, Take 20 mg by mouth 2 (Two) Times a Day., Disp: , Rfl:   •  Calcium Carbonate-Vitamin D (CALCARB 600/D PO), Take 600 mg by mouth 2 (Two) Times a Day., Disp: , Rfl:   •  HYDROcodone-acetaminophen (NORCO)  MG per tablet, Take 1  tablet by mouth Every 12 (Twelve) Hours As Needed for Moderate Pain ., Disp: , Rfl:   •  ipratropium-albuterol (COMBIVENT RESPIMAT)  MCG/ACT inhaler, Inhale 1 puff 4 (Four) Times a Day As Needed for Wheezing., Disp: , Rfl:   •  levETIRAcetam (KEPPRA) 500 MG tablet, Take 500 mg by mouth Every 12 (Twelve) Hours., Disp: , Rfl:   •  Loratadine 10 MG capsule, Take  by mouth., Disp: , Rfl:   •  O2 (OXYGEN), Inhale 2 L/min 1 (One) Time., Disp: , Rfl:   •  pantoprazole (PROTONIX) 40 MG EC tablet, Take 40 mg by mouth Daily., Disp: , Rfl:   •  PHENobarbital (LUMINAL) 30 MG tablet, , Disp: , Rfl:   •  phenytoin (DILANTIN) 100 MG ER capsule, Take 100 mg by mouth Every Morning., Disp: , Rfl:   •  polyethylene glycol (MIRALAX) powder, Take 17 g by mouth Daily., Disp: 225 g, Rfl: 0  •  venlafaxine XR (EFFEXOR-XR) 150 MG 24 hr capsule, Take 150 mg by mouth Daily., Disp: , Rfl:   •  clonazePAM (KlonoPIN) 2 MG tablet, Take 1 mg by mouth Every 12 (Twelve) Hours As Needed for Seizures., Disp: , Rfl:   •  HYDROcodone-acetaminophen (NORCO) 5-325 MG per tablet, Take 1 tablet by mouth Every 6 (Six) Hours As Needed for Moderate Pain  for up to 20 doses., Disp: 20 tablet, Rfl: 0    Social History:     Social History     Occupational History   • Not on file   Tobacco Use   • Smoking status: Current Some Day Smoker     Packs/day: 1.00     Years: 53.00     Pack years: 53.00     Types: Cigarettes     Start date: 6/6/1965   • Smokeless tobacco: Never Used   Substance and Sexual Activity   • Alcohol use: No   • Drug use: No   • Sexual activity: Defer      Social History     Social History Narrative   • Not on file       Family History:     Family History   Problem Relation Age of Onset   • Arthritis Mother    • Arthritis Father    • Cancer Father    • Alcohol abuse Sister    • Arthritis Sister    • Cancer Sister    • Alcohol abuse Brother    • Arthritis Brother    • Cancer Brother    • Early death Brother    • Breast cancer Neg Hx         Review of Systems:   Review of Systems   Constitutional: Negative.    HENT: Negative.    Eyes: Negative.    Respiratory: Positive for apnea, cough and wheezing.    Cardiovascular: Negative.    Gastrointestinal: Positive for constipation.   Endocrine: Negative.    Genitourinary: Negative.    Musculoskeletal: Positive for arthralgias, back pain and joint swelling.   Skin: Negative.    Neurological: Positive for seizures.   Hematological: Negative.    Psychiatric/Behavioral: Positive for confusion and dysphoric mood. The patient is nervous/anxious.        Physical Exam:   Constitutional: Patient is oriented to person, place, and time. Patient appears thin and in mild distress.   Vitals:    02/12/19 1016   BP: 126/74   Pulse: 89       HENT:   Head: Normocephalic and atraumatic.   Right Ear: External ear normal.   Left Ear: External ear normal.   Eyes: EOM are normal. Right eye exhibits no discharge. Left eye exhibits no discharge.   Neck: Normal range of motion. Neck supple.   Cardiovascular: Regular rhythm and normal heart sounds.    No murmur heard.  Pulmonary/Chest: Effort normal. No respiratory distress.Clear to ascultation bilaterally.  Abdominal: Soft.   Musculoskeletal:Thin elderly white female in mild distress.  She holds her right arm to her side.  Pain with attempted motion of the shoulder.  Is not particularly bruised or erythematous.  There is some mild deformity and swelling noted.  The right hand is grossly neurovascularly intact without any swelling.  She has good gentle range of motion of her elbow.        Neurological: Patient is alert and oriented to person, place, and time.   Skin: Skin is warm and dry. No rash noted. Patient is not diaphoretic.   Psychiatric: Patinet has a normal mood and affect. Patients behavior is normal. Thought content normal.     Radiology:     X-rays shows a proximal humerus fracture just below the surgical neck with complete displacement and high riding of the humerus  itself.  The humeral head is still within the glenoid.  This is definitely a significant change from the last x-ray we had a of her in August.        Assessment    ICD-10-CM ICD-9-CM   1. Other closed displaced fracture of proximal end of right humerus, sequela S42.291S 905.2       Plan:  Displaced right proximal humerus fracture probable reinjury at some point from an old fracture.  Have discussed situation with the patient and family.  Recommended open reduction internal fixation of this.  We discussed the risks including infection, bleeding, stiffness, failure healing her fixation as she's quite osteoporotic, need for future surgery.  Risk of anesthesia etc.  Patient would like to wait the next week to do this we'll try to get a note from her family physician.    Written by, Lucia ZAMBRANO, acting as a scribe for Dr. Costello        Electronically signed by Lucia Toscano PA at 2/12/2019 11:28 AM       ICU Vital Signs     Row Name 02/21/19 1100 02/21/19 0845 02/21/19 0600 02/21/19 0300 02/21/19 0145       Vitals    Temp  99.2 °F (37.3 °C)  --  98.7 °F (37.1 °C)  98.5 °F (36.9 °C)  --    Temp src  Oral  --  Oral  Oral  --    Pulse  99  --  91  95  --    Heart Rate Source  Monitor  --  Monitor  Monitor  --    Resp  18  --  18  18  --    Resp Rate Source  Visual  --  Visual  Visual  --    BP  153/75  --  137/70  121/69  --    BP Location  Left arm  --  Left arm  Left arm  --    BP Method  Automatic  --  Automatic  Automatic  --    Patient Position  Lying  --  Lying  Lying  --       Oxygen Therapy    SpO2  98 %  --  96 %  --  95 %    Device (Oxygen Therapy)  room air  room air  --  --  room air    Row Name 02/20/19 2300 02/20/19 2055 02/20/19 2045 02/20/19 1945 02/20/19 1845       Vitals    Temp  98.8 °F (37.1 °C)  --  97.1 °F (36.2 °C)  97.4 °F (36.3 °C)  97.9 °F (36.6 °C)    Temp src  Oral  --  Oral  Oral  Oral    Pulse  93  --  100  101  78    Heart Rate Source  Monitor  --  Monitor  Monitor  Monitor     Resp  18  --  17  17  16    Resp Rate Source  Visual  --  Visual  Visual  Visual    BP  117/69  --  126/70  122/67  122/76    BP Location  Left arm  --  Left arm  Left arm  Left arm    BP Method  Automatic  --  Automatic  Automatic  Automatic    Patient Position  Lying  --  Lying  Lying  Lying       Oxygen Therapy    SpO2  --  --  --  97 %  97 %    Device (Oxygen Therapy)  --  room air  --  --  --    Row Name 02/20/19 1745 02/20/19 1645 02/20/19 1615             Vitals    Temp  98 °F (36.7 °C)  97.3 °F (36.3 °C)  97.1 °F (36.2 °C)      Temp src  Oral  Oral  Oral      Pulse  92  82  77      Heart Rate Source  Monitor  Monitor  Monitor      Resp  16  18  14      Resp Rate Source  Visual  Visual  Visual      BP  134/70  127/70  151/68      BP Location  Left arm  Left arm  Left arm      BP Method  Automatic  Automatic  Automatic      Patient Position  Lying  Lying  Lying         Oxygen Therapy    SpO2  97 %  97 %  95 %      Device (Oxygen Therapy)  --  room air  room air          Intake & Output (last day)       02/20 0701 - 02/21 0700 02/21 0701 - 02/22 0700    P.O. 240 1000    I.V. (mL/kg) 800 (15.6)     Total Intake(mL/kg) 1040 (20.3) 1000 (19.5)    Net +1040 +1000          Urine Unmeasured Occurrence 4 x     Stool Unmeasured Occurrence 0 x 1 x        Hospital Medications (all)       Dose Frequency Start End    atorvastatin (LIPITOR) tablet 40 mg 40 mg Nightly 2/20/2019     Sig - Route: Take 1 tablet by mouth Every Night. - Oral    calcium carbonate (oyster shell) tablet 500 mg 500 mg Daily 2/21/2019     Sig - Route: Take 1 tablet by mouth Daily. - Oral    ceFAZolin (ANCEF) 1 g/100 mL 0.9% NS IVPB (mbp) 1 g Every 8 Hours 2/20/2019 2/21/2019    Sig - Route: Infuse 100 mL into a venous catheter Every 8 (Eight) Hours. - Intravenous    ceFAZolin (ANCEF) 2 g in sodium chloride 0.9 % 100 mL IVPB 2 g Once 2/20/2019 2/20/2019    Sig - Route: Infuse 2 g into a venous catheter 1 (One) Time. - Intravenous    cetirizine (zyrTEC)  "tablet 10 mg 10 mg Daily 2/21/2019     Sig - Route: Take 1 tablet by mouth Daily. - Oral    clonazePAM (KlonoPIN) tablet 0.5 mg 0.5 mg 2 Times Daily 2/20/2019     Sig - Route: Take 1 tablet by mouth 2 (Two) Times a Day. - Oral    folic acid (FOLVITE) tablet 1 mg 1 mg Daily 2/21/2019     Sig - Route: Take 1 tablet by mouth Daily. - Oral    HYDROcodone-acetaminophen (NORCO)  MG per tablet 1 tablet 1 tablet Every 6 Hours PRN 2/20/2019 3/2/2019    Sig - Route: Take 1 tablet by mouth Every 6 (Six) Hours As Needed for Moderate Pain . - Oral    ketorolac (TORADOL) injection 15 mg 15 mg Every 6 Hours PRN 2/20/2019 2/20/2019    Sig - Route: Infuse 0.5 mL into a venous catheter Every 6 (Six) Hours As Needed for Severe Pain . - Intravenous    lactated ringers infusion 125 mL/hr Continuous 2/20/2019     Sig - Route: Infuse 125 mL/hr into a venous catheter Continuous. - Intravenous    levETIRAcetam (KEPPRA) tablet 750 mg 750 mg Every 12 Hours Scheduled 2/20/2019     Sig - Route: Take 750 mg by mouth Every 12 (Twelve) Hours. - Oral    ondansetron (ZOFRAN) injection 4 mg 4 mg Every 6 Hours PRN 2/20/2019     Sig - Route: Infuse 2 mL into a venous catheter Every 6 (Six) Hours As Needed for Nausea or Vomiting. - Intravenous    Linked Group 1:  \"Or\" Linked Group Details        ondansetron (ZOFRAN) tablet 4 mg 4 mg Every 6 Hours PRN 2/20/2019     Sig - Route: Take 1 tablet by mouth Every 6 (Six) Hours As Needed for Nausea or Vomiting. - Oral    Linked Group 1:  \"Or\" Linked Group Details        ondansetron ODT (ZOFRAN-ODT) disintegrating tablet 4 mg 4 mg Every 6 Hours PRN 2/20/2019     Sig - Route: Take 1 tablet by mouth Every 6 (Six) Hours As Needed for Nausea or Vomiting. - Oral    Linked Group 1:  \"Or\" Linked Group Details        pantoprazole (PROTONIX) EC tablet 40 mg 40 mg Daily 2/21/2019     Sig - Route: Take 1 tablet by mouth Daily. - Oral    PHENobarbital (LUMINAL) tablet 32.4 mg 32.4 mg Every 8 Hours Scheduled 2/20/2019  "    Sig - Route: Take 1 tablet by mouth Every 8 (Eight) Hours. - Oral    potassium chloride (K-DUR,KLOR-CON) CR tablet 20 mEq 20 mEq 3 Times Daily 2/20/2019     Sig - Route: Take 1 tablet by mouth 3 (Three) Times a Day. - Oral    senna (SENOKOT) tablet 1 tablet 1 tablet Daily 2/21/2019     Sig - Route: Take 1 tablet by mouth Daily. - Oral    sodium chloride 0.9 % flush 3 mL 3 mL Every 12 Hours Scheduled 2/20/2019     Sig - Route: Infuse 3 mL into a venous catheter Every 12 (Twelve) Hours. - Intravenous    sodium chloride 0.9 % flush 3-10 mL 3-10 mL As Needed 2/20/2019     Sig - Route: Infuse 3-10 mL into a venous catheter As Needed for Line Care. - Intravenous    sodium chloride 0.9 % infusion 100 mL/hr Continuous 2/20/2019     Sig - Route: Infuse 100 mL/hr into a venous catheter Continuous. - Intravenous    bacitracin 50,000 Units, polymyxin B 500,000 Units in sodium chloride (NS) 1,000 mL irrigation (Discontinued)  As Needed 2/20/2019 2/20/2019    Sig: As Needed.    Reason for Discontinue: Patient Discharge    bupivacaine-EPINEPHrine (MARCAINE w/EPI) injection (Discontinued)  As Needed 2/20/2019 2/20/2019    Sig: As Needed.    Reason for Discontinue: Patient Discharge    fentaNYL citrate (PF) (SUBLIMAZE) injection 50 mcg (Discontinued) 50 mcg Every 5 Minutes PRN 2/20/2019 2/20/2019    Sig - Route: Infuse 1 mL into a venous catheter Every 5 (Five) Minutes As Needed for Moderate Pain  or Severe Pain . - Intravenous    Reason for Discontinue: Patient Transfer    ipratropium-albuterol (DUO-NEB) nebulizer solution 3 mL (Discontinued) 3 mL Once As Needed 2/20/2019 2/20/2019    Sig - Route: Take 3 mL by nebulization 1 (One) Time As Needed for Wheezing or Shortness of Air (bronchospasm). - Nebulization    Reason for Discontinue: Patient Transfer    meperidine (DEMEROL) injection 12.5 mg (Discontinued) 12.5 mg Every 5 Minutes PRN 2/20/2019 2/20/2019    Sig - Route: Infuse 0.5 mL into a venous catheter Every 5 (Five) Minutes  As Needed for Shivering (May repeat x 1). - Intravenous    Reason for Discontinue: Patient Transfer    ondansetron (ZOFRAN) injection 4 mg (Discontinued) 4 mg Once As Needed 2/20/2019 2/20/2019    Sig - Route: Infuse 2 mL into a venous catheter 1 (One) Time As Needed for Nausea or Vomiting (may repeat times one dose). - Intravenous    Reason for Discontinue: Patient Transfer    sodium chloride 0.9 % flush 3 mL (Discontinued) 3 mL Every 12 Hours Scheduled 2/20/2019 2/20/2019    Sig - Route: Infuse 3 mL into a venous catheter Every 12 (Twelve) Hours. - Intravenous    Reason for Discontinue: Patient Transfer    sodium chloride 0.9 % flush 3-10 mL (Discontinued) 3-10 mL As Needed 2/20/2019 2/20/2019    Sig - Route: Infuse 3-10 mL into a venous catheter As Needed for Line Care. - Intravenous    Reason for Discontinue: Patient Transfer            Lab Results (last 24 hours)     Procedure Component Value Units Date/Time    Basic Metabolic Panel [[id number]]  (Abnormal) Collected:  02/21/19 0430    Specimen:  Blood Updated:  02/21/19 0526     Glucose 138 mg/dL      BUN <5 mg/dL      Creatinine 0.62 mg/dL      Sodium 136 mmol/L      Potassium 4.0 mmol/L      Chloride 105 mmol/L      CO2 23.0 mmol/L      Calcium 8.5 mg/dL      eGFR Non African Amer 95 mL/min/1.73      BUN/Creatinine Ratio --     Comment: Unable to calculate Bun/Crea Ratio.        Anion Gap 8.0 mmol/L     Narrative:       The MDRD GFR formula is only valid for adults with stable renal function between ages 18 and 70.    Osmolality, Calculated [[id number]] Collected:  02/21/19 0430    Specimen:  Blood Updated:  02/21/19 0526     Osmolality Calc -- mOsm/kg      Comment: Unable to calculate.       CBC & Differential [[id number]] Collected:  02/21/19 0430    Specimen:  Blood Updated:  02/21/19 0520    Narrative:       The following orders were created for panel order CBC & Differential.  Procedure                               Abnormality         Status                      ---------                               -----------         ------                     CBC Auto Differential[[id number]]        Abnormal            Final result                 Please view results for these tests on the individual orders.    CBC Auto Differential [[id number]]  (Abnormal) Collected:  02/21/19 0430    Specimen:  Blood Updated:  02/21/19 0520     WBC 7.25 10*3/mm3      RBC 3.33 10*6/mm3      Hemoglobin 10.3 g/dL      Hematocrit 30.8 %      MCV 92.5 fL      MCH 30.9 pg      MCHC 33.4 g/dL      RDW 13.2 %      RDW-SD 43.0 fl      MPV 8.5 fL      Platelets 219 10*3/mm3      Neutrophil % 82.8 %      Lymphocyte % 6.8 %      Monocyte % 9.0 %      Eosinophil % 1.0 %      Basophil % 0.1 %      Immature Grans % 0.3 %      Neutrophils, Absolute 6.01 10*3/mm3      Lymphocytes, Absolute 0.49 10*3/mm3      Monocytes, Absolute 0.65 10*3/mm3      Eosinophils, Absolute 0.07 10*3/mm3      Basophils, Absolute 0.01 10*3/mm3      Immature Grans, Absolute 0.02 10*3/mm3         Orders (last 24 hrs)     Start     Ordered    02/21/19 1044  Discharge patient  Once      02/21/19 1050    02/21/19 0900  calcium carbonate (oyster shell) tablet 500 mg  Daily      02/20/19 1528    02/21/19 0900  folic acid (FOLVITE) tablet 1 mg  Daily      02/20/19 1528    02/21/19 0900  cetirizine (zyrTEC) tablet 10 mg  Daily      02/20/19 1528    02/21/19 0900  pantoprazole (PROTONIX) EC tablet 40 mg  Daily      02/20/19 1528    02/21/19 0900  senna (SENOKOT) tablet 1 tablet  Daily      02/20/19 1528    02/21/19 0600  Basic Metabolic Panel  Morning Draw      02/20/19 1528    02/21/19 0600  CBC & Differential  Morning Draw      02/20/19 1528    02/21/19 0600  CBC Auto Differential  PROCEDURE ONCE      02/21/19 0002    02/21/19 0525  Osmolality, Calculated  Once      02/21/19 0524    02/21/19 0000  HYDROcodone-acetaminophen (NORCO)  MG per tablet  Every 6 Hours PRN      02/21/19 1050    02/21/19 0000  Discharge Follow-up with Specified  Provider: Dr. Costello; 2 Weeks      02/21/19 1050    02/20/19 2100  atorvastatin (LIPITOR) tablet 40 mg  Nightly      02/20/19 1528    02/20/19 2100  clonazePAM (KlonoPIN) tablet 0.5 mg  2 Times Daily      02/20/19 1528    02/20/19 2100  levETIRAcetam (KEPPRA) tablet 750 mg  Every 12 Hours Scheduled      02/20/19 1528    02/20/19 2100  sodium chloride 0.9 % flush 3 mL  Every 12 Hours Scheduled      02/20/19 1528    02/20/19 1900  ceFAZolin (ANCEF) 1 g/100 mL 0.9% NS IVPB (mbp)  Every 8 Hours      02/20/19 1528    02/20/19 1615  PHENobarbital (LUMINAL) tablet 32.4 mg  Every 8 Hours Scheduled      02/20/19 1528    02/20/19 1615  sodium chloride 0.9 % infusion  Continuous      02/20/19 1528    02/20/19 1600  potassium chloride (K-DUR,KLOR-CON) CR tablet 20 mEq  3 Times Daily      02/20/19 1528    02/20/19 1528  ondansetron (ZOFRAN) tablet 4 mg  Every 6 Hours PRN      02/20/19 1528    02/20/19 1528  ondansetron ODT (ZOFRAN-ODT) disintegrating tablet 4 mg  Every 6 Hours PRN      02/20/19 1528    02/20/19 1528  ondansetron (ZOFRAN) injection 4 mg  Every 6 Hours PRN      02/20/19 1528    02/20/19 1528  sodium chloride 0.9 % flush 3-10 mL  As Needed      02/20/19 1528    02/20/19 1528  HYDROcodone-acetaminophen (NORCO)  MG per tablet 1 tablet  Every 6 Hours PRN      02/20/19 1528    02/20/19 1528  ketorolac (TORADOL) injection 15 mg  Every 6 Hours PRN      02/20/19 1528    02/20/19 1003  sodium chloride 0.9 % flush 3 mL  Every 12 Hours Scheduled,   Status:  Discontinued      02/20/19 1001    02/20/19 1003  lactated ringers infusion  Continuous      02/20/19 1001    Unscheduled  Sling - Cast Care  As Needed     Comments:  Sling for right arm at all times    02/20/19 1528    Unscheduled  Apply Ice to Affected Area  As Needed     Comments:  15-20 minutes every 2 hours when necessary pain and swelling    02/20/19 1528    --  senna (SENOKOT) 8.6 MG tablet tablet  Daily      02/20/19 1136    --  potassium chloride  (K-DUR,KLOR-CON) 20 MEQ CR tablet  3 Times Daily      02/20/19 1136    --  folic acid (FOLVITE) 1 MG tablet  Daily      02/20/19 1136    --  calcium carbonate-vitamin d 600-400 MG-UNIT per tablet  Daily      02/20/19 1136    --  levETIRAcetam (KEPPRA) 750 MG tablet  2 Times Daily      02/20/19 1136    --  clonazePAM (KlonoPIN) 0.5 MG tablet  2 Times Daily      02/20/19 1136    --  loratadine (CLARITIN) 10 MG tablet  Daily      02/20/19 1136             Operative/Procedure Notes (most recent note)      Elliot Costello MD at 2/20/2019 11:48 AM          HUMERUS PROXIMAL OPEN REDUCTION INTERNAL FIXATION  Procedure Report    Patient Name:  Maribel Staton  YOB: 1945    Date of Surgery:  2/20/2019     Indications:  Patient is 73-year-old white female who presented the office with complaint of right shoulder pain.  She had previously been treated for a nondisplaced mildly angulated proximal humerus fracture earlier in the summer which was healing well and she was doing okay with it but apparently she had a fall a couple months ago again or she broke her hand and she reinjured her shoulder.  At the time injury to the shoulder was missed.  When seen earlier this month with complaint shoulder pain she had a completely displaced oblique fracture proximal humerus.  Situation was discussed with the patient and her son and decision was made to proceed with open reduction internal fixation.  The risk and benefits were discussed including infection, bleeding, prolonged rehabilitation, stiffness, failure healing, local nerve damage, need for future surgery, risk of anesthesia etc.  Patient appears be well where the risk and benefits and consented to the procedure    Pre-op Diagnosis:   Other closed displaced fracture of proximal end of right humerus, sequela [S42.291S]       Post-Op Diagnosis Codes:     * Other closed displaced fracture of proximal end of right humerus, sequela  [S42.291S]    Procedure/CPT® Codes:      Procedure(s):  HUMERUS PROXIMAL OPEN REDUCTION INTERNAL FIXATION    Staff:  Surgeon(s):  Elliot Costello MD    Assistant: Bennett Skelton    Anesthesia: Choice    Estimated Blood Loss: 250 cc    Implants:    Implant Name Type Inv. Item Serial No.  Lot No. LRB No. Used   PLT HUM LCP PROX STD SS 5H [id number] - [id number] - [id number] Implant PLT HUM LCP PROX STD SS 5H [id number] 241.903 DEPU[employer]  Right 1   SCRW MERLIN S/TAP 3.5X24MM - S204.824 - [id number] Implant SCRW MERLIN S/TAP 3.5X24MM 204.824 DEPUY SYNTHES  Right 1   SCRW MERLIN S/TAP 3.5X26MM - S204.826 - [id number] Implant SCRW MERLIN S/TAP 3.5X26MM 204.826 DEPUY SYNTHES  Right 2   SCRW MERLIN S/TAP 3.5X28MM - S204.828 - [id number] Implant SCRW MERLIN S/TAP 3.5X28MM 204.828 DEPUY SYNTHES  Right 1   SCRW LK S/TAP STRDRV 3.5X26MM - S212.109 - [id number] Implant SCRW LK S/TAP STRDRV 3.5X26MM 212.109 DEPUY SYNTHES  Right 1   SCRW LK S/TAP STRDRV 3.5X40MM - S212.117 - [id number] Implant SCRW LK S/TAP STRDRV 3.5X40MM 212.117 DEPUY SYN[employer]ES  Right 2   SCRW LK S/TAP STRDRV 3.5X46MM - S212.136 - [id number] Implant SCRW LK S/TAP STRDRV 3.5X46MM 212.136 DEPU[employer]  Right 1   SCRW LK S/TAP STRDRV 3.5X50MM - S212.121 - [id number] Implant SCRW LK S/TAP STRDRV 3.5X50MM 212.121 DEPUY SYN[employer]ES  Right 1   SCRW LK S/TAP STRDRV 3.5X52MM - S212.122 - [id number] Implant SCRW LK S/TAP STRDRV 3.5X52MM 212.122 DEPUY SYNTHES  Right 1   SCRW LK S/TAP STRDRV 3.5X54MM - S02.212.054 - [id number] Implant SCRW LK S/TAP STRDRV 3.5X54MM 02.212.054 [employer]  Right 1       Specimen:          None      Findings: See op note    Complications: None      Description of Procedure: Briefly the patient brought the operative room she underwent a general anesthetics per the anesthesia service.  She was in place with the beach chair attachment in beachchair position.  Her right arm was prepped with ChloraPrep and draped in sterile fashion from  the hand and upper chest Rizzolo back and neck.  The incision site was infiltrated with 0.5% Marcaine with epi.  A deltopectoral incision was made.  Patient is quite thin and petite.  He identified this the vein was is quite small and we split the deltopectoral interval bluntly.  We continued our dissection down to the humerus.  The clavipectoral fascia was divided and dissection was made lateral to the biceps tendon.    The fracture here was quite unstable.  As we continue our dissection it was a slight amount of fluid within the fracture site and was serous.  There was significant evidence of all bony formation and scar tissue.  Meticulous and careful dissection was made to try to remove all the abnormal scar scar start scar tissue and soft tissue between the fracture site.  We also did using rongeurs and drills removed some of the callus bone from the prior injury.  At the fracture site itself of we debrided both ends back to good bleeding bone.    The fracture was quite unstable.  We initially reapproximated it with towel clips.  We did put a lag screw from anterior to posterior along centrally anterior spike that this is unusual method of June 2 5 overdoing it 35 place 35 screw.  Intraoperative fluoroscopy showed.  Be a very nice and reasonable reduction.  We then laterally placed a Synthes 5 hole proximal humeral locking plate.  3 cortical screws and one locking screw were placed distally along the humeral shaft.  Proximally 6 locking screws were placed.  There was position was verified under fluoroscopy internal range of motion.  We did have to change to the screws, there were little bit long.  On the table the patient.  The fracture appeared be quite stable.  She had about 30° of external rotation and she was stiff.  The wound was irrigated out copiously.  Proximally we closed some of the split area were reviewed taken down from the rotator cuff with a #2 Ethibond figure-of-eight sutures.  We then closed the  clavicle pectoral pectoral clavicle fascia with #1 Vicryl interrupted figure-of-eight sutures.  The deltopectoral fascia was then reapproximated one running #1 Vicryl running locking sutures.  Subcutaneous tissues were approximated with 1 Vicryl and 2-0 Monocryl and the skin was closed with staples.  A Betadine soaked Adaptic and sterile dressing was placed around the wound.  Patient was placed in a sling and ice pack was placed.  She tolerated procedure well chance recovery room in stable condition      Elliot Costello MD     Date: 2/20/2019  Time: 2:04 PM      Electronically signed by Elliot Costello MD at 2/21/2019  7:44 AM          Physician Progress Notes (most recent note)      Lucia Toscano PA at 2/21/2019  8:28 AM     Attestation signed by Elliot Costello MD at 2/21/2019 11:02 AM    I have reviewed the documentation above and agree.                    Inpatient Progress Note  Maribel Staton  Date: 02/21/19  MRN: [id number]      Subjective:  Maribel Staton is a 73 y.o. female POD#1 ORIF proximal humerus. Sitting up in hospital bed. She complains of moderate pain. She denies paresthesias. No new complaints.     Objective:    Vitals:    02/20/19 2300 02/21/19 0145 02/21/19 0300 02/21/19 0600   BP: 117/69  121/69 137/70   BP Location: Left arm  Left arm Left arm   Patient Position: Lying  Lying Lying   Pulse: 93  95 91   Resp: 18  18 18   Temp: 98.8 °F (37.1 °C)  98.5 °F (36.9 °C) 98.7 °F (37.1 °C)   TempSrc: Oral  Oral Oral   SpO2:  95%  96%   Weight:       Height:         Sling and swath in place. Bulky dressing intact. Good mobility of digits and wrist. Neurovascular status is intact.     Labs:    Results from last 7 days   Lab Units 02/21/19  0430 02/15/19  1308   WBC 10*3/mm3 7.25 5.69   HEMOGLOBIN g/dL 10.3* 13.3   HEMATOCRIT % 30.8* 39.0   MCV fL 92.5 90.1   MCHC g/dL 33.4 34.1   PLATELETS 10*3/mm3 219 284         Results from last 7 days   Lab Units  02/21/19  0430 02/15/19  1308   SODIUM mmol/L 136 129*   POTASSIUM mmol/L 4.0 4.2   CHLORIDE mmol/L 105 97*   CO2 mmol/L 23.0* 25.9   BUN mg/dL <5* 6*   CREATININE mg/dL 0.62 0.50   EGFR IF NONAFRICN AM mL/min/1.73 95 122   CALCIUM mg/dL 8.5 10.1*   GLUCOSE mg/dL 138* 101   Estimated Creatinine Clearance: 52.2 mL/min (by C-G formula based on SCr of 0.62 mg/dL).  No results found for: AMMONIA          No results found for: HGBA1C  Lab Results   Component Value Date    TSH 2.016 09/09/2014         Pain Management Panel     There is no flowsheet data to display.                 Radiology:  Imaging Results (last 72 hours)     Procedure Component Value Units Date/Time    FL Surgery Fluoro [[id number]] Collected:  02/20/19 1405     Updated:  02/20/19 1407    Narrative:       EXAMINATION: FL SURGERY FLUORO-      CLINICAL INDICATION:     ORIF RT prox humerus; S42.291S-Other displaced  fracture of upper end of right humerus, sequela     TECHNIQUE: Frontal view of the region of interest..  FLUOROSCOPY TIME: 42s     COMPARISON: NONE      FINDINGS: Fluoroscopy during surgery.       Impression:       As above     This report was finalized on 2/20/2019 2:05 PM by Dr. Trenton Jackson MD.               Assessment:      ICD-10-CM ICD-9-CM   1. Other closed displaced fracture of proximal end of right humerus, sequela S42.291S 905.2       Plan:  POD#1 ORIF proximal humerus. Doing well. Pain controlled. Continue sling. Gentle ROM hand and wrist. Possible discharge home today.       KENYA Jung           Electronically signed by Elliot Costello MD at 2/21/2019 11:02 AM       Consult Notes (most recent note)     No notes of this type exist for this encounter.        Nutrition Notes (most recent note)     No notes of this type exist for this encounter.        Physical Therapy Notes (most recent note)     No notes of this type exist for this encounter.        Occupational Therapy Notes (most recent note)     No notes of  this type exist for this encounter.        Speech Language Pathology Notes (most recent note)     No notes of this type exist for this encounter.        Respiratory Therapy Notes (most recent note)     No notes of this type exist for this encounter.        ADL Documentation (most recent)      Most Recent Value   Presence of Pain  denies pain/discomfort   Transferring  2 - assistive person   Toileting  2 - assistive person   Bathing  2 - assistive person   Dressing  2 - assistive person   Eating  0 - independent   Communication  0 - understands/communicates without difficulty   Swallowing  0 - swallows foods/liquids without difficulty   Equipment Currently Used at Home  walker, rolling, wheelchair            Discharge Summary     No notes of this type exist for this encounter.        Discharge Order (From admission, onward)    Start     Ordered    02/21/19 1044  Discharge patient  Once     Expected Discharge Date:  02/21/19    Discharge Disposition:  Home or Self Care    Physician of Record for Attribution - Please select from Treatment Team:  YAYA MCALLISTER [[id number]]    Review needed by CMO to determine Physician of Record:  No       Question Answer Comment   Physician of Record for Attribution - Please select from Treatment Team YAYA MCALLISTER    Review needed by CMO to determine Physician of Record No        02/21/19 3146

## 2019-02-21 NOTE — DISCHARGE SUMMARY
Maribel Staton  1945  [id number]      Date of Discharge:  2/21/2019      Discharge Diagnosis:   Other closed displaced fracture of proximal end of right humerus    Procedures Performed  Procedure(s):  HUMERUS PROXIMAL OPEN REDUCTION INTERNAL FIXATION         Hospital Course  Patient is a 73 y.o. female presented with complaints of right shoulder pain.  She been previously treated for a nondisplaced mildly angulated proximal humerus fracture early in the summer which had healed well.  She then had another fall that occurred several months ago when she broke her hand and reinjured the shoulder.  At the time the shoulder was missed and she now presented to the office with worsening pain of the right shoulder.  Radiographs revealed a completely displaced oblique fracture of the proximal humerus.  She was thus brought to the operating room and underwent open reduction internal fixation of the right humerus.  Intraoperatively was fairly uneventful.  Postoperatively x-rays were obtained revealing near anatomic alignment of fracture with plate and screws in good position.  On postoperative day 1 she was sitting up in hospital bed with complaints of moderate pain.  She denied paresthesias.  She was tolerating the sling and swath well.  Her dressing was intact with mild swelling.  She good mobility of the digits with intact neurovascular status her vital signs were stable and she was afebrile.  The remainder of her stay was uneventful she was deemed medically stable and discharged home on 2/21/2019.  She will return back to the clinic in 10 days for follow-up.      Consults:   Consults     No orders found for last 30 day(s).            Condition on Discharge:  Stable      Vital Signs  Vitals:    02/20/19 2300 02/21/19 0145 02/21/19 0300 02/21/19 0600   BP: 117/69  121/69 137/70   BP Location: Left arm  Left arm Left arm   Patient Position: Lying  Lying Lying   Pulse: 93  95 91   Resp: 18  18 18   Temp: 98.8 °F (37.1 °C)   98.5 °F (36.9 °C) 98.7 °F (37.1 °C)   TempSrc: Oral  Oral Oral   SpO2:  95%  96%   Weight:       Height:             Discharge Disposition  Home or Self Care      Discharge Medications     Discharge Medications      Changes to Medications      Instructions Start Date   HYDROcodone-acetaminophen  MG per tablet  Commonly known as:  NORCO  What changed:    · when to take this  · reasons to take this   1 tablet, Oral, Every 6 Hours PRN         Continue These Medications      Instructions Start Date   atorvastatin 40 MG tablet  Commonly known as:  LIPITOR   40 mg, Oral, Nightly      calcium carbonate-vitamin d 600-400 MG-UNIT per tablet   1 tablet, Oral, Daily      clonazePAM 0.5 MG tablet  Commonly known as:  KlonoPIN   0.5 mg, Oral, 2 Times Daily      folic acid 1 MG tablet  Commonly known as:  FOLVITE   1 mg, Oral, Daily      levETIRAcetam 750 MG tablet  Commonly known as:  KEPPRA   750 mg, Oral, 2 Times Daily      loratadine 10 MG tablet  Commonly known as:  CLARITIN   10 mg, Oral, Daily      pantoprazole 40 MG EC tablet  Commonly known as:  PROTONIX   40 mg, Oral, Daily      PHENobarbital 30 MG tablet  Commonly known as:  LUMINAL   30 mg, 3 Times Daily      potassium chloride 20 MEQ CR tablet  Commonly known as:  K-DUR,KLOR-CON   20 mEq, Oral, 3 Times Daily      senna 8.6 MG tablet tablet  Commonly known as:  SENOKOT   1 tablet, Oral, Daily               Follow-up Appointments  Future Appointments   Date Time Provider Department Center   3/7/2019  9:00 AM Elliot Costello MD [facility] None     Additional Instructions for the Follow-ups that You Need to Schedule     Discharge Follow-up with Specified Provider: Dr. Costello; 2 Weeks   As directed      To:  Dr. Costello    Follow Up:  2 Weeks

## 2019-02-21 NOTE — DISCHARGE INSTRUCTIONS
Keep dressing clean and dry.  Keep aquacel dressing in place right arm for 7 days.  If becomes more than 75 percent saturated then change dressing with dressing given.

## 2019-03-06 DIAGNOSIS — Z98.890 S/P ORIF (OPEN REDUCTION INTERNAL FIXATION) FRACTURE: Primary | ICD-10-CM

## 2019-03-06 DIAGNOSIS — S42.291D OTHER CLOSED DISPLACED FRACTURE OF PROXIMAL END OF RIGHT HUMERUS WITH ROUTINE HEALING, SUBSEQUENT ENCOUNTER: ICD-10-CM

## 2019-03-06 DIAGNOSIS — Z87.81 S/P ORIF (OPEN REDUCTION INTERNAL FIXATION) FRACTURE: Primary | ICD-10-CM

## 2019-03-07 ENCOUNTER — HOSPITAL ENCOUNTER (OUTPATIENT)
Dept: GENERAL RADIOLOGY | Facility: HOSPITAL | Age: 74
Discharge: HOME | End: 2019-03-07
Admitting: ORTHOPAEDIC SURGERY

## 2019-03-07 ENCOUNTER — OFFICE VISIT (OUTPATIENT)
Dept: ORTHOPEDIC SURGERY | Facility: CLINIC | Age: 74
End: 2019-03-07

## 2019-03-07 VITALS — BODY MASS INDEX: 22.78 KG/M2 | WEIGHT: 113 LBS | HEIGHT: 59 IN

## 2019-03-07 DIAGNOSIS — Z87.81 S/P ORIF (OPEN REDUCTION INTERNAL FIXATION) FRACTURE: Primary | ICD-10-CM

## 2019-03-07 DIAGNOSIS — Z98.890 S/P ORIF (OPEN REDUCTION INTERNAL FIXATION) FRACTURE: ICD-10-CM

## 2019-03-07 DIAGNOSIS — S42.291D OTHER CLOSED DISPLACED FRACTURE OF PROXIMAL END OF RIGHT HUMERUS WITH ROUTINE HEALING, SUBSEQUENT ENCOUNTER: ICD-10-CM

## 2019-03-07 DIAGNOSIS — Z98.890 S/P ORIF (OPEN REDUCTION INTERNAL FIXATION) FRACTURE: Primary | ICD-10-CM

## 2019-03-07 DIAGNOSIS — Z87.81 S/P ORIF (OPEN REDUCTION INTERNAL FIXATION) FRACTURE: ICD-10-CM

## 2019-03-07 PROCEDURE — 99024 POSTOP FOLLOW-UP VISIT: CPT | Performed by: ORTHOPAEDIC SURGERY

## 2019-03-07 PROCEDURE — 73060 X-RAY EXAM OF HUMERUS: CPT

## 2019-03-07 PROCEDURE — 73060 X-RAY EXAM OF HUMERUS: CPT | Performed by: RADIOLOGY

## 2019-03-07 NOTE — PROGRESS NOTES
Patient: Maribel Staton    YOB: 1945    Chief Complaint   Patient presents with   • Right Shoulder - Fracture, Pain         History of Present Illness: Patient is 15 days status post open reduction internal fixation of the right proximal humerus nonunion malunion.  Doing okay states it feels better than prior to surgery.  Still complains of pain.  No specific complaints of paresthesias.  Should be noted that patient was on chronic pain medication preoperatively    Past Medical History:   Diagnosis Date   • Anxiety    • Arthritis    • Asthma    • Back pain    • Chronic low back pain    • COPD (chronic obstructive pulmonary disease) (CMS/[facility])    • Depression    • Elevated cholesterol    • Fall 01/03/2018   • GERD (gastroesophageal reflux disease)    • Headache    • History of transfusion    • Hypertension    • Seizures (CMS/[facility])    • Sleep apnea    • Tobacco abuse         Social History     Socioeconomic History   • Marital status:      Spouse name: Not on file   • Number of children: Not on file   • Years of education: Not on file   • Highest education level: Not on file   Social Needs   • Financial resource strain: Not on file   • Food insecurity - worry: Not on file   • Food insecurity - inability: Not on file   • Transportation needs - medical: Not on file   • Transportation needs - non-medical: Not on file   Occupational History   • Not on file   Tobacco Use   • Smoking status: Current Some Day Smoker     Packs/day: 1.00     Years: 53.00     Pack years: 53.00     Types: Cigarettes     Start date: 6/6/1965   • Smokeless tobacco: Never Used   Substance and Sexual Activity   • Alcohol use: No   • Drug use: No   • Sexual activity: Defer   Other Topics Concern   • Not on file   Social History Narrative   • Not on file           Physical Exam: 73 y.o. female  General Appearance:    Alert and oriented x 3, cooperative, in no acute distress                   Vitals:    03/07/19 0905   Weight: 51.3  "kg (113 lb)   Height: 149.9 cm (59\")          Wound is clean dry and intact.  There is some mild swelling and some resolving ecchymosis there is no obvious redness or warmth.  Axillary nerve sensation is intact.  Her right hand is grossly neurovascular intact without swelling.  She has good flexion-extension of the elbow.  Gentle pendulum exercises do not appear to bother her that much.  She has external rotation back out to about 0 degrees      Radiology:       X-rays taken today show what appears to be good alignment of the fracture and the metal fixation on both AP and lateral planes.  No obvious changes are noted.      Assessment/Plan: Healing status post open reduction internal fixation of right proximal humerus nonunion malunion.  X-rays look very good today.  Clinically the patient is doing okay.  I did refill prescription for the Norco 10/3/2025 she can take 1 these every 8-12 hours.  I gave her 24.  She may need to talk with her family physician about getting back on her normal chronic pain medicine.  We will see her back in 4-5 weeks.  I showed her to begin pendulum exercises stable walking and gentle external rotation or side.  We will discuss formal physical therapy at next visit          Discussion/Summary:                This chart was completed utilizing the dragon speech recognition software.  Grammatical errors, random word insertions, pronoun errors, and incomplete sentences or occasional consequences of the system due to software limitations, ambient noise, and hardware issues.  Any questions or concerns about the content, text, or information contained within the body of this dictation should be directly addressed to the physician for clarification        This document was signed by Elliot Costello M.D. March 7, 2019 9:21 AM  "

## 2019-04-02 DIAGNOSIS — S42.294D OTHER CLOSED NONDISPLACED FRACTURE OF PROXIMAL END OF RIGHT HUMERUS WITH ROUTINE HEALING, SUBSEQUENT ENCOUNTER: Primary | ICD-10-CM

## 2019-04-04 ENCOUNTER — OFFICE VISIT (OUTPATIENT)
Dept: ORTHOPEDIC SURGERY | Facility: CLINIC | Age: 74
End: 2019-04-04

## 2019-04-04 ENCOUNTER — HOSPITAL ENCOUNTER (OUTPATIENT)
Dept: GENERAL RADIOLOGY | Facility: HOSPITAL | Age: 74
Discharge: HOME | End: 2019-04-04
Admitting: ORTHOPAEDIC SURGERY

## 2019-04-04 VITALS — WEIGHT: 113.1 LBS | HEIGHT: 59 IN | BODY MASS INDEX: 22.8 KG/M2

## 2019-04-04 DIAGNOSIS — Z87.81 S/P ORIF (OPEN REDUCTION INTERNAL FIXATION) FRACTURE: Primary | ICD-10-CM

## 2019-04-04 DIAGNOSIS — S42.294D OTHER CLOSED NONDISPLACED FRACTURE OF PROXIMAL END OF RIGHT HUMERUS WITH ROUTINE HEALING, SUBSEQUENT ENCOUNTER: ICD-10-CM

## 2019-04-04 DIAGNOSIS — Z98.890 S/P ORIF (OPEN REDUCTION INTERNAL FIXATION) FRACTURE: Primary | ICD-10-CM

## 2019-04-04 PROCEDURE — 73030 X-RAY EXAM OF SHOULDER: CPT | Performed by: RADIOLOGY

## 2019-04-04 PROCEDURE — 99024 POSTOP FOLLOW-UP VISIT: CPT | Performed by: ORTHOPAEDIC SURGERY

## 2019-04-04 PROCEDURE — 73030 X-RAY EXAM OF SHOULDER: CPT

## 2019-04-04 RX ORDER — HYDROCODONE BITARTRATE AND ACETAMINOPHEN 10; 325 MG/1; MG/1
0.5 TABLET ORAL EVERY 8 HOURS PRN
COMMUNITY

## 2019-04-04 NOTE — PROGRESS NOTES
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  Doing okay.  She continues to complain of pain but she does have history of chronic pain complaints.  No specific paresthesias or swelling    Past Medical History:   Diagnosis Date   • Anxiety    • Arthritis    • Asthma    • Back pain    • Chronic low back pain    • COPD (chronic obstructive pulmonary disease) (CMS/[facility])    • Depression    • Elevated cholesterol    • Fall 01/03/2018   • GERD (gastroesophageal reflux disease)    • Headache    • History of transfusion    • Hypertension    • Seizures (CMS/[facility])    • Sleep apnea    • Tobacco abuse         Social History     Socioeconomic History   • Marital status:      Spouse name: Not on file   • Number of children: Not on file   • Years of education: Not on file   • Highest education level: Not on file   Tobacco Use   • Smoking status: Current Some Day Smoker     Packs/day: 1.00     Years: 53.00     Pack years: 53.00     Types: Cigarettes     Start date: 6/6/1965   • Smokeless tobacco: Never Used   Substance and Sexual Activity   • Alcohol use: No   • Drug use: No   • Sexual activity: Defer           Physical Exam: 73 y.o. female  General Appearance:    Alert and oriented x 3, cooperative, in no acute distress                   Vitals:    04/04/19 0931   Weight: 51.3 kg (113 lb 1.5 oz)   Height: 149.9 cm (59.02\")          Sitting with her sling on somewhat crouched.  Her wound is clean dry and intact.  There is no redness swelling or warmth of the shoulder itself.  Right hand is grossly neurovascular intact without swelling I actually can rotate her about 10-15 degrees of external rotation and forward flex her to about 60 or 70 degrees without severe " discomfort.      Radiology:   X-rays taken today show no significant change in alignment of fracture may be some early callus is noted medially.          Assessment/Plan: Healing right proximal humerus fracture.  Plan at this time to begin her with physical therapy to begin gentle range of motion and strengthening.  Emphasis on gentle pain should be her guide.  She can wean out of sling as comfort permits.  No heavy lifting pushing or pulling with the arm.  We will see her back in 5 weeks for follow-up x-ray and check          Discussion/Summary:                This chart was completed utilizing the dragon speech recognition software.  Grammatical errors, random word insertions, pronoun errors, and incomplete sentences or occasional consequences of the system due to software limitations, ambient noise, and hardware issues.  Any questions or concerns about the content, text, or information contained within the body of this dictation should be directly addressed to the physician for clarification        This document was signed by Elliot Costello M.D. April 4, 2019 9:44 AM

## 2019-05-07 DIAGNOSIS — S42.294D OTHER CLOSED NONDISPLACED FRACTURE OF PROXIMAL END OF RIGHT HUMERUS WITH ROUTINE HEALING, SUBSEQUENT ENCOUNTER: ICD-10-CM

## 2019-05-07 DIAGNOSIS — Z98.890 S/P ORIF (OPEN REDUCTION INTERNAL FIXATION) FRACTURE: Primary | ICD-10-CM

## 2019-05-07 DIAGNOSIS — Z87.81 S/P ORIF (OPEN REDUCTION INTERNAL FIXATION) FRACTURE: Primary | ICD-10-CM

## 2019-05-09 ENCOUNTER — OFFICE VISIT (OUTPATIENT)
Dept: ORTHOPEDIC SURGERY | Facility: CLINIC | Age: 74
End: 2019-05-09

## 2019-05-09 ENCOUNTER — HOSPITAL ENCOUNTER (OUTPATIENT)
Dept: GENERAL RADIOLOGY | Facility: HOSPITAL | Age: 74
Discharge: HOME | End: 2019-05-09
Admitting: ORTHOPAEDIC SURGERY

## 2019-05-09 VITALS — BODY MASS INDEX: 21.97 KG/M2 | WEIGHT: 109 LBS | HEIGHT: 59 IN

## 2019-05-09 DIAGNOSIS — Z98.890 S/P ORIF (OPEN REDUCTION INTERNAL FIXATION) FRACTURE: ICD-10-CM

## 2019-05-09 DIAGNOSIS — Z87.81 S/P ORIF (OPEN REDUCTION INTERNAL FIXATION) FRACTURE: ICD-10-CM

## 2019-05-09 DIAGNOSIS — S42.291S OTHER CLOSED DISPLACED FRACTURE OF PROXIMAL END OF RIGHT HUMERUS, SEQUELA: ICD-10-CM

## 2019-05-09 DIAGNOSIS — Z98.890 S/P ORIF (OPEN REDUCTION INTERNAL FIXATION) FRACTURE: Primary | ICD-10-CM

## 2019-05-09 DIAGNOSIS — S42.294D OTHER CLOSED NONDISPLACED FRACTURE OF PROXIMAL END OF RIGHT HUMERUS WITH ROUTINE HEALING, SUBSEQUENT ENCOUNTER: ICD-10-CM

## 2019-05-09 DIAGNOSIS — Z87.81 S/P ORIF (OPEN REDUCTION INTERNAL FIXATION) FRACTURE: Primary | ICD-10-CM

## 2019-05-09 PROCEDURE — 73030 X-RAY EXAM OF SHOULDER: CPT

## 2019-05-09 PROCEDURE — 73030 X-RAY EXAM OF SHOULDER: CPT | Performed by: RADIOLOGY

## 2019-05-09 PROCEDURE — 99024 POSTOP FOLLOW-UP VISIT: CPT | Performed by: ORTHOPAEDIC SURGERY

## 2019-05-09 NOTE — PROGRESS NOTES
"Patient: Maribel Staton    YOB: 1945    Chief Complaint   Patient presents with   • Right Shoulder - Fracture, Follow-up, Pain         History of Present Illness: Patient presents for follow-up of her right arm.  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  Is doing better.  Still has some soreness and achiness but no major complaints today.  Has been working with physical therapist    Past Medical History:   Diagnosis Date   • Anxiety    • Arthritis    • Asthma    • Back pain    • Chronic low back pain    • COPD (chronic obstructive pulmonary disease) (CMS/[facility])    • Depression    • Elevated cholesterol    • Fall 01/03/2018   • GERD (gastroesophageal reflux disease)    • Headache    • History of transfusion    • Hypertension    • Seizures (CMS/[facility])    • Sleep apnea    • Tobacco abuse         Social History     Socioeconomic History   • Marital status:      Spouse name: Not on file   • Number of children: Not on file   • Years of education: Not on file   • Highest education level: Not on file   Tobacco Use   • Smoking status: Current Some Day Smoker     Packs/day: 1.00     Years: 53.00     Pack years: 53.00     Types: Cigarettes     Start date: 6/6/1965   • Smokeless tobacco: Never Used   Substance and Sexual Activity   • Alcohol use: No   • Drug use: No   • Sexual activity: Defer           Physical Exam: 73 y.o. female  General Appearance:    Alert and oriented x 3, cooperative, in no acute distress                   Vitals:    05/09/19 0915   Weight: 49.4 kg (109 lb)   Height: 149.9 cm (59\")          She can easily get her hand to her mouth she can get it to the top of her head with a slight flexion of her head.  She can get her hand to her backside.  She has about 30 degrees of external rotation at her side.  She probably forward flexes and abducts to about 80 degrees.  Right hand is grossly " neurovascular intact without swelling wounds well-healed      Radiology:       X-ray shows no change in alignment of fracture or the metal fixation.  Some early callus is noted      Assessment/Plan: Healing right proximal humerus fracture.  Would like to continue with physical therapy.  Again we discussed with her and her  that functionally we want her to be able to easily get her about the back of her head and her back side.  The fact that she had a prolonged problem presurgery she probably always have some stiffness.  We would expect improvement for another year or so.  Continue home therapy.  We will see her back in 6 weeks for an x-ray check            Patient's Body mass index is 22.02 kg/m². BMI is within normal parameters. No follow-up required..      Discussion/Summary:                This chart was completed utilizing the dragon speech recognition software.  Grammatical errors, random word insertions, pronoun errors, and incomplete sentences or occasional consequences of the system due to software limitations, ambient noise, and hardware issues.  Any questions or concerns about the content, text, or information contained within the body of this dictation should be directly addressed to the physician for clarification        This document was signed by Elliot Costello M.D. May 9, 2019 9:24 AM

## 2019-05-23 ENCOUNTER — APPOINTMENT (OUTPATIENT)
Dept: GENERAL RADIOLOGY | Facility: HOSPITAL | Age: 74
End: 2019-05-23

## 2019-05-23 ENCOUNTER — APPOINTMENT (OUTPATIENT)
Dept: CT IMAGING | Facility: HOSPITAL | Age: 74
End: 2019-05-23

## 2019-05-23 ENCOUNTER — HOSPITAL ENCOUNTER (INPATIENT)
Facility: HOSPITAL | Age: 74
LOS: 3 days | Discharge: HOME HEALTH | End: 2019-05-26
Attending: ORTHOPAEDIC SURGERY | Admitting: ORTHOPAEDIC SURGERY

## 2019-05-23 DIAGNOSIS — S42.321A CLOSED DISPLACED TRANSVERSE FRACTURE OF SHAFT OF RIGHT HUMERUS, INITIAL ENCOUNTER: Primary | ICD-10-CM

## 2019-05-23 DIAGNOSIS — S42.354A CLOSED NONDISPLACED COMMINUTED FRACTURE OF SHAFT OF RIGHT HUMERUS, INITIAL ENCOUNTER: ICD-10-CM

## 2019-05-23 PROBLEM — S42.309A HUMERAL SHAFT FRACTURE: Status: ACTIVE | Noted: 2019-05-23

## 2019-05-23 LAB
ABO GROUP BLD: NORMAL
ALBUMIN SERPL-MCNC: 3.91 G/DL (ref 3.5–5.2)
ALBUMIN/GLOB SERPL: 0.9 G/DL
ALP SERPL-CCNC: 103 U/L (ref 39–117)
ALT SERPL W P-5'-P-CCNC: 10 U/L (ref 1–33)
ANION GAP SERPL CALCULATED.3IONS-SCNC: 13.6 MMOL/L
ANION GAP SERPL CALCULATED.3IONS-SCNC: 14.6 MMOL/L
AST SERPL-CCNC: 18 U/L (ref 1–32)
BASOPHILS # BLD AUTO: 0.01 10*3/MM3 (ref 0–0.2)
BASOPHILS NFR BLD AUTO: 0.1 % (ref 0–1.5)
BILIRUB SERPL-MCNC: 0.3 MG/DL (ref 0.2–1.2)
BLD GP AB SCN SERPL QL: NEGATIVE
BUN BLD-MCNC: 4 MG/DL (ref 8–23)
BUN BLD-MCNC: 5 MG/DL (ref 8–23)
BUN/CREAT SERPL: 7.4 (ref 7–25)
BUN/CREAT SERPL: 9.1 (ref 7–25)
CALCIUM SPEC-SCNC: 9.4 MG/DL (ref 8.6–10.5)
CALCIUM SPEC-SCNC: 9.8 MG/DL (ref 8.6–10.5)
CHLORIDE SERPL-SCNC: 93 MMOL/L (ref 98–107)
CHLORIDE SERPL-SCNC: 94 MMOL/L (ref 98–107)
CO2 SERPL-SCNC: 22.4 MMOL/L (ref 22–29)
CO2 SERPL-SCNC: 22.4 MMOL/L (ref 22–29)
CREAT BLD-MCNC: 0.54 MG/DL (ref 0.57–1)
CREAT BLD-MCNC: 0.55 MG/DL (ref 0.57–1)
DEPRECATED RDW RBC AUTO: 45.4 FL (ref 37–54)
EOSINOPHIL # BLD AUTO: 0.03 10*3/MM3 (ref 0–0.4)
EOSINOPHIL NFR BLD AUTO: 0.4 % (ref 0.3–6.2)
ERYTHROCYTE [DISTWIDTH] IN BLOOD BY AUTOMATED COUNT: 14.2 % (ref 12.3–15.4)
ETHANOL BLD-MCNC: <10 MG/DL (ref 0–10)
ETHANOL UR QL: <0.01 %
GFR SERPL CREATININE-BSD FRML MDRD: 109 ML/MIN/1.73
GFR SERPL CREATININE-BSD FRML MDRD: 111 ML/MIN/1.73
GLOBULIN UR ELPH-MCNC: 4.5 GM/DL
GLUCOSE BLD-MCNC: 115 MG/DL (ref 65–99)
GLUCOSE BLD-MCNC: 120 MG/DL (ref 65–99)
HBA1C MFR BLD: 5.4 % (ref 4.8–5.6)
HCT VFR BLD AUTO: 38.6 % (ref 34–46.6)
HGB BLD-MCNC: 12.6 G/DL (ref 12–15.9)
IMM GRANULOCYTES # BLD AUTO: 0.01 10*3/MM3 (ref 0–0.05)
IMM GRANULOCYTES NFR BLD AUTO: 0.1 % (ref 0–0.5)
LYMPHOCYTES # BLD AUTO: 1.69 10*3/MM3 (ref 0.7–3.1)
LYMPHOCYTES NFR BLD AUTO: 24.3 % (ref 19.6–45.3)
MCH RBC QN AUTO: 29.4 PG (ref 26.6–33)
MCHC RBC AUTO-ENTMCNC: 32.6 G/DL (ref 31.5–35.7)
MCV RBC AUTO: 90 FL (ref 79–97)
MONOCYTES # BLD AUTO: 0.64 10*3/MM3 (ref 0.1–0.9)
MONOCYTES NFR BLD AUTO: 9.2 % (ref 5–12)
NEUTROPHILS # BLD AUTO: 4.57 10*3/MM3 (ref 1.7–7)
NEUTROPHILS NFR BLD AUTO: 65.9 % (ref 42.7–76)
PHENOBARB SERPL-MCNC: 12.5 MCG/ML (ref 10–30)
PLATELET # BLD AUTO: 251 10*3/MM3 (ref 140–450)
PMV BLD AUTO: 8.5 FL (ref 6–12)
POTASSIUM BLD-SCNC: 3.7 MMOL/L (ref 3.5–5.2)
POTASSIUM BLD-SCNC: 4.8 MMOL/L (ref 3.5–5.2)
PROT SERPL-MCNC: 8.4 G/DL (ref 6–8.5)
RBC # BLD AUTO: 4.29 10*6/MM3 (ref 3.77–5.28)
RH BLD: POSITIVE
SODIUM BLD-SCNC: 130 MMOL/L (ref 136–145)
SODIUM BLD-SCNC: 130 MMOL/L (ref 136–145)
T&S EXPIRATION DATE: NORMAL
TSH SERPL DL<=0.05 MIU/L-ACNC: 1.9 MIU/ML (ref 0.27–4.2)
WBC NRBC COR # BLD: 6.95 10*3/MM3 (ref 3.4–10.8)

## 2019-05-23 PROCEDURE — 73030 X-RAY EXAM OF SHOULDER: CPT | Performed by: RADIOLOGY

## 2019-05-23 PROCEDURE — 82306 VITAMIN D 25 HYDROXY: CPT | Performed by: PHYSICIAN ASSISTANT

## 2019-05-23 PROCEDURE — 80307 DRUG TEST PRSMV CHEM ANLYZR: CPT | Performed by: PHYSICIAN ASSISTANT

## 2019-05-23 PROCEDURE — 71045 X-RAY EXAM CHEST 1 VIEW: CPT

## 2019-05-23 PROCEDURE — 85025 COMPLETE CBC W/AUTO DIFF WBC: CPT | Performed by: NURSE PRACTITIONER

## 2019-05-23 PROCEDURE — 93010 ELECTROCARDIOGRAM REPORT: CPT | Performed by: INTERNAL MEDICINE

## 2019-05-23 PROCEDURE — 73060 X-RAY EXAM OF HUMERUS: CPT

## 2019-05-23 PROCEDURE — 80184 ASSAY OF PHENOBARBITAL: CPT | Performed by: INTERNAL MEDICINE

## 2019-05-23 PROCEDURE — 84443 ASSAY THYROID STIM HORMONE: CPT | Performed by: PHYSICIAN ASSISTANT

## 2019-05-23 PROCEDURE — 73030 X-RAY EXAM OF SHOULDER: CPT

## 2019-05-23 PROCEDURE — 25010000002 LEVETRIRACETAM PER 10 MG: Performed by: INTERNAL MEDICINE

## 2019-05-23 PROCEDURE — 93005 ELECTROCARDIOGRAM TRACING: CPT | Performed by: NURSE PRACTITIONER

## 2019-05-23 PROCEDURE — 99223 1ST HOSP IP/OBS HIGH 75: CPT | Performed by: PHYSICIAN ASSISTANT

## 2019-05-23 PROCEDURE — 80177 DRUG SCRN QUAN LEVETIRACETAM: CPT | Performed by: PHYSICIAN ASSISTANT

## 2019-05-23 PROCEDURE — 83930 ASSAY OF BLOOD OSMOLALITY: CPT | Performed by: PHYSICIAN ASSISTANT

## 2019-05-23 PROCEDURE — 73060 X-RAY EXAM OF HUMERUS: CPT | Performed by: RADIOLOGY

## 2019-05-23 PROCEDURE — 83036 HEMOGLOBIN GLYCOSYLATED A1C: CPT | Performed by: PHYSICIAN ASSISTANT

## 2019-05-23 PROCEDURE — 70450 CT HEAD/BRAIN W/O DYE: CPT | Performed by: RADIOLOGY

## 2019-05-23 PROCEDURE — 86850 RBC ANTIBODY SCREEN: CPT | Performed by: ORTHOPAEDIC SURGERY

## 2019-05-23 PROCEDURE — 71045 X-RAY EXAM CHEST 1 VIEW: CPT | Performed by: RADIOLOGY

## 2019-05-23 PROCEDURE — 99284 EMERGENCY DEPT VISIT MOD MDM: CPT

## 2019-05-23 PROCEDURE — 86901 BLOOD TYPING SEROLOGIC RH(D): CPT | Performed by: ORTHOPAEDIC SURGERY

## 2019-05-23 PROCEDURE — 86900 BLOOD TYPING SEROLOGIC ABO: CPT | Performed by: ORTHOPAEDIC SURGERY

## 2019-05-23 PROCEDURE — 80053 COMPREHEN METABOLIC PANEL: CPT | Performed by: NURSE PRACTITIONER

## 2019-05-23 PROCEDURE — 70450 CT HEAD/BRAIN W/O DYE: CPT

## 2019-05-23 RX ORDER — HYDROCODONE BITARTRATE AND ACETAMINOPHEN 5; 325 MG/1; MG/1
1 TABLET ORAL EVERY 4 HOURS PRN
Status: DISCONTINUED | OUTPATIENT
Start: 2019-05-23 | End: 2019-05-26 | Stop reason: HOSPADM

## 2019-05-23 RX ORDER — HYDROCODONE BITARTRATE AND ACETAMINOPHEN 10; 325 MG/1; MG/1
1 TABLET ORAL 2 TIMES DAILY PRN
Status: DISCONTINUED | OUTPATIENT
Start: 2019-05-23 | End: 2019-05-24

## 2019-05-23 RX ORDER — SODIUM CHLORIDE 9 MG/ML
75 INJECTION, SOLUTION INTRAVENOUS CONTINUOUS
Status: DISCONTINUED | OUTPATIENT
Start: 2019-05-23 | End: 2019-05-25

## 2019-05-23 RX ORDER — NITROGLYCERIN 0.4 MG/1
0.4 TABLET SUBLINGUAL
Status: DISCONTINUED | OUTPATIENT
Start: 2019-05-23 | End: 2019-05-26 | Stop reason: HOSPADM

## 2019-05-23 RX ORDER — PHENOBARBITAL 32.4 MG/1
32.4 TABLET ORAL EVERY 8 HOURS SCHEDULED
Status: DISCONTINUED | OUTPATIENT
Start: 2019-05-23 | End: 2019-05-26 | Stop reason: HOSPADM

## 2019-05-23 RX ORDER — LORAZEPAM 2 MG/ML
1 INJECTION INTRAMUSCULAR EVERY 4 HOURS PRN
Status: DISCONTINUED | OUTPATIENT
Start: 2019-05-23 | End: 2019-05-26 | Stop reason: HOSPADM

## 2019-05-23 RX ORDER — FOLIC ACID 1 MG/1
1 TABLET ORAL DAILY
Status: DISCONTINUED | OUTPATIENT
Start: 2019-05-23 | End: 2019-05-26 | Stop reason: HOSPADM

## 2019-05-23 RX ORDER — PANTOPRAZOLE SODIUM 40 MG/1
40 TABLET, DELAYED RELEASE ORAL DAILY
Status: DISCONTINUED | OUTPATIENT
Start: 2019-05-23 | End: 2019-05-26 | Stop reason: HOSPADM

## 2019-05-23 RX ORDER — HYDROCODONE BITARTRATE AND ACETAMINOPHEN 5; 325 MG/1; MG/1
2 TABLET ORAL EVERY 4 HOURS PRN
Status: DISCONTINUED | OUTPATIENT
Start: 2019-05-23 | End: 2019-05-26 | Stop reason: HOSPADM

## 2019-05-23 RX ORDER — ONDANSETRON 2 MG/ML
4 INJECTION INTRAMUSCULAR; INTRAVENOUS EVERY 6 HOURS PRN
Status: DISCONTINUED | OUTPATIENT
Start: 2019-05-23 | End: 2019-05-26 | Stop reason: HOSPADM

## 2019-05-23 RX ORDER — NALOXONE HCL 0.4 MG/ML
0.4 VIAL (ML) INJECTION
Status: DISCONTINUED | OUTPATIENT
Start: 2019-05-23 | End: 2019-05-23

## 2019-05-23 RX ORDER — SODIUM CHLORIDE 0.9 % (FLUSH) 0.9 %
3 SYRINGE (ML) INJECTION EVERY 12 HOURS SCHEDULED
Status: DISCONTINUED | OUTPATIENT
Start: 2019-05-23 | End: 2019-05-26 | Stop reason: HOSPADM

## 2019-05-23 RX ORDER — SODIUM CHLORIDE 0.9 % (FLUSH) 0.9 %
3-10 SYRINGE (ML) INJECTION AS NEEDED
Status: DISCONTINUED | OUTPATIENT
Start: 2019-05-23 | End: 2019-05-26 | Stop reason: HOSPADM

## 2019-05-23 RX ORDER — SODIUM CHLORIDE 450 MG/100ML
75 INJECTION, SOLUTION INTRAVENOUS CONTINUOUS
Status: DISCONTINUED | OUTPATIENT
Start: 2019-05-23 | End: 2019-05-23

## 2019-05-23 RX ORDER — NALOXONE HCL 0.4 MG/ML
0.4 VIAL (ML) INJECTION
Status: DISCONTINUED | OUTPATIENT
Start: 2019-05-23 | End: 2019-05-26 | Stop reason: HOSPADM

## 2019-05-23 RX ORDER — MORPHINE SULFATE 2 MG/ML
2 INJECTION, SOLUTION INTRAMUSCULAR; INTRAVENOUS EVERY 4 HOURS PRN
Status: DISCONTINUED | OUTPATIENT
Start: 2019-05-23 | End: 2019-05-26 | Stop reason: HOSPADM

## 2019-05-23 RX ORDER — MORPHINE SULFATE 2 MG/ML
1 INJECTION, SOLUTION INTRAMUSCULAR; INTRAVENOUS EVERY 4 HOURS PRN
Status: DISCONTINUED | OUTPATIENT
Start: 2019-05-23 | End: 2019-05-23

## 2019-05-23 RX ORDER — CLONAZEPAM 0.5 MG/1
0.5 TABLET ORAL 2 TIMES DAILY
Status: DISCONTINUED | OUTPATIENT
Start: 2019-05-23 | End: 2019-05-26 | Stop reason: HOSPADM

## 2019-05-23 RX ORDER — SENNA PLUS 8.6 MG/1
1 TABLET ORAL DAILY
Status: DISCONTINUED | OUTPATIENT
Start: 2019-05-23 | End: 2019-05-26 | Stop reason: HOSPADM

## 2019-05-23 RX ORDER — LEVETIRACETAM 500 MG/1
1000 TABLET ORAL EVERY 12 HOURS SCHEDULED
Status: DISCONTINUED | OUTPATIENT
Start: 2019-05-23 | End: 2019-05-26 | Stop reason: HOSPADM

## 2019-05-23 RX ORDER — ATORVASTATIN CALCIUM 40 MG/1
40 TABLET, FILM COATED ORAL NIGHTLY
Status: DISCONTINUED | OUTPATIENT
Start: 2019-05-23 | End: 2019-05-26 | Stop reason: HOSPADM

## 2019-05-23 RX ORDER — PHENOBARBITAL 32.4 MG/1
32.4 TABLET ORAL 3 TIMES DAILY
COMMUNITY

## 2019-05-23 RX ADMIN — PHENOBARBITAL 32.4 MG: 32.4 TABLET ORAL at 22:58

## 2019-05-23 RX ADMIN — ATORVASTATIN CALCIUM 40 MG: 40 TABLET, FILM COATED ORAL at 20:12

## 2019-05-23 RX ADMIN — LEVETIRACETAM 1000 MG: 500 TABLET, FILM COATED ORAL at 20:12

## 2019-05-23 RX ADMIN — SODIUM CHLORIDE 1000 MG: 9 INJECTION, SOLUTION INTRAVENOUS at 20:15

## 2019-05-23 RX ADMIN — CLONAZEPAM 0.5 MG: 0.5 TABLET ORAL at 20:12

## 2019-05-23 RX ADMIN — SODIUM CHLORIDE 75 ML/HR: 9 INJECTION, SOLUTION INTRAVENOUS at 17:46

## 2019-05-23 RX ADMIN — FOLIC ACID 1 MG: 1 TABLET ORAL at 20:13

## 2019-05-23 RX ADMIN — SENNOSIDES 1 TABLET: 8.6 TABLET, FILM COATED ORAL at 20:13

## 2019-05-24 ENCOUNTER — APPOINTMENT (OUTPATIENT)
Dept: GENERAL RADIOLOGY | Facility: HOSPITAL | Age: 74
End: 2019-05-24

## 2019-05-24 ENCOUNTER — ANESTHESIA EVENT (OUTPATIENT)
Dept: PERIOP | Facility: HOSPITAL | Age: 74
End: 2019-05-24

## 2019-05-24 ENCOUNTER — ANESTHESIA (OUTPATIENT)
Dept: PERIOP | Facility: HOSPITAL | Age: 74
End: 2019-05-24

## 2019-05-24 LAB
25(OH)D3 SERPL-MCNC: 30.6 NG/ML (ref 30–100)
ANION GAP SERPL CALCULATED.3IONS-SCNC: 13 MMOL/L
BASOPHILS # BLD AUTO: 0.01 10*3/MM3 (ref 0–0.2)
BASOPHILS NFR BLD AUTO: 0.2 % (ref 0–1.5)
BUN BLD-MCNC: 4 MG/DL (ref 8–23)
BUN/CREAT SERPL: 8.7 (ref 7–25)
CALCIUM SPEC-SCNC: 8.1 MG/DL (ref 8.6–10.5)
CHLORIDE SERPL-SCNC: 98 MMOL/L (ref 98–107)
CO2 SERPL-SCNC: 21 MMOL/L (ref 22–29)
CREAT BLD-MCNC: 0.46 MG/DL (ref 0.57–1)
DEPRECATED RDW RBC AUTO: 46 FL (ref 37–54)
EOSINOPHIL # BLD AUTO: 0.03 10*3/MM3 (ref 0–0.4)
EOSINOPHIL NFR BLD AUTO: 0.6 % (ref 0.3–6.2)
ERYTHROCYTE [DISTWIDTH] IN BLOOD BY AUTOMATED COUNT: 14.2 % (ref 12.3–15.4)
GFR SERPL CREATININE-BSD FRML MDRD: 134 ML/MIN/1.73
GLUCOSE BLD-MCNC: 95 MG/DL (ref 65–99)
HCT VFR BLD AUTO: 31.2 % (ref 34–46.6)
HGB BLD-MCNC: 10.2 G/DL (ref 12–15.9)
IMM GRANULOCYTES # BLD AUTO: 0 10*3/MM3 (ref 0–0.05)
IMM GRANULOCYTES NFR BLD AUTO: 0 % (ref 0–0.5)
LYMPHOCYTES # BLD AUTO: 1.29 10*3/MM3 (ref 0.7–3.1)
LYMPHOCYTES NFR BLD AUTO: 26.4 % (ref 19.6–45.3)
MAGNESIUM SERPL-MCNC: 1.6 MG/DL (ref 1.6–2.4)
MCH RBC QN AUTO: 29.8 PG (ref 26.6–33)
MCHC RBC AUTO-ENTMCNC: 32.7 G/DL (ref 31.5–35.7)
MCV RBC AUTO: 91.2 FL (ref 79–97)
MONOCYTES # BLD AUTO: 0.6 10*3/MM3 (ref 0.1–0.9)
MONOCYTES NFR BLD AUTO: 12.3 % (ref 5–12)
NEUTROPHILS # BLD AUTO: 2.96 10*3/MM3 (ref 1.7–7)
NEUTROPHILS NFR BLD AUTO: 60.5 % (ref 42.7–76)
OSMOLALITY SERPL: 279 MOSM/KG (ref 280–301)
PHOSPHATE SERPL-MCNC: 3.1 MG/DL (ref 2.5–4.5)
PLATELET # BLD AUTO: 220 10*3/MM3 (ref 140–450)
PMV BLD AUTO: 8.6 FL (ref 6–12)
POTASSIUM BLD-SCNC: 3.4 MMOL/L (ref 3.5–5.2)
RBC # BLD AUTO: 3.42 10*6/MM3 (ref 3.77–5.28)
SODIUM BLD-SCNC: 132 MMOL/L (ref 136–145)
WBC NRBC COR # BLD: 4.89 10*3/MM3 (ref 3.4–10.8)

## 2019-05-24 PROCEDURE — 73060 X-RAY EXAM OF HUMERUS: CPT | Performed by: RADIOLOGY

## 2019-05-24 PROCEDURE — C1713 ANCHOR/SCREW BN/BN,TIS/BN: HCPCS | Performed by: ORTHOPAEDIC SURGERY

## 2019-05-24 PROCEDURE — 24515 OPTX HUMRL SHFT FX PLATE/SCR: CPT | Performed by: ORTHOPAEDIC SURGERY

## 2019-05-24 PROCEDURE — 25010000003 CEFAZOLIN PER 500 MG: Performed by: ORTHOPAEDIC SURGERY

## 2019-05-24 PROCEDURE — 25010000002 ONDANSETRON PER 1 MG: Performed by: NURSE ANESTHETIST, CERTIFIED REGISTERED

## 2019-05-24 PROCEDURE — 94799 UNLISTED PULMONARY SVC/PX: CPT

## 2019-05-24 PROCEDURE — 73060 X-RAY EXAM OF HUMERUS: CPT

## 2019-05-24 PROCEDURE — 83735 ASSAY OF MAGNESIUM: CPT | Performed by: INTERNAL MEDICINE

## 2019-05-24 PROCEDURE — 84100 ASSAY OF PHOSPHORUS: CPT | Performed by: INTERNAL MEDICINE

## 2019-05-24 PROCEDURE — 0PSF04Z REPOSITION RIGHT HUMERAL SHAFT WITH INTERNAL FIXATION DEVICE, OPEN APPROACH: ICD-10-PCS | Performed by: ORTHOPAEDIC SURGERY

## 2019-05-24 PROCEDURE — 25010000002 PROPOFOL 10 MG/ML EMULSION: Performed by: NURSE ANESTHETIST, CERTIFIED REGISTERED

## 2019-05-24 PROCEDURE — 99233 SBSQ HOSP IP/OBS HIGH 50: CPT | Performed by: INTERNAL MEDICINE

## 2019-05-24 PROCEDURE — 25010000002 FENTANYL CITRATE (PF) 100 MCG/2ML SOLUTION: Performed by: NURSE ANESTHETIST, CERTIFIED REGISTERED

## 2019-05-24 PROCEDURE — 85025 COMPLETE CBC W/AUTO DIFF WBC: CPT | Performed by: ORTHOPAEDIC SURGERY

## 2019-05-24 PROCEDURE — 94640 AIRWAY INHALATION TREATMENT: CPT

## 2019-05-24 PROCEDURE — 76000 FLUOROSCOPY <1 HR PHYS/QHP: CPT

## 2019-05-24 PROCEDURE — 80048 BASIC METABOLIC PNL TOTAL CA: CPT | Performed by: ORTHOPAEDIC SURGERY

## 2019-05-24 PROCEDURE — 99222 1ST HOSP IP/OBS MODERATE 55: CPT | Performed by: PHYSICIAN ASSISTANT

## 2019-05-24 PROCEDURE — 25010000002 PHENYLEPHRINE PER 1 ML: Performed by: NURSE ANESTHETIST, CERTIFIED REGISTERED

## 2019-05-24 DEVICE — SCRW LK S/TAP STRDRV 3.5X30MM: Type: IMPLANTABLE DEVICE | Site: HUMERUS | Status: FUNCTIONAL

## 2019-05-24 DEVICE — PLT LCP 10HL 3.5X137MM: Type: IMPLANTABLE DEVICE | Site: HUMERUS | Status: FUNCTIONAL

## 2019-05-24 DEVICE — SCRW CORT S/TAP 3.5X24MM: Type: IMPLANTABLE DEVICE | Site: HUMERUS | Status: FUNCTIONAL

## 2019-05-24 DEVICE — SCRW CORT S/TAP 3.5X30MM: Type: IMPLANTABLE DEVICE | Site: HUMERUS | Status: FUNCTIONAL

## 2019-05-24 DEVICE — DBM T44145 5CC ORTHOBLEND SMALL DEFGRAFT
Type: IMPLANTABLE DEVICE | Site: HUMERUS | Status: FUNCTIONAL
Brand: GRAFTON®AND GRAFTON PLUS®DEMINERALIZED BONE MATRIX (DBM)

## 2019-05-24 DEVICE — SCRW LK S/TAP STRDRV 3.5X28MM: Type: IMPLANTABLE DEVICE | Site: HUMERUS | Status: FUNCTIONAL

## 2019-05-24 DEVICE — WAX BONE HEMO NAT 2.5G: Type: IMPLANTABLE DEVICE | Site: HUMERUS | Status: FUNCTIONAL

## 2019-05-24 DEVICE — SCRW LK S/TAP STRDRV 3.5X26MM: Type: IMPLANTABLE DEVICE | Site: HUMERUS | Status: FUNCTIONAL

## 2019-05-24 DEVICE — SCRW CORT S/TAP 3.5X28MM: Type: IMPLANTABLE DEVICE | Site: HUMERUS | Status: FUNCTIONAL

## 2019-05-24 RX ORDER — SODIUM CHLORIDE 0.9 % (FLUSH) 0.9 %
3 SYRINGE (ML) INJECTION EVERY 12 HOURS SCHEDULED
Status: DISCONTINUED | OUTPATIENT
Start: 2019-05-24 | End: 2019-05-24 | Stop reason: HOSPADM

## 2019-05-24 RX ORDER — ROCURONIUM BROMIDE 10 MG/ML
INJECTION, SOLUTION INTRAVENOUS AS NEEDED
Status: DISCONTINUED | OUTPATIENT
Start: 2019-05-24 | End: 2019-05-24 | Stop reason: SURG

## 2019-05-24 RX ORDER — MAGNESIUM SULFATE HEPTAHYDRATE 40 MG/ML
2 INJECTION, SOLUTION INTRAVENOUS AS NEEDED
Status: DISCONTINUED | OUTPATIENT
Start: 2019-05-24 | End: 2019-05-26 | Stop reason: HOSPADM

## 2019-05-24 RX ORDER — SODIUM CHLORIDE 0.9 % (FLUSH) 0.9 %
3-10 SYRINGE (ML) INJECTION AS NEEDED
Status: DISCONTINUED | OUTPATIENT
Start: 2019-05-24 | End: 2019-05-24 | Stop reason: HOSPADM

## 2019-05-24 RX ORDER — POTASSIUM CHLORIDE 20 MEQ/1
40 TABLET, EXTENDED RELEASE ORAL EVERY 4 HOURS
Status: DISCONTINUED | OUTPATIENT
Start: 2019-05-24 | End: 2019-05-24 | Stop reason: SDUPTHER

## 2019-05-24 RX ORDER — SODIUM CHLORIDE AND POTASSIUM CHLORIDE 150; 900 MG/100ML; MG/100ML
100 INJECTION, SOLUTION INTRAVENOUS CONTINUOUS
Status: DISCONTINUED | OUTPATIENT
Start: 2019-05-24 | End: 2019-05-24

## 2019-05-24 RX ORDER — ONDANSETRON 2 MG/ML
INJECTION INTRAMUSCULAR; INTRAVENOUS AS NEEDED
Status: DISCONTINUED | OUTPATIENT
Start: 2019-05-24 | End: 2019-05-24 | Stop reason: SURG

## 2019-05-24 RX ORDER — CEFAZOLIN SODIUM 2 G/50ML
2 SOLUTION INTRAVENOUS ONCE
Status: DISCONTINUED | OUTPATIENT
Start: 2019-05-24 | End: 2019-05-24 | Stop reason: HOSPADM

## 2019-05-24 RX ORDER — PROPOFOL 10 MG/ML
VIAL (ML) INTRAVENOUS AS NEEDED
Status: DISCONTINUED | OUTPATIENT
Start: 2019-05-24 | End: 2019-05-24 | Stop reason: SURG

## 2019-05-24 RX ORDER — POTASSIUM CHLORIDE 20 MEQ/1
40 TABLET, EXTENDED RELEASE ORAL AS NEEDED
Status: DISCONTINUED | OUTPATIENT
Start: 2019-05-24 | End: 2019-05-26 | Stop reason: HOSPADM

## 2019-05-24 RX ORDER — SODIUM CHLORIDE, SODIUM LACTATE, POTASSIUM CHLORIDE, CALCIUM CHLORIDE 600; 310; 30; 20 MG/100ML; MG/100ML; MG/100ML; MG/100ML
125 INJECTION, SOLUTION INTRAVENOUS CONTINUOUS
Status: DISCONTINUED | OUTPATIENT
Start: 2019-05-24 | End: 2019-05-24

## 2019-05-24 RX ORDER — LIDOCAINE HYDROCHLORIDE 20 MG/ML
INJECTION, SOLUTION INFILTRATION; PERINEURAL AS NEEDED
Status: DISCONTINUED | OUTPATIENT
Start: 2019-05-24 | End: 2019-05-24 | Stop reason: SURG

## 2019-05-24 RX ORDER — IPRATROPIUM BROMIDE AND ALBUTEROL SULFATE 2.5; .5 MG/3ML; MG/3ML
3 SOLUTION RESPIRATORY (INHALATION) ONCE AS NEEDED
Status: DISCONTINUED | OUTPATIENT
Start: 2019-05-24 | End: 2019-05-24 | Stop reason: HOSPADM

## 2019-05-24 RX ORDER — BUPIVACAINE HYDROCHLORIDE AND EPINEPHRINE 5; 5 MG/ML; UG/ML
INJECTION, SOLUTION PERINEURAL AS NEEDED
Status: DISCONTINUED | OUTPATIENT
Start: 2019-05-24 | End: 2019-05-24 | Stop reason: HOSPADM

## 2019-05-24 RX ORDER — IPRATROPIUM BROMIDE AND ALBUTEROL SULFATE 2.5; .5 MG/3ML; MG/3ML
3 SOLUTION RESPIRATORY (INHALATION) ONCE
Status: COMPLETED | OUTPATIENT
Start: 2019-05-24 | End: 2019-05-24

## 2019-05-24 RX ORDER — POTASSIUM CHLORIDE 7.45 MG/ML
10 INJECTION INTRAVENOUS
Status: DISCONTINUED | OUTPATIENT
Start: 2019-05-24 | End: 2019-05-26 | Stop reason: HOSPADM

## 2019-05-24 RX ORDER — FENTANYL CITRATE 50 UG/ML
50 INJECTION, SOLUTION INTRAMUSCULAR; INTRAVENOUS
Status: DISCONTINUED | OUTPATIENT
Start: 2019-05-24 | End: 2019-05-24 | Stop reason: HOSPADM

## 2019-05-24 RX ORDER — MAGNESIUM SULFATE HEPTAHYDRATE 40 MG/ML
4 INJECTION, SOLUTION INTRAVENOUS AS NEEDED
Status: DISCONTINUED | OUTPATIENT
Start: 2019-05-24 | End: 2019-05-26 | Stop reason: HOSPADM

## 2019-05-24 RX ORDER — POTASSIUM CHLORIDE 20 MEQ/1
40 TABLET, EXTENDED RELEASE ORAL EVERY 4 HOURS
Status: COMPLETED | OUTPATIENT
Start: 2019-05-24 | End: 2019-05-24

## 2019-05-24 RX ORDER — FAMOTIDINE 10 MG/ML
INJECTION, SOLUTION INTRAVENOUS AS NEEDED
Status: DISCONTINUED | OUTPATIENT
Start: 2019-05-24 | End: 2019-05-24 | Stop reason: SURG

## 2019-05-24 RX ORDER — FENTANYL CITRATE 50 UG/ML
INJECTION, SOLUTION INTRAMUSCULAR; INTRAVENOUS AS NEEDED
Status: DISCONTINUED | OUTPATIENT
Start: 2019-05-24 | End: 2019-05-24 | Stop reason: SURG

## 2019-05-24 RX ORDER — MAGNESIUM SULFATE HEPTAHYDRATE 40 MG/ML
4 INJECTION, SOLUTION INTRAVENOUS ONCE
Status: COMPLETED | OUTPATIENT
Start: 2019-05-24 | End: 2019-05-24

## 2019-05-24 RX ORDER — ONDANSETRON 2 MG/ML
4 INJECTION INTRAMUSCULAR; INTRAVENOUS ONCE AS NEEDED
Status: DISCONTINUED | OUTPATIENT
Start: 2019-05-24 | End: 2019-05-24 | Stop reason: HOSPADM

## 2019-05-24 RX ORDER — POTASSIUM CHLORIDE 1.5 G/1.77G
40 POWDER, FOR SOLUTION ORAL AS NEEDED
Status: DISCONTINUED | OUTPATIENT
Start: 2019-05-24 | End: 2019-05-26 | Stop reason: HOSPADM

## 2019-05-24 RX ADMIN — PHENYLEPHRINE HYDROCHLORIDE 100 MCG: 10 INJECTION, SOLUTION INTRAMUSCULAR; INTRAVENOUS; SUBCUTANEOUS at 12:56

## 2019-05-24 RX ADMIN — LEVETIRACETAM 1000 MG: 500 TABLET, FILM COATED ORAL at 21:02

## 2019-05-24 RX ADMIN — FENTANYL CITRATE 100 MCG: 50 INJECTION INTRAMUSCULAR; INTRAVENOUS at 12:18

## 2019-05-24 RX ADMIN — LIDOCAINE HYDROCHLORIDE 60 MG: 20 INJECTION, SOLUTION INFILTRATION; PERINEURAL at 12:18

## 2019-05-24 RX ADMIN — ATORVASTATIN CALCIUM 40 MG: 40 TABLET, FILM COATED ORAL at 21:02

## 2019-05-24 RX ADMIN — PHENOBARBITAL 32.4 MG: 32.4 TABLET ORAL at 21:02

## 2019-05-24 RX ADMIN — PHENYLEPHRINE HYDROCHLORIDE 100 MCG: 10 INJECTION, SOLUTION INTRAMUSCULAR; INTRAVENOUS; SUBCUTANEOUS at 13:05

## 2019-05-24 RX ADMIN — PHENYLEPHRINE HYDROCHLORIDE 100 MCG: 10 INJECTION, SOLUTION INTRAMUSCULAR; INTRAVENOUS; SUBCUTANEOUS at 12:52

## 2019-05-24 RX ADMIN — POTASSIUM CHLORIDE 40 MEQ: 1500 TABLET, EXTENDED RELEASE ORAL at 17:04

## 2019-05-24 RX ADMIN — PHENYLEPHRINE HYDROCHLORIDE 100 MCG: 10 INJECTION, SOLUTION INTRAMUSCULAR; INTRAVENOUS; SUBCUTANEOUS at 13:40

## 2019-05-24 RX ADMIN — CLONAZEPAM 0.5 MG: 0.5 TABLET ORAL at 09:20

## 2019-05-24 RX ADMIN — PHENOBARBITAL 32.4 MG: 32.4 TABLET ORAL at 05:17

## 2019-05-24 RX ADMIN — PHENYLEPHRINE HYDROCHLORIDE 100 MCG: 10 INJECTION, SOLUTION INTRAMUSCULAR; INTRAVENOUS; SUBCUTANEOUS at 13:58

## 2019-05-24 RX ADMIN — PHENYLEPHRINE HYDROCHLORIDE 100 MCG: 10 INJECTION, SOLUTION INTRAMUSCULAR; INTRAVENOUS; SUBCUTANEOUS at 13:34

## 2019-05-24 RX ADMIN — PHENYLEPHRINE HYDROCHLORIDE 100 MCG: 10 INJECTION, SOLUTION INTRAMUSCULAR; INTRAVENOUS; SUBCUTANEOUS at 12:50

## 2019-05-24 RX ADMIN — PHENYLEPHRINE HYDROCHLORIDE 100 MCG: 10 INJECTION, SOLUTION INTRAMUSCULAR; INTRAVENOUS; SUBCUTANEOUS at 13:37

## 2019-05-24 RX ADMIN — POTASSIUM CHLORIDE 40 MEQ: 1500 TABLET, EXTENDED RELEASE ORAL at 21:01

## 2019-05-24 RX ADMIN — LEVETIRACETAM 1000 MG: 500 TABLET, FILM COATED ORAL at 09:20

## 2019-05-24 RX ADMIN — ONDANSETRON 4 MG: 2 INJECTION, SOLUTION INTRAMUSCULAR; INTRAVENOUS at 12:57

## 2019-05-24 RX ADMIN — MAGNESIUM SULFATE HEPTAHYDRATE 4 G: 40 INJECTION, SOLUTION INTRAVENOUS at 09:21

## 2019-05-24 RX ADMIN — PHENYLEPHRINE HYDROCHLORIDE 100 MCG: 10 INJECTION, SOLUTION INTRAMUSCULAR; INTRAVENOUS; SUBCUTANEOUS at 12:51

## 2019-05-24 RX ADMIN — PHENYLEPHRINE HYDROCHLORIDE 100 MCG: 10 INJECTION, SOLUTION INTRAMUSCULAR; INTRAVENOUS; SUBCUTANEOUS at 14:01

## 2019-05-24 RX ADMIN — PHENYLEPHRINE HYDROCHLORIDE 100 MCG: 10 INJECTION, SOLUTION INTRAMUSCULAR; INTRAVENOUS; SUBCUTANEOUS at 13:44

## 2019-05-24 RX ADMIN — IPRATROPIUM BROMIDE AND ALBUTEROL SULFATE 3 ML: .5; 3 SOLUTION RESPIRATORY (INHALATION) at 11:36

## 2019-05-24 RX ADMIN — FENTANYL CITRATE 50 MCG: 50 INJECTION INTRAMUSCULAR; INTRAVENOUS at 13:01

## 2019-05-24 RX ADMIN — SODIUM CHLORIDE 75 ML/HR: 9 INJECTION, SOLUTION INTRAVENOUS at 05:17

## 2019-05-24 RX ADMIN — SODIUM CHLORIDE, POTASSIUM CHLORIDE, SODIUM LACTATE AND CALCIUM CHLORIDE: 600; 310; 30; 20 INJECTION, SOLUTION INTRAVENOUS at 12:17

## 2019-05-24 RX ADMIN — ROCURONIUM BROMIDE 5 MG: 10 INJECTION INTRAVENOUS at 13:01

## 2019-05-24 RX ADMIN — FAMOTIDINE 20 MG: 10 INJECTION, SOLUTION INTRAVENOUS at 12:18

## 2019-05-24 RX ADMIN — PHENYLEPHRINE HYDROCHLORIDE 100 MCG: 10 INJECTION, SOLUTION INTRAMUSCULAR; INTRAVENOUS; SUBCUTANEOUS at 13:08

## 2019-05-24 RX ADMIN — FENTANYL CITRATE 50 MCG: 50 INJECTION INTRAMUSCULAR; INTRAVENOUS at 13:33

## 2019-05-24 RX ADMIN — PHENYLEPHRINE HYDROCHLORIDE 100 MCG: 10 INJECTION, SOLUTION INTRAMUSCULAR; INTRAVENOUS; SUBCUTANEOUS at 13:11

## 2019-05-24 RX ADMIN — ROCURONIUM BROMIDE 20 MG: 10 INJECTION INTRAVENOUS at 12:28

## 2019-05-24 RX ADMIN — CEFAZOLIN 2 G: 1 INJECTION, POWDER, FOR SOLUTION INTRAMUSCULAR; INTRAVENOUS; PARENTERAL at 21:01

## 2019-05-24 RX ADMIN — PROPOFOL 100 MG: 10 INJECTION, EMULSION INTRAVENOUS at 12:28

## 2019-05-24 RX ADMIN — CLONAZEPAM 0.5 MG: 0.5 TABLET ORAL at 21:01

## 2019-05-24 NOTE — ANESTHESIA POSTPROCEDURE EVALUATION
Patient: Maribel Staton    Procedure Summary     Date:  05/24/19 Room / Location:  [facility] OR 03 /  [facility] OR    Anesthesia Start:  1215 Anesthesia Stop:  1421    Procedure:  HUMERUS MID-SHAFT FRACTURE OPEN REDUCTION INTERNAL FIXATION (Right Arm Upper) Diagnosis:       Closed displaced transverse fracture of shaft of right humerus, initial encounter      (Closed displaced transverse fracture of shaft of right humerus, initial encounter [S42.321A])    Surgeon:  Elliot Costello MD Provider:  Arvind Antonio MD    Anesthesia Type:  general ASA Status:  3          Anesthesia Type: general  Last vitals  BP   159/66 (05/24/19 1452)   Temp   98 °F (36.7 °C) (05/24/19 1422)   Pulse   99 (05/24/19 1452)   Resp   20 (05/24/19 1452)     SpO2   99 % (05/24/19 1452)     Post Anesthesia Care and Evaluation    Patient location during evaluation: PACU  Patient participation: complete - patient participated  Level of consciousness: awake and alert  Pain score: 1  Pain management: adequate  Airway patency: patent  Anesthetic complications: No anesthetic complications  PONV Status: controlled  Cardiovascular status: acceptable  Respiratory status: acceptable  Hydration status: acceptable

## 2019-05-24 NOTE — ANESTHESIA PREPROCEDURE EVALUATION
Anesthesia Evaluation     Patient summary reviewed and Nursing notes reviewed   no history of anesthetic complications:  NPO Solid Status: > 8 hours  NPO Liquid Status: > 8 hours           Airway   Mallampati: II  TM distance: >3 FB  Neck ROM: limited  Possible difficult intubation  Dental - normal exam   (+) edentulous    Pulmonary - normal exam   (+) a smoker Current Abstained day of surgery, COPD, asthma, sleep apnea, decreased breath sounds,   Cardiovascular - normal exam  Exercise tolerance: good (4-7 METS)    NYHA Classification: II    (+) hypertension, hyperlipidemia,   (-) past MI, dysrhythmias, angina, CHF      Neuro/Psych  (+) seizures (last sz 2 days ago), headaches, psychiatric history Anxiety,     GI/Hepatic/Renal/Endo    (+)  GERD,    (-) liver disease, no renal disease, diabetes, hypothyroidism    Musculoskeletal     (+) back pain, chronic pain,   Abdominal  - normal exam    Bowel sounds: normal.   Substance History - negative use     OB/GYN negative ob/gyn ROS         Other   (+) arthritis                       Anesthesia Plan    ASA 3     general     intravenous induction   Anesthetic plan, all risks, benefits, and alternatives have been provided, discussed and informed consent has been obtained with: patient and spouse/significant other.  Use of blood products discussed with patient and spouse/significant other  Consented to blood products.

## 2019-05-24 NOTE — ANESTHESIA PROCEDURE NOTES
Airway  Urgency: elective    Date/Time: 5/24/2019 12:28 PM  Airway not difficult    General Information and Staff    Patient location during procedure: OR  Anesthesiologist: Arvind Antonio MD  CRNA: Alyssa Jones CRNA    Indications and Patient Condition  Indications for airway management: airway protection    Preoxygenated: yes  MILS maintained throughout  Mask difficulty assessment: 2 - vent by mask + OA or adjuvant +/- NMBA    Final Airway Details  Final airway type: endotracheal airway      Successful airway: ETT  Cuffed: yes   Successful intubation technique: direct laryngoscopy  Facilitating devices/methods: intubating stylet  Endotracheal tube insertion site: oral  Blade: Rosa  Blade size: 3  ETT size (mm): 7.0  Cormack-Lehane Classification: grade IIa - partial view of glottis  Placement verified by: chest auscultation, capnometry and palpation of cuff   Cuff volume (mL): 6  Measured from: lips  ETT to lips (cm): 22  Number of attempts at approach: 1    Additional Comments  Dentition as preop. No complications noted. Patient tolerated well.  ETT secured.

## 2019-05-25 LAB
ALBUMIN SERPL-MCNC: 3.32 G/DL (ref 3.5–5.2)
ALBUMIN/GLOB SERPL: 1 G/DL
ALP SERPL-CCNC: 76 U/L (ref 39–117)
ALT SERPL W P-5'-P-CCNC: 8 U/L (ref 1–33)
ANION GAP SERPL CALCULATED.3IONS-SCNC: 11.4 MMOL/L
AST SERPL-CCNC: 19 U/L (ref 1–32)
BACTERIA UR QL AUTO: ABNORMAL /HPF
BASOPHILS # BLD AUTO: 0.01 10*3/MM3 (ref 0–0.2)
BASOPHILS NFR BLD AUTO: 0.1 % (ref 0–1.5)
BILIRUB SERPL-MCNC: 0.2 MG/DL (ref 0.2–1.2)
BILIRUB UR QL STRIP: NEGATIVE
BUN BLD-MCNC: 3 MG/DL (ref 8–23)
BUN/CREAT SERPL: 6.3 (ref 7–25)
CALCIUM SPEC-SCNC: 7.8 MG/DL (ref 8.6–10.5)
CHLORIDE SERPL-SCNC: 95 MMOL/L (ref 98–107)
CLARITY UR: CLEAR
CO2 SERPL-SCNC: 20.6 MMOL/L (ref 22–29)
COLOR UR: YELLOW
CREAT BLD-MCNC: 0.48 MG/DL (ref 0.57–1)
DEPRECATED RDW RBC AUTO: 44.5 FL (ref 37–54)
EOSINOPHIL # BLD AUTO: 0.01 10*3/MM3 (ref 0–0.4)
EOSINOPHIL NFR BLD AUTO: 0.1 % (ref 0.3–6.2)
ERYTHROCYTE [DISTWIDTH] IN BLOOD BY AUTOMATED COUNT: 13.9 % (ref 12.3–15.4)
GFR SERPL CREATININE-BSD FRML MDRD: 127 ML/MIN/1.73
GLOBULIN UR ELPH-MCNC: 3.5 GM/DL
GLUCOSE BLD-MCNC: 129 MG/DL (ref 65–99)
GLUCOSE UR STRIP-MCNC: NEGATIVE MG/DL
HCT VFR BLD AUTO: 29.1 % (ref 34–46.6)
HGB BLD-MCNC: 9.6 G/DL (ref 12–15.9)
HGB UR QL STRIP.AUTO: ABNORMAL
HYALINE CASTS UR QL AUTO: ABNORMAL /LPF
IMM GRANULOCYTES # BLD AUTO: 0.01 10*3/MM3 (ref 0–0.05)
IMM GRANULOCYTES NFR BLD AUTO: 0.1 % (ref 0–0.5)
KETONES UR QL STRIP: NEGATIVE
LEUKOCYTE ESTERASE UR QL STRIP.AUTO: NEGATIVE
LYMPHOCYTES # BLD AUTO: 1.17 10*3/MM3 (ref 0.7–3.1)
LYMPHOCYTES NFR BLD AUTO: 16.1 % (ref 19.6–45.3)
MAGNESIUM SERPL-MCNC: 2.1 MG/DL (ref 1.6–2.4)
MCH RBC QN AUTO: 29.7 PG (ref 26.6–33)
MCHC RBC AUTO-ENTMCNC: 33 G/DL (ref 31.5–35.7)
MCV RBC AUTO: 90.1 FL (ref 79–97)
MONOCYTES # BLD AUTO: 0.68 10*3/MM3 (ref 0.1–0.9)
MONOCYTES NFR BLD AUTO: 9.4 % (ref 5–12)
NEUTROPHILS # BLD AUTO: 5.37 10*3/MM3 (ref 1.7–7)
NEUTROPHILS NFR BLD AUTO: 74.2 % (ref 42.7–76)
NITRITE UR QL STRIP: NEGATIVE
OSMOLALITY UR: 383 MOSM/KG
PH UR STRIP.AUTO: 7 [PH] (ref 5–8)
PHOSPHATE SERPL-MCNC: 1.7 MG/DL (ref 2.5–4.5)
PLATELET # BLD AUTO: 237 10*3/MM3 (ref 140–450)
PMV BLD AUTO: 8.7 FL (ref 6–12)
POTASSIUM BLD-SCNC: 4.6 MMOL/L (ref 3.5–5.2)
PROT SERPL-MCNC: 6.8 G/DL (ref 6–8.5)
PROT UR QL STRIP: NEGATIVE
RBC # BLD AUTO: 3.23 10*6/MM3 (ref 3.77–5.28)
RBC # UR: ABNORMAL /HPF
REF LAB TEST METHOD: ABNORMAL
SODIUM BLD-SCNC: 127 MMOL/L (ref 136–145)
SODIUM UR-SCNC: 73 MMOL/L
SP GR UR STRIP: 1.01 (ref 1–1.03)
SQUAMOUS #/AREA URNS HPF: ABNORMAL /HPF
UROBILINOGEN UR QL STRIP: ABNORMAL
WBC NRBC COR # BLD: 7.25 10*3/MM3 (ref 3.4–10.8)
WBC UR QL AUTO: ABNORMAL /HPF

## 2019-05-25 PROCEDURE — 25010000002 ENOXAPARIN PER 10 MG: Performed by: ORTHOPAEDIC SURGERY

## 2019-05-25 PROCEDURE — 80053 COMPREHEN METABOLIC PANEL: CPT | Performed by: INTERNAL MEDICINE

## 2019-05-25 PROCEDURE — 84300 ASSAY OF URINE SODIUM: CPT | Performed by: INTERNAL MEDICINE

## 2019-05-25 PROCEDURE — 93005 ELECTROCARDIOGRAM TRACING: CPT | Performed by: INTERNAL MEDICINE

## 2019-05-25 PROCEDURE — 84100 ASSAY OF PHOSPHORUS: CPT | Performed by: INTERNAL MEDICINE

## 2019-05-25 PROCEDURE — 99024 POSTOP FOLLOW-UP VISIT: CPT | Performed by: PHYSICIAN ASSISTANT

## 2019-05-25 PROCEDURE — 94799 UNLISTED PULMONARY SVC/PX: CPT

## 2019-05-25 PROCEDURE — 81001 URINALYSIS AUTO W/SCOPE: CPT | Performed by: INTERNAL MEDICINE

## 2019-05-25 PROCEDURE — 85025 COMPLETE CBC W/AUTO DIFF WBC: CPT | Performed by: INTERNAL MEDICINE

## 2019-05-25 PROCEDURE — 83735 ASSAY OF MAGNESIUM: CPT | Performed by: INTERNAL MEDICINE

## 2019-05-25 PROCEDURE — 83935 ASSAY OF URINE OSMOLALITY: CPT | Performed by: INTERNAL MEDICINE

## 2019-05-25 PROCEDURE — 25010000003 CEFAZOLIN PER 500 MG: Performed by: ORTHOPAEDIC SURGERY

## 2019-05-25 PROCEDURE — 99233 SBSQ HOSP IP/OBS HIGH 50: CPT | Performed by: INTERNAL MEDICINE

## 2019-05-25 RX ORDER — METOPROLOL TARTRATE 5 MG/5ML
5 INJECTION INTRAVENOUS ONCE
Status: COMPLETED | OUTPATIENT
Start: 2019-05-25 | End: 2019-05-25

## 2019-05-25 RX ADMIN — CLONAZEPAM 0.5 MG: 0.5 TABLET ORAL at 09:08

## 2019-05-25 RX ADMIN — METOPROLOL TARTRATE 5 MG: 5 INJECTION, SOLUTION INTRAVENOUS at 17:20

## 2019-05-25 RX ADMIN — LEVETIRACETAM 1000 MG: 500 TABLET, FILM COATED ORAL at 09:08

## 2019-05-25 RX ADMIN — PHENOBARBITAL 32.4 MG: 32.4 TABLET ORAL at 05:35

## 2019-05-25 RX ADMIN — CEFAZOLIN 2 G: 1 INJECTION, POWDER, FOR SOLUTION INTRAMUSCULAR; INTRAVENOUS; PARENTERAL at 11:49

## 2019-05-25 RX ADMIN — FOLIC ACID 1 MG: 1 TABLET ORAL at 09:08

## 2019-05-25 RX ADMIN — PANTOPRAZOLE SODIUM 40 MG: 40 TABLET, DELAYED RELEASE ORAL at 09:08

## 2019-05-25 RX ADMIN — PHENOBARBITAL 32.4 MG: 32.4 TABLET ORAL at 21:26

## 2019-05-25 RX ADMIN — ENOXAPARIN SODIUM 40 MG: 40 INJECTION SUBCUTANEOUS at 09:08

## 2019-05-25 RX ADMIN — ATORVASTATIN CALCIUM 40 MG: 40 TABLET, FILM COATED ORAL at 21:26

## 2019-05-25 RX ADMIN — PHENOBARBITAL 32.4 MG: 32.4 TABLET ORAL at 13:40

## 2019-05-25 RX ADMIN — CLONAZEPAM 0.5 MG: 0.5 TABLET ORAL at 21:26

## 2019-05-25 RX ADMIN — SENNOSIDES 1 TABLET: 8.6 TABLET, FILM COATED ORAL at 09:08

## 2019-05-25 RX ADMIN — CEFAZOLIN 2 G: 1 INJECTION, POWDER, FOR SOLUTION INTRAMUSCULAR; INTRAVENOUS; PARENTERAL at 04:05

## 2019-05-25 RX ADMIN — LEVETIRACETAM 1000 MG: 500 TABLET, FILM COATED ORAL at 21:26

## 2019-05-25 RX ADMIN — HYDROCODONE BITARTRATE AND ACETAMINOPHEN 1 TABLET: 5; 325 TABLET ORAL at 16:06

## 2019-05-25 RX ADMIN — POTASSIUM & SODIUM PHOSPHATES POWDER PACK 280-160-250 MG 2 PACKET: 280-160-250 PACK at 09:10

## 2019-05-26 VITALS
RESPIRATION RATE: 18 BRPM | TEMPERATURE: 97.7 F | WEIGHT: 106.25 LBS | OXYGEN SATURATION: 100 % | HEIGHT: 59 IN | DIASTOLIC BLOOD PRESSURE: 76 MMHG | HEART RATE: 102 BPM | BODY MASS INDEX: 21.42 KG/M2 | SYSTOLIC BLOOD PRESSURE: 118 MMHG

## 2019-05-26 PROBLEM — S42.321A CLOSED DISPLACED TRANSVERSE FRACTURE OF SHAFT OF RIGHT HUMERUS: Status: RESOLVED | Noted: 2019-05-23 | Resolved: 2019-05-26

## 2019-05-26 PROBLEM — Z98.890 S/P ORIF (OPEN REDUCTION INTERNAL FIXATION) FRACTURE: Status: RESOLVED | Noted: 2019-05-09 | Resolved: 2019-05-26

## 2019-05-26 PROBLEM — Z87.81 S/P ORIF (OPEN REDUCTION INTERNAL FIXATION) FRACTURE: Status: RESOLVED | Noted: 2019-05-09 | Resolved: 2019-05-26

## 2019-05-26 PROBLEM — R29.6 FREQUENT FALLS: Chronic | Status: ACTIVE | Noted: 2018-06-04

## 2019-05-26 PROBLEM — S72.001A CLOSED RIGHT HIP FRACTURE: Status: RESOLVED | Noted: 2017-06-06 | Resolved: 2019-05-26

## 2019-05-26 LAB
ALBUMIN SERPL-MCNC: 2.8 G/DL (ref 3.5–5.2)
ALBUMIN/GLOB SERPL: 0.8 G/DL
ALP SERPL-CCNC: 62 U/L (ref 39–117)
ALT SERPL W P-5'-P-CCNC: 6 U/L (ref 1–33)
ANION GAP SERPL CALCULATED.3IONS-SCNC: 11.5 MMOL/L
AST SERPL-CCNC: 18 U/L (ref 1–32)
BASOPHILS # BLD AUTO: 0.01 10*3/MM3 (ref 0–0.2)
BASOPHILS NFR BLD AUTO: 0.2 % (ref 0–1.5)
BILIRUB SERPL-MCNC: 0.2 MG/DL (ref 0.2–1.2)
BUN BLD-MCNC: 4 MG/DL (ref 8–23)
BUN/CREAT SERPL: 10 (ref 7–25)
CALCIUM SPEC-SCNC: 7.7 MG/DL (ref 8.6–10.5)
CHLORIDE SERPL-SCNC: 98 MMOL/L (ref 98–107)
CO2 SERPL-SCNC: 21.5 MMOL/L (ref 22–29)
CREAT BLD-MCNC: 0.4 MG/DL (ref 0.57–1)
DEPRECATED RDW RBC AUTO: 45.5 FL (ref 37–54)
EOSINOPHIL # BLD AUTO: 0.13 10*3/MM3 (ref 0–0.4)
EOSINOPHIL NFR BLD AUTO: 2.3 % (ref 0.3–6.2)
ERYTHROCYTE [DISTWIDTH] IN BLOOD BY AUTOMATED COUNT: 14.2 % (ref 12.3–15.4)
GFR SERPL CREATININE-BSD FRML MDRD: >150 ML/MIN/1.73
GLOBULIN UR ELPH-MCNC: 3.5 GM/DL
GLUCOSE BLD-MCNC: 111 MG/DL (ref 65–99)
HCT VFR BLD AUTO: 26 % (ref 34–46.6)
HGB BLD-MCNC: 8.3 G/DL (ref 12–15.9)
IMM GRANULOCYTES # BLD AUTO: 0.01 10*3/MM3 (ref 0–0.05)
IMM GRANULOCYTES NFR BLD AUTO: 0.2 % (ref 0–0.5)
LYMPHOCYTES # BLD AUTO: 1.11 10*3/MM3 (ref 0.7–3.1)
LYMPHOCYTES NFR BLD AUTO: 19.4 % (ref 19.6–45.3)
MAGNESIUM SERPL-MCNC: 2 MG/DL (ref 1.6–2.4)
MCH RBC QN AUTO: 29 PG (ref 26.6–33)
MCHC RBC AUTO-ENTMCNC: 31.9 G/DL (ref 31.5–35.7)
MCV RBC AUTO: 90.9 FL (ref 79–97)
MONOCYTES # BLD AUTO: 0.65 10*3/MM3 (ref 0.1–0.9)
MONOCYTES NFR BLD AUTO: 11.4 % (ref 5–12)
NEUTROPHILS # BLD AUTO: 3.8 10*3/MM3 (ref 1.7–7)
NEUTROPHILS NFR BLD AUTO: 66.5 % (ref 42.7–76)
PHOSPHATE SERPL-MCNC: 1.9 MG/DL (ref 2.5–4.5)
PLATELET # BLD AUTO: 221 10*3/MM3 (ref 140–450)
PMV BLD AUTO: 8.5 FL (ref 6–12)
POTASSIUM BLD-SCNC: 3.3 MMOL/L (ref 3.5–5.2)
PROT SERPL-MCNC: 6.3 G/DL (ref 6–8.5)
RBC # BLD AUTO: 2.86 10*6/MM3 (ref 3.77–5.28)
SODIUM BLD-SCNC: 131 MMOL/L (ref 136–145)
WBC NRBC COR # BLD: 5.71 10*3/MM3 (ref 3.4–10.8)

## 2019-05-26 PROCEDURE — 85025 COMPLETE CBC W/AUTO DIFF WBC: CPT | Performed by: INTERNAL MEDICINE

## 2019-05-26 PROCEDURE — 84100 ASSAY OF PHOSPHORUS: CPT | Performed by: INTERNAL MEDICINE

## 2019-05-26 PROCEDURE — 25010000002 ENOXAPARIN PER 10 MG: Performed by: ORTHOPAEDIC SURGERY

## 2019-05-26 PROCEDURE — 99024 POSTOP FOLLOW-UP VISIT: CPT | Performed by: PHYSICIAN ASSISTANT

## 2019-05-26 PROCEDURE — 83735 ASSAY OF MAGNESIUM: CPT | Performed by: INTERNAL MEDICINE

## 2019-05-26 PROCEDURE — 80053 COMPREHEN METABOLIC PANEL: CPT | Performed by: INTERNAL MEDICINE

## 2019-05-26 PROCEDURE — 99232 SBSQ HOSP IP/OBS MODERATE 35: CPT | Performed by: INTERNAL MEDICINE

## 2019-05-26 RX ORDER — LEVETIRACETAM 1000 MG/1
1000 TABLET ORAL EVERY 12 HOURS SCHEDULED
Qty: 120 TABLET | Refills: 0 | Status: SHIPPED | OUTPATIENT
Start: 2019-05-26 | End: 2019-07-25

## 2019-05-26 RX ORDER — POTASSIUM CHLORIDE 20 MEQ/1
40 TABLET, EXTENDED RELEASE ORAL EVERY 4 HOURS
Status: COMPLETED | OUTPATIENT
Start: 2019-05-26 | End: 2019-05-26

## 2019-05-26 RX ORDER — HYDROCODONE BITARTRATE AND ACETAMINOPHEN 5; 325 MG/1; MG/1
1 TABLET ORAL EVERY 4 HOURS PRN
Qty: 32 TABLET | Refills: 0 | Status: SHIPPED | OUTPATIENT
Start: 2019-05-26 | End: 2019-05-26

## 2019-05-26 RX ADMIN — LEVETIRACETAM 1000 MG: 500 TABLET, FILM COATED ORAL at 09:05

## 2019-05-26 RX ADMIN — PHENOBARBITAL 32.4 MG: 32.4 TABLET ORAL at 05:46

## 2019-05-26 RX ADMIN — PANTOPRAZOLE SODIUM 40 MG: 40 TABLET, DELAYED RELEASE ORAL at 09:05

## 2019-05-26 RX ADMIN — SODIUM CHLORIDE, PRESERVATIVE FREE 3 ML: 5 INJECTION INTRAVENOUS at 09:06

## 2019-05-26 RX ADMIN — POTASSIUM & SODIUM PHOSPHATES POWDER PACK 280-160-250 MG 2 PACKET: 280-160-250 PACK at 05:46

## 2019-05-26 RX ADMIN — ENOXAPARIN SODIUM 40 MG: 40 INJECTION SUBCUTANEOUS at 09:05

## 2019-05-26 RX ADMIN — CLONAZEPAM 0.5 MG: 0.5 TABLET ORAL at 09:05

## 2019-05-26 RX ADMIN — SENNOSIDES 1 TABLET: 8.6 TABLET, FILM COATED ORAL at 09:05

## 2019-05-26 RX ADMIN — POTASSIUM CHLORIDE 40 MEQ: 1500 TABLET, EXTENDED RELEASE ORAL at 12:08

## 2019-05-26 RX ADMIN — POTASSIUM CHLORIDE 40 MEQ: 1500 TABLET, EXTENDED RELEASE ORAL at 05:46

## 2019-05-26 RX ADMIN — FOLIC ACID 1 MG: 1 TABLET ORAL at 09:05

## 2019-05-27 ENCOUNTER — READMISSION MANAGEMENT (OUTPATIENT)
Dept: CALL CENTER | Facility: HOSPITAL | Age: 74
End: 2019-05-27

## 2019-05-28 ENCOUNTER — TELEPHONE (OUTPATIENT)
Dept: MEDSURG UNIT | Facility: HOSPITAL | Age: 74
End: 2019-05-28

## 2019-05-28 LAB — LEVETIRACETAM SERPL-MCNC: 24.1 UG/ML (ref 10–40)

## 2019-05-30 ENCOUNTER — READMISSION MANAGEMENT (OUTPATIENT)
Dept: CALL CENTER | Facility: HOSPITAL | Age: 74
End: 2019-05-30

## 2019-05-30 NOTE — OUTREACH NOTE
General Surgery Week 1 Survey      Responses   Facility patient discharged from?  [facility]   Does the patient have one of the following disease processes/diagnoses(primary or secondary)?  General Surgery   Is there a successful TCM telephone encounter documented?  No   Week 1 attempt successful?  No   Unsuccessful attempts  Attempt 1          Stefanie Francis RN

## 2019-06-03 ENCOUNTER — READMISSION MANAGEMENT (OUTPATIENT)
Dept: CALL CENTER | Facility: HOSPITAL | Age: 74
End: 2019-06-03

## 2019-06-03 NOTE — OUTREACH NOTE
General Surgery Week 1 Survey      Responses   Facility patient discharged from?  [facility]   Does the patient have one of the following disease processes/diagnoses(primary or secondary)?  General Surgery   Is there a successful TCM telephone encounter documented?  No   Week 1 attempt successful?  No   Unsuccessful attempts  Attempt 2          Samreen Sawyer RN

## 2019-06-04 ENCOUNTER — READMISSION MANAGEMENT (OUTPATIENT)
Dept: CALL CENTER | Facility: HOSPITAL | Age: 74
End: 2019-06-04

## 2019-06-04 NOTE — OUTREACH NOTE
General Surgery Week 2 Survey      Responses   Facility patient discharged from?  [facility]   Does the patient have one of the following disease processes/diagnoses(primary or secondary)?  General Surgery   Week 2 attempt successful?  Yes   Call start time  1610   Call end time  1615   Discharge diagnosis  ORIF right humerus   Is patient permission given to speak with other caregiver?  Yes   List who call center can speak with     Person spoke with today (if not patient) and relationship     Meds reviewed with patient/caregiver?  Yes   Is the patient having any side effects they believe may be caused by any medication additions or changes?  No   Is the patient taking all medications as directed (includes completed medication regime)?  Yes   Does the patient have a follow up appointment scheduled with their surgeon?  Yes   Has the patient kept scheduled appointments due by today?  Yes   DME comments  Still wearing home O2.    Psychosocial issues?  No   Comments  Pain is minimal per . biggest bother is not being able to bend elbow r/t splint. Denies fever, N/V. Appetite WNL.    Did the patient receive a copy of their discharge instructions?  Yes   Nursing interventions  Reviewed instructions with patient   What is the patient's perception of their health status since discharge?  Improving   Nursing interventions  Nurse provided patient education   Is the patient /caregiver able to teach back basic post-op care?  Practice 'cough and deep breath', No tub bath, swimming, or hot tub until instructed by MD, Do not remove steri-strips   Is the patient/caregiver able to teach back signs and symptoms of incisional infection?  Increased redness, swelling or pain at the incisonal site, Pus or odor from incision   Is the patient/caregiver able to teach back steps to recovery at home?  Weigh daily, Eat a well-balance diet   Is the patient/caregiver able to teach back the hierarchy of who to call/visit for  symptoms/problems? PCP, Specialist, Home health nurse, Urgent Care, ED, 911  Yes   Week 2 call completed?  Yes          Samreen Sawyer RN

## 2019-06-05 DIAGNOSIS — Z98.890 S/P ORIF (OPEN REDUCTION INTERNAL FIXATION) FRACTURE: Primary | ICD-10-CM

## 2019-06-05 DIAGNOSIS — S42.291S OTHER CLOSED DISPLACED FRACTURE OF PROXIMAL END OF RIGHT HUMERUS, SEQUELA: ICD-10-CM

## 2019-06-05 DIAGNOSIS — Z87.81 S/P ORIF (OPEN REDUCTION INTERNAL FIXATION) FRACTURE: Primary | ICD-10-CM

## 2019-06-06 ENCOUNTER — HOSPITAL ENCOUNTER (OUTPATIENT)
Dept: GENERAL RADIOLOGY | Facility: HOSPITAL | Age: 74
Discharge: HOME | End: 2019-06-06
Admitting: ORTHOPAEDIC SURGERY

## 2019-06-06 ENCOUNTER — OFFICE VISIT (OUTPATIENT)
Dept: ORTHOPEDIC SURGERY | Facility: CLINIC | Age: 74
End: 2019-06-06

## 2019-06-06 VITALS — WEIGHT: 106 LBS | HEIGHT: 59 IN | BODY MASS INDEX: 21.37 KG/M2

## 2019-06-06 DIAGNOSIS — Z98.890 S/P ORIF (OPEN REDUCTION INTERNAL FIXATION) FRACTURE: Primary | ICD-10-CM

## 2019-06-06 DIAGNOSIS — Z98.890 S/P ORIF (OPEN REDUCTION INTERNAL FIXATION) FRACTURE: ICD-10-CM

## 2019-06-06 DIAGNOSIS — Z87.81 S/P ORIF (OPEN REDUCTION INTERNAL FIXATION) FRACTURE: Primary | ICD-10-CM

## 2019-06-06 DIAGNOSIS — S42.291S OTHER CLOSED DISPLACED FRACTURE OF PROXIMAL END OF RIGHT HUMERUS, SEQUELA: ICD-10-CM

## 2019-06-06 DIAGNOSIS — Z87.81 S/P ORIF (OPEN REDUCTION INTERNAL FIXATION) FRACTURE: ICD-10-CM

## 2019-06-06 PROCEDURE — 73060 X-RAY EXAM OF HUMERUS: CPT

## 2019-06-06 PROCEDURE — 99024 POSTOP FOLLOW-UP VISIT: CPT | Performed by: ORTHOPAEDIC SURGERY

## 2019-06-06 PROCEDURE — 73060 X-RAY EXAM OF HUMERUS: CPT | Performed by: RADIOLOGY

## 2019-06-06 NOTE — PROGRESS NOTES
"Patient: Maribel Staton    YOB: 1945    Chief Complaint   Patient presents with   • Right Upper Arm - Follow-up, Fracture, Pain         History of Present Illness: Patient presents almost 2 weeks status post open reduction internal fixation of a mid shaft humeral fracture that was at the tip of the previous proximal humerus plate comminuted.  Doing well has no specific complaints today.  She is has been in a splint until today.    Past Medical History:   Diagnosis Date   • Anxiety    • Arthritis    • Asthma    • Back pain    • Chronic low back pain    • Closed displaced transverse fracture of shaft of right humerus 5/23/2019    Added automatically from request for surgery [id number]   • COPD (chronic obstructive pulmonary disease) (CMS/[facility])    • Depression    • Elevated cholesterol    • Fall 01/03/2018   • GERD (gastroesophageal reflux disease)    • Headache    • History of transfusion    • Hypertension    • Seizures (CMS/[facility])    • Sleep apnea    • Tobacco abuse         Social History     Socioeconomic History   • Marital status:      Spouse name: Not on file   • Number of children: Not on file   • Years of education: Not on file   • Highest education level: Not on file   Tobacco Use   • Smoking status: Current Some Day Smoker     Packs/day: 1.00     Years: 53.00     Pack years: 53.00     Types: Cigarettes     Start date: 6/6/1965   • Smokeless tobacco: Never Used   Substance and Sexual Activity   • Alcohol use: No   • Drug use: No   • Sexual activity: Defer           Physical Exam: 73 y.o. female  General Appearance:    Alert and oriented x 3, cooperative, in no acute distress                   Vitals:    06/06/19 1302   Weight: 48.1 kg (106 lb)   Height: 149.9 cm (59\")          Her wound is clean dry and intact.  No erythema.  There is some minimal swelling and resolving ecchymosis noted right hand is grossly neurovascular intact especially in the radial nerve distribution she is a little " extend her fingers and her wrist.      Radiology:       X-ray shows no change in alignment of fracture or metal fixation.  We placed an anterior plate around the previous plate.      Assessment/Plan: Healing status post open reduction internal fixation midshaft fracture at the tip of the previous plate.  Today were going to put her in a fracture brace humerus.  She can still use her sling that she can begin gentle use of the elbow as tolerates she can encourage movement of her hand and fingers.  She can use her arm for very light activities such as picking up a cup of coffee or using a fork.  No pushing her body weight or pulling her body weight or any heavy lifting with the arm.  The brace should be worn at all times she can remove it briefly for bathing purposes only.  We will see her back in approximately 4-5 weeks for follow-up x-ray and check            Discussion/Summary:                This chart was completed utilizing the dragon speech recognition software.  Grammatical errors, random word insertions, pronoun errors, and incomplete sentences or occasional consequences of the system due to software limitations, ambient noise, and hardware issues.  Any questions or concerns about the content, text, or information contained within the body of this dictation should be directly addressed to the physician for clarification        This document was signed by Elliot Costello M.D. June 6, 2019 1:21 PM

## 2019-06-12 ENCOUNTER — READMISSION MANAGEMENT (OUTPATIENT)
Dept: CALL CENTER | Facility: HOSPITAL | Age: 74
End: 2019-06-12

## 2019-06-12 NOTE — OUTREACH NOTE
General Surgery Week 3 Survey      Responses   Facility patient discharged from?  [facility]   Does the patient have one of the following disease processes/diagnoses(primary or secondary)?  General Surgery   Week 3 attempt successful?  Yes   Call start time  1354   Call end time  1358   Discharge diagnosis  ORIF right humerus   Person spoke with today (if not patient) and relationship  Mr. Harris   Medication alerts for this patient  n   Meds reviewed with patient/caregiver?  Yes   Is the patient having any side effects they believe may be caused by any medication additions or changes?  No   Does the patient have all medications related to this admission filled (includes all antibiotics, pain medications, etc.)  Yes   Is the patient taking all medications as directed (includes completed medication regime)?  Yes   Has the patient kept scheduled appointments due by today?  Yes   What is the Home health agency?   [facility]   Has home health visited the patient within 72 hours of discharge?  Yes   What DME was ordered?  O2, WC   What is the patient's perception of their health status since discharge?  Improving   Nursing interventions  Nurse provided patient education   Is the patient /caregiver able to teach back basic post-op care?  Practice 'cough and deep breath', Do not remove steri-strips, Keep incision areas clean,dry and protected   Is the patient/caregiver able to teach back signs and symptoms of incisional infection?  Increased redness, swelling or pain at the incisonal site, Increased drainage or bleeding   Is the patient/caregiver able to teach back steps to recovery at home?  Eat a well-balance diet, Make a list of questions for surgeon's appointment, Rest and rebuild strength, gradually increase activity   Is the patient/caregiver able to teach back the hierarchy of who to call/visit for symptoms/problems? PCP, Specialist, Home health nurse, Urgent Care, ED, 911  Yes   Week 3 call  completed?  Yes          Kathy Carney RN

## 2019-06-19 DIAGNOSIS — Z98.890 S/P ORIF (OPEN REDUCTION INTERNAL FIXATION) FRACTURE: Primary | ICD-10-CM

## 2019-06-19 DIAGNOSIS — Z87.81 S/P ORIF (OPEN REDUCTION INTERNAL FIXATION) FRACTURE: Primary | ICD-10-CM

## 2019-06-19 DIAGNOSIS — S42.291S OTHER CLOSED DISPLACED FRACTURE OF PROXIMAL END OF RIGHT HUMERUS, SEQUELA: ICD-10-CM

## 2019-06-20 ENCOUNTER — APPOINTMENT (OUTPATIENT)
Dept: GENERAL RADIOLOGY | Facility: HOSPITAL | Age: 74
End: 2019-06-20

## 2019-07-09 ENCOUNTER — HOSPITAL ENCOUNTER (OUTPATIENT)
Dept: GENERAL RADIOLOGY | Facility: HOSPITAL | Age: 74
Discharge: HOME | End: 2019-07-09
Admitting: ORTHOPAEDIC SURGERY

## 2019-07-09 ENCOUNTER — OFFICE VISIT (OUTPATIENT)
Dept: ORTHOPEDIC SURGERY | Facility: CLINIC | Age: 74
End: 2019-07-09

## 2019-07-09 VITALS — WEIGHT: 106 LBS | HEIGHT: 59 IN | BODY MASS INDEX: 21.37 KG/M2

## 2019-07-09 DIAGNOSIS — Z87.81 S/P ORIF (OPEN REDUCTION INTERNAL FIXATION) FRACTURE: ICD-10-CM

## 2019-07-09 DIAGNOSIS — S42.291S OTHER CLOSED DISPLACED FRACTURE OF PROXIMAL END OF RIGHT HUMERUS, SEQUELA: ICD-10-CM

## 2019-07-09 DIAGNOSIS — Z87.81 S/P ORIF (OPEN REDUCTION INTERNAL FIXATION) FRACTURE: Primary | ICD-10-CM

## 2019-07-09 DIAGNOSIS — Z98.890 S/P ORIF (OPEN REDUCTION INTERNAL FIXATION) FRACTURE: Primary | ICD-10-CM

## 2019-07-09 DIAGNOSIS — Z98.890 S/P ORIF (OPEN REDUCTION INTERNAL FIXATION) FRACTURE: ICD-10-CM

## 2019-07-09 PROCEDURE — 99024 POSTOP FOLLOW-UP VISIT: CPT | Performed by: ORTHOPAEDIC SURGERY

## 2019-07-09 PROCEDURE — 73060 X-RAY EXAM OF HUMERUS: CPT | Performed by: RADIOLOGY

## 2019-07-09 PROCEDURE — 73060 X-RAY EXAM OF HUMERUS: CPT

## 2019-07-09 NOTE — PROGRESS NOTES
"Patient: Maribel Staton    YOB: 1945    Chief Complaint   Patient presents with   • Right Upper Arm - Follow-up, Fracture, Pain         History of Present Illness: Patient presents for follow-up status post open reduction internal fixation right humeral shaft fracture at the age of the previous open reduction internal fixation of right proximal humerus fracture.  She is almost 7 weeks status post surgery.  No specific complaints of pain or paresthesias today    Past Medical History:   Diagnosis Date   • Anxiety    • Arthritis    • Asthma    • Back pain    • Chronic low back pain    • Closed displaced transverse fracture of shaft of right humerus 5/23/2019    Added automatically from request for surgery [id number]   • COPD (chronic obstructive pulmonary disease) (CMS/[facility])    • Depression    • Elevated cholesterol    • Fall 01/03/2018   • GERD (gastroesophageal reflux disease)    • Headache    • History of transfusion    • Hypertension    • Seizures (CMS/[facility])    • Sleep apnea    • Tobacco abuse         Social History     Socioeconomic History   • Marital status:      Spouse name: Not on file   • Number of children: Not on file   • Years of education: Not on file   • Highest education level: Not on file   Tobacco Use   • Smoking status: Current Some Day Smoker     Packs/day: 1.00     Years: 53.00     Pack years: 53.00     Types: Cigarettes     Start date: 6/6/1965   • Smokeless tobacco: Never Used   Substance and Sexual Activity   • Alcohol use: No   • Drug use: No   • Sexual activity: Defer           Physical Exam: 73 y.o. female  General Appearance:    Alert and oriented x 3, cooperative, in no acute distress                   Vitals:    07/09/19 0938   Weight: 48.1 kg (106 lb)   Height: 149.9 cm (59\")          Wound is well-healed.  Right hand is grossly neurovascular intact without swelling.  She is lacking about 20 degrees of extension of the elbow she probably flexes it to about 105.  She " forward flexes her shoulder to about 70 or 80 degrees abducts about the same.  She is wearing a fracture brace on today.      Radiology:       X-rays taken today show no change in alignment of the fracture or metal fixation.  Appears well aligned      Assessment/Plan: Healing status post open reduction internal fixation of the midshaft humeral fracture that was at the edge of a previous plate in place for a proximal humerus fracture.  Plan at this time is he she can wean out of the brace as she is comfortable now.  She can begin using her arm for activities of daily living such as eating bathing brushing her teeth or hair etc.  I do want not want to do any heavy lifting pushing or pulling with that right arm.  We are to get her started with physical therapy to start working on range of motion of her elbow and her shoulder with gentle strengthening.  We will see her back in approximately 6 weeks for follow-up x-ray and check.                      Discussion/Summary:                This chart was completed utilizing the dragon speech recognition software.  Grammatical errors, random word insertions, pronoun errors, and incomplete sentences or occasional consequences of the system due to software limitations, ambient noise, and hardware issues.  Any questions or concerns about the content, text, or information contained within the body of this dictation should be directly addressed to the physician for clarification        This document was signed by Elliot Costello M.D. July 9, 2019 10:28 AM

## 2019-08-20 ENCOUNTER — APPOINTMENT (OUTPATIENT)
Dept: GENERAL RADIOLOGY | Facility: HOSPITAL | Age: 74
End: 2019-08-20

## 2019-10-07 ENCOUNTER — TRANSCRIBE ORDERS (OUTPATIENT)
Dept: ADMINISTRATIVE | Facility: HOSPITAL | Age: 74
End: 2019-10-07

## 2019-10-07 DIAGNOSIS — Z87.891 PERSONAL HISTORY OF TOBACCO USE, PRESENTING HAZARDS TO HEALTH: Primary | ICD-10-CM

## 2019-10-31 ENCOUNTER — APPOINTMENT (OUTPATIENT)
Dept: GENERAL RADIOLOGY | Facility: HOSPITAL | Age: 74
End: 2019-10-31

## 2019-10-31 ENCOUNTER — HOSPITAL ENCOUNTER (INPATIENT)
Facility: HOSPITAL | Age: 74
LOS: 3 days | Discharge: HOME HEALTH | End: 2019-11-03
Attending: EMERGENCY MEDICINE | Admitting: INTERNAL MEDICINE

## 2019-10-31 DIAGNOSIS — J44.9 CHRONIC OBSTRUCTIVE PULMONARY DISEASE, UNSPECIFIED COPD TYPE (HCC): ICD-10-CM

## 2019-10-31 DIAGNOSIS — IMO0002 ATRIAL FIBRILLATION/FLUTTER: Primary | ICD-10-CM

## 2019-10-31 LAB
ALBUMIN SERPL-MCNC: 3.82 G/DL (ref 3.5–5.2)
ALBUMIN/GLOB SERPL: 0.9 G/DL
ALP SERPL-CCNC: 84 U/L (ref 39–117)
ALT SERPL W P-5'-P-CCNC: 18 U/L (ref 1–33)
ANION GAP SERPL CALCULATED.3IONS-SCNC: 10.6 MMOL/L (ref 5–15)
APTT PPP: 30.1 SECONDS (ref 23.8–36.1)
AST SERPL-CCNC: 31 U/L (ref 1–32)
BASOPHILS # BLD AUTO: 0.05 10*3/MM3 (ref 0–0.2)
BASOPHILS NFR BLD AUTO: 0.9 % (ref 0–1.5)
BILIRUB SERPL-MCNC: <0.2 MG/DL (ref 0.2–1.2)
BUN BLD-MCNC: 11 MG/DL (ref 8–23)
BUN/CREAT SERPL: 16.9 (ref 7–25)
CALCIUM SPEC-SCNC: 10 MG/DL (ref 8.6–10.5)
CHLORIDE SERPL-SCNC: 95 MMOL/L (ref 98–107)
CO2 SERPL-SCNC: 28.4 MMOL/L (ref 22–29)
CREAT BLD-MCNC: 0.65 MG/DL (ref 0.57–1)
DEPRECATED RDW RBC AUTO: 49.6 FL (ref 37–54)
EOSINOPHIL # BLD AUTO: 0.06 10*3/MM3 (ref 0–0.4)
EOSINOPHIL NFR BLD AUTO: 1.1 % (ref 0.3–6.2)
ERYTHROCYTE [DISTWIDTH] IN BLOOD BY AUTOMATED COUNT: 13.7 % (ref 12.3–15.4)
GFR SERPL CREATININE-BSD FRML MDRD: 90 ML/MIN/1.73
GLOBULIN UR ELPH-MCNC: 4.1 GM/DL
GLUCOSE BLD-MCNC: 101 MG/DL (ref 65–99)
HCT VFR BLD AUTO: 35.3 % (ref 34–46.6)
HGB BLD-MCNC: 11 G/DL (ref 12–15.9)
HOLD SPECIMEN: NORMAL
HOLD SPECIMEN: NORMAL
IMM GRANULOCYTES # BLD AUTO: 0.02 10*3/MM3 (ref 0–0.05)
IMM GRANULOCYTES NFR BLD AUTO: 0.4 % (ref 0–0.5)
INR PPP: 0.99 (ref 0.9–1.1)
LYMPHOCYTES # BLD AUTO: 1.58 10*3/MM3 (ref 0.7–3.1)
LYMPHOCYTES NFR BLD AUTO: 29.5 % (ref 19.6–45.3)
MCH RBC QN AUTO: 30.6 PG (ref 26.6–33)
MCHC RBC AUTO-ENTMCNC: 31.2 G/DL (ref 31.5–35.7)
MCV RBC AUTO: 98.3 FL (ref 79–97)
MONOCYTES # BLD AUTO: 0.33 10*3/MM3 (ref 0.1–0.9)
MONOCYTES NFR BLD AUTO: 6.2 % (ref 5–12)
NEUTROPHILS # BLD AUTO: 3.31 10*3/MM3 (ref 1.7–7)
NEUTROPHILS NFR BLD AUTO: 61.9 % (ref 42.7–76)
NRBC BLD AUTO-RTO: 0 /100 WBC (ref 0–0.2)
NT-PROBNP SERPL-MCNC: 3177 PG/ML (ref 5–900)
PLATELET # BLD AUTO: 317 10*3/MM3 (ref 140–450)
PMV BLD AUTO: 8.8 FL (ref 6–12)
POTASSIUM BLD-SCNC: 4.3 MMOL/L (ref 3.5–5.2)
PROT SERPL-MCNC: 7.9 G/DL (ref 6–8.5)
PROTHROMBIN TIME: 13.6 SECONDS (ref 11–15.4)
RBC # BLD AUTO: 3.59 10*6/MM3 (ref 3.77–5.28)
SODIUM BLD-SCNC: 134 MMOL/L (ref 136–145)
TROPONIN T SERPL-MCNC: <0.01 NG/ML (ref 0–0.03)
WBC NRBC COR # BLD: 5.35 10*3/MM3 (ref 3.4–10.8)
WHOLE BLOOD HOLD SPECIMEN: NORMAL
WHOLE BLOOD HOLD SPECIMEN: NORMAL

## 2019-10-31 PROCEDURE — 80053 COMPREHEN METABOLIC PANEL: CPT | Performed by: EMERGENCY MEDICINE

## 2019-10-31 PROCEDURE — 84484 ASSAY OF TROPONIN QUANT: CPT | Performed by: EMERGENCY MEDICINE

## 2019-10-31 PROCEDURE — 93010 ELECTROCARDIOGRAM REPORT: CPT | Performed by: INTERNAL MEDICINE

## 2019-10-31 PROCEDURE — 99223 1ST HOSP IP/OBS HIGH 75: CPT | Performed by: INTERNAL MEDICINE

## 2019-10-31 PROCEDURE — 25010000002 FUROSEMIDE PER 20 MG: Performed by: INTERNAL MEDICINE

## 2019-10-31 PROCEDURE — 85730 THROMBOPLASTIN TIME PARTIAL: CPT | Performed by: INTERNAL MEDICINE

## 2019-10-31 PROCEDURE — 99283 EMERGENCY DEPT VISIT LOW MDM: CPT

## 2019-10-31 PROCEDURE — 25010000002 AZITHROMYCIN: Performed by: EMERGENCY MEDICINE

## 2019-10-31 PROCEDURE — 93005 ELECTROCARDIOGRAM TRACING: CPT | Performed by: EMERGENCY MEDICINE

## 2019-10-31 PROCEDURE — 71045 X-RAY EXAM CHEST 1 VIEW: CPT | Performed by: RADIOLOGY

## 2019-10-31 PROCEDURE — 85025 COMPLETE CBC W/AUTO DIFF WBC: CPT | Performed by: EMERGENCY MEDICINE

## 2019-10-31 PROCEDURE — 25010000002 CEFTRIAXONE: Performed by: EMERGENCY MEDICINE

## 2019-10-31 PROCEDURE — 87040 BLOOD CULTURE FOR BACTERIA: CPT | Performed by: EMERGENCY MEDICINE

## 2019-10-31 PROCEDURE — 83880 ASSAY OF NATRIURETIC PEPTIDE: CPT | Performed by: EMERGENCY MEDICINE

## 2019-10-31 PROCEDURE — 71045 X-RAY EXAM CHEST 1 VIEW: CPT

## 2019-10-31 PROCEDURE — 85610 PROTHROMBIN TIME: CPT | Performed by: INTERNAL MEDICINE

## 2019-10-31 RX ORDER — DILTIAZEM HCL/D5W 125 MG/125
5-15 PLASTIC BAG, INJECTION (ML) INTRAVENOUS CONTINUOUS
Status: DISCONTINUED | OUTPATIENT
Start: 2019-10-31 | End: 2019-10-31

## 2019-10-31 RX ORDER — LEVETIRACETAM 500 MG/1
1000 TABLET ORAL 2 TIMES DAILY
Status: DISCONTINUED | OUTPATIENT
Start: 2019-10-31 | End: 2019-11-03 | Stop reason: HOSPADM

## 2019-10-31 RX ORDER — CLONAZEPAM 0.5 MG/1
0.5 TABLET ORAL 2 TIMES DAILY PRN
Status: DISCONTINUED | OUTPATIENT
Start: 2019-10-31 | End: 2019-11-03 | Stop reason: HOSPADM

## 2019-10-31 RX ORDER — CLONAZEPAM 1 MG/1
0.5 TABLET ORAL 2 TIMES DAILY PRN
COMMUNITY

## 2019-10-31 RX ORDER — BISOPROLOL FUMARATE 5 MG/1
10 TABLET, FILM COATED ORAL 2 TIMES DAILY
Status: CANCELLED | OUTPATIENT
Start: 2019-10-31

## 2019-10-31 RX ORDER — SODIUM CHLORIDE 0.9 % (FLUSH) 0.9 %
10 SYRINGE (ML) INJECTION EVERY 12 HOURS SCHEDULED
Status: DISCONTINUED | OUTPATIENT
Start: 2019-10-31 | End: 2019-11-03 | Stop reason: HOSPADM

## 2019-10-31 RX ORDER — PHENOBARBITAL 32.4 MG/1
32.4 TABLET ORAL EVERY 8 HOURS SCHEDULED
Status: DISCONTINUED | OUTPATIENT
Start: 2019-10-31 | End: 2019-10-31

## 2019-10-31 RX ORDER — LANOLIN ALCOHOL/MO/W.PET/CERES
1000 CREAM (GRAM) TOPICAL DAILY
Status: DISCONTINUED | OUTPATIENT
Start: 2019-11-01 | End: 2019-11-03 | Stop reason: HOSPADM

## 2019-10-31 RX ORDER — DONEPEZIL HYDROCHLORIDE 5 MG/1
5 TABLET, FILM COATED ORAL NIGHTLY
COMMUNITY

## 2019-10-31 RX ORDER — MONTELUKAST SODIUM 10 MG/1
10 TABLET ORAL NIGHTLY
COMMUNITY

## 2019-10-31 RX ORDER — FOLIC ACID 1 MG/1
1 TABLET ORAL DAILY
Status: DISCONTINUED | OUTPATIENT
Start: 2019-11-01 | End: 2019-11-03 | Stop reason: HOSPADM

## 2019-10-31 RX ORDER — SODIUM CHLORIDE 0.9 % (FLUSH) 0.9 %
10 SYRINGE (ML) INJECTION AS NEEDED
Status: DISCONTINUED | OUTPATIENT
Start: 2019-10-31 | End: 2019-11-03 | Stop reason: HOSPADM

## 2019-10-31 RX ORDER — BISOPROLOL FUMARATE 5 MG/1
10 TABLET, FILM COATED ORAL 2 TIMES DAILY
COMMUNITY
End: 2019-11-03 | Stop reason: HOSPADM

## 2019-10-31 RX ORDER — CARVEDILOL 6.25 MG/1
6.25 TABLET ORAL 2 TIMES DAILY WITH MEALS
Status: DISCONTINUED | OUTPATIENT
Start: 2019-10-31 | End: 2019-11-01

## 2019-10-31 RX ORDER — HYDROCODONE BITARTRATE AND ACETAMINOPHEN 10; 325 MG/1; MG/1
0.5 TABLET ORAL EVERY 8 HOURS PRN
Status: DISCONTINUED | OUTPATIENT
Start: 2019-10-31 | End: 2019-11-03 | Stop reason: HOSPADM

## 2019-10-31 RX ORDER — FUROSEMIDE 10 MG/ML
20 INJECTION INTRAMUSCULAR; INTRAVENOUS EVERY 12 HOURS
Status: DISCONTINUED | OUTPATIENT
Start: 2019-10-31 | End: 2019-11-01

## 2019-10-31 RX ORDER — BISOPROLOL FUMARATE 5 MG/1
10 TABLET, FILM COATED ORAL 2 TIMES DAILY
Status: DISCONTINUED | OUTPATIENT
Start: 2019-10-31 | End: 2019-10-31

## 2019-10-31 RX ORDER — PANTOPRAZOLE SODIUM 40 MG/1
40 TABLET, DELAYED RELEASE ORAL 2 TIMES DAILY
Status: DISCONTINUED | OUTPATIENT
Start: 2019-10-31 | End: 2019-11-03 | Stop reason: HOSPADM

## 2019-10-31 RX ORDER — DILTIAZEM HYDROCHLORIDE 180 MG/1
180 CAPSULE, COATED, EXTENDED RELEASE ORAL DAILY
Status: CANCELLED | OUTPATIENT
Start: 2019-11-01

## 2019-10-31 RX ORDER — DILTIAZEM HYDROCHLORIDE 180 MG/1
180 CAPSULE, COATED, EXTENDED RELEASE ORAL DAILY
COMMUNITY
End: 2019-11-03 | Stop reason: HOSPADM

## 2019-10-31 RX ORDER — NITROGLYCERIN 0.4 MG/1
0.4 TABLET SUBLINGUAL
Status: DISCONTINUED | OUTPATIENT
Start: 2019-10-31 | End: 2019-11-03 | Stop reason: HOSPADM

## 2019-10-31 RX ORDER — DILTIAZEM HYDROCHLORIDE 180 MG/1
180 CAPSULE, COATED, EXTENDED RELEASE ORAL DAILY
Status: DISCONTINUED | OUTPATIENT
Start: 2019-11-01 | End: 2019-10-31

## 2019-10-31 RX ORDER — MONTELUKAST SODIUM 10 MG/1
10 TABLET ORAL NIGHTLY
Status: DISCONTINUED | OUTPATIENT
Start: 2019-10-31 | End: 2019-11-03 | Stop reason: HOSPADM

## 2019-10-31 RX ORDER — PHENOBARBITAL 32.4 MG/1
32.4 TABLET ORAL EVERY 8 HOURS SCHEDULED
Status: DISCONTINUED | OUTPATIENT
Start: 2019-11-01 | End: 2019-11-03 | Stop reason: HOSPADM

## 2019-10-31 RX ORDER — DONEPEZIL HYDROCHLORIDE 5 MG/1
5 TABLET, FILM COATED ORAL NIGHTLY
Status: DISCONTINUED | OUTPATIENT
Start: 2019-10-31 | End: 2019-11-03 | Stop reason: HOSPADM

## 2019-10-31 RX ORDER — LEVETIRACETAM 1000 MG/1
1000 TABLET ORAL 2 TIMES DAILY
COMMUNITY

## 2019-10-31 RX ORDER — SENNA PLUS 8.6 MG/1
1 TABLET ORAL 2 TIMES DAILY
Status: DISCONTINUED | OUTPATIENT
Start: 2019-10-31 | End: 2019-11-03 | Stop reason: HOSPADM

## 2019-10-31 RX ORDER — CETIRIZINE HYDROCHLORIDE 10 MG/1
5 TABLET ORAL DAILY
Status: DISCONTINUED | OUTPATIENT
Start: 2019-11-01 | End: 2019-11-03 | Stop reason: HOSPADM

## 2019-10-31 RX ORDER — LANOLIN ALCOHOL/MO/W.PET/CERES
1000 CREAM (GRAM) TOPICAL DAILY
COMMUNITY

## 2019-10-31 RX ORDER — ATORVASTATIN CALCIUM 40 MG/1
40 TABLET, FILM COATED ORAL NIGHTLY
Status: DISCONTINUED | OUTPATIENT
Start: 2019-10-31 | End: 2019-11-03 | Stop reason: HOSPADM

## 2019-10-31 RX ADMIN — Medication 1 TABLET: at 22:46

## 2019-10-31 RX ADMIN — MONTELUKAST SODIUM 10 MG: 10 TABLET, COATED ORAL at 22:46

## 2019-10-31 RX ADMIN — DONEPEZIL HYDROCHLORIDE 5 MG: 5 TABLET, FILM COATED ORAL at 22:46

## 2019-10-31 RX ADMIN — Medication 5 MG/HR: at 13:44

## 2019-10-31 RX ADMIN — SENNOSIDES 1 TABLET: 8.6 TABLET, FILM COATED ORAL at 22:46

## 2019-10-31 RX ADMIN — AZITHROMYCIN MONOHYDRATE 500 MG: 500 INJECTION, POWDER, LYOPHILIZED, FOR SOLUTION INTRAVENOUS at 19:30

## 2019-10-31 RX ADMIN — APIXABAN 5 MG: 5 TABLET, FILM COATED ORAL at 21:57

## 2019-10-31 RX ADMIN — SODIUM CHLORIDE, PRESERVATIVE FREE 10 ML: 5 INJECTION INTRAVENOUS at 21:59

## 2019-10-31 RX ADMIN — DILTIAZEM HYDROCHLORIDE 30 MG: 30 TABLET, FILM COATED ORAL at 22:45

## 2019-10-31 RX ADMIN — PANTOPRAZOLE SODIUM 40 MG: 40 TABLET, DELAYED RELEASE ORAL at 22:46

## 2019-10-31 RX ADMIN — FUROSEMIDE 20 MG: 10 INJECTION, SOLUTION INTRAMUSCULAR; INTRAVENOUS at 19:16

## 2019-10-31 RX ADMIN — ATORVASTATIN CALCIUM 40 MG: 40 TABLET, FILM COATED ORAL at 22:46

## 2019-10-31 RX ADMIN — CEFTRIAXONE 1 G: 1 INJECTION, POWDER, FOR SOLUTION INTRAMUSCULAR; INTRAVENOUS at 18:08

## 2019-10-31 RX ADMIN — LEVETIRACETAM 1000 MG: 500 TABLET, FILM COATED ORAL at 22:45

## 2019-10-31 RX ADMIN — CARVEDILOL 6.25 MG: 6.25 TABLET, FILM COATED ORAL at 21:57

## 2019-11-01 ENCOUNTER — APPOINTMENT (OUTPATIENT)
Dept: CARDIOLOGY | Facility: HOSPITAL | Age: 74
End: 2019-11-01

## 2019-11-01 LAB
ALBUMIN SERPL-MCNC: 3.4 G/DL (ref 3.5–5.2)
ALBUMIN/GLOB SERPL: 0.9 G/DL
ALP SERPL-CCNC: 75 U/L (ref 39–117)
ALT SERPL W P-5'-P-CCNC: 15 U/L (ref 1–33)
ANION GAP SERPL CALCULATED.3IONS-SCNC: 11.7 MMOL/L (ref 5–15)
AST SERPL-CCNC: 25 U/L (ref 1–32)
BASOPHILS # BLD AUTO: 0.04 10*3/MM3 (ref 0–0.2)
BASOPHILS NFR BLD AUTO: 0.7 % (ref 0–1.5)
BILIRUB SERPL-MCNC: 0.2 MG/DL (ref 0.2–1.2)
BUN BLD-MCNC: 14 MG/DL (ref 8–23)
BUN/CREAT SERPL: 22.6 (ref 7–25)
CALCIUM SPEC-SCNC: 9.1 MG/DL (ref 8.6–10.5)
CHLORIDE SERPL-SCNC: 97 MMOL/L (ref 98–107)
CHOLEST SERPL-MCNC: 138 MG/DL (ref 0–200)
CO2 SERPL-SCNC: 26.3 MMOL/L (ref 22–29)
CREAT BLD-MCNC: 0.62 MG/DL (ref 0.57–1)
DEPRECATED RDW RBC AUTO: 49.1 FL (ref 37–54)
EOSINOPHIL # BLD AUTO: 0.07 10*3/MM3 (ref 0–0.4)
EOSINOPHIL NFR BLD AUTO: 1.2 % (ref 0.3–6.2)
ERYTHROCYTE [DISTWIDTH] IN BLOOD BY AUTOMATED COUNT: 13.6 % (ref 12.3–15.4)
GFR SERPL CREATININE-BSD FRML MDRD: 95 ML/MIN/1.73
GLOBULIN UR ELPH-MCNC: 4 GM/DL
GLUCOSE BLD-MCNC: 107 MG/DL (ref 65–99)
HCT VFR BLD AUTO: 34.7 % (ref 34–46.6)
HDLC SERPL-MCNC: 52 MG/DL (ref 40–60)
HGB BLD-MCNC: 10.9 G/DL (ref 12–15.9)
IMM GRANULOCYTES # BLD AUTO: 0.02 10*3/MM3 (ref 0–0.05)
IMM GRANULOCYTES NFR BLD AUTO: 0.3 % (ref 0–0.5)
LDLC SERPL CALC-MCNC: 68 MG/DL (ref 0–100)
LDLC/HDLC SERPL: 1.32 {RATIO}
LYMPHOCYTES # BLD AUTO: 1.42 10*3/MM3 (ref 0.7–3.1)
LYMPHOCYTES NFR BLD AUTO: 24.1 % (ref 19.6–45.3)
MAGNESIUM SERPL-MCNC: 1.7 MG/DL (ref 1.6–2.4)
MCH RBC QN AUTO: 30.4 PG (ref 26.6–33)
MCHC RBC AUTO-ENTMCNC: 31.4 G/DL (ref 31.5–35.7)
MCV RBC AUTO: 96.9 FL (ref 79–97)
MONOCYTES # BLD AUTO: 0.43 10*3/MM3 (ref 0.1–0.9)
MONOCYTES NFR BLD AUTO: 7.3 % (ref 5–12)
NEUTROPHILS # BLD AUTO: 3.91 10*3/MM3 (ref 1.7–7)
NEUTROPHILS NFR BLD AUTO: 66.4 % (ref 42.7–76)
NRBC BLD AUTO-RTO: 0 /100 WBC (ref 0–0.2)
PHENOBARB SERPL-MCNC: 16 MCG/ML (ref 10–30)
PHOSPHATE SERPL-MCNC: 3.8 MG/DL (ref 2.5–4.5)
PLATELET # BLD AUTO: 277 10*3/MM3 (ref 140–450)
PMV BLD AUTO: 8.3 FL (ref 6–12)
POTASSIUM BLD-SCNC: 3.6 MMOL/L (ref 3.5–5.2)
PROT SERPL-MCNC: 7.4 G/DL (ref 6–8.5)
RBC # BLD AUTO: 3.58 10*6/MM3 (ref 3.77–5.28)
SODIUM BLD-SCNC: 135 MMOL/L (ref 136–145)
TRIGL SERPL-MCNC: 88 MG/DL (ref 0–150)
VLDLC SERPL-MCNC: 17.6 MG/DL
WBC NRBC COR # BLD: 5.89 10*3/MM3 (ref 3.4–10.8)

## 2019-11-01 PROCEDURE — 93005 ELECTROCARDIOGRAM TRACING: CPT | Performed by: INTERNAL MEDICINE

## 2019-11-01 PROCEDURE — 99232 SBSQ HOSP IP/OBS MODERATE 35: CPT | Performed by: INTERNAL MEDICINE

## 2019-11-01 PROCEDURE — 80053 COMPREHEN METABOLIC PANEL: CPT | Performed by: INTERNAL MEDICINE

## 2019-11-01 PROCEDURE — 85025 COMPLETE CBC W/AUTO DIFF WBC: CPT | Performed by: INTERNAL MEDICINE

## 2019-11-01 PROCEDURE — 84100 ASSAY OF PHOSPHORUS: CPT | Performed by: INTERNAL MEDICINE

## 2019-11-01 PROCEDURE — 25010000002 DIGOXIN PER 500 MCG: Performed by: NURSE PRACTITIONER

## 2019-11-01 PROCEDURE — 93306 TTE W/DOPPLER COMPLETE: CPT

## 2019-11-01 PROCEDURE — 80061 LIPID PANEL: CPT | Performed by: INTERNAL MEDICINE

## 2019-11-01 PROCEDURE — 93010 ELECTROCARDIOGRAM REPORT: CPT | Performed by: INTERNAL MEDICINE

## 2019-11-01 PROCEDURE — 80184 ASSAY OF PHENOBARBITAL: CPT | Performed by: HOSPITALIST

## 2019-11-01 PROCEDURE — 25010000002 FUROSEMIDE PER 20 MG: Performed by: INTERNAL MEDICINE

## 2019-11-01 PROCEDURE — 94799 UNLISTED PULMONARY SVC/PX: CPT

## 2019-11-01 PROCEDURE — 83735 ASSAY OF MAGNESIUM: CPT | Performed by: INTERNAL MEDICINE

## 2019-11-01 PROCEDURE — 99222 1ST HOSP IP/OBS MODERATE 55: CPT | Performed by: NURSE PRACTITIONER

## 2019-11-01 RX ORDER — DIGOXIN 0.25 MG/ML
250 INJECTION INTRAMUSCULAR; INTRAVENOUS ONCE
Status: COMPLETED | OUTPATIENT
Start: 2019-11-01 | End: 2019-11-01

## 2019-11-01 RX ORDER — FUROSEMIDE 10 MG/ML
20 INJECTION INTRAMUSCULAR; INTRAVENOUS DAILY
Status: DISCONTINUED | OUTPATIENT
Start: 2019-11-02 | End: 2019-11-03 | Stop reason: HOSPADM

## 2019-11-01 RX ORDER — CARVEDILOL 6.25 MG/1
12.5 TABLET ORAL 2 TIMES DAILY WITH MEALS
Status: DISCONTINUED | OUTPATIENT
Start: 2019-11-01 | End: 2019-11-03 | Stop reason: HOSPADM

## 2019-11-01 RX ADMIN — MONTELUKAST SODIUM 10 MG: 10 TABLET, COATED ORAL at 20:58

## 2019-11-01 RX ADMIN — PHENOBARBITAL 32.4 MG: 32.4 TABLET ORAL at 21:01

## 2019-11-01 RX ADMIN — Medication 1 TABLET: at 08:07

## 2019-11-01 RX ADMIN — DIGOXIN 250 MCG: 0.25 INJECTION INTRAMUSCULAR; INTRAVENOUS at 14:35

## 2019-11-01 RX ADMIN — LEVETIRACETAM 1000 MG: 500 TABLET, FILM COATED ORAL at 08:05

## 2019-11-01 RX ADMIN — ATORVASTATIN CALCIUM 40 MG: 40 TABLET, FILM COATED ORAL at 20:58

## 2019-11-01 RX ADMIN — CARVEDILOL 12.5 MG: 6.25 TABLET, FILM COATED ORAL at 17:24

## 2019-11-01 RX ADMIN — APIXABAN 5 MG: 5 TABLET, FILM COATED ORAL at 08:06

## 2019-11-01 RX ADMIN — SENNOSIDES 1 TABLET: 8.6 TABLET, FILM COATED ORAL at 20:58

## 2019-11-01 RX ADMIN — DIGOXIN 250 MCG: 0.25 INJECTION INTRAMUSCULAR; INTRAVENOUS at 17:24

## 2019-11-01 RX ADMIN — Medication 1 TABLET: at 17:24

## 2019-11-01 RX ADMIN — CETIRIZINE HYDROCHLORIDE 5 MG: 10 TABLET, FILM COATED ORAL at 08:05

## 2019-11-01 RX ADMIN — DILTIAZEM HYDROCHLORIDE 30 MG: 30 TABLET, FILM COATED ORAL at 02:25

## 2019-11-01 RX ADMIN — CARVEDILOL 6.25 MG: 6.25 TABLET, FILM COATED ORAL at 08:07

## 2019-11-01 RX ADMIN — PHENOBARBITAL 32.4 MG: 32.4 TABLET ORAL at 01:41

## 2019-11-01 RX ADMIN — DILTIAZEM HYDROCHLORIDE 30 MG: 30 TABLET, FILM COATED ORAL at 06:48

## 2019-11-01 RX ADMIN — SODIUM CHLORIDE, PRESERVATIVE FREE 10 ML: 5 INJECTION INTRAVENOUS at 08:06

## 2019-11-01 RX ADMIN — LEVETIRACETAM 1000 MG: 500 TABLET, FILM COATED ORAL at 20:58

## 2019-11-01 RX ADMIN — SODIUM CHLORIDE, PRESERVATIVE FREE 10 ML: 5 INJECTION INTRAVENOUS at 20:59

## 2019-11-01 RX ADMIN — PHENOBARBITAL 32.4 MG: 32.4 TABLET ORAL at 14:35

## 2019-11-01 RX ADMIN — APIXABAN 5 MG: 5 TABLET, FILM COATED ORAL at 20:58

## 2019-11-01 RX ADMIN — DILTIAZEM HYDROCHLORIDE 30 MG: 30 TABLET, FILM COATED ORAL at 12:25

## 2019-11-01 RX ADMIN — PANTOPRAZOLE SODIUM 40 MG: 40 TABLET, DELAYED RELEASE ORAL at 08:06

## 2019-11-01 RX ADMIN — FOLIC ACID 1 MG: 1 TABLET ORAL at 08:06

## 2019-11-01 RX ADMIN — PANTOPRAZOLE SODIUM 40 MG: 40 TABLET, DELAYED RELEASE ORAL at 20:58

## 2019-11-01 RX ADMIN — DONEPEZIL HYDROCHLORIDE 5 MG: 5 TABLET, FILM COATED ORAL at 20:58

## 2019-11-01 RX ADMIN — FUROSEMIDE 20 MG: 10 INJECTION, SOLUTION INTRAMUSCULAR; INTRAVENOUS at 06:48

## 2019-11-01 RX ADMIN — Medication 1000 MCG: at 08:05

## 2019-11-01 RX ADMIN — DILTIAZEM HYDROCHLORIDE 30 MG: 30 TABLET, FILM COATED ORAL at 17:24

## 2019-11-01 RX ADMIN — SENNOSIDES 1 TABLET: 8.6 TABLET, FILM COATED ORAL at 08:06

## 2019-11-01 RX ADMIN — HYDROCODONE BITARTRATE AND ACETAMINOPHEN 0.5 TABLET: 10; 325 TABLET ORAL at 14:42

## 2019-11-02 LAB
ANION GAP SERPL CALCULATED.3IONS-SCNC: 11.1 MMOL/L (ref 5–15)
BASOPHILS # BLD AUTO: 0.03 10*3/MM3 (ref 0–0.2)
BASOPHILS NFR BLD AUTO: 0.7 % (ref 0–1.5)
BUN BLD-MCNC: 11 MG/DL (ref 8–23)
BUN/CREAT SERPL: 19 (ref 7–25)
CALCIUM SPEC-SCNC: 9.5 MG/DL (ref 8.6–10.5)
CHLORIDE SERPL-SCNC: 98 MMOL/L (ref 98–107)
CO2 SERPL-SCNC: 26.9 MMOL/L (ref 22–29)
CREAT BLD-MCNC: 0.58 MG/DL (ref 0.57–1)
DEPRECATED RDW RBC AUTO: 49.6 FL (ref 37–54)
EOSINOPHIL # BLD AUTO: 0.09 10*3/MM3 (ref 0–0.4)
EOSINOPHIL NFR BLD AUTO: 2 % (ref 0.3–6.2)
ERYTHROCYTE [DISTWIDTH] IN BLOOD BY AUTOMATED COUNT: 13.6 % (ref 12.3–15.4)
GFR SERPL CREATININE-BSD FRML MDRD: 102 ML/MIN/1.73
GLUCOSE BLD-MCNC: 106 MG/DL (ref 65–99)
HCT VFR BLD AUTO: 33.8 % (ref 34–46.6)
HGB BLD-MCNC: 10.3 G/DL (ref 12–15.9)
IMM GRANULOCYTES # BLD AUTO: 0.02 10*3/MM3 (ref 0–0.05)
IMM GRANULOCYTES NFR BLD AUTO: 0.4 % (ref 0–0.5)
LYMPHOCYTES # BLD AUTO: 1.52 10*3/MM3 (ref 0.7–3.1)
LYMPHOCYTES NFR BLD AUTO: 33.9 % (ref 19.6–45.3)
MAGNESIUM SERPL-MCNC: 2 MG/DL (ref 1.6–2.4)
MCH RBC QN AUTO: 30.4 PG (ref 26.6–33)
MCHC RBC AUTO-ENTMCNC: 30.5 G/DL (ref 31.5–35.7)
MCV RBC AUTO: 99.7 FL (ref 79–97)
MONOCYTES # BLD AUTO: 0.46 10*3/MM3 (ref 0.1–0.9)
MONOCYTES NFR BLD AUTO: 10.3 % (ref 5–12)
NEUTROPHILS # BLD AUTO: 2.36 10*3/MM3 (ref 1.7–7)
NEUTROPHILS NFR BLD AUTO: 52.7 % (ref 42.7–76)
NRBC BLD AUTO-RTO: 0 /100 WBC (ref 0–0.2)
PHOSPHATE SERPL-MCNC: 3.3 MG/DL (ref 2.5–4.5)
PLATELET # BLD AUTO: 243 10*3/MM3 (ref 140–450)
PMV BLD AUTO: 8.1 FL (ref 6–12)
POTASSIUM BLD-SCNC: 4.6 MMOL/L (ref 3.5–5.2)
RBC # BLD AUTO: 3.39 10*6/MM3 (ref 3.77–5.28)
SODIUM BLD-SCNC: 136 MMOL/L (ref 136–145)
WBC NRBC COR # BLD: 4.48 10*3/MM3 (ref 3.4–10.8)

## 2019-11-02 PROCEDURE — 99232 SBSQ HOSP IP/OBS MODERATE 35: CPT | Performed by: INTERNAL MEDICINE

## 2019-11-02 PROCEDURE — 25010000002 FUROSEMIDE PER 20 MG: Performed by: INTERNAL MEDICINE

## 2019-11-02 PROCEDURE — 85025 COMPLETE CBC W/AUTO DIFF WBC: CPT | Performed by: INTERNAL MEDICINE

## 2019-11-02 PROCEDURE — 84100 ASSAY OF PHOSPHORUS: CPT | Performed by: INTERNAL MEDICINE

## 2019-11-02 PROCEDURE — 80048 BASIC METABOLIC PNL TOTAL CA: CPT | Performed by: INTERNAL MEDICINE

## 2019-11-02 PROCEDURE — 83735 ASSAY OF MAGNESIUM: CPT | Performed by: INTERNAL MEDICINE

## 2019-11-02 RX ORDER — AMIODARONE HYDROCHLORIDE 200 MG/1
200 TABLET ORAL
Status: DISCONTINUED | OUTPATIENT
Start: 2019-11-02 | End: 2019-11-03 | Stop reason: HOSPADM

## 2019-11-02 RX ADMIN — FUROSEMIDE 20 MG: 10 INJECTION, SOLUTION INTRAMUSCULAR; INTRAVENOUS at 09:45

## 2019-11-02 RX ADMIN — SENNOSIDES 1 TABLET: 8.6 TABLET, FILM COATED ORAL at 21:49

## 2019-11-02 RX ADMIN — SENNOSIDES 1 TABLET: 8.6 TABLET, FILM COATED ORAL at 09:45

## 2019-11-02 RX ADMIN — FOLIC ACID 1 MG: 1 TABLET ORAL at 09:44

## 2019-11-02 RX ADMIN — PANTOPRAZOLE SODIUM 40 MG: 40 TABLET, DELAYED RELEASE ORAL at 09:44

## 2019-11-02 RX ADMIN — DILTIAZEM HYDROCHLORIDE 30 MG: 30 TABLET, FILM COATED ORAL at 00:51

## 2019-11-02 RX ADMIN — HYDROCODONE BITARTRATE AND ACETAMINOPHEN 0.5 TABLET: 10; 325 TABLET ORAL at 00:52

## 2019-11-02 RX ADMIN — Medication 1 TABLET: at 17:42

## 2019-11-02 RX ADMIN — LEVETIRACETAM 1000 MG: 500 TABLET, FILM COATED ORAL at 21:49

## 2019-11-02 RX ADMIN — SODIUM CHLORIDE, PRESERVATIVE FREE 10 ML: 5 INJECTION INTRAVENOUS at 21:50

## 2019-11-02 RX ADMIN — ATORVASTATIN CALCIUM 40 MG: 40 TABLET, FILM COATED ORAL at 21:49

## 2019-11-02 RX ADMIN — PANTOPRAZOLE SODIUM 40 MG: 40 TABLET, DELAYED RELEASE ORAL at 21:49

## 2019-11-02 RX ADMIN — APIXABAN 5 MG: 5 TABLET, FILM COATED ORAL at 21:49

## 2019-11-02 RX ADMIN — Medication 1 TABLET: at 09:42

## 2019-11-02 RX ADMIN — Medication 1000 MCG: at 09:44

## 2019-11-02 RX ADMIN — AMIODARONE HYDROCHLORIDE 200 MG: 200 TABLET ORAL at 14:14

## 2019-11-02 RX ADMIN — LEVETIRACETAM 1000 MG: 500 TABLET, FILM COATED ORAL at 09:43

## 2019-11-02 RX ADMIN — CETIRIZINE HYDROCHLORIDE 5 MG: 10 TABLET, FILM COATED ORAL at 09:44

## 2019-11-02 RX ADMIN — PHENOBARBITAL 32.4 MG: 32.4 TABLET ORAL at 21:50

## 2019-11-02 RX ADMIN — HYDROCODONE BITARTRATE AND ACETAMINOPHEN 0.5 TABLET: 10; 325 TABLET ORAL at 21:50

## 2019-11-02 RX ADMIN — APIXABAN 5 MG: 5 TABLET, FILM COATED ORAL at 09:43

## 2019-11-02 RX ADMIN — DILTIAZEM HYDROCHLORIDE 30 MG: 30 TABLET, FILM COATED ORAL at 06:52

## 2019-11-02 RX ADMIN — MONTELUKAST SODIUM 10 MG: 10 TABLET, COATED ORAL at 21:49

## 2019-11-02 RX ADMIN — PHENOBARBITAL 32.4 MG: 32.4 TABLET ORAL at 14:14

## 2019-11-02 RX ADMIN — CARVEDILOL 12.5 MG: 6.25 TABLET, FILM COATED ORAL at 09:42

## 2019-11-02 RX ADMIN — SODIUM CHLORIDE, PRESERVATIVE FREE 10 ML: 5 INJECTION INTRAVENOUS at 09:47

## 2019-11-02 RX ADMIN — DONEPEZIL HYDROCHLORIDE 5 MG: 5 TABLET, FILM COATED ORAL at 21:51

## 2019-11-02 RX ADMIN — CARVEDILOL 12.5 MG: 6.25 TABLET, FILM COATED ORAL at 17:42

## 2019-11-02 RX ADMIN — PHENOBARBITAL 32.4 MG: 32.4 TABLET ORAL at 06:52

## 2019-11-03 VITALS
RESPIRATION RATE: 15 BRPM | BODY MASS INDEX: 23.51 KG/M2 | WEIGHT: 112 LBS | TEMPERATURE: 97.8 F | SYSTOLIC BLOOD PRESSURE: 139 MMHG | OXYGEN SATURATION: 100 % | DIASTOLIC BLOOD PRESSURE: 90 MMHG | HEART RATE: 73 BPM | HEIGHT: 58 IN

## 2019-11-03 LAB
BH CV ECHO MEAS - ACS: 1.3 CM
BH CV ECHO MEAS - AO MAX PG: 3.5 MMHG
BH CV ECHO MEAS - AO MEAN PG: 2.4 MMHG
BH CV ECHO MEAS - AO ROOT AREA (BSA CORRECTED): 2.3
BH CV ECHO MEAS - AO ROOT AREA: 8.2 CM^2
BH CV ECHO MEAS - AO ROOT DIAM: 3.2 CM
BH CV ECHO MEAS - AO V2 MAX: 93.7 CM/SEC
BH CV ECHO MEAS - AO V2 MEAN: 74 CM/SEC
BH CV ECHO MEAS - AO V2 VTI: 15.6 CM
BH CV ECHO MEAS - BSA(HAYCOCK): 1.5 M^2
BH CV ECHO MEAS - BSA: 1.4 M^2
BH CV ECHO MEAS - BZI_BMI: 23.4 KILOGRAMS/M^2
BH CV ECHO MEAS - BZI_METRIC_HEIGHT: 147.3 CM
BH CV ECHO MEAS - BZI_METRIC_WEIGHT: 50.8 KG
BH CV ECHO MEAS - EDV(CUBED): 22.9 ML
BH CV ECHO MEAS - EDV(MOD-SP4): 23 ML
BH CV ECHO MEAS - EDV(TEICH): 30.6 ML
BH CV ECHO MEAS - EF(CUBED): 40.6 %
BH CV ECHO MEAS - EF(MOD-SP4): 56.5 %
BH CV ECHO MEAS - EF(TEICH): 35 %
BH CV ECHO MEAS - ESV(CUBED): 13.6 ML
BH CV ECHO MEAS - ESV(MOD-SP4): 10 ML
BH CV ECHO MEAS - ESV(TEICH): 19.9 ML
BH CV ECHO MEAS - FS: 15.9 %
BH CV ECHO MEAS - IVS/LVPW: 1
BH CV ECHO MEAS - IVSD: 1.5 CM
BH CV ECHO MEAS - LA DIMENSION: 3.1 CM
BH CV ECHO MEAS - LA/AO: 0.97
BH CV ECHO MEAS - LV DIASTOLIC VOL/BSA (35-75): 16.2 ML/M^2
BH CV ECHO MEAS - LV MASS(C)D: 144.8 GRAMS
BH CV ECHO MEAS - LV MASS(C)DI: 101.7 GRAMS/M^2
BH CV ECHO MEAS - LV SYSTOLIC VOL/BSA (12-30): 7 ML/M^2
BH CV ECHO MEAS - LVIDD: 2.8 CM
BH CV ECHO MEAS - LVIDS: 2.4 CM
BH CV ECHO MEAS - LVLD AP4: 5.1 CM
BH CV ECHO MEAS - LVLS AP4: 5.1 CM
BH CV ECHO MEAS - LVOT AREA (M): 3.1 CM^2
BH CV ECHO MEAS - LVOT AREA: 3.1 CM^2
BH CV ECHO MEAS - LVOT DIAM: 2 CM
BH CV ECHO MEAS - LVPWD: 1.5 CM
BH CV ECHO MEAS - MV A MAX VEL: 46.4 CM/SEC
BH CV ECHO MEAS - MV E MAX VEL: 87.4 CM/SEC
BH CV ECHO MEAS - MV E/A: 1.9
BH CV ECHO MEAS - PA ACC SLOPE: 706.8 CM/SEC^2
BH CV ECHO MEAS - PA ACC TIME: 0.11 SEC
BH CV ECHO MEAS - PA PR(ACCEL): 31.5 MMHG
BH CV ECHO MEAS - SI(AO): 90.4 ML/M^2
BH CV ECHO MEAS - SI(CUBED): 6.5 ML/M^2
BH CV ECHO MEAS - SI(MOD-SP4): 9.1 ML/M^2
BH CV ECHO MEAS - SI(TEICH): 7.5 ML/M^2
BH CV ECHO MEAS - SV(AO): 128.7 ML
BH CV ECHO MEAS - SV(CUBED): 9.3 ML
BH CV ECHO MEAS - SV(MOD-SP4): 13 ML
BH CV ECHO MEAS - SV(TEICH): 10.7 ML
MAXIMAL PREDICTED HEART RATE: 149 BPM
STRESS TARGET HR: 127 BPM

## 2019-11-03 PROCEDURE — 99239 HOSP IP/OBS DSCHRG MGMT >30: CPT | Performed by: INTERNAL MEDICINE

## 2019-11-03 PROCEDURE — 99232 SBSQ HOSP IP/OBS MODERATE 35: CPT | Performed by: INTERNAL MEDICINE

## 2019-11-03 PROCEDURE — 25010000002 FUROSEMIDE PER 20 MG: Performed by: INTERNAL MEDICINE

## 2019-11-03 RX ORDER — FUROSEMIDE 20 MG/1
20 TABLET ORAL DAILY
Qty: 30 TABLET | Refills: 0 | Status: SHIPPED | OUTPATIENT
Start: 2019-11-03 | End: 2019-12-03

## 2019-11-03 RX ORDER — AMIODARONE HYDROCHLORIDE 200 MG/1
200 TABLET ORAL DAILY
Qty: 30 TABLET | Refills: 0 | Status: SHIPPED | OUTPATIENT
Start: 2019-11-03 | End: 2019-11-18

## 2019-11-03 RX ORDER — CARVEDILOL 12.5 MG/1
12.5 TABLET ORAL 2 TIMES DAILY WITH MEALS
Qty: 60 TABLET | Refills: 0 | Status: SHIPPED | OUTPATIENT
Start: 2019-11-03 | End: 2019-12-03

## 2019-11-03 RX ADMIN — Medication 1000 MCG: at 10:09

## 2019-11-03 RX ADMIN — AMIODARONE HYDROCHLORIDE 200 MG: 200 TABLET ORAL at 10:07

## 2019-11-03 RX ADMIN — SODIUM CHLORIDE, PRESERVATIVE FREE 10 ML: 5 INJECTION INTRAVENOUS at 10:10

## 2019-11-03 RX ADMIN — FUROSEMIDE 20 MG: 10 INJECTION, SOLUTION INTRAMUSCULAR; INTRAVENOUS at 10:09

## 2019-11-03 RX ADMIN — LEVETIRACETAM 1000 MG: 500 TABLET, FILM COATED ORAL at 10:06

## 2019-11-03 RX ADMIN — Medication 1 TABLET: at 10:06

## 2019-11-03 RX ADMIN — PHENOBARBITAL 32.4 MG: 32.4 TABLET ORAL at 05:49

## 2019-11-03 RX ADMIN — CETIRIZINE HYDROCHLORIDE 5 MG: 10 TABLET, FILM COATED ORAL at 10:08

## 2019-11-03 RX ADMIN — APIXABAN 5 MG: 5 TABLET, FILM COATED ORAL at 10:09

## 2019-11-03 RX ADMIN — CARVEDILOL 12.5 MG: 6.25 TABLET, FILM COATED ORAL at 10:08

## 2019-11-03 RX ADMIN — PANTOPRAZOLE SODIUM 40 MG: 40 TABLET, DELAYED RELEASE ORAL at 10:07

## 2019-11-03 RX ADMIN — SENNOSIDES 1 TABLET: 8.6 TABLET, FILM COATED ORAL at 10:07

## 2019-11-03 RX ADMIN — FOLIC ACID 1 MG: 1 TABLET ORAL at 10:09

## 2019-11-04 ENCOUNTER — READMISSION MANAGEMENT (OUTPATIENT)
Dept: CALL CENTER | Facility: HOSPITAL | Age: 74
End: 2019-11-04

## 2019-11-04 NOTE — OUTREACH NOTE
Prep Survey      Responses   Facility patient discharged from?  [place]   Is patient eligible?  Yes   Discharge diagnosis  Atrial flutter   Does the patient have one of the following disease processes/diagnoses(primary or secondary)?  CHF   Does the patient have Home health ordered?  Yes   What is the Home health agency?   [facility]     Is there a DME ordered?  No   Medication alerts for this patient  Eliquis and Lasix    Prep survey completed?  Yes          Makayla Loyola RN

## 2019-11-05 ENCOUNTER — READMISSION MANAGEMENT (OUTPATIENT)
Dept: CALL CENTER | Facility: HOSPITAL | Age: 74
End: 2019-11-05

## 2019-11-05 LAB
BACTERIA SPEC AEROBE CULT: NORMAL
BACTERIA SPEC AEROBE CULT: NORMAL

## 2019-11-05 NOTE — OUTREACH NOTE
CHF Week 1 Survey      Responses   Facility patient discharged from?  [facility]   Does the patient have one of the following disease processes/diagnoses(primary or secondary)?  CHF   Is there a successful TCM telephone encounter documented?  No   CHF Week 1 attempt successful?  Yes   Call start time  1408   Call end time  1412   Discharge diagnosis  Atrial flutter   List who call center can speak with   Steven   Person spoke with today (if not patient) and relationship  Steven   Medication alerts for this patient  Eliquis and Lasix    Meds reviewed with patient/caregiver?  N/A   Is the patient having any side effects they believe may be caused by any medication additions or changes?  Yes   Does the patient have all medications ordered at discharge?  Yes   Is the patient taking all medications as directed (includes completed medication regime)?  Yes   Does the patient have a primary care provider?   Yes   Does the patient have an appointment with their PCP within 7 days of discharge?  Yes   Has the patient kept scheduled appointments due by today?  N/A   What is the Home health agency?   [facility]     Has home health visited the patient within 72 hours of discharge?  Yes   Psychosocial issues?  No   Did the patient receive a copy of their discharge instructions?  Yes   What is the patient's perception of their health status since discharge?  Improving   Is the patient weighing daily?  No   Does the patient have scales?  Yes   Daily weight interventions  Education provided on importance of daily weight   Is the patient able to teach back Heart Failure diet management?  No   Is the patient able to teach back Heart Failure Zones?  No   Is the patient able to teach back signs and symptoms of worsening condition? (i.e. weight gain, shortness of air, etc.)  Yes   Is the patient/caregiver able to teach back the hierarchy of who to call/visit for symptoms/problems? PCP, Specialist, Home health nurse, Urgent Care, ED,  911  Yes    CHF Week 1 call completed?  Yes          Melody Kwan RN

## 2019-11-12 ENCOUNTER — READMISSION MANAGEMENT (OUTPATIENT)
Dept: CALL CENTER | Facility: HOSPITAL | Age: 74
End: 2019-11-12

## 2019-11-12 NOTE — OUTREACH NOTE
CHF Week 2 Survey      Responses   Facility patient discharged from?  [facility]   Does the patient have one of the following disease processes/diagnoses(primary or secondary)?  CHF   Week 2 attempt successful?  Yes   Call start time  1704   Call end time  1708   Discharge diagnosis  Atrial fib/flutter, CHF   Is patient permission given to speak with other caregiver?  Yes   List who call center can speak with   Steven   Person spoke with today (if not patient) and relationship  Steven   Meds reviewed with patient/caregiver?  Yes   Is the patient taking all medications as directed (includes completed medication regime)?  Yes   Does the patient have a primary care provider?   Yes   Comments regarding PCP  Emelia Boudreaux MD PCP   Has the patient kept scheduled appointments due by today?  N/A   Psychosocial issues?  No   Did the patient receive a copy of their discharge instructions?  Yes   Nursing interventions  Reviewed instructions with patient   What is the patient's perception of their health status since discharge?  Improving   Is the patient able to teach back signs and symptoms of worsening condition? (i.e. weight gain, shortness of air, etc.)  Yes   Is the patient/caregiver able to teach back the hierarchy of who to call/visit for symptoms/problems? PCP, Specialist, Home health nurse, Urgent Care, ED, 911  Yes   CHF Week 2 call completed?  Yes          Marycruz Blount RN

## 2019-11-18 ENCOUNTER — OFFICE VISIT (OUTPATIENT)
Dept: CARDIOLOGY | Facility: CLINIC | Age: 74
End: 2019-11-18

## 2019-11-18 VITALS
HEIGHT: 59 IN | SYSTOLIC BLOOD PRESSURE: 93 MMHG | HEART RATE: 76 BPM | WEIGHT: 112 LBS | BODY MASS INDEX: 22.58 KG/M2 | OXYGEN SATURATION: 95 % | DIASTOLIC BLOOD PRESSURE: 61 MMHG

## 2019-11-18 DIAGNOSIS — IMO0002 ATRIAL FIBRILLATION/FLUTTER: Primary | ICD-10-CM

## 2019-11-18 PROCEDURE — 93000 ELECTROCARDIOGRAM COMPLETE: CPT | Performed by: NURSE PRACTITIONER

## 2019-11-18 PROCEDURE — 99213 OFFICE O/P EST LOW 20 MIN: CPT | Performed by: NURSE PRACTITIONER

## 2019-11-18 RX ORDER — AMIODARONE HYDROCHLORIDE 100 MG/1
100 TABLET ORAL DAILY
Qty: 30 TABLET | Refills: 11 | Status: SHIPPED | OUTPATIENT
Start: 2019-11-18 | End: 2020-02-20

## 2019-11-18 NOTE — PROGRESS NOTES
Subjective     Chief Complaint: Atrial Fibrillation    History of Present Illness   Maribel Staton is a 74 y.o. female who presents with a past medical history significant for seizure disorder, hypertension, dementia and hyperlipidemia.  She is wheelchair-bound due to frequent falls.  She recently had a hospitalization for atrial fibrillation/flutter.  She is here today for follow-up.    Ms. Staton denies chest pain, palpitations and lower extremity swelling.  She does report fatigue and states that she bruises easily however she denies bright red blood per rectum or black tarry stools.      Current Outpatient Medications:   •  apixaban (ELIQUIS) 5 MG tablet tablet, Take 1 tablet by mouth Every 12 (Twelve) Hours for 30 days., Disp: 60 tablet, Rfl: 0  •  atorvastatin (LIPITOR) 40 MG tablet, Take 1 tablet by mouth Every Night., Disp: 30 tablet, Rfl: 0  •  calcium carbonate-vitamin d 600-400 MG-UNIT per tablet, Take 1 tablet by mouth 2 (Two) Times a Day., Disp: , Rfl:   •  carvedilol (COREG) 12.5 MG tablet, Take 1 tablet by mouth 2 (Two) Times a Day With Meals for 30 days., Disp: 60 tablet, Rfl: 0  •  clonazePAM (KlonoPIN) 1 MG tablet, Take 0.5 mg by mouth 2 (Two) Times a Day As Needed for Anxiety or Seizures., Disp: , Rfl:   •  donepezil (ARICEPT) 5 MG tablet, Take 5 mg by mouth Every Night., Disp: , Rfl:   •  folic acid (FOLVITE) 1 MG tablet, Take 1 mg by mouth Daily., Disp: , Rfl:   •  furosemide (LASIX) 20 MG tablet, Take 1 tablet by mouth Daily for 30 days., Disp: 30 tablet, Rfl: 0  •  HYDROcodone-acetaminophen (NORCO)  MG per tablet, Take 0.5 tablets by mouth Every 8 (Eight) Hours As Needed for Moderate Pain ., Disp: , Rfl:   •  levETIRAcetam (KEPPRA) 1000 MG tablet, Take 1,000 mg by mouth 2 (Two) Times a Day., Disp: , Rfl:   •  loratadine (CLARITIN) 10 MG tablet, Take 10 mg by mouth Daily., Disp: , Rfl:   •  montelukast (SINGULAIR) 10 MG tablet, Take 10 mg by mouth Every Night., Disp: , Rfl:   •   "pantoprazole (PROTONIX) 40 MG EC tablet, Take 40 mg by mouth 2 (Two) Times a Day., Disp: , Rfl:   •  PHENobarbital (LUMINAL) 32.4 MG tablet, Take 32.4 mg by mouth 3 (Three) Times a Day., Disp: , Rfl:   •  senna (SENOKOT) 8.6 MG tablet tablet, Take 1 tablet by mouth 2 (Two) Times a Day., Disp: , Rfl:   •  vitamin B-12 (CYANOCOBALAMIN) 1000 MCG tablet, Take 1,000 mcg by mouth Daily., Disp: , Rfl:   •  amiodarone (PACERONE) 100 MG tablet, Take 1 tablet by mouth Daily., Disp: 30 tablet, Rfl: 11     Current outpatient and discharge medications have been reconciled for the patient.  Reviewed by: DI Harris    On this date:  The following portions of the patient's history were reviewed and updated as appropriate: allergies, current medications, past family history, past medical history, past social history, past surgical history and problem list.    Review of Systems   Constitution: Positive for malaise/fatigue. Negative for decreased appetite, weakness, weight gain and weight loss.   Cardiovascular: Negative for chest pain, claudication, dyspnea on exertion, irregular heartbeat, leg swelling, near-syncope, palpitations, paroxysmal nocturnal dyspnea and syncope.   Respiratory: Negative for cough and shortness of breath.    Hematologic/Lymphatic: Bruises/bleeds easily.   Neurological: Negative for dizziness and light-headedness.         Objective     BP 93/61 (BP Location: Left arm, Patient Position: Sitting, Cuff Size: Adult)   Pulse 76   Ht 149.9 cm (59\")   Wt 50.8 kg (112 lb)   SpO2 95%   BMI 22.62 kg/m²     Physical Exam      ECG 12 Lead  Date/Time: 11/19/2019 5:21 PM  Performed by: Paulette Castillo APRN  Authorized by: Paulette Castillo APRN   Comparison: compared with previous ECG from 1/11/2019  Comparison to previous ECG: Atrial tachycardia with varying degrees of AV block, nonspecific ST and T wave abnormality  Rhythm: sinus arrhythmia  Rate: normal  BPM: 76  Comments:             11/1/2019 " transthoracic echocardiogram  Interpretation Summary     · Left ventricular wall thickness is consistent with mild concentric hypertrophy.  · Left ventricular systolic function is low normal.  · Normal. Estimated EF appears to be in the range of 51 - 55%. (It was 61-65% in 6/5/2018 )  · Left ventricular diastolic dysfunction.  · Left atrial cavity size is mildly dilated.  · The aortic valve leaflets showed mild calcification. No evidence of aortic stenosis or regurgitation.  · Mild mitral valve regurgitation is present  · The tricuspid valve is normal. No evidence of tricuspid valve stenosis is present. No tricuspid valve regurgitation is present.  · The pulmonic valve is not well visualized.  · There is no evidence of pericardial effusion.  · The study is technically difficult for diagnosis.         Assessment/Plan       Maribel was seen today for atrial fibrillation.    Diagnoses and all orders for this visit:    Atrial fibrillation/flutter (CMS/HCC)     Patient instructed to continue amiodarone at 200 mg daily until her prescription medication runs out.  At that time she will replace it with amiodarone 100 mg daily.  Her  has been made aware of this change in her medications.     The patient's CHADsVASc score is at least 3 for hypertension, age, and sex.  Continue Eliquis for stroke prophylaxis.      Return in about 3 months (around 2/18/2020).      DI Sandoval

## 2019-11-18 NOTE — PATIENT INSTRUCTIONS
Steps to Quit Smoking    Smoking tobacco can be bad for your health. It can also affect almost every organ in your body. Smoking puts you and people around you at risk for many serious long-lasting (chronic) diseases. Quitting smoking is hard, but it is one of the best things that you can do for your health. It is never too late to quit.  What are the benefits of quitting smoking?  When you quit smoking, you lower your risk for getting serious diseases and conditions. They can include:  · Lung cancer or lung disease.  · Heart disease.  · Stroke.  · Heart attack.  · Not being able to have children (infertility).  · Weak bones (osteoporosis) and broken bones (fractures).     If you have coughing, wheezing, and shortness of breath, those symptoms may get better when you quit. You may also get sick less often. If you are pregnant, quitting smoking can help to lower your chances of having a baby of low birth weight.  What can I do to help me quit smoking?  Talk with your doctor about what can help you quit smoking. Some things you can do (strategies) include:  · Quitting smoking totally, instead of slowly cutting back how much you smoke over a period of time.  · Going to in-person counseling. You are more likely to quit if you go to many counseling sessions.  · Using resources and support systems, such as:  ? Online chats with a counselor.  ? Phone quitlines.  ? Printed self-help materials.  ? Support groups or group counseling.  ? Text messaging programs.  ? Mobile phone apps or applications.  · Taking medicines. Some of these medicines may have nicotine in them. If you are pregnant or breastfeeding, do not take any medicines to quit smoking unless your doctor says it is okay. Talk with your doctor about counseling or other things that can help you.     Talk with your doctor about using more than one strategy at the same time, such as taking medicines while you are also going to in-person counseling. This can help make  quitting easier.  What things can I do to make it easier to quit?  Quitting smoking might feel very hard at first, but there is a lot that you can do to make it easier. Take these steps:  · Talk to your family and friends. Ask them to support and encourage you.  · Call phone quitlines, reach out to support groups, or work with a counselor.  · Ask people who smoke to not smoke around you.  · Avoid places that make you want (trigger) to smoke, such as:  ? Bars.  ? Parties.  ? Smoke-break areas at work.  · Spend time with people who do not smoke.  · Lower the stress in your life. Stress can make you want to smoke. Try these things to help your stress:  ? Getting regular exercise.  ? Deep-breathing exercises.  ? Yoga.  ? Meditating.  ? Doing a body scan. To do this, close your eyes, focus on one area of your body at a time from head to toe, and notice which parts of your body are tense. Try to relax the muscles in those areas.  · Download or buy apps on your mobile phone or tablet that can help you stick to your quit plan. There are many free apps, such as QuitGuide from the CDC (Centers for Disease Control and Prevention). You can find more support from smokefree.gov and other websites.     This information is not intended to replace advice given to you by your health care provider. Make sure you discuss any questions you have with your health care provider.  Document Released: 10/14/2010 Document Revised: 08/15/2017 Document Reviewed: 05/03/2016  e-SENS Interactive Patient Education © 2019 [employer]

## 2019-11-19 ENCOUNTER — READMISSION MANAGEMENT (OUTPATIENT)
Dept: CALL CENTER | Facility: HOSPITAL | Age: 74
End: 2019-11-19

## 2019-11-19 NOTE — OUTREACH NOTE
CHF Week 3 Survey      Responses   Facility patient discharged from?  [facility]   Does the patient have one of the following disease processes/diagnoses(primary or secondary)?  CHF   Week 3 attempt successful?  No   Unsuccessful attempts  Attempt 1          Kathy Carney RN

## 2019-11-20 ENCOUNTER — READMISSION MANAGEMENT (OUTPATIENT)
Dept: CALL CENTER | Facility: HOSPITAL | Age: 74
End: 2019-11-20

## 2019-11-20 NOTE — OUTREACH NOTE
CHF Week 3 Survey      Responses   Facility patient discharged from?  [facility]   Does the patient have one of the following disease processes/diagnoses(primary or secondary)?  CHF   Week 3 attempt successful?  No   Unsuccessful attempts  Attempt 2          Rylee Hough RN

## 2020-01-21 ENCOUNTER — TRANSCRIBE ORDERS (OUTPATIENT)
Dept: ADMINISTRATIVE | Facility: HOSPITAL | Age: 75
End: 2020-01-21

## 2020-01-21 DIAGNOSIS — Z87.891 PERSONAL HISTORY OF NICOTINE DEPENDENCE: Primary | ICD-10-CM

## 2020-01-28 ENCOUNTER — HOSPITAL ENCOUNTER (OUTPATIENT)
Dept: CT IMAGING | Facility: HOSPITAL | Age: 75
Discharge: HOME | End: 2020-01-28
Admitting: INTERNAL MEDICINE

## 2020-01-28 DIAGNOSIS — Z87.891 PERSONAL HISTORY OF NICOTINE DEPENDENCE: ICD-10-CM

## 2020-01-28 PROCEDURE — G0297 LDCT FOR LUNG CA SCREEN: HCPCS

## 2020-01-28 PROCEDURE — G0297 LDCT FOR LUNG CA SCREEN: HCPCS | Performed by: RADIOLOGY

## 2020-02-18 ENCOUNTER — HOSPITAL ENCOUNTER (OUTPATIENT)
Dept: MAMMOGRAPHY | Facility: HOSPITAL | Age: 75
Discharge: HOME | End: 2020-02-18
Admitting: INTERNAL MEDICINE

## 2020-02-18 DIAGNOSIS — Z12.31 VISIT FOR SCREENING MAMMOGRAM: ICD-10-CM

## 2020-02-18 PROCEDURE — 77063 BREAST TOMOSYNTHESIS BI: CPT | Performed by: RADIOLOGY

## 2020-02-18 PROCEDURE — 77063 BREAST TOMOSYNTHESIS BI: CPT

## 2020-02-18 PROCEDURE — 77067 SCR MAMMO BI INCL CAD: CPT | Performed by: RADIOLOGY

## 2020-02-18 PROCEDURE — 77067 SCR MAMMO BI INCL CAD: CPT

## 2020-02-20 ENCOUNTER — OFFICE VISIT (OUTPATIENT)
Dept: CARDIOLOGY | Facility: CLINIC | Age: 75
End: 2020-02-20

## 2020-02-20 VITALS
WEIGHT: 112 LBS | SYSTOLIC BLOOD PRESSURE: 123 MMHG | OXYGEN SATURATION: 95 % | HEIGHT: 59 IN | HEART RATE: 71 BPM | DIASTOLIC BLOOD PRESSURE: 74 MMHG | BODY MASS INDEX: 22.58 KG/M2

## 2020-02-20 DIAGNOSIS — E78.2 MIXED HYPERLIPIDEMIA: ICD-10-CM

## 2020-02-20 DIAGNOSIS — I10 ESSENTIAL HYPERTENSION: ICD-10-CM

## 2020-02-20 DIAGNOSIS — IMO0002 ATRIAL FIBRILLATION/FLUTTER: Primary | ICD-10-CM

## 2020-02-20 PROBLEM — E78.5 HYPERLIPIDEMIA: Status: ACTIVE | Noted: 2020-02-20

## 2020-02-20 PROCEDURE — 99213 OFFICE O/P EST LOW 20 MIN: CPT | Performed by: NURSE PRACTITIONER

## 2020-02-20 PROCEDURE — 93000 ELECTROCARDIOGRAM COMPLETE: CPT | Performed by: NURSE PRACTITIONER

## 2020-02-20 NOTE — PROGRESS NOTES
Subjective     Chief Complaint: Atrial Fibrillation    History of Present Illness   Maribel Staton is a 74 y.o. female who presents with a past medical history significant for paroxysmal atrial fibrillation/flutter, seizure disorder, status post VNS implant, hypertension, dementia and hyperlipidemia.  She is wheelchair-bound due to frequent falls.  She presents today for follow-up.    Ms. Staton is accompanied by her  for today's visit.  He denies chest pain and palpitations.  She denies irregular heartbeats.  She does report some cough and shortness of breath due to upper respiratory infection.  She denies any bleeding issues, no bright red blood per rectum or black tarry stools.      Current Outpatient Medications:   •  apixaban (ELIQUIS) 5 MG tablet tablet, Take 5 mg by mouth 2 (Two) Times a Day., Disp: , Rfl:   •  atorvastatin (LIPITOR) 40 MG tablet, Take 1 tablet by mouth Every Night., Disp: 30 tablet, Rfl: 0  •  calcium carbonate-vitamin d 600-400 MG-UNIT per tablet, Take 1 tablet by mouth 2 (Two) Times a Day., Disp: , Rfl:   •  clonazePAM (KlonoPIN) 1 MG tablet, Take 0.5 mg by mouth 2 (Two) Times a Day As Needed for Anxiety or Seizures., Disp: , Rfl:   •  donepezil (ARICEPT) 5 MG tablet, Take 5 mg by mouth Every Night., Disp: , Rfl:   •  folic acid (FOLVITE) 1 MG tablet, Take 1 mg by mouth Daily., Disp: , Rfl:   •  HYDROcodone-acetaminophen (NORCO)  MG per tablet, Take 0.5 tablets by mouth Every 8 (Eight) Hours As Needed for Moderate Pain ., Disp: , Rfl:   •  levETIRAcetam (KEPPRA) 1000 MG tablet, Take 1,000 mg by mouth 2 (Two) Times a Day., Disp: , Rfl:   •  loratadine (CLARITIN) 10 MG tablet, Take 10 mg by mouth Daily., Disp: , Rfl:   •  montelukast (SINGULAIR) 10 MG tablet, Take 10 mg by mouth Every Night., Disp: , Rfl:   •  pantoprazole (PROTONIX) 40 MG EC tablet, Take 40 mg by mouth 2 (Two) Times a Day., Disp: , Rfl:   •  PHENobarbital (LUMINAL) 32.4 MG tablet, Take 32.4 mg by mouth 3  "(Three) Times a Day., Disp: , Rfl:   •  senna (SENOKOT) 8.6 MG tablet tablet, Take 1 tablet by mouth 2 (Two) Times a Day., Disp: , Rfl:   •  vitamin B-12 (CYANOCOBALAMIN) 1000 MCG tablet, Take 1,000 mcg by mouth Daily., Disp: , Rfl:      On this date:  The following portions of the patient's history were reviewed and updated as appropriate: allergies, current medications, past family history, past medical history, past social history, past surgical history and problem list.    Review of Systems   Constitution: Negative for malaise/fatigue.   Cardiovascular: Negative for chest pain, dyspnea on exertion, irregular heartbeat, leg swelling, near-syncope, orthopnea, palpitations, paroxysmal nocturnal dyspnea and syncope.   Respiratory: Positive for cough and shortness of breath.    Hematologic/Lymphatic: Does not bruise/bleed easily.   Neurological: Negative for dizziness, light-headedness and weakness.         Objective     /74 (BP Location: Left arm)   Pulse 71   Ht 149.9 cm (59\")   Wt 50.8 kg (112 lb)   SpO2 95%   BMI 22.62 kg/m²     Physical Exam   Constitutional: She is oriented to person, place, and time. She appears well-developed and well-nourished.   HENT:   Head: Normocephalic and atraumatic.   Eyes: Pupils are equal, round, and reactive to light.   Neck: No JVD present.   Cardiovascular: Normal rate, regular rhythm and intact distal pulses. Exam reveals no gallop and no friction rub.   No murmur heard.  Pulmonary/Chest: Effort normal and breath sounds normal. No respiratory distress. She has no wheezes. She has no rales.   Neurological: She is alert and oriented to person, place, and time.   Skin: Skin is warm and dry.   Psychiatric: She has a normal mood and affect.   Vitals reviewed.      Procedures      Assessment/Plan       Maribel was seen today for atrial fibrillation.    Diagnoses and all orders for this visit:    Atrial fibrillation/flutter (CMS/HCC)    Essential hypertension    Mixed " hyperlipidemia          Plan of care was reviewed.  Medication changes were made as noted below.  Patient agreed with plan of care.    Will discontinue amiodarone today.  Continue Eliquis twice daily for stroke prophylaxis in the presence of paroxysmal atrial fibrillation/flutter.    Hypertension is controlled.    With regard to hyperlipidemia, this is followed by her primary care provider.    Return in about 6 months (around 8/20/2020).      DI Sandoval

## 2020-10-21 RX ORDER — AMIODARONE HYDROCHLORIDE 200 MG/1
TABLET ORAL
Qty: 15 TABLET | Refills: 0 | OUTPATIENT
Start: 2020-10-21

## 2021-05-05 ENCOUNTER — TRANSCRIBE ORDERS (OUTPATIENT)
Dept: ADMINISTRATIVE | Facility: HOSPITAL | Age: 76
End: 2021-05-05

## 2021-05-05 DIAGNOSIS — Z87.891 PERSONAL HISTORY OF NICOTINE DEPENDENCE: Primary | ICD-10-CM

## 2021-05-11 ENCOUNTER — HOSPITAL ENCOUNTER (OUTPATIENT)
Dept: BONE DENSITY | Facility: HOSPITAL | Age: 76
Discharge: HOME | End: 2021-05-11
Admitting: INTERNAL MEDICINE

## 2021-05-11 DIAGNOSIS — M81.0 OSTEOPOROSIS, POST-MENOPAUSAL: ICD-10-CM

## 2021-05-11 PROCEDURE — 77080 DXA BONE DENSITY AXIAL: CPT | Performed by: RADIOLOGY

## 2021-05-11 PROCEDURE — 77080 DXA BONE DENSITY AXIAL: CPT

## 2021-05-18 ENCOUNTER — HOSPITAL ENCOUNTER (OUTPATIENT)
Dept: CT IMAGING | Facility: HOSPITAL | Age: 76
Discharge: HOME | End: 2021-05-18
Admitting: INTERNAL MEDICINE

## 2021-05-18 DIAGNOSIS — Z87.891 PERSONAL HISTORY OF NICOTINE DEPENDENCE: ICD-10-CM

## 2021-05-18 PROCEDURE — 71271 CT THORAX LUNG CANCER SCR C-: CPT

## 2021-05-18 PROCEDURE — 71271 CT THORAX LUNG CANCER SCR C-: CPT | Performed by: RADIOLOGY

## 2021-06-01 ENCOUNTER — TRANSCRIBE ORDERS (OUTPATIENT)
Dept: INFUSION THERAPY | Facility: HOSPITAL | Age: 76
End: 2021-06-01

## 2021-06-01 DIAGNOSIS — M81.0 SENILE OSTEOPOROSIS: Primary | ICD-10-CM

## 2021-06-11 ENCOUNTER — HOSPITAL ENCOUNTER (OUTPATIENT)
Dept: INFUSION THERAPY | Facility: HOSPITAL | Age: 76
Discharge: HOME | End: 2021-06-11
Admitting: INTERNAL MEDICINE

## 2021-06-11 VITALS
TEMPERATURE: 97.7 F | HEART RATE: 68 BPM | DIASTOLIC BLOOD PRESSURE: 57 MMHG | RESPIRATION RATE: 18 BRPM | SYSTOLIC BLOOD PRESSURE: 99 MMHG

## 2021-06-11 DIAGNOSIS — M80.00XA OSTEOPOROSIS WITH CURRENT PATHOLOGICAL FRACTURE, UNSPECIFIED OSTEOPOROSIS TYPE, INITIAL ENCOUNTER: Primary | ICD-10-CM

## 2021-06-11 PROCEDURE — 96372 THER/PROPH/DIAG INJ SC/IM: CPT

## 2021-06-11 PROCEDURE — 25010000002 DENOSUMAB 60 MG/ML SOLUTION PREFILLED SYRINGE: Performed by: INTERNAL MEDICINE

## 2021-06-11 RX ADMIN — DENOSUMAB 60 MG: 60 INJECTION SUBCUTANEOUS at 10:11

## 2021-09-25 ENCOUNTER — HOSPITAL ENCOUNTER (EMERGENCY)
Facility: HOSPITAL | Age: 76
Discharge: HOME | End: 2021-09-25
Attending: EMERGENCY MEDICINE | Admitting: EMERGENCY MEDICINE

## 2021-09-25 ENCOUNTER — APPOINTMENT (OUTPATIENT)
Dept: GENERAL RADIOLOGY | Facility: HOSPITAL | Age: 76
End: 2021-09-25

## 2021-09-25 VITALS
DIASTOLIC BLOOD PRESSURE: 76 MMHG | RESPIRATION RATE: 18 BRPM | SYSTOLIC BLOOD PRESSURE: 137 MMHG | WEIGHT: 106 LBS | HEART RATE: 90 BPM | BODY MASS INDEX: 21.37 KG/M2 | OXYGEN SATURATION: 96 % | HEIGHT: 59 IN | TEMPERATURE: 98 F

## 2021-09-25 DIAGNOSIS — S99.921A INJURY OF RIGHT FOOT, INITIAL ENCOUNTER: Primary | ICD-10-CM

## 2021-09-25 PROCEDURE — 73630 X-RAY EXAM OF FOOT: CPT

## 2021-09-25 PROCEDURE — 99282 EMERGENCY DEPT VISIT SF MDM: CPT

## 2021-09-26 NOTE — ED NOTES
"Asked pt if she had a ride home, pt states that her  dropped her off but she \"had no idea where he is now\". Doesn't know his number to call him. Attempted number for  on pts facesheet wit no answer, called son, Cyrus, said he would try to get ahold of his dad and send him this way.      Missy Gloria RN  09/25/21 2127    "

## 2021-09-26 NOTE — ED PROVIDER NOTES
Subjective   Patient presents to ER with injury to right foot      Foot Injury  Location:  Foot  Injury: yes (hit with wheelchair)    Foot location:  R foot  Pain details:     Quality:  Aching    Severity:  Moderate    Onset quality:  Sudden    Timing:  Constant  Chronicity:  New      Review of Systems   Constitutional: Negative.    HENT: Negative.    Eyes: Negative.    Respiratory: Negative.    Cardiovascular: Negative.    Gastrointestinal: Negative.    Endocrine: Negative.    Genitourinary: Negative.    Musculoskeletal: Positive for gait problem.        Pain swelling right foot   Skin: Negative.    Allergic/Immunologic: Negative.    Hematological: Negative.    Psychiatric/Behavioral: Negative.        Past Medical History:   Diagnosis Date   • Anxiety    • Arthritis    • Asthma    • Back pain    • Chronic low back pain    • Closed displaced transverse fracture of shaft of right humerus 5/23/2019    Added automatically from request for surgery [id number]   • COPD (chronic obstructive pulmonary disease) (CMS/[facility])    • Depression    • Elevated cholesterol    • Fall 01/03/2018   • GERD (gastroesophageal reflux disease)    • Headache    • History of transfusion    • Hypertension    • Seizures (CMS/[facility])    • Sleep apnea    • Tobacco abuse        Allergies   Allergen Reactions   • Dilaudid [Hydromorphone Hcl] Delirium     Pt says her  says she is, pt says she is not   • Morphine And Related Confusion     Pt says her  says she is, but pt says she is not       Past Surgical History:   Procedure Laterality Date   • ABDOMINAL SURGERY     • APPENDECTOMY      about 15 years ago   • CARDIAC CATHETERIZATION  2012   • CATARACT EXTRACTION Bilateral    • CHOLECYSTECTOMY  about 4 years ago   • CHOLECYSTECTOMY     • COLONOSCOPY     • ENDOSCOPY     • FRACTURE SURGERY Right     arm and leg   • HIP ARTHROPLASTY Bilateral    • HYSTERECTOMY     • JOINT REPLACEMENT Right    • ORIF HIP FRACTURE Right 05/2017   • ORIF HUMERUS  FRACTURE Right 2/20/2019    Procedure: HUMERUS PROXIMAL OPEN REDUCTION INTERNAL FIXATION;  Surgeon: Elliot Costello MD;  Location: [facility] OR;  Service: Orthopedics   • ORIF HUMERUS FRACTURE Right 5/24/2019    Procedure: HUMERUS MID-SHAFT FRACTURE OPEN REDUCTION INTERNAL FIXATION;  Surgeon: Elliot Costello MD;  Location:  [facility] OR;  Service: Orthopedics   • SKIN GRAFT      1970's   • VAGUS NERVE STIMULATOR IMPLANTATION N/A 8/20/2018    Procedure: VAGUS NERVE STIMULATOR IMPLANTATION;  Surgeon: Ursula Celestin MD;  Location:  [facility] OR;  Service: General       Family History   Problem Relation Age of Onset   • Arthritis Mother    • Arthritis Father    • Cancer Father    • Alcohol abuse Sister    • Arthritis Sister    • Cancer Sister    • Alcohol abuse Brother    • Arthritis Brother    • Cancer Brother    • Early death Brother    • Breast cancer Neg Hx        Social History     Socioeconomic History   • Marital status:      Spouse name: Not on file   • Number of children: Not on file   • Years of education: Not on file   • Highest education level: Not on file   Tobacco Use   • Smoking status: Current Some Day Smoker     Packs/day: 1.00     Years: 53.00     Pack years: 53.00     Types: Cigarettes     Start date: 6/6/1965   • Smokeless tobacco: Never Used   Substance and Sexual Activity   • Alcohol use: No   • Drug use: No   • Sexual activity: Defer           Objective   Physical Exam  Vitals and nursing note reviewed.   Constitutional:       Appearance: Normal appearance.   HENT:      Head: Normocephalic.      Nose: Nose normal.      Mouth/Throat:      Mouth: Mucous membranes are moist.   Eyes:      Pupils: Pupils are equal, round, and reactive to light.   Cardiovascular:      Rate and Rhythm: Normal rate.      Pulses: Normal pulses.   Pulmonary:      Effort: Pulmonary effort is normal.   Abdominal:      General: Abdomen is flat.   Musculoskeletal:      Cervical back: Normal range of  motion.      Comments: Swollen, tender right foot, pulses good   Skin:     General: Skin is warm.      Capillary Refill: Capillary refill takes less than 2 seconds.   Neurological:      General: No focal deficit present.      Mental Status: She is alert.         Procedures           ED Course                                           MDM    Final diagnoses:   Injury of right foot, initial encounter       ED Disposition  ED Disposition     ED Disposition Condition Comment    Discharge Stable           Emelia Boudreaux MD  [address] [facility]  [id number]  [facility] [address] [phone]    Schedule an appointment as soon as possible for a visit   If symptoms worsen         Medication List      No changes were made to your prescriptions during this visit.          Pola Madsen MD  09/25/21 4902

## 2021-12-15 ENCOUNTER — TRANSCRIBE ORDERS (OUTPATIENT)
Dept: INFUSION THERAPY | Facility: HOSPITAL | Age: 76
End: 2021-12-15

## 2021-12-15 DIAGNOSIS — M81.0 SENILE OSTEOPOROSIS: Primary | ICD-10-CM

## 2022-01-11 ENCOUNTER — HOSPITAL ENCOUNTER (OUTPATIENT)
Dept: MAMMOGRAPHY | Facility: HOSPITAL | Age: 77
Discharge: HOME | End: 2022-01-11
Admitting: INTERNAL MEDICINE

## 2022-01-11 DIAGNOSIS — Z12.31 VISIT FOR SCREENING MAMMOGRAM: ICD-10-CM

## 2022-01-11 PROCEDURE — 77067 SCR MAMMO BI INCL CAD: CPT

## 2022-01-11 PROCEDURE — 77063 BREAST TOMOSYNTHESIS BI: CPT

## 2022-01-11 PROCEDURE — 77067 SCR MAMMO BI INCL CAD: CPT | Performed by: RADIOLOGY

## 2022-01-11 PROCEDURE — 77063 BREAST TOMOSYNTHESIS BI: CPT | Performed by: RADIOLOGY

## 2022-01-14 ENCOUNTER — INFUSION (OUTPATIENT)
Dept: ONCOLOGY | Facility: HOSPITAL | Age: 77
End: 2022-01-14

## 2022-01-14 VITALS
HEART RATE: 63 BPM | TEMPERATURE: 97.1 F | RESPIRATION RATE: 18 BRPM | DIASTOLIC BLOOD PRESSURE: 69 MMHG | OXYGEN SATURATION: 97 % | SYSTOLIC BLOOD PRESSURE: 134 MMHG

## 2022-01-14 DIAGNOSIS — M80.00XK AGE-RELATED OSTEOPOROSIS WITH CURRENT PATHOLOGICAL FRACTURE WITH NONUNION, SUBSEQUENT ENCOUNTER: Primary | ICD-10-CM

## 2022-01-14 PROCEDURE — 25010000002 DENOSUMAB 60 MG/ML SOLUTION PREFILLED SYRINGE: Performed by: INTERNAL MEDICINE

## 2022-01-14 PROCEDURE — 96372 THER/PROPH/DIAG INJ SC/IM: CPT

## 2022-01-14 RX ADMIN — DENOSUMAB 60 MG: 60 INJECTION SUBCUTANEOUS at 13:51

## 2022-01-14 NOTE — PATIENT INSTRUCTIONS
Denosumab injection  What is this medicine?  DENOSUMAB (den oh damian mab) slows bone breakdown. Prolia is used to treat osteoporosis in women after menopause and in men, and in people who are taking corticosteroids for 6 months or more. Xgeva is used to treat a high calcium level due to cancer and to prevent bone fractures and other bone problems caused by multiple myeloma or cancer bone metastases. Xgeva is also used to treat giant cell tumor of the bone.  This medicine may be used for other purposes; ask your health care provider or pharmacist if you have questions.  COMMON BRAND NAME(S): Prolia, XGEVA  What should I tell my health care provider before I take this medicine?  They need to know if you have any of these conditions:  · dental disease  · having surgery or tooth extraction  · infection  · kidney disease  · low levels of calcium or Vitamin D in the blood  · malnutrition  · on hemodialysis  · skin conditions or sensitivity  · thyroid or parathyroid disease  · an unusual reaction to denosumab, other medicines, foods, dyes, or preservatives  · pregnant or trying to get pregnant  · breast-feeding  How should I use this medicine?  This medicine is for injection under the skin. It is given by a health care professional in a hospital or clinic setting.  A special MedGuide will be given to you before each treatment. Be sure to read this information carefully each time.  For Prolia, talk to your pediatrician regarding the use of this medicine in children. Special care may be needed. For Xgeva, talk to your pediatrician regarding the use of this medicine in children. While this drug may be prescribed for children as young as 13 years for selected conditions, precautions do apply.  Overdosage: If you think you have taken too much of this medicine contact a poison control center or emergency room at once.  NOTE: This medicine is only for you. Do not share this medicine with others.  What if I miss a dose?  It is  important not to miss your dose. Call your doctor or health care professional if you are unable to keep an appointment.  What may interact with this medicine?  Do not take this medicine with any of the following medications:  · other medicines containing denosumab  This medicine may also interact with the following medications:  · medicines that lower your chance of fighting infection  · steroid medicines like prednisone or cortisone  This list may not describe all possible interactions. Give your health care provider a list of all the medicines, herbs, non-prescription drugs, or dietary supplements you use. Also tell them if you smoke, drink alcohol, or use illegal drugs. Some items may interact with your medicine.  What should I watch for while using this medicine?  Visit your doctor or health care professional for regular checks on your progress. Your doctor or health care professional may order blood tests and other tests to see how you are doing.  Call your doctor or health care professional for advice if you get a fever, chills or sore throat, or other symptoms of a cold or flu. Do not treat yourself. This drug may decrease your body's ability to fight infection. Try to avoid being around people who are sick.  You should make sure you get enough calcium and vitamin D while you are taking this medicine, unless your doctor tells you not to. Discuss the foods you eat and the vitamins you take with your health care professional.  See your dentist regularly. Brush and floss your teeth as directed. Before you have any dental work done, tell your dentist you are receiving this medicine.  Do not become pregnant while taking this medicine or for 5 months after stopping it. Talk with your doctor or health care professional about your birth control options while taking this medicine. Women should inform their doctor if they wish to become pregnant or think they might be pregnant. There is a potential for serious side  effects to an unborn child. Talk to your health care professional or pharmacist for more information.  What side effects may I notice from receiving this medicine?  Side effects that you should report to your doctor or health care professional as soon as possible:  · allergic reactions like skin rash, itching or hives, swelling of the face, lips, or tongue  · bone pain  · breathing problems  · dizziness  · jaw pain, especially after dental work  · redness, blistering, peeling of the skin  · signs and symptoms of infection like fever or chills; cough; sore throat; pain or trouble passing urine  · signs of low calcium like fast heartbeat, muscle cramps or muscle pain; pain, tingling, numbness in the hands or feet; seizures  · unusual bleeding or bruising  · unusually weak or tired  Side effects that usually do not require medical attention (report to your doctor or health care professional if they continue or are bothersome):  · constipation  · diarrhea  · headache  · joint pain  · loss of appetite  · muscle pain  · runny nose  · tiredness  · upset stomach  This list may not describe all possible side effects. Call your doctor for medical advice about side effects. You may report side effects to FDA at 4-453-FDA-7930.  Where should I keep my medicine?  This medicine is only given in a clinic, doctor's office, or other health care setting and will not be stored at home.  NOTE: This sheet is a summary. It may not cover all possible information. If you have questions about this medicine, talk to your doctor, pharmacist, or health care provider.  © 2021 [employer] (2019-04-26 16:10:44)

## (undated) DEVICE — CUSTOM PACK: Brand: UNBRANDED

## (undated) DEVICE — SUT SILK 3/0 RB1 K872H BX/36

## (undated) DEVICE — PAD,ABDOMINAL,8"X10",ST,LF: Brand: MEDLINE

## (undated) DEVICE — BNDG ELAS CO-FLEX SLF ADHR 4IN5YD LF STRL

## (undated) DEVICE — BIT DRL QC DIA 2.5X110MM

## (undated) DEVICE — APPL CHLORAPREP W/TINT 26ML ORNG

## (undated) DEVICE — GLV SURG TRIUMPH ORTHO W/ALOE PF LTX 8.5 STRL

## (undated) DEVICE — PAD GRND REM POLYHESIVE A/ DISP

## (undated) DEVICE — SUT PROLN 4/0 RB1 D/A 36IN 8557H

## (undated) DEVICE — DRSNG TELFA PAD NONADH STR 1S 3X8IN

## (undated) DEVICE — ANTIBACTERIAL UNDYED BRAIDED (POLYGLACTIN 910), SYNTHETIC ABSORBABLE SUTURE: Brand: COATED VICRYL

## (undated) DEVICE — KWIRE THRD TROC/TP 1.6X5X150MM NS
Type: IMPLANTABLE DEVICE | Site: HUMERUS | Status: NON-FUNCTIONAL
Removed: 2019-02-20

## (undated) DEVICE — SUT VIC 3/0 SH 27IN J416H

## (undated) DEVICE — ULTRACLEAN ACCESSORY ELECTRODE, 1 INCH COATED NEEDLE WITH EXTENDED INSULATION: Brand: ULTRACLEAN

## (undated) DEVICE — GLV SURG TRIUMPH ORTHO W/ALOE PF LTX 8 STRL

## (undated) DEVICE — ROSEBUD DISSECTORS: Brand: DEROYAL

## (undated) DEVICE — TUNNELER: Brand: CYBERONICS® TUNNELER MODEL 402

## (undated) DEVICE — SPNG GZ WOVN 4X4IN 12PLY 10/BX STRL

## (undated) DEVICE — BNDG ELAS ELITE V/CLOSE 6IN 5YD LF STRL

## (undated) DEVICE — DBD-PACK,EENT,SIRUS,PK II: Brand: MEDLINE

## (undated) DEVICE — Device

## (undated) DEVICE — PK BASIC 70

## (undated) DEVICE — UNDERCAST PADDING: Brand: DEROYAL

## (undated) DEVICE — SHEET,DRAPE,53X77,STERILE: Brand: MEDLINE

## (undated) DEVICE — BIT DRL QC DIA 3.5X110MM

## (undated) DEVICE — SCRW CORT S/TAP 3.5X22MM
Type: IMPLANTABLE DEVICE | Site: HUMERUS | Status: NON-FUNCTIONAL
Removed: 2019-05-24

## (undated) DEVICE — VESSEL LOOPS X-RAY DETECTABLE: Brand: DEROYAL

## (undated) DEVICE — PK SHLDR 70

## (undated) DEVICE — BIT DRL QC DIA 2.8X165MM NS

## (undated) DEVICE — PROXIMATE RH ROTATING HEAD SKIN STAPLERS (35 WIDE) CONTAINS 35 STAINLESS STEEL STAPLES: Brand: PROXIMATE

## (undated) DEVICE — ENCORE® LATEX MICRO SIZE 7, STERILE LATEX POWDER-FREE SURGICAL GLOVE: Brand: ENCORE

## (undated) DEVICE — HOLDER: Brand: DEROYAL

## (undated) DEVICE — ACCESSORY PACK: Brand: VNS THERAPY®  MODEL 502 ACCESSORY PACK

## (undated) DEVICE — SPLNT ORTHOGLASS 5INX15FT SN

## (undated) DEVICE — SUT SILK 0 SH 30IN K834H

## (undated) DEVICE — DRSNG WND GZ CURAD OIL EMULSION 3X16IN LF STRL

## (undated) DEVICE — BNDG ELAS ELITE V/CLOSE 3IN 5YD LF STRL

## (undated) DEVICE — DRP C/ARM W/BAND W/CLIPS 41X74IN

## (undated) DEVICE — AMD ANTIMICROBIAL NON-ADHERENT ISLAND DRESSING,0.2% POLYHEXAMETHYLENE BIGUANIDE HCI (PHMB): Brand: TELFA

## (undated) DEVICE — SUT VIC 0 CP1 27IN J267H

## (undated) DEVICE — ARM SLING: Brand: DEROYAL

## (undated) DEVICE — GII QUICKANCHOR PLUS SIZE 2 (5 METRIC) PANACRYL BRAIDED ABSORBABLE SUTURE 36 INCHES (91CM), DOUBLE ARMED WITH CP-2 NEEDLES, WITH DISPOSABLE INSERTER.
Type: IMPLANTABLE DEVICE | Site: HUMERUS | Status: NON-FUNCTIONAL
Brand: GII QUICKANCHOR PANACRYL
Removed: 2019-02-20

## (undated) DEVICE — 3M™ STERI-STRIP™ REINFORCED ADHESIVE SKIN CLOSURES, R1547, 1/2 IN X 4 IN (12 MM X 100 MM), 6 STRIPS/ENVELOPE: Brand: 3M™ STERI-STRIP™

## (undated) DEVICE — NDL HYPO ECLPS SFTY 22G 1 1/2IN

## (undated) DEVICE — SYS SKIN CLS DERMABOND PRINEO W/22CM MESH TP